# Patient Record
Sex: MALE | Race: ASIAN | Employment: OTHER | URBAN - METROPOLITAN AREA
[De-identification: names, ages, dates, MRNs, and addresses within clinical notes are randomized per-mention and may not be internally consistent; named-entity substitution may affect disease eponyms.]

---

## 2017-01-04 ENCOUNTER — HOSPITAL ENCOUNTER (INPATIENT)
Facility: HOSPITAL | Age: 82
LOS: 5 days | Discharge: NURSING, REHAB OR LONG-TERM CARE | DRG: 377 | End: 2017-01-09
Attending: EMERGENCY MEDICINE | Admitting: INTERNAL MEDICINE
Payer: MEDICARE

## 2017-01-04 ENCOUNTER — APPOINTMENT (OUTPATIENT)
Dept: GENERAL RADIOLOGY | Facility: HOSPITAL | Age: 82
DRG: 377 | End: 2017-01-04
Attending: STUDENT IN AN ORGANIZED HEALTH CARE EDUCATION/TRAINING PROGRAM
Payer: MEDICARE

## 2017-01-04 ENCOUNTER — ANESTHESIA EVENT (OUTPATIENT)
Dept: SURGERY | Facility: HOSPITAL | Age: 82
DRG: 377 | End: 2017-01-04
Payer: MEDICARE

## 2017-01-04 ENCOUNTER — APPOINTMENT (OUTPATIENT)
Dept: GENERAL RADIOLOGY | Facility: HOSPITAL | Age: 82
DRG: 377 | End: 2017-01-04
Attending: EMERGENCY MEDICINE
Payer: MEDICARE

## 2017-01-04 ENCOUNTER — SURGERY (OUTPATIENT)
Age: 82
End: 2017-01-04

## 2017-01-04 ENCOUNTER — ANESTHESIA (OUTPATIENT)
Dept: SURGERY | Facility: HOSPITAL | Age: 82
DRG: 377 | End: 2017-01-04
Payer: MEDICARE

## 2017-01-04 ENCOUNTER — APPOINTMENT (OUTPATIENT)
Dept: ULTRASOUND IMAGING | Facility: HOSPITAL | Age: 82
DRG: 377 | End: 2017-01-04
Attending: EMERGENCY MEDICINE
Payer: MEDICARE

## 2017-01-04 DIAGNOSIS — K92.2 GASTROINTESTINAL HEMORRHAGE, UNSPECIFIED GASTROINTESTINAL HEMORRHAGE TYPE: Primary | ICD-10-CM

## 2017-01-04 DIAGNOSIS — R06.00 DYSPNEA, UNSPECIFIED TYPE: ICD-10-CM

## 2017-01-04 DIAGNOSIS — D64.9 ANEMIA, UNSPECIFIED TYPE: ICD-10-CM

## 2017-01-04 PROBLEM — E87.3 RESPIRATORY ALKALOSIS: Status: ACTIVE | Noted: 2017-01-04

## 2017-01-04 PROBLEM — E87.1 HYPONATREMIA: Status: ACTIVE | Noted: 2017-01-04

## 2017-01-04 PROBLEM — E87.3 METABOLIC ALKALOSIS: Status: ACTIVE | Noted: 2017-01-04

## 2017-01-04 PROBLEM — N17.9 ACUTE RENAL FAILURE (ARF) (HCC): Status: ACTIVE | Noted: 2017-01-04

## 2017-01-04 PROBLEM — D72.829 LEUKOCYTOSIS: Status: ACTIVE | Noted: 2017-01-04

## 2017-01-04 PROBLEM — E87.2 METABOLIC ACIDOSIS: Status: ACTIVE | Noted: 2017-01-04

## 2017-01-04 PROBLEM — N17.9 ACUTE KIDNEY INJURY (HCC): Status: ACTIVE | Noted: 2017-01-04

## 2017-01-04 PROBLEM — R79.89 AZOTEMIA: Status: ACTIVE | Noted: 2017-01-04

## 2017-01-04 PROBLEM — N17.9 ACUTE KIDNEY INJURY: Status: ACTIVE | Noted: 2017-01-04

## 2017-01-04 PROBLEM — R73.9 HYPERGLYCEMIA: Status: ACTIVE | Noted: 2017-01-04

## 2017-01-04 PROBLEM — E87.20 METABOLIC ACIDOSIS: Status: ACTIVE | Noted: 2017-01-04

## 2017-01-04 PROBLEM — N17.9 ACUTE RENAL FAILURE (ARF): Status: ACTIVE | Noted: 2017-01-04

## 2017-01-04 LAB
ALBUMIN SERPL-MCNC: 2.3 G/DL (ref 3.5–4.8)
ALLENS TEST: POSITIVE
ALP LIVER SERPL-CCNC: 67 U/L (ref 45–117)
ALT SERPL-CCNC: 29 U/L (ref 17–63)
ANTIBODY SCREEN: NEGATIVE
APTT PPP: 30.4 SECONDS (ref 25–34)
ARTERIAL BLD GAS O2 SATURATION: 96 % (ref 92–100)
ARTERIAL BLOOD GAS BASE EXCESS: -6.7
ARTERIAL BLOOD GAS HCO3: 17.3 MEQ/L (ref 22–26)
ARTERIAL BLOOD GAS PCO2: 29 MM HG (ref 35–45)
ARTERIAL BLOOD GAS PH: 7.4 (ref 7.35–7.45)
ARTERIAL BLOOD GAS PO2: 121 MM HG (ref 80–105)
AST SERPL-CCNC: 23 U/L (ref 15–41)
ATRIAL RATE: 82 BPM
BAND %: 8 %
BASOPHIL % MANUAL: 0 %
BASOPHIL ABSOLUTE MANUAL: 0 X10(3) UL (ref 0–0.1)
BILIRUB SERPL-MCNC: 0.8 MG/DL (ref 0.1–2)
BUN BLD-MCNC: 95 MG/DL (ref 8–20)
CALCIUM BLD-MCNC: 6.9 MG/DL (ref 8.3–10.3)
CALCULATED O2 SATURATION: 99 % (ref 92–100)
CARBOXYHEMOGLOBIN: 2 % SAT (ref 0–3)
CHLORIDE: 100 MMOL/L (ref 101–111)
CO2: 20 MMOL/L (ref 22–32)
CREAT BLD-MCNC: 2.26 MG/DL (ref 0.7–1.3)
EOSINOPHIL % MANUAL: 1 %
EOSINOPHIL ABSOLUTE MANUAL: 0.16 X10(3) UL (ref 0–0.3)
ERYTHROCYTE [DISTWIDTH] IN BLOOD BY AUTOMATED COUNT: 13.5 % (ref 11.5–16)
GLUCOSE BLD-MCNC: 124 MG/DL (ref 70–99)
HCT VFR BLD AUTO: 24.6 % (ref 37–53)
HGB BLD-MCNC: 8 G/DL (ref 13–17)
INR BLD: 1.21 (ref 0.89–1.12)
L/M: 3 L/MIN
LYMPHOCYTE % MANUAL: 3 %
LYMPHOCYTE ABSOLUTE MANUAL: 0.48 X10(3) UL (ref 0.9–4)
M PROTEIN MFR SERPL ELPH: 5 G/DL (ref 6.1–8.3)
MCH RBC QN AUTO: 31 PG (ref 27–33.2)
MCHC RBC AUTO-ENTMCNC: 32.5 G/DL (ref 31–37)
MCV RBC AUTO: 95.3 FL (ref 80–99)
METHEMOGLOBIN: 0.6 % SAT (ref 0.4–1.5)
MONOCYTE % MANUAL: 2 %
MONOCYTE ABSOLUTE MANUAL: 0.32 X10(3) UL (ref 0.1–0.6)
MORPHOLOGY: NORMAL
MYELOCYTE %: 1 %
MYELOCYTE ABSOLUTE MANUAL: 0.16 X10(3) UL (ref ?–0.01)
NEUTROPHIL ABS PRELIM: 13.82 X10 (3) UL (ref 1.3–6.7)
NEUTROPHIL ABSOLUTE MANUAL: 14.88 X10(3) UL (ref 1.3–6.7)
NEUTROPHILS % MANUAL: 85 %
P AXIS: 64 DEGREES
P-R INTERVAL: 146 MS
PATIENT TEMPERATURE: 98.6 F
PLATELET # BLD AUTO: 354 10(3)UL (ref 150–450)
PLATELET MORPHOLOGY: NORMAL
POTASSIUM SERPL-SCNC: 4.5 MMOL/L (ref 3.6–5.1)
PRO-BETA NATRIURETIC PEPTIDE: 1108 PG/ML (ref ?–450)
PSA SERPL DL<=0.01 NG/ML-MCNC: 15.7 SECONDS (ref 12.3–14.8)
Q-T INTERVAL: 378 MS
QRS DURATION: 92 MS
QTC CALCULATION (BEZET): 441 MS
R AXIS: 31 DEGREES
RBC # BLD AUTO: 2.58 X10(6)UL (ref 3.8–5.8)
RED CELL DISTRIBUTION WIDTH-SD: 46.1 FL (ref 35.1–46.3)
RH BLOOD TYPE: POSITIVE
SODIUM SERPL-SCNC: 130 MMOL/L (ref 136–144)
T AXIS: 164 DEGREES
TOTAL CELLS COUNTED: 100
TOTAL HEMOGLOBIN: 8 G/DL (ref 12.6–17.4)
TROPONIN: <0.046 NG/ML (ref ?–0.05)
VENTRICULAR RATE: 82 BPM
WBC # BLD AUTO: 16 X10(3) UL (ref 4–13)

## 2017-01-04 PROCEDURE — 84484 ASSAY OF TROPONIN QUANT: CPT | Performed by: EMERGENCY MEDICINE

## 2017-01-04 PROCEDURE — 74000 XR ABDOMEN (KUB) (1 AP VIEW)  (CPT=74000): CPT

## 2017-01-04 PROCEDURE — 85025 COMPLETE CBC W/AUTO DIFF WBC: CPT | Performed by: EMERGENCY MEDICINE

## 2017-01-04 PROCEDURE — 86900 BLOOD TYPING SEROLOGIC ABO: CPT | Performed by: EMERGENCY MEDICINE

## 2017-01-04 PROCEDURE — 36600 WITHDRAWAL OF ARTERIAL BLOOD: CPT | Performed by: EMERGENCY MEDICINE

## 2017-01-04 PROCEDURE — 85027 COMPLETE CBC AUTOMATED: CPT | Performed by: EMERGENCY MEDICINE

## 2017-01-04 PROCEDURE — 93010 ELECTROCARDIOGRAM REPORT: CPT

## 2017-01-04 PROCEDURE — 85730 THROMBOPLASTIN TIME PARTIAL: CPT | Performed by: EMERGENCY MEDICINE

## 2017-01-04 PROCEDURE — 96361 HYDRATE IV INFUSION ADD-ON: CPT

## 2017-01-04 PROCEDURE — 0D9670Z DRAINAGE OF STOMACH WITH DRAINAGE DEVICE, VIA NATURAL OR ARTIFICIAL OPENING: ICD-10-PCS | Performed by: EMERGENCY MEDICINE

## 2017-01-04 PROCEDURE — 83050 HGB METHEMOGLOBIN QUAN: CPT | Performed by: EMERGENCY MEDICINE

## 2017-01-04 PROCEDURE — 85610 PROTHROMBIN TIME: CPT | Performed by: EMERGENCY MEDICINE

## 2017-01-04 PROCEDURE — 83880 ASSAY OF NATRIURETIC PEPTIDE: CPT | Performed by: EMERGENCY MEDICINE

## 2017-01-04 PROCEDURE — 30233N1 TRANSFUSION OF NONAUTOLOGOUS RED BLOOD CELLS INTO PERIPHERAL VEIN, PERCUTANEOUS APPROACH: ICD-10-PCS | Performed by: EMERGENCY MEDICINE

## 2017-01-04 PROCEDURE — 94640 AIRWAY INHALATION TREATMENT: CPT

## 2017-01-04 PROCEDURE — 99285 EMERGENCY DEPT VISIT HI MDM: CPT

## 2017-01-04 PROCEDURE — 86901 BLOOD TYPING SEROLOGIC RH(D): CPT | Performed by: EMERGENCY MEDICINE

## 2017-01-04 PROCEDURE — 87081 CULTURE SCREEN ONLY: CPT | Performed by: INTERNAL MEDICINE

## 2017-01-04 PROCEDURE — 87040 BLOOD CULTURE FOR BACTERIA: CPT | Performed by: EMERGENCY MEDICINE

## 2017-01-04 PROCEDURE — 93005 ELECTROCARDIOGRAM TRACING: CPT

## 2017-01-04 PROCEDURE — 96365 THER/PROPH/DIAG IV INF INIT: CPT

## 2017-01-04 PROCEDURE — 85018 HEMOGLOBIN: CPT | Performed by: EMERGENCY MEDICINE

## 2017-01-04 PROCEDURE — 74020 XR ABDOMEN, OBSTRUCTIVE SERIES (CPT=74020): CPT

## 2017-01-04 PROCEDURE — 85007 BL SMEAR W/DIFF WBC COUNT: CPT | Performed by: EMERGENCY MEDICINE

## 2017-01-04 PROCEDURE — 99291 CRITICAL CARE FIRST HOUR: CPT

## 2017-01-04 PROCEDURE — C9113 INJ PANTOPRAZOLE SODIUM, VIA: HCPCS | Performed by: EMERGENCY MEDICINE

## 2017-01-04 PROCEDURE — 86920 COMPATIBILITY TEST SPIN: CPT

## 2017-01-04 PROCEDURE — 86850 RBC ANTIBODY SCREEN: CPT | Performed by: EMERGENCY MEDICINE

## 2017-01-04 PROCEDURE — 0DJ08ZZ INSPECTION OF UPPER INTESTINAL TRACT, VIA NATURAL OR ARTIFICIAL OPENING ENDOSCOPIC: ICD-10-PCS | Performed by: STUDENT IN AN ORGANIZED HEALTH CARE EDUCATION/TRAINING PROGRAM

## 2017-01-04 PROCEDURE — 82803 BLOOD GASES ANY COMBINATION: CPT | Performed by: EMERGENCY MEDICINE

## 2017-01-04 PROCEDURE — 36415 COLL VENOUS BLD VENIPUNCTURE: CPT

## 2017-01-04 PROCEDURE — 80053 COMPREHEN METABOLIC PANEL: CPT | Performed by: EMERGENCY MEDICINE

## 2017-01-04 PROCEDURE — 93970 EXTREMITY STUDY: CPT

## 2017-01-04 PROCEDURE — 82375 ASSAY CARBOXYHB QUANT: CPT | Performed by: EMERGENCY MEDICINE

## 2017-01-04 PROCEDURE — 71010 XR CHEST AP PORTABLE  (CPT=71010): CPT

## 2017-01-04 RX ORDER — ACETAMINOPHEN AND CODEINE PHOSPHATE 300; 30 MG/1; MG/1
1 TABLET ORAL EVERY 4 HOURS PRN
Status: ON HOLD | COMMUNITY
End: 2017-01-09

## 2017-01-04 RX ORDER — CEPHALEXIN 250 MG/1
250 CAPSULE ORAL 4 TIMES DAILY
Status: ON HOLD | COMMUNITY
End: 2017-01-09

## 2017-01-04 RX ORDER — PREDNISONE 10 MG/1
30 TABLET ORAL DAILY
Status: ON HOLD | COMMUNITY
Start: 2017-01-05 | End: 2017-01-09

## 2017-01-04 RX ORDER — CARVEDILOL 6.25 MG/1
6.25 TABLET ORAL 2 TIMES DAILY WITH MEALS
Status: ON HOLD | COMMUNITY
End: 2017-05-15

## 2017-01-04 RX ORDER — METOCLOPRAMIDE HYDROCHLORIDE 5 MG/ML
10 INJECTION INTRAMUSCULAR; INTRAVENOUS ONCE
Status: COMPLETED | OUTPATIENT
Start: 2017-01-04 | End: 2017-01-04

## 2017-01-04 RX ORDER — SODIUM CHLORIDE 9 MG/ML
INJECTION, SOLUTION INTRAVENOUS CONTINUOUS
Status: DISCONTINUED | OUTPATIENT
Start: 2017-01-04 | End: 2017-01-06

## 2017-01-04 RX ORDER — IPRATROPIUM BROMIDE AND ALBUTEROL SULFATE 2.5; .5 MG/3ML; MG/3ML
3 SOLUTION RESPIRATORY (INHALATION) ONCE
Status: COMPLETED | OUTPATIENT
Start: 2017-01-04 | End: 2017-01-04

## 2017-01-04 RX ORDER — SODIUM CHLORIDE 9 MG/ML
INJECTION, SOLUTION INTRAVENOUS ONCE
Status: DISCONTINUED | OUTPATIENT
Start: 2017-01-04 | End: 2017-01-08

## 2017-01-04 RX ORDER — SODIUM CHLORIDE 9 MG/ML
INJECTION, SOLUTION INTRAVENOUS CONTINUOUS
Status: ACTIVE | OUTPATIENT
Start: 2017-01-04 | End: 2017-01-04

## 2017-01-04 RX ORDER — METHYLPREDNISOLONE SODIUM SUCCINATE 40 MG/ML
40 INJECTION, POWDER, LYOPHILIZED, FOR SOLUTION INTRAMUSCULAR; INTRAVENOUS EVERY 8 HOURS
Status: COMPLETED | OUTPATIENT
Start: 2017-01-04 | End: 2017-01-06

## 2017-01-04 RX ORDER — IPRATROPIUM BROMIDE AND ALBUTEROL SULFATE 2.5; .5 MG/3ML; MG/3ML
3 SOLUTION RESPIRATORY (INHALATION)
Status: ON HOLD | COMMUNITY
End: 2017-05-11 | Stop reason: ALTCHOICE

## 2017-01-04 RX ORDER — PRAVASTATIN SODIUM 40 MG
40 TABLET ORAL NIGHTLY
Status: ON HOLD | COMMUNITY
End: 2017-05-11 | Stop reason: ALTCHOICE

## 2017-01-04 RX ORDER — ONDANSETRON 4 MG/1
4 TABLET, ORALLY DISINTEGRATING ORAL EVERY 8 HOURS PRN
Status: ON HOLD | COMMUNITY
End: 2017-01-18

## 2017-01-04 RX ORDER — LOSARTAN POTASSIUM 100 MG/1
100 TABLET ORAL
COMMUNITY
End: 2019-02-27

## 2017-01-04 RX ORDER — BISACODYL 10 MG
10 SUPPOSITORY, RECTAL RECTAL DAILY PRN
Status: ON HOLD | COMMUNITY
End: 2017-05-11 | Stop reason: ALTCHOICE

## 2017-01-04 RX ORDER — SENNA PLUS 8.6 MG/1
1 TABLET ORAL 2 TIMES DAILY
Status: ON HOLD | COMMUNITY
End: 2017-05-11

## 2017-01-04 RX ORDER — ONDANSETRON 2 MG/ML
4 INJECTION INTRAMUSCULAR; INTRAVENOUS EVERY 4 HOURS PRN
Status: DISCONTINUED | OUTPATIENT
Start: 2017-01-04 | End: 2017-01-04

## 2017-01-04 RX ORDER — IPRATROPIUM BROMIDE AND ALBUTEROL SULFATE 2.5; .5 MG/3ML; MG/3ML
3 SOLUTION RESPIRATORY (INHALATION)
Status: DISCONTINUED | OUTPATIENT
Start: 2017-01-04 | End: 2017-01-06

## 2017-01-04 RX ORDER — ACETAMINOPHEN 325 MG/1
650 TABLET ORAL EVERY 6 HOURS PRN
Status: ON HOLD | COMMUNITY
End: 2017-05-11 | Stop reason: ALTCHOICE

## 2017-01-04 RX ORDER — PREDNISONE 10 MG/1
40 TABLET ORAL DAILY
Status: ON HOLD | COMMUNITY
Start: 2017-01-03 | End: 2017-01-09

## 2017-01-04 RX ORDER — ISOSORBIDE MONONITRATE 30 MG/1
30 TABLET, EXTENDED RELEASE ORAL DAILY
COMMUNITY
End: 2019-07-16

## 2017-01-04 RX ORDER — PREDNISONE 10 MG/1
10 TABLET ORAL DAILY
Status: ON HOLD | COMMUNITY
Start: 2017-01-07 | End: 2017-01-09

## 2017-01-04 RX ORDER — GUAIFENESIN AND DEXTROMETHORPHAN HYDROBROMIDE 100; 10 MG/5ML; MG/5ML
10 SOLUTION ORAL EVERY 6 HOURS PRN
Status: ON HOLD | COMMUNITY
End: 2017-05-11 | Stop reason: ALTCHOICE

## 2017-01-04 NOTE — ED PROVIDER NOTES
Patient Seen in: [facility] Emergency Department    History   Patient presents with:  Dyspnea CHRIS SOB (respiratory)    Stated Complaint: SOB, vomiting up blood    HPI    Patient is a pleasant 27-year-old male, who arrives via EMS for evaluation of d Inhalation Solution,  Take 3 mL by nebulization. Isosorbide Mononitrate ER 30 MG Oral Tablet 24 Hr,  Take 30 mg by mouth daily. ondansetron 4 MG Oral Tablet Dispersible,  Take 4 mg by mouth every 8 (eight) hours as needed for Nausea.    predniSONE 10 MG MG Oral Cap,  Take 160 mg by mouth every morning before breakfast.     Meloxicam (MOBIC) 7.5 MG Oral Tab,  Take 7.5 mg by mouth daily. Tiotropium Bromide Monohydrate (SPIRIVA HANDIHALER) 18 MCG Inhalation Cap,  Inhale 18 mcg into the lungs daily.        Carole Cage patient is awake, alert, and oriented x3. Cranial nerves are grossly intact. There is no gross motor or sensory deficits identified.     ED Course     Labs Reviewed   ARTERIAL BLOOD GAS - Abnormal; Notable for the following:     ABG pCO2 29 (*)     ABG pO2 following orders were created for panel order TYPE AND SCREEN.   Procedure                               Abnormality         Status                     ---------                               -----------         ------                     BLOOD TYPE, ABO AN spine. Please correlate clinically. If this is an NG tube, the tube should be further advanced into the stomach. There is elevation of the right hemidiaphragm. There is no pulmonary edema, consolidation or pleural effusion seen.  Calcified granuloma seen w involved direct patient intervention, complex decision making, and/or extensive discussions with the patient, family, and clinical staff.     Disposition and Plan     Clinical Impression:  Gastrointestinal hemorrhage, unspecified gastrointestinal hemorrhage

## 2017-01-04 NOTE — CONSULTS
[facility]  Report of Consultation    Nino Mccarty Patient Status:  Emergency    1934 MRN [id number]   Location [facility] Attending Carlos Gay MD   Hosp Day # 0 PCP Sindi Brothers MD     Reason he does not use illicit drugs. Allergies:  No Known Allergies    Medications:      Prior to Admission Medications:  acetaminophen 325 MG Oral Tab Take 325 mg by mouth every 6 (six) hours as needed for Pain.    Acetaminophen-Codeine #3 300-30 MG Oral Tab Soln Inhale 2 puffs into the lungs every 6 (six) hours as needed for Wheezing. Budesonide-Formoterol Fumarate (SYMBICORT) 160-4.5 MCG/ACT Inhalation Aerosol Inhale 2 puffs into the lungs 2 (two) times daily.    TraMADol HCl 50 MG Oral Tab Take 1 tablet (5 Vitals for the past 24 hrs:   BP Temp Temp src Pulse Resp SpO2 Height Weight   01/04/17 1617 96/55 mmHg 98.9 °F (37.2 °C) Temporal 77 18 100 % - -   01/04/17 1525 (!) 78/35 mmHg - - 75 17 97 % - -   01/04/17 1414 - - - 74 19 100 % - -   01/04/17 1354 - - - doing well  · US of LUIS MIGUEL to r/o DVT  · abx for aspiration pna   · Nebs , steroids for today - hopefully can stop tomorrow  · GI consulted   · Follow  BNP , will inform Dr Camila Frazier of patients admission  · PPI gtt   · Scope timing as per GI  Questions/concerns w

## 2017-01-05 ENCOUNTER — APPOINTMENT (OUTPATIENT)
Dept: GENERAL RADIOLOGY | Facility: HOSPITAL | Age: 82
DRG: 377 | End: 2017-01-05
Attending: INTERNAL MEDICINE
Payer: MEDICARE

## 2017-01-05 LAB
ALBUMIN SERPL-MCNC: 2.2 G/DL (ref 3.5–4.8)
ALP LIVER SERPL-CCNC: 66 U/L (ref 45–117)
ALT SERPL-CCNC: 28 U/L (ref 17–63)
AST SERPL-CCNC: 27 U/L (ref 15–41)
BAND %: 17 %
BASOPHIL % MANUAL: 0 %
BASOPHIL ABSOLUTE MANUAL: 0 X10(3) UL (ref 0–0.1)
BETA NATRIURETIC PEPTIDE: 85 PG/ML (ref 2–99)
BILIRUB SERPL-MCNC: 1.1 MG/DL (ref 0.1–2)
BUN BLD-MCNC: 105 MG/DL (ref 8–20)
CALCIUM BLD-MCNC: 6.5 MG/DL (ref 8.3–10.3)
CHLORIDE: 102 MMOL/L (ref 101–111)
CO2: 18 MMOL/L (ref 22–32)
CREAT BLD-MCNC: 2.39 MG/DL (ref 0.7–1.3)
EOSINOPHIL % MANUAL: 0 %
EOSINOPHIL ABSOLUTE MANUAL: 0 X10(3) UL (ref 0–0.3)
ERYTHROCYTE [DISTWIDTH] IN BLOOD BY AUTOMATED COUNT: 13.6 % (ref 11.5–16)
FOLATE (FOLIC ACID), SERUM: 59.4 NG/ML (ref 8.7–24)
GLUCOSE BLD-MCNC: 114 MG/DL (ref 70–99)
HAV AB SERPL IA-ACNC: >2000 PG/ML (ref 193–986)
HAV IGM SER QL: 4.3 MG/DL (ref 1.7–3)
HCT VFR BLD AUTO: 29.1 % (ref 37–53)
HEMOLYSIS: 2
HGB BLD-MCNC: 9.7 G/DL (ref 13–17)
ICTERUS: 1
INR BLD: 1.28 (ref 0.89–1.12)
IRON SATURATION: 17 % (ref 13–45)
IRON: 39 UG/DL (ref 45–182)
LIPEMIA: 1
LYMPHOCYTE % MANUAL: 2 %
LYMPHOCYTE ABSOLUTE MANUAL: 0.32 X10(3) UL (ref 0.9–4)
M PROTEIN MFR SERPL ELPH: 5.2 G/DL (ref 6.1–8.3)
MCH RBC QN AUTO: 31.6 PG (ref 27–33.2)
MCHC RBC AUTO-ENTMCNC: 33.3 G/DL (ref 31–37)
MCV RBC AUTO: 94.8 FL (ref 80–99)
MONOCYTE % MANUAL: 1 %
MONOCYTE ABSOLUTE MANUAL: 0.16 X10(3) UL (ref 0.1–0.6)
NEUTROPHIL ABS PRELIM: 14.41 X10 (3) UL (ref 1.3–6.7)
NEUTROPHIL ABSOLUTE MANUAL: 15.71 X10(3) UL (ref 1.3–6.7)
NEUTROPHILS % MANUAL: 80 %
PLATELET # BLD AUTO: 385 10(3)UL (ref 150–450)
POTASSIUM SERPL-SCNC: 4.5 MMOL/L (ref 3.6–5.1)
PSA SERPL DL<=0.01 NG/ML-MCNC: 16.4 SECONDS (ref 12.3–14.8)
RBC # BLD AUTO: 3.07 X10(6)UL (ref 3.8–5.8)
RED CELL DISTRIBUTION WIDTH-SD: 46.3 FL (ref 35.1–46.3)
SODIUM SERPL-SCNC: 133 MMOL/L (ref 136–144)
TOTAL CELLS COUNTED: 100
TOTAL IRON BINDING CAPACITY: 234 UG/DL (ref 298–536)
TRANSFERRIN: 157 MG/DL (ref 200–360)
TROPONIN: <0.046 NG/ML (ref ?–0.05)
WBC # BLD AUTO: 16.2 X10(3) UL (ref 4–13)

## 2017-01-05 PROCEDURE — 83735 ASSAY OF MAGNESIUM: CPT | Performed by: INTERNAL MEDICINE

## 2017-01-05 PROCEDURE — 97535 SELF CARE MNGMENT TRAINING: CPT

## 2017-01-05 PROCEDURE — 94640 AIRWAY INHALATION TREATMENT: CPT

## 2017-01-05 PROCEDURE — 83550 IRON BINDING TEST: CPT | Performed by: INTERNAL MEDICINE

## 2017-01-05 PROCEDURE — 84484 ASSAY OF TROPONIN QUANT: CPT | Performed by: INTERNAL MEDICINE

## 2017-01-05 PROCEDURE — 83880 ASSAY OF NATRIURETIC PEPTIDE: CPT | Performed by: INTERNAL MEDICINE

## 2017-01-05 PROCEDURE — 36430 TRANSFUSION BLD/BLD COMPNT: CPT

## 2017-01-05 PROCEDURE — 97116 GAIT TRAINING THERAPY: CPT

## 2017-01-05 PROCEDURE — 71010 XR CHEST AP PORTABLE  (CPT=71010): CPT

## 2017-01-05 PROCEDURE — 87493 C DIFF AMPLIFIED PROBE: CPT | Performed by: INTERNAL MEDICINE

## 2017-01-05 PROCEDURE — C9113 INJ PANTOPRAZOLE SODIUM, VIA: HCPCS | Performed by: STUDENT IN AN ORGANIZED HEALTH CARE EDUCATION/TRAINING PROGRAM

## 2017-01-05 PROCEDURE — 97163 PT EVAL HIGH COMPLEX 45 MIN: CPT

## 2017-01-05 PROCEDURE — 85610 PROTHROMBIN TIME: CPT | Performed by: INTERNAL MEDICINE

## 2017-01-05 PROCEDURE — 85025 COMPLETE CBC W/AUTO DIFF WBC: CPT | Performed by: INTERNAL MEDICINE

## 2017-01-05 PROCEDURE — 80053 COMPREHEN METABOLIC PANEL: CPT | Performed by: INTERNAL MEDICINE

## 2017-01-05 PROCEDURE — 82607 VITAMIN B-12: CPT | Performed by: INTERNAL MEDICINE

## 2017-01-05 PROCEDURE — 85007 BL SMEAR W/DIFF WBC COUNT: CPT | Performed by: INTERNAL MEDICINE

## 2017-01-05 PROCEDURE — 82746 ASSAY OF FOLIC ACID SERUM: CPT | Performed by: INTERNAL MEDICINE

## 2017-01-05 PROCEDURE — 83540 ASSAY OF IRON: CPT | Performed by: INTERNAL MEDICINE

## 2017-01-05 PROCEDURE — 85027 COMPLETE CBC AUTOMATED: CPT | Performed by: INTERNAL MEDICINE

## 2017-01-05 PROCEDURE — 97166 OT EVAL MOD COMPLEX 45 MIN: CPT

## 2017-01-05 RX ORDER — MORPHINE SULFATE 2 MG/ML
1 INJECTION, SOLUTION INTRAMUSCULAR; INTRAVENOUS EVERY 4 HOURS PRN
Status: DISCONTINUED | OUTPATIENT
Start: 2017-01-05 | End: 2017-01-07

## 2017-01-05 RX ORDER — PANTOPRAZOLE SODIUM 40 MG/1
40 TABLET, DELAYED RELEASE ORAL
Status: DISCONTINUED | OUTPATIENT
Start: 2017-01-06 | End: 2017-01-09

## 2017-01-05 RX ORDER — ONDANSETRON 2 MG/ML
4 INJECTION INTRAMUSCULAR; INTRAVENOUS EVERY 4 HOURS PRN
Status: DISCONTINUED | OUTPATIENT
Start: 2017-01-05 | End: 2017-01-09

## 2017-01-05 NOTE — ANESTHESIA PREPROCEDURE EVALUATION
PRE-OP EVALUATION    Patient Name: Rohit Abdullahi    Pre-op Diagnosis: Stomach disorder [K31.9]    Procedure(s):  Esophagogastroduodenoscopy with Mac    Surgeon(s) and Role:     * Syed Johnson MD - Primary    Pre-op vitals reviewed. Temp: 98. every 4 (four) hours as needed. Disp:  Rfl:    Isosorbide Mononitrate ER 30 MG Oral Tablet 24 Hr Take 30 mg by mouth daily. Disp:  Rfl:    ondansetron 4 MG Oral Tablet Dispersible Take 4 mg by mouth every 8 (eight) hours as needed for Nausea.  Disp:  Rfl: Aero Soln Inhale 2 puffs into the lungs every 6 (six) hours as needed for Wheezing. Disp:  Rfl:    Budesonide-Formoterol Fumarate (SYMBICORT) 160-4.5 MCG/ACT Inhalation Aerosol Inhale 2 puffs into the lungs 2 (two) times daily.  Disp:  Rfl:        Allergies ROM: limited Cardiovascular    Cardiovascular exam normal.         Dental  Comment: Edentulous           Pulmonary    Pulmonary exam normal.                 Other findings            ASA: 3 and emergent  Plan: MAC  NPO status verified and           Plan/ri

## 2017-01-05 NOTE — PHYSICAL THERAPY NOTE
PHYSICAL THERAPY EVALUATION - INPATIENT     Room Number: 463/463-A  Evaluation Date: 1/5/2017  Type of Evaluation: Initial  Physician Order: PT Eval and Treat    Presenting Problem: GI hemorrhage, anemia, dyspnea, R knee replacement  Reason for Therapy level  Stairs to Enter : 1  Railing: No  Stairs to Bedroom: 16  Railing: Yes    Lives With: Alone; Other (Comment) ( 4 days/wk)  Drives: Yes  Patient Owned Equipment: Rolling walker;Cane  Patient Regularly Uses: None    Prior Level of Independe Fair -  Static Standing: Fair -  Dynamic Standing: Fair -    ADDITIONAL TESTS  Additional Tests: Edema; Other (Comment)                    Other Test(s): Mercedes: 7/10 with ambulation    Edema: Deep pitting, indentation remains for a short time  Edema Location from EOB to RW with min A and stood for 1 minute, with verbal cues on hand placement. Pt then impulsively sat down at EOB. Pt sat EOB for 2 minutes and then performed transfer into standing at RW with min A.  Patient rated exertion level (Mercedes scale) with t mobility;Gait training;Patient education; Family education;Strengthening;Stair training;Transfer training;Balance training  Rehab Potential : Good  Frequency (Obs): 5x/week  Number of Visits to Meet Established Goals: 5      CURRENT GOALS    Goal #1 Patient

## 2017-01-05 NOTE — PLAN OF CARE
GASTROINTESTINAL - ADULT    • Minimal or absence of nausea and vomiting Progressing        RESPIRATORY - ADULT    • Achieves optimal ventilation and oxygenation Progressing        Assumed care of pt at 1930 consent for blood and egd obtained from son by of

## 2017-01-05 NOTE — ANESTHESIA POSTPROCEDURE EVALUATION
[facility] [place] Patient Status:  Inpatient   Age/Gender 80year old male MRN [id number]   [facility] SURGERY Attending Jacob Johnson MD   Hosp Day # 0 PCP Robert Freedman MD       Anesthesia Post-op Note    Pr

## 2017-01-05 NOTE — CONSULTS
Gastroenterology Initial Consultation  I have personally seen and examined the patient.     Patient Name: Lillian Hugh  Referring physician:  Faarz Miller  Reason for consultation: hematemesis  CC: hematemesis  HPI: Mr Sonido Wagner was brought today from rehab ondansetron HCl (ZOFRAN) injection 4 mg 4 mg Intravenous Q4H PRN   [COMPLETED] Pantoprazole Sodium (PROTONIX) 80 mg in sodium chloride 0.9 % 100 mL IVPB 80 mg Intravenous Once   [MAR Hold] ipratropium-albuterol (DUONEB) nebulizer solution 3 mL 3 mL Nebuliz ideation, or homicidal ideation           Oncologic: The patient reports on history of prior solid tumor or hematologic malignancy           ENT: The patient reports no hoarseness of voice, hearing loss, sinus congestion, tinnitus           Neurologic:  The 1406   ALT  29   AST  23     XR abdomen  NG in body of stomach, no dilation     Impression: Mr Fay Verdin is an 81 yo male with history of recent knee repair. He is on a full ASA for post-op prophylaxis.   He reports pre-surgical Hgb of 13, Hgb of 7 yesterday

## 2017-01-05 NOTE — CONSULTS
[facility]. History & Physical    Gerardo Prajapati Patient Status:  Inpatient    1934 MRN [id number]   [facility] 4SW-A Attending Mettu, Dima Mason MD   Hosp Day # 1 PCP Eliza Wright MD     History of Allergies    Home Medications:    Prescriptions prior to admission:  acetaminophen 325 MG Oral Tab Take 65 mg by mouth every 6 (six) hours as needed for Pain.    Disp:  Rfl:  Past Week at Unknown time   Acetaminophen-Codeine #3 300-30 MG Oral Tab Take 1 tab Rfl:  1/4/2017 at 0900   Tiotropium Bromide Monohydrate 18 MCG Inhalation Cap Inhale 18 mcg into the lungs daily. Disp:  Rfl:  1/4/2017 at 0900   predniSONE 10 MG Oral Tab Take 30 mg by mouth daily.  Disp:  Rfl:     [START ON 1/7/2017] predniSONE 10 MG Oral adenopathy;     thyroid:  no enlargement/tenderness/nodules; no carotid    bruit or JVD   Back:     Symmetric, no curvature, ROM normal, no CVA tenderness   Lungs:     Clear to auscultation bilaterally, respirations unlabored   Chest Wall:    No tenderness Hyponatremia    Hyperglycemia    Leukocytosis    Acute renal failure (ARF) (HCC)    Acute kidney injury (HCC)    Azotemia    Metabolic acidosis    Metabolic alkalosis    Respiratory alkalosis    Gastrointestinal hemorrhage    Anemia, unspecified type    Dy

## 2017-01-05 NOTE — OPERATIVE REPORT
EGD operative report  Patient Name: Biju Villanueva  Procedure: Esophagogastroduodenoscopy  Indication: hematemesis (per ER report)  Attending: Jan Manzanares M.D. Consent:  The risks, benefits, and alternatives were discussed with the patient / POA. portions of the stomach (the body, cardia, greater curvature, lesser curvature), there was no mucosal injury or lesion. There was no blood staining seen on the mucosa.    Duodenum: There was green fluid seen refluxing from pylorus into the stomach and ther

## 2017-01-05 NOTE — CM/SW NOTE
01/05/17 1400   CM/SW Referral Data   Referral Source Social Work (self-referral)   Reason for Referral Discharge planning   Informant Children   Pertinent Medical Hx   Primary Care Physician Name Mettu   Patient Info   Patient's Mental Status Alert;Andry

## 2017-01-05 NOTE — OCCUPATIONAL THERAPY NOTE
OCCUPATIONAL THERAPY EVALUATION - INPATIENT     Room Number: 463/463-A  Evaluation Date: 1/5/2017  Type of Evaluation: Initial  Presenting Problem: GI Hemorrhage; anemia; dyspnea; recent s/p R TKA x 2 weeks ago    Physician Order: IP Consult to [facility] cervical and lumbar spine   • Anemia 5/18/2016       Past Surgical History      Past Surgical History    HERNIA SURGERY      ANGIOGRAM      Comment 2009    COLECTOMY      OTHER SURGICAL HISTORY      Comment 6 yeara ago colonscopy with polpys    COLONOSCOPY WFL - within functional limits    SENSATION  Light touch:  intact    Communication: Able to makes needs known in [place].       Behavioral/Emotional/Social: WFL    RANGE OF MOTION AND STRENGTH ASSESSMENT  Upper extremity ROM is within functional limits care, recommendations and safety. Patient transferred supine to EOB with min A.  EOB sitting with good balance. EOB to stand with min A. Patient tolerated x 4 min in standing with min c/o SOB.   Patient educated on REBA scale of perceived exertion and wr equipment PRN  Patient will perform toileting: with supervision and with adaptive equipment PRN    Functional Transfer Goals  Patient will transfer from sit to stand:  with supervision  Patient will transfer to bedside commode:  with stand by assist    WILLIAM

## 2017-01-05 NOTE — PAYOR COMM NOTE
Attending Physician: Sabine Fuentes MD    Review Type: ADMISSION   Reviewer: Janine Sams       Date: January 5, 2017 - 10:43 AM  Payor: [address] Number: N/A  Admit date: 1/4/2017  1:42 PM   Admitted from Emergency Dept.:yes A, RN    1/4/2017 2230 Given 40 mg Intravenous Margaret MYERS RN      Metoclopramide HCl (REGLAN) injection 10 mg     Date Action Dose Route User    1/4/2017 1932 Given 10 mg Intravenous Margaret MYERS RN      morphINE sulfate (PF) 2 MG/ML injectio All other components within normal limits   PRO BETA NATRIURETIC PEPTIDE - Abnormal; Notable for the following:     Pro-Beta Natriuretic Peptide 1108 (*)     All other components within normal limits   PROTHROMBIN TIME (PT) - Abnormal; Notable for the anti-Xa units/mL. BNP (B TYPE NATRIUERTIC PEPTIDE) - Normal   TROPONIN I - Normal   CBC WITH DIFFERENTIAL WITH PLATELET    Narrative: The following orders were created for panel order CBC WITH DIFFERENTIAL WITH PLATELET.   Procedure hemorrhage, unspecified gastrointestinal hemorrhage type  (primary encounter diagnosis)  Anemia, unspecified type  Dyspnea, unspecified type    Disposition:  Admit    Present on Admission                ICD-10-CM  Noted  POA      Acute kidney injury (HCC)

## 2017-01-05 NOTE — PROGRESS NOTES
[facility]  Progress Note    Rohit Abdullahi Patient Status:  Inpatient    1934 MRN [id number]   [facility] 4SW-A Attending Syed Johnson MD   Hosp Day # 1 PCP Milo Mejia MD     Subjective:  Jarett Lovett ([facility]     Azotemia     Metabolic acidosis     Metabolic alkalosis     Respiratory alkalosis     Gastrointestinal hemorrhage     Gastrointestinal hemorrhage, unspecified gastrointestinal hemorrhage type     Anemia, unspecified type     Dyspnea, unspecified

## 2017-01-05 NOTE — PROGRESS NOTES
[facility] Pharm[facility] Note:  Renal Adjustment for Zosyn (piperacillin/tazobactam)    Syed QUEZADA Iris Unger is a 80year old male who has been prescribed Zosyn (piperacillin/tazobactam) 3.375 g IVPB every 8 hrs.   CrCl is estimated creatinine clearance is 17.8 mL/min

## 2017-01-05 NOTE — PROGRESS NOTES
Patient returned from EGD, spoke with Dr Giselle Pino about findings. Reported large quanity of fluid and food particle with no active bleed source noted.   Due to concern for possible aspiration will order PCXR for AM, encourage GARRY, RN to NT suction to clear au

## 2017-01-06 ENCOUNTER — APPOINTMENT (OUTPATIENT)
Dept: GENERAL RADIOLOGY | Facility: HOSPITAL | Age: 82
DRG: 377 | End: 2017-01-06
Attending: INTERNAL MEDICINE
Payer: MEDICARE

## 2017-01-06 LAB
ALBUMIN SERPL-MCNC: 2 G/DL (ref 3.5–4.8)
ALP LIVER SERPL-CCNC: 61 U/L (ref 45–117)
ALT SERPL-CCNC: 24 U/L (ref 17–63)
AST SERPL-CCNC: 19 U/L (ref 15–41)
BAND %: 1 %
BASOPHIL % MANUAL: 0 %
BASOPHIL ABSOLUTE MANUAL: 0 X10(3) UL (ref 0–0.1)
BILIRUB SERPL-MCNC: 1 MG/DL (ref 0.1–2)
BLOOD TYPE BARCODE: 5100
BUN BLD-MCNC: 86 MG/DL (ref 8–20)
CALCIUM BLD-MCNC: 6.4 MG/DL (ref 8.3–10.3)
CHLORIDE: 113 MMOL/L (ref 101–111)
CO2: 16 MMOL/L (ref 22–32)
CREAT BLD-MCNC: 1.85 MG/DL (ref 0.7–1.3)
EOSINOPHIL % MANUAL: 0 %
EOSINOPHIL ABSOLUTE MANUAL: 0 X10(3) UL (ref 0–0.3)
ERYTHROCYTE [DISTWIDTH] IN BLOOD BY AUTOMATED COUNT: 14.6 % (ref 11.5–16)
GLUCOSE BLD-MCNC: 169 MG/DL (ref 70–99)
HAV IGM SER QL: 4.1 MG/DL (ref 1.7–3)
HCT VFR BLD AUTO: 28.1 % (ref 37–53)
HGB BLD-MCNC: 9 G/DL (ref 13–17)
INR BLD: 1.26 (ref 0.89–1.12)
LYMPHOCYTE % MANUAL: 2 %
LYMPHOCYTE ABSOLUTE MANUAL: 0.26 X10(3) UL (ref 0.9–4)
M PROTEIN MFR SERPL ELPH: 4.7 G/DL (ref 6.1–8.3)
MCH RBC QN AUTO: 31.9 PG (ref 27–33.2)
MCHC RBC AUTO-ENTMCNC: 32 G/DL (ref 31–37)
MCV RBC AUTO: 99.6 FL (ref 80–99)
METAMYELOCYTE %: 1 %
METAMYELOCYTE ABSOLUTE MANUAL: 0.13 X10(3) UL (ref ?–0.01)
MONOCYTE % MANUAL: 3 %
MONOCYTE ABSOLUTE MANUAL: 0.4 X10(3) UL (ref 0.1–0.6)
MORPHOLOGY: NORMAL
NEUTROPHIL ABS PRELIM: 11.51 X10 (3) UL (ref 1.3–6.7)
NEUTROPHIL ABSOLUTE MANUAL: 12.41 X10(3) UL (ref 1.3–6.7)
NEUTROPHILS % MANUAL: 93 %
PHOSPHATE SERPL-MCNC: 4.1 MG/DL (ref 2.5–4.9)
PLATELET # BLD AUTO: 333 10(3)UL (ref 150–450)
PLATELET MORPHOLOGY: NORMAL
POTASSIUM SERPL-SCNC: 4 MMOL/L (ref 3.6–5.1)
PSA SERPL DL<=0.01 NG/ML-MCNC: 16.2 SECONDS (ref 12.3–14.8)
RBC # BLD AUTO: 2.82 X10(6)UL (ref 3.8–5.8)
RED CELL DISTRIBUTION WIDTH-SD: 52.3 FL (ref 35.1–46.3)
SODIUM SERPL-SCNC: 140 MMOL/L (ref 136–144)
TOTAL CELLS COUNTED: 100
WBC # BLD AUTO: 13.2 X10(3) UL (ref 4–13)

## 2017-01-06 PROCEDURE — 80053 COMPREHEN METABOLIC PANEL: CPT | Performed by: INTERNAL MEDICINE

## 2017-01-06 PROCEDURE — 83735 ASSAY OF MAGNESIUM: CPT | Performed by: INTERNAL MEDICINE

## 2017-01-06 PROCEDURE — 85025 COMPLETE CBC W/AUTO DIFF WBC: CPT | Performed by: INTERNAL MEDICINE

## 2017-01-06 PROCEDURE — 85007 BL SMEAR W/DIFF WBC COUNT: CPT | Performed by: INTERNAL MEDICINE

## 2017-01-06 PROCEDURE — 94640 AIRWAY INHALATION TREATMENT: CPT

## 2017-01-06 PROCEDURE — C9113 INJ PANTOPRAZOLE SODIUM, VIA: HCPCS | Performed by: INTERNAL MEDICINE

## 2017-01-06 PROCEDURE — 71010 XR CHEST AP PORTABLE  (CPT=71010): CPT

## 2017-01-06 PROCEDURE — 97110 THERAPEUTIC EXERCISES: CPT

## 2017-01-06 PROCEDURE — 97530 THERAPEUTIC ACTIVITIES: CPT

## 2017-01-06 PROCEDURE — 97535 SELF CARE MNGMENT TRAINING: CPT

## 2017-01-06 PROCEDURE — 84100 ASSAY OF PHOSPHORUS: CPT | Performed by: INTERNAL MEDICINE

## 2017-01-06 PROCEDURE — 85027 COMPLETE CBC AUTOMATED: CPT | Performed by: INTERNAL MEDICINE

## 2017-01-06 PROCEDURE — 85610 PROTHROMBIN TIME: CPT | Performed by: INTERNAL MEDICINE

## 2017-01-06 PROCEDURE — 94667 MNPJ CHEST WALL 1ST: CPT

## 2017-01-06 RX ORDER — IPRATROPIUM BROMIDE AND ALBUTEROL SULFATE 2.5; .5 MG/3ML; MG/3ML
3 SOLUTION RESPIRATORY (INHALATION)
Status: DISCONTINUED | OUTPATIENT
Start: 2017-01-07 | End: 2017-01-09

## 2017-01-06 RX ORDER — PREDNISONE 20 MG/1
40 TABLET ORAL 2 TIMES DAILY WITH MEALS
Status: COMPLETED | OUTPATIENT
Start: 2017-01-07 | End: 2017-01-08

## 2017-01-06 NOTE — PLAN OF CARE
GASTROINTESTINAL - ADULT    • Minimal or absence of nausea and vomiting Progressing          Impaired Activities of Daily Living    • Achieve highest/safest level of independence in self care Progressing          RESPIRATORY - ADULT    • Achieves optimal v

## 2017-01-06 NOTE — PROGRESS NOTES
PT. RECEIVED FROM ICU ACCOMPANIED BY ICU RN. PT. IS ALERT AND SPEAKS IN A HOARSE VOICE, VS STABLE. PT.S SON VISITING TO REASSURE PT. THAT THEY KNOW WHERE HE IS .PT.S RELATIVES GAVE RN LIST OF VEGETARIAN SELECTIONS FOR BREAKFAST.  PT. AND FAMILY UPDATED ON P

## 2017-01-06 NOTE — PHYSICAL THERAPY NOTE
PHYSICAL THERAPY TREATMENT NOTE - INPATIENT    Room Number: 353/353-A     Session: 1   Number of Visits to Meet Established Goals: 5    Presenting Problem: GI hemorrhage, anemia, dyspnea, R knee replacement - per chart - R TKA approx 2 weeks ago, at [facility] knee  Management Techniques: Breathing techniques;Relaxation;Repositioning    BALANCE                                                                                                                    Static Sitting: Fair  Dynamic Sitting: Fair - light in reach. RN informed of session. Mobility Guidelines updated in Pt room for nursing staff.        THERAPEUTIC EXERCISES  Lower Extremity Ankle pumps  Glut sets  Hip AB/AD  Heel slides  Quad sets     Upper Extremity  - open/close     Position S

## 2017-01-06 NOTE — OCCUPATIONAL THERAPY NOTE
OCCUPATIONAL THERAPY TREATMENT NOTE - INPATIENT     Room Number: 353/353-A  Session: 1  Number of Visits to Meet Established Goals: 7    Presenting Problem: GI Hemorrhage; anemia; dyspnea; recent s/p R TKA x 2 weeks ago    History related to current admiss SURGERY      ANGIOGRAM      Comment 2009    COLECTOMY      OTHER SURGICAL HISTORY      Comment 6 yeara ago colonscopy with polpys    COLONOSCOPY  6/30/2014    Comment Procedure: COLONOSCOPY;  Surgeon: Hugo Flores DO;  Location: [facility] ENDOSCOPY    KNEE R tolerated standing x 4 min during grooming and OFH at sink, no LOB noted. Patient returned to bed at end of session. Son present and was asked to bring in clothes for patient. Patient End of Session: In bed; With [facility] staff;Needs met;Call light within beka

## 2017-01-06 NOTE — PROGRESS NOTES
[facility]  Progress Note    Bishop Chavez Patient Status:  Inpatient    1934 MRN [id number]   [facility] 4SW-A Attending Mettu, Stephanie Hammans, MD   Hosp Day # 2 PCP MD Vinod Umana Abdominal pain     Back pain with radiation     Community acquired pneumonia     At risk for falling     Lumbar spinal stenosis     Lumbosacral radiculopathy     Nontraumatic subdural hygroma (HCC)     Acute encephalopathy     Anemia     Left lower lobe pn

## 2017-01-06 NOTE — PROGRESS NOTES
[facility]  Progress Note    Myla Euceda Patient Status:  Inpatient    1934 MRN [id number]   [facility] 4SW-A Attending Dina Johnson MD   Hosp Day # 2 PCP Gregory Kat MD     Subjective:  Myla Euceda with radiation     Community acquired pneumonia     At risk for falling     Lumbar spinal stenosis     Lumbosacral radiculopathy     Nontraumatic subdural hygroma (HCC)     Acute encephalopathy     Anemia     Left lower lobe pneumonia ([address]     Hyponatremi

## 2017-01-06 NOTE — PROGRESS NOTES
Pharmacy Note: Renal dose adjustment   Syed QUEZADA Melissa Sun was originally prescribed Zosyn 3.375 g IV every 8hr which was renally dose adjusted at the time of the original order per P&T approved renal dosing protocol. Renal function has now improved.

## 2017-01-06 NOTE — PLAN OF CARE
GASTROINTESTINAL - ADULT    • Minimal or absence of nausea and vomiting Progressing        Impaired Activities of Daily Living    • Achieve highest/safest level of independence in self care Progressing        Impaired Functional Mobility    • Achieve highe

## 2017-01-06 NOTE — CM/SW NOTE
Update sent via [facility] to Kristian Price [phone]. GREGORIO noted that pt will need nebulizer treatments when he returns. SW following.

## 2017-01-07 LAB
BASOPHILS # BLD AUTO: 0.03 X10(3) UL (ref 0–0.1)
BASOPHILS NFR BLD AUTO: 0.2 %
BUN BLD-MCNC: 64 MG/DL (ref 8–20)
CALCIUM BLD-MCNC: 6.9 MG/DL (ref 8.3–10.3)
CHLORIDE: 113 MMOL/L (ref 101–111)
CO2: 20 MMOL/L (ref 22–32)
CREAT BLD-MCNC: 1.56 MG/DL (ref 0.7–1.3)
EOSINOPHIL # BLD AUTO: 0 X10(3) UL (ref 0–0.3)
EOSINOPHIL NFR BLD AUTO: 0 %
ERYTHROCYTE [DISTWIDTH] IN BLOOD BY AUTOMATED COUNT: 14.3 % (ref 11.5–16)
GLUCOSE BLD-MCNC: 296 MG/DL (ref 70–99)
HCT VFR BLD AUTO: 24.6 % (ref 37–53)
HGB BLD-MCNC: 8.6 G/DL (ref 13–17)
IMMATURE GRANULOCYTE COUNT: 0.47 X10(3) UL (ref 0–1)
IMMATURE GRANULOCYTE RATIO %: 3.9 %
LYMPHOCYTES # BLD AUTO: 0.31 X10(3) UL (ref 0.9–4)
LYMPHOCYTES NFR BLD AUTO: 2.6 %
MCH RBC QN AUTO: 32.2 PG (ref 27–33.2)
MCHC RBC AUTO-ENTMCNC: 35 G/DL (ref 31–37)
MCV RBC AUTO: 92.1 FL (ref 80–99)
MONOCYTES # BLD AUTO: 0.56 X10(3) UL (ref 0.1–0.6)
MONOCYTES NFR BLD AUTO: 4.6 %
NEUTROPHIL ABS PRELIM: 10.75 X10 (3) UL (ref 1.3–6.7)
NEUTROPHILS # BLD AUTO: 10.75 X10(3) UL (ref 1.3–6.7)
NEUTROPHILS NFR BLD AUTO: 88.7 %
PLATELET # BLD AUTO: 341 10(3)UL (ref 150–450)
POTASSIUM SERPL-SCNC: 4.1 MMOL/L (ref 3.6–5.1)
RBC # BLD AUTO: 2.67 X10(6)UL (ref 3.8–5.8)
RED CELL DISTRIBUTION WIDTH-SD: 48 FL (ref 35.1–46.3)
SODIUM SERPL-SCNC: 141 MMOL/L (ref 136–144)
WBC # BLD AUTO: 12.1 X10(3) UL (ref 4–13)

## 2017-01-07 PROCEDURE — 97535 SELF CARE MNGMENT TRAINING: CPT

## 2017-01-07 PROCEDURE — 94640 AIRWAY INHALATION TREATMENT: CPT

## 2017-01-07 PROCEDURE — 80048 BASIC METABOLIC PNL TOTAL CA: CPT | Performed by: FAMILY MEDICINE

## 2017-01-07 PROCEDURE — 85025 COMPLETE CBC W/AUTO DIFF WBC: CPT | Performed by: INTERNAL MEDICINE

## 2017-01-07 PROCEDURE — 97110 THERAPEUTIC EXERCISES: CPT

## 2017-01-07 PROCEDURE — 97530 THERAPEUTIC ACTIVITIES: CPT

## 2017-01-07 PROCEDURE — 97116 GAIT TRAINING THERAPY: CPT

## 2017-01-07 PROCEDURE — 94664 DEMO&/EVAL PT USE INHALER: CPT

## 2017-01-07 PROCEDURE — 92610 EVALUATE SWALLOWING FUNCTION: CPT

## 2017-01-07 RX ORDER — ACETAMINOPHEN AND CODEINE PHOSPHATE 300; 30 MG/1; MG/1
1 TABLET ORAL EVERY 4 HOURS PRN
Status: DISCONTINUED | OUTPATIENT
Start: 2017-01-07 | End: 2017-01-09

## 2017-01-07 NOTE — PLAN OF CARE
GASTROINTESTINAL - ADULT    • Minimal or absence of nausea and vomiting Completed          Patient/Family Goals    • Patient/Family Long Term Goal Progressing    • Patient/Family Short Term Goal Progressing        States no nausea, vomiting.   States tolera

## 2017-01-07 NOTE — PHYSICAL THERAPY NOTE
PHYSICAL THERAPY KNEE TREATMENT NOTE - INPATIENT     Room Number: 353/353-A     Session: 2  Number of Visits to Meet Established Goals: 5    Presenting Problem: GI hemorrhage, anemia, dyspnea, R knee replacement    Problem List  Principal Problem:    Dali Repositioning    BALANCE  Static Sitting: Fair  Dynamic Sitting: Fair -  Static Standing: Fair -  Dynamic Standing: Fair -    ACTIVITY TOLERANCE  Room air  Shortness of breath    AM-PAC '6-Clicks' INPATIENT SHORT FORM - BASIC MOBILITY  How much difficulty tolerance on RLE, forward head and kyphotic posture. Pt used toilet during session, cues for proper use of walker during turning and sitting down. Pt tends to place walker on the side during turning.       Exercises AM Session    Ankle Pumps 20 reps    Quad

## 2017-01-07 NOTE — PROGRESS NOTES
[facility]  Progress Note    Pili Johnson Patient Status:  Inpatient    1934 MRN [id number]   [facility] 3SW-A Attending Mettu, Wes Carrion MD   Hosp Day # 3 PCP Amanuel Gong MD     Subjective:  Pili Johnson Lumbar spinal stenosis     Lumbosacral radiculopathy     Nontraumatic subdural hygroma (HCC)     Acute encephalopathy     Anemia     Left lower lobe pneumonia (HCC)     Hyponatremia     Hyperglycemia     Leukocytosis     Acute renal failure (ARF) ([facility]

## 2017-01-07 NOTE — SLP NOTE
ADULT SWALLOWING EVALUATION    ASSESSMENT & PLAN   ASSESSMENT    Orders received for bedside swallow evaluation secondary to family reporting observing Patient coughing following PO intake within the last 1-2 days.   Patient reported that after he eats he e to Patient report of symptoms. Results/recommendations communicated to RN. Jean Assessment of Swallow Function Score: Minimal abnormality detected    PLAN   Diet Recommendations - Solids: Regular  Diet Recommendations - Liquid:  Thin    Treatment Plan with pneumonia. Patient treated multiple times by this service in days surrounding that date, including a VFSS (on 5/14/2016) which recommended mechanical soft/chopped diet, thin liquids with small/single sips and no straws.   No penetration nor aspiration #2 The patient/family/caregiver will demonstrate understanding and implementation of aspiration precautions and swallow strategies independently over 1-2 session(s).    Goal #3 Patient will participate in VFSS to further investigate pharyngeal swallow funct

## 2017-01-07 NOTE — PLAN OF CARE
GASTROINTESTINAL - ADULT    • Minimal or absence of nausea and vomiting Progressing        Impaired Functional Mobility    • Achieve highest/safest level of mobility/gait Progressing        Patient/Family Goals    • Patient/Family Short Term Goal Progressi

## 2017-01-07 NOTE — PROGRESS NOTES
[facility]  Progress Note    Darlin Carlos Patient Status:  Inpatient    1934 MRN [id number]   [facility] 4SW-A Attending Mettu, Emi Scott MD   Hosp Day # 3 PCP Shahab Pack MD     Subjective:  Darlin Carlos radiation     Community acquired pneumonia     At risk for falling     Lumbar spinal stenosis     Lumbosacral radiculopathy     Nontraumatic subdural hygroma (HCC)     Acute encephalopathy     Anemia     Left lower lobe pneumonia (HCC)     Hyponatremia

## 2017-01-07 NOTE — OCCUPATIONAL THERAPY NOTE
OCCUPATIONAL THERAPY TREATMENT NOTE - INPATIENT     Room Number: 353/353-A  Session: 2  Number of Visits to Meet Established Goals: 7    Presenting Problem: GI Hemorrhage; anemia; dyspnea; recent s/p R TKA x 2 weeks ago    History related to current admiss SURGERY      ANGIOGRAM      Comment 2009    COLECTOMY      OTHER SURGICAL HISTORY      Comment 6 yeara ago colonscopy with polpys    COLONOSCOPY  6/30/2014    Comment Procedure: COLONOSCOPY;  Surgeon: Mary Dawson DO;  Location: [facility] ENDOSCOPY    KNEE R difficulty w/ dressing at baseline).   Standing there ex, pt requiring unilateral support on rw at all times in stand, 10 reps of 2 exercises w/ one standing rest break, pt SOB and requiring seated rest.  Instruction in seated there ex (pt still sup assist

## 2017-01-08 ENCOUNTER — APPOINTMENT (OUTPATIENT)
Dept: ULTRASOUND IMAGING | Facility: HOSPITAL | Age: 82
DRG: 377 | End: 2017-01-08
Attending: INTERNAL MEDICINE
Payer: MEDICARE

## 2017-01-08 ENCOUNTER — APPOINTMENT (OUTPATIENT)
Dept: GENERAL RADIOLOGY | Facility: HOSPITAL | Age: 82
DRG: 377 | End: 2017-01-08
Attending: INTERNAL MEDICINE
Payer: MEDICARE

## 2017-01-08 LAB
BAND %: 6 %
BASOPHIL % MANUAL: 0 %
BASOPHIL ABSOLUTE MANUAL: 0 X10(3) UL (ref 0–0.1)
BUN BLD-MCNC: 43 MG/DL (ref 8–20)
CALCIUM BLD-MCNC: 7.1 MG/DL (ref 8.3–10.3)
CHLORIDE: 115 MMOL/L (ref 101–111)
CO2: 22 MMOL/L (ref 22–32)
CREAT BLD-MCNC: 1.26 MG/DL (ref 0.7–1.3)
EOSINOPHIL % MANUAL: 0 %
EOSINOPHIL ABSOLUTE MANUAL: 0 X10(3) UL (ref 0–0.3)
ERYTHROCYTE [DISTWIDTH] IN BLOOD BY AUTOMATED COUNT: 14.6 % (ref 11.5–16)
GLUCOSE BLD-MCNC: 222 MG/DL (ref 70–99)
HAV IGM SER QL: 3.4 MG/DL (ref 1.7–3)
HCT VFR BLD AUTO: 24.8 % (ref 37–53)
HGB BLD-MCNC: 8 G/DL (ref 13–17)
LYMPHOCYTE % MANUAL: 4 %
LYMPHOCYTE ABSOLUTE MANUAL: 0.53 X10(3) UL (ref 0.9–4)
MCH RBC QN AUTO: 30.9 PG (ref 27–33.2)
MCHC RBC AUTO-ENTMCNC: 32.3 G/DL (ref 31–37)
MCV RBC AUTO: 95.8 FL (ref 80–99)
MONOCYTE % MANUAL: 2 %
MONOCYTE ABSOLUTE MANUAL: 0.27 X10(3) UL (ref 0.1–0.6)
MORPHOLOGY: NORMAL
NEUTROPHIL ABS PRELIM: 11.57 X10 (3) UL (ref 1.3–6.7)
NEUTROPHIL ABSOLUTE MANUAL: 12.5 X10(3) UL (ref 1.3–6.7)
NEUTROPHILS % MANUAL: 88 %
PLATELET # BLD AUTO: 299 10(3)UL (ref 150–450)
PLATELET MORPHOLOGY: NORMAL
POTASSIUM SERPL-SCNC: 4.8 MMOL/L (ref 3.6–5.1)
RBC # BLD AUTO: 2.59 X10(6)UL (ref 3.8–5.8)
RED CELL DISTRIBUTION WIDTH-SD: 50.3 FL (ref 35.1–46.3)
SODIUM SERPL-SCNC: 145 MMOL/L (ref 136–144)
TOTAL CELLS COUNTED: 100
WBC # BLD AUTO: 13.3 X10(3) UL (ref 4–13)

## 2017-01-08 PROCEDURE — 71010 XR CHEST AP PORTABLE  (CPT=71010): CPT

## 2017-01-08 PROCEDURE — 80048 BASIC METABOLIC PNL TOTAL CA: CPT | Performed by: INTERNAL MEDICINE

## 2017-01-08 PROCEDURE — 87205 SMEAR GRAM STAIN: CPT | Performed by: INTERNAL MEDICINE

## 2017-01-08 PROCEDURE — 83735 ASSAY OF MAGNESIUM: CPT | Performed by: INTERNAL MEDICINE

## 2017-01-08 PROCEDURE — 87070 CULTURE OTHR SPECIMN AEROBIC: CPT | Performed by: INTERNAL MEDICINE

## 2017-01-08 PROCEDURE — 85027 COMPLETE CBC AUTOMATED: CPT | Performed by: INTERNAL MEDICINE

## 2017-01-08 PROCEDURE — 94640 AIRWAY INHALATION TREATMENT: CPT

## 2017-01-08 PROCEDURE — 85007 BL SMEAR W/DIFF WBC COUNT: CPT | Performed by: INTERNAL MEDICINE

## 2017-01-08 PROCEDURE — 85025 COMPLETE CBC W/AUTO DIFF WBC: CPT | Performed by: INTERNAL MEDICINE

## 2017-01-08 PROCEDURE — 87075 CULTR BACTERIA EXCEPT BLOOD: CPT | Performed by: INTERNAL MEDICINE

## 2017-01-08 PROCEDURE — 93971 EXTREMITY STUDY: CPT

## 2017-01-08 RX ORDER — CARVEDILOL 6.25 MG/1
6.25 TABLET ORAL 2 TIMES DAILY WITH MEALS
Status: DISCONTINUED | OUTPATIENT
Start: 2017-01-08 | End: 2017-01-09

## 2017-01-08 RX ORDER — CYANOCOBALAMIN 1000 UG/ML
1000 INJECTION INTRAMUSCULAR; SUBCUTANEOUS
Status: DISCONTINUED | OUTPATIENT
Start: 2017-02-01 | End: 2017-01-09

## 2017-01-08 RX ORDER — SENNA AND DOCUSATE SODIUM 50; 8.6 MG/1; MG/1
2 TABLET, FILM COATED ORAL 2 TIMES DAILY
Status: DISCONTINUED | OUTPATIENT
Start: 2017-01-08 | End: 2017-01-09

## 2017-01-08 RX ORDER — LOSARTAN POTASSIUM 100 MG/1
100 TABLET ORAL DAILY
Status: DISCONTINUED | OUTPATIENT
Start: 2017-01-08 | End: 2017-01-09

## 2017-01-08 RX ORDER — BISACODYL 10 MG
10 SUPPOSITORY, RECTAL RECTAL DAILY
Status: DISCONTINUED | OUTPATIENT
Start: 2017-01-08 | End: 2017-01-09

## 2017-01-08 RX ORDER — ISOSORBIDE MONONITRATE 30 MG/1
30 TABLET, EXTENDED RELEASE ORAL DAILY
Status: DISCONTINUED | OUTPATIENT
Start: 2017-01-08 | End: 2017-01-09

## 2017-01-08 RX ORDER — ENOXAPARIN SODIUM 100 MG/ML
40 INJECTION SUBCUTANEOUS DAILY
Status: DISCONTINUED | OUTPATIENT
Start: 2017-01-08 | End: 2017-01-09

## 2017-01-08 RX ORDER — MELATONIN
325 DAILY
Status: DISCONTINUED | OUTPATIENT
Start: 2017-01-08 | End: 2017-01-09

## 2017-01-08 RX ORDER — SENNOSIDES 8.6 MG
8.6 TABLET ORAL 2 TIMES DAILY
Status: DISCONTINUED | OUTPATIENT
Start: 2017-01-08 | End: 2017-01-09

## 2017-01-08 RX ORDER — LEVOTHYROXINE SODIUM 0.05 MG/1
50 TABLET ORAL
Status: DISCONTINUED | OUTPATIENT
Start: 2017-01-08 | End: 2017-01-09

## 2017-01-08 RX ORDER — ASPIRIN 325 MG
325 TABLET, DELAYED RELEASE (ENTERIC COATED) ORAL 2 TIMES DAILY
Status: DISCONTINUED | OUTPATIENT
Start: 2017-01-08 | End: 2017-01-09

## 2017-01-08 RX ORDER — ACETAMINOPHEN 325 MG/1
650 TABLET ORAL EVERY 6 HOURS PRN
Status: DISCONTINUED | OUTPATIENT
Start: 2017-01-08 | End: 2017-01-09

## 2017-01-08 RX ORDER — ATORVASTATIN CALCIUM 20 MG/1
20 TABLET, FILM COATED ORAL NIGHTLY
Status: DISCONTINUED | OUTPATIENT
Start: 2017-01-08 | End: 2017-01-09

## 2017-01-08 RX ORDER — ALFUZOSIN HYDROCHLORIDE 10 MG/1
10 TABLET, EXTENDED RELEASE ORAL
Status: DISCONTINUED | OUTPATIENT
Start: 2017-01-09 | End: 2017-01-09

## 2017-01-08 NOTE — PROGRESS NOTES
[facility]. Progress Note    Kasia Bruno Patient Status:  Inpatient    1934 MRN [id number]   [facility] 3SW-A Attending Toni Johnson MD   Hosp Day # 4 PCP Velia Nair MD     Subjective:  Pr that was retained in the stomach, unclear as to why.  There was no clear obstructive pattern on single plain film of abdomen shot after NG was inserted    Current  complaints: SOB    Objective:  /73 mmHg  Pulse 85  Temp(Src) 98.6 °F (37 °C) (Oral)  R 1.26*   --    --        Recent Labs   Lab  01/06/17   0633  01/07/17   0528  01/08/17   0531   NA  140  141  145*   K  4.0  4.1  4.8   CL  113*  113*  115*   CO2  16.0*  20.0*  22.0   BUN  86*  64*  43*   CREATSERUM  1.85*  1.56*  1.26   CA  6.4*  6.9*  7. and posterior tibial veins. FINDINGS:   SAPHENOFEMORAL JUNCTION:  No reflux. THROMBI:  None visible. COMPRESSION:  Normal compressibility, phasicity, and augmentation. OTHER:  Negative. 1/4/2017  CONCLUSION:  No DVT in the lower extremities.     Dict (CPT=71010), 1/05/2017, 5:15. INDICATIONS:  dyspnea  PATIENT STATED HISTORY:  Patient is post-op knee surgery with dyspnea and wheezing. 1/6/2017  CONCLUSION:  Slight elevation right hemidiaphragm. Normal heart size and pulmonary vascularity.   No by: Josh Moreno MD on 1/04/2017 at 15:25     Approved by: Josh Moreno MD              Assessment:  Patient Active Problem List:     Abdominal pain     Back pain with radiation     Community acquired pneumonia     At risk for falling     Lumbar

## 2017-01-08 NOTE — PLAN OF CARE
Patient remained alert and oriented to person, place, time and situation this shift. On room air with continuous pulse oximetry monitoring and oxygen saturations remaining greater than 92%. Denies sob/difficulty breathing.   Wheezing auscultated bilateral

## 2017-01-08 NOTE — PLAN OF CARE
Impaired Activities of Daily Living    • Achieve highest/safest level of independence in self care Progressing        Impaired Functional Mobility    • Achieve highest/safest level of mobility/gait Progressing        Impaired Swallowing    • Minimize aspir

## 2017-01-09 ENCOUNTER — APPOINTMENT (OUTPATIENT)
Dept: GENERAL RADIOLOGY | Facility: HOSPITAL | Age: 82
DRG: 377 | End: 2017-01-09
Attending: INTERNAL MEDICINE
Payer: MEDICARE

## 2017-01-09 VITALS
BODY MASS INDEX: 32.51 KG/M2 | SYSTOLIC BLOOD PRESSURE: 145 MMHG | HEIGHT: 60 IN | RESPIRATION RATE: 18 BRPM | DIASTOLIC BLOOD PRESSURE: 62 MMHG | OXYGEN SATURATION: 98 % | TEMPERATURE: 98 F | HEART RATE: 82 BPM | WEIGHT: 165.56 LBS

## 2017-01-09 LAB
BASOPHILS # BLD AUTO: 0.03 X10(3) UL (ref 0–0.1)
BASOPHILS NFR BLD AUTO: 0.2 %
BUN BLD-MCNC: 37 MG/DL (ref 8–20)
CALCIUM BLD-MCNC: 7.3 MG/DL (ref 8.3–10.3)
CHLORIDE: 112 MMOL/L (ref 101–111)
CO2: 24 MMOL/L (ref 22–32)
CREAT BLD-MCNC: 1.13 MG/DL (ref 0.7–1.3)
EOSINOPHIL # BLD AUTO: 0 X10(3) UL (ref 0–0.3)
EOSINOPHIL NFR BLD AUTO: 0 %
ERYTHROCYTE [DISTWIDTH] IN BLOOD BY AUTOMATED COUNT: 14.6 % (ref 11.5–16)
GLUCOSE BLD-MCNC: 175 MG/DL (ref 70–99)
HAV IGM SER QL: 3.1 MG/DL (ref 1.7–3)
HCT VFR BLD AUTO: 24.9 % (ref 37–53)
HGB BLD-MCNC: 8.2 G/DL (ref 13–17)
IMMATURE GRANULOCYTE COUNT: 0.44 X10(3) UL (ref 0–1)
IMMATURE GRANULOCYTE RATIO %: 2.7 %
LYMPHOCYTES # BLD AUTO: 0.7 X10(3) UL (ref 0.9–4)
LYMPHOCYTES NFR BLD AUTO: 4.4 %
MCH RBC QN AUTO: 32 PG (ref 27–33.2)
MCHC RBC AUTO-ENTMCNC: 32.9 G/DL (ref 31–37)
MCV RBC AUTO: 97.3 FL (ref 80–99)
MONOCYTES # BLD AUTO: 0.52 X10(3) UL (ref 0.1–0.6)
MONOCYTES NFR BLD AUTO: 3.2 %
NEUTROPHIL ABS PRELIM: 14.4 X10 (3) UL (ref 1.3–6.7)
NEUTROPHILS # BLD AUTO: 14.4 X10(3) UL (ref 1.3–6.7)
NEUTROPHILS NFR BLD AUTO: 89.5 %
PLATELET # BLD AUTO: 285 10(3)UL (ref 150–450)
POTASSIUM SERPL-SCNC: 5.2 MMOL/L (ref 3.6–5.1)
POTASSIUM SERPL-SCNC: 5.3 MMOL/L (ref 3.6–5.1)
RBC # BLD AUTO: 2.56 X10(6)UL (ref 3.8–5.8)
RED CELL DISTRIBUTION WIDTH-SD: 51.5 FL (ref 35.1–46.3)
SODIUM SERPL-SCNC: 141 MMOL/L (ref 136–144)
WBC # BLD AUTO: 16.1 X10(3) UL (ref 4–13)

## 2017-01-09 PROCEDURE — 97530 THERAPEUTIC ACTIVITIES: CPT

## 2017-01-09 PROCEDURE — 92611 MOTION FLUOROSCOPY/SWALLOW: CPT

## 2017-01-09 PROCEDURE — 94640 AIRWAY INHALATION TREATMENT: CPT

## 2017-01-09 PROCEDURE — 74230 X-RAY XM SWLNG FUNCJ C+: CPT

## 2017-01-09 PROCEDURE — 80048 BASIC METABOLIC PNL TOTAL CA: CPT | Performed by: INTERNAL MEDICINE

## 2017-01-09 PROCEDURE — 83735 ASSAY OF MAGNESIUM: CPT | Performed by: FAMILY MEDICINE

## 2017-01-09 PROCEDURE — 85025 COMPLETE CBC W/AUTO DIFF WBC: CPT | Performed by: INTERNAL MEDICINE

## 2017-01-09 PROCEDURE — 97116 GAIT TRAINING THERAPY: CPT

## 2017-01-09 PROCEDURE — 84132 ASSAY OF SERUM POTASSIUM: CPT | Performed by: FAMILY MEDICINE

## 2017-01-09 RX ORDER — PREDNISONE 10 MG/1
TABLET ORAL
Qty: 20 TABLET | Refills: 0 | Status: ON HOLD | OUTPATIENT
Start: 2017-01-09 | End: 2017-01-18

## 2017-01-09 RX ORDER — ACETAMINOPHEN AND CODEINE PHOSPHATE 300; 30 MG/1; MG/1
1 TABLET ORAL EVERY 4 HOURS PRN
Qty: 60 TABLET | Refills: 0 | Status: ON HOLD | OUTPATIENT
Start: 2017-01-09 | End: 2017-05-11 | Stop reason: ALTCHOICE

## 2017-01-09 NOTE — PLAN OF CARE
Patient remained alert and oriented to person, place, time and situation this shift. On room air with continuous pulse oximetry monitoring and oxygen saturations remaining greater than 92%. Telemetry monitoring with NSR. Administered aspirin as ordered.

## 2017-01-09 NOTE — VIDEO SWALLOW STUDY NOTE
ADULT VIDEOFLUOROSCOPIC SWALLOWING STUDY    Admission Date: 1/4/2017  Evaluation Date: 01/09/2017  Radiologist: Dr. James Stephens    Dear Dr. Mellissa Butt,  This letter is to inform you of Syed Cooney Videofluoroscopic Swallowing Study results and/or plan o Approved by: Roderick Dodson MD                   Reason for Referral: R/O aspiration  Patient with complaints of burning in his chest after eating per clinical swallow evaluation completed 1/7/17  Family/Patient Goals:   To get better     ASSESSMENT signs or symptoms of aspiration with 95 % accuracy over 1-2 session(s). Goal #2 The patient/family/caregiver will demonstrate understanding and implementation of aspiration precautions and swallow strategies independently over 1-2 session(s).      Lisa Sosa

## 2017-01-09 NOTE — PROGRESS NOTES
[facility] 15. Progress Note    Ruddy Rodriguez Patient Status:  Inpatient    1934 MRN [id number]   [facility] 3SW-A Attending Mettu, Kennedi Devlin MD   Hosp Day # 5 PCP Merry Pop MD     Subjective:  Pr that was retained in the stomach, unclear as to why.  There was no clear obstructive pattern on single plain film of abdomen shot after NG was inserted    Current  complaints: feeling much better co reflux like symptoms    Objective:  /57 mmHg  Pulse 95.8  97.3   PLT  341.0  299.0  285.0   BAND   --   6   --        Recent Labs   Lab  01/07/17   0528  01/08/17   0531  01/09/17   0515   NA  141  145*  141   K  4.1  4.8  5.2*   CL  113*  115*  112*   CO2  20.0*  22.0  24.0   BUN  64*  43*  37*   TISHA Common, deep, and superficial femoral, popliteal, sapheno-femoral junction, and posterior tibial veins. FINDINGS:   SAPHENOFEMORAL JUNCTION:  No reflux. THROMBI:  None visible. COMPRESSION:  Normal compressibility, phasicity, and augmentation.  OTHER:  Ne AP chest radiograph was obtained. COMPARISON:  [facility] , XR CHEST AP PORTABLE  (CPT=71010), 1/05/2017, 5:15. INDICATIONS:  dyspnea  PATIENT STATED HISTORY:  Patient is post-op knee surgery with dyspnea and wheezing.         1/6/2017  CONCLUSION:  Slight el correlate clinically. 2. Stable elevation of the right hemidiaphragm.     Dictated by: Claribel Kauffman MD on 1/04/2017 at 15:25     Approved by: Claribel Kauffman MD              Assessment:  Patient Active Problem List:     Abdominal pain     Back pain w

## 2017-01-09 NOTE — CM/SW NOTE
Informed by RN that pt can d/c to [address] [facility] [phone]. She notes that family had discussed pt having SCDs with SON[facility] and order is on the AVS.  RN states that family is requesting [employer]- pt is able to travel via [address].     GREGORIO contacted Courtney- liaison

## 2017-01-09 NOTE — PROGRESS NOTES
[facility]  Progress Note    Tiffanie Bradford Patient Status:  Inpatient    1934 MRN [id number]   [facility] 3SW-A Attending Jacob Johnson MD   Hosp Day # 4 PCP Robert Freedman MD     Subjective:  Tiffanie Bradford Back pain with radiation     Community acquired pneumonia     At risk for falling     Lumbar spinal stenosis     Lumbosacral radiculopathy     Nontraumatic subdural hygroma (HCC)     Acute encephalopathy     Anemia     Left lower lobe pneumonia (HCC)     H

## 2017-01-09 NOTE — SLP NOTE
Preliminary VFSS report:  Patient with transient laryngeal penetration with thin liquids via straw. No laryngeal penetration or tracheal aspiration with solids or thin liquids.       Recommend:  Regular consistency and thin liquids  No Straw  To retroflow

## 2017-01-09 NOTE — PLAN OF CARE
Dr. Raf Gan notified of pt's potassium and magnesium results.  Orders received for discharge and placed in AVS.

## 2017-01-09 NOTE — PLAN OF CARE
Pt had 12 beats of V-tach at 13:17 per telemetry tech. Pt asleep and asymptomatic. VSS. Dr. Foster Rubio notified. Orders for labs received. Phlebotomist paged.

## 2017-01-09 NOTE — PROGRESS NOTES
[facility]  Progress Note    Kamryn Titus Patient Status:  Inpatient    1934 MRN [id number]   [facility] 3SW-A Attending Mettu, Mariam Soares MD   Hosp Day # 5 PCP Obed Porter MD     Assessment:      1.  Anemia ac Right hand  Blood pressure 126/56, pulse 77, temperature 98 °F (36.7 °C), temperature source Oral, resp. rate 18, height 5' (1.524 m), weight 165 lb 9.1 oz (75.1 kg), SpO2 100 %.     Intake/Output:    Intake/Output Summary (Last 24 hours) at 01/09/17 5400 Oral BID with meals   cholecalciferol (VITAMIN D3) cap/tab 1,000 Units 1,000 Units Oral Daily   [START ON 2/1/2017] cyanocobalamin (VITAMIN B12) 1000 MCG/ML injection 1,000 mcg 1,000 mcg Intramuscular Q30 Days   dextromethorphan-guaiFENesin (ROBITUSSIN-DM)

## 2017-01-09 NOTE — PHYSICAL THERAPY NOTE
PHYSICAL THERAPY KNEE TREATMENT NOTE - INPATIENT     Room Number: 353/353-A     Session: 3  Number of Visits to Meet Established Goals: 5    Presenting Problem: GI hemorrhage, anemia, dyspnea, R knee replacement    Problem List  Principal Problem:    Dali thigh  Management Techniques: Repositioning    BALANCE  Static Sitting: Fair  Dynamic Sitting: Fair -  Static Standing: Fair -  Dynamic Standing: Fair -    ACTIVITY TOLERANCE  Room air  Shortness of breath    AM-PAC '6-Clicks' INPATIENT SHORT FORM - BASIC End of Session: Needs met;Call light within reach;RN aware of session/findings; With [address] [place] [address] staff; In bed    ASSESSMENT        Pt seen for gait training, active flexibility and BLE strengthening, due to [facility] admission for GI hemorrhage, recent R TKA.  At this time,

## 2017-01-09 NOTE — PLAN OF CARE
Patient/Family Goals    • Patient/Family Long Term Goal Progressing    • Patient/Family Short Term Goal Progressing        States pain well controlled on oral pain meds. Has ambulated three times today in hallway with staff, gait steady.    distal portion

## 2017-01-10 NOTE — PLAN OF CARE
Impaired Activities of Daily Living    • Achieve highest/safest level of independence in self care Adequate for Discharge        Impaired Functional Mobility    • Achieve highest/safest level of mobility/gait Adequate for Discharge        Impaired Swallowi

## 2017-01-10 NOTE — CM/SW NOTE
01/10/17 1700   Discharge disposition   Discharged to: Skilled Nurs   Name of Facillity/Home [address]   Patient assessed for rehabilitation services?  Yes   Patient is Discharged to a [address] [place], [place] [address]vard Yes   Discharge transportatio

## 2017-01-18 ENCOUNTER — ANESTHESIA EVENT (OUTPATIENT)
Dept: ENDOSCOPY | Facility: HOSPITAL | Age: 82
DRG: 378 | End: 2017-01-18
Payer: MEDICARE

## 2017-01-18 ENCOUNTER — APPOINTMENT (OUTPATIENT)
Dept: CT IMAGING | Facility: HOSPITAL | Age: 82
DRG: 378 | End: 2017-01-18
Attending: EMERGENCY MEDICINE
Payer: MEDICARE

## 2017-01-18 ENCOUNTER — HOSPITAL ENCOUNTER (INPATIENT)
Facility: HOSPITAL | Age: 82
LOS: 6 days | Discharge: ANOTHER HOSPITAL | DRG: 378 | End: 2017-01-24
Attending: EMERGENCY MEDICINE | Admitting: INTERNAL MEDICINE
Payer: MEDICARE

## 2017-01-18 ENCOUNTER — ANESTHESIA (OUTPATIENT)
Dept: ENDOSCOPY | Facility: HOSPITAL | Age: 82
DRG: 378 | End: 2017-01-18
Payer: MEDICARE

## 2017-01-18 ENCOUNTER — APPOINTMENT (OUTPATIENT)
Dept: GENERAL RADIOLOGY | Facility: HOSPITAL | Age: 82
DRG: 378 | End: 2017-01-18
Attending: EMERGENCY MEDICINE
Payer: MEDICARE

## 2017-01-18 ENCOUNTER — SURGERY (OUTPATIENT)
Age: 82
End: 2017-01-18

## 2017-01-18 DIAGNOSIS — K92.2 UPPER GI BLEEDING: Primary | ICD-10-CM

## 2017-01-18 PROBLEM — J44.89 ASTHMA-COPD OVERLAP SYNDROME (HCC): Chronic | Status: ACTIVE | Noted: 2017-01-18

## 2017-01-18 PROBLEM — J44.89 ASTHMA-COPD OVERLAP SYNDROME: Chronic | Status: ACTIVE | Noted: 2017-01-18

## 2017-01-18 PROBLEM — I10 ESSENTIAL HYPERTENSION, BENIGN: Chronic | Status: ACTIVE | Noted: 2017-01-18

## 2017-01-18 PROBLEM — J44.9 ASTHMA-COPD OVERLAP SYNDROME (HCC): Chronic | Status: ACTIVE | Noted: 2017-01-18

## 2017-01-18 LAB
ALBUMIN SERPL-MCNC: 2.3 G/DL (ref 3.5–4.8)
ALP LIVER SERPL-CCNC: 87 U/L (ref 45–117)
ALT SERPL-CCNC: 20 U/L (ref 17–63)
ANTIBODY SCREEN: NEGATIVE
APTT PPP: 30.8 SECONDS (ref 25–34)
AST SERPL-CCNC: 20 U/L (ref 15–41)
ATRIAL RATE: 85 BPM
BASOPHILS # BLD AUTO: 0.01 X10(3) UL (ref 0–0.1)
BASOPHILS NFR BLD AUTO: 0.2 %
BILIRUB SERPL-MCNC: 0.7 MG/DL (ref 0.1–2)
BUN BLD-MCNC: 26 MG/DL (ref 8–20)
CALCIUM BLD-MCNC: 8.2 MG/DL (ref 8.3–10.3)
CHLORIDE: 104 MMOL/L (ref 101–111)
CO2: 25 MMOL/L (ref 22–32)
CREAT BLD-MCNC: 1.65 MG/DL (ref 0.7–1.3)
EOSINOPHIL # BLD AUTO: 0.04 X10(3) UL (ref 0–0.3)
EOSINOPHIL NFR BLD AUTO: 0.7 %
ERYTHROCYTE [DISTWIDTH] IN BLOOD BY AUTOMATED COUNT: 13.7 % (ref 11.5–16)
GLUCOSE BLD-MCNC: 101 MG/DL (ref 70–99)
HCT VFR BLD AUTO: 24.4 % (ref 37–53)
HCT VFR BLD AUTO: 29.2 % (ref 37–53)
HGB BLD-MCNC: 8 G/DL (ref 13–17)
HGB BLD-MCNC: 9.8 G/DL (ref 13–17)
IMMATURE GRANULOCYTE COUNT: 0.03 X10(3) UL (ref 0–1)
IMMATURE GRANULOCYTE RATIO %: 0.5 %
INR BLD: 1.23 (ref 0.89–1.12)
LIPASE: 93 U/L (ref 73–393)
LYMPHOCYTES # BLD AUTO: 0.47 X10(3) UL (ref 0.9–4)
LYMPHOCYTES NFR BLD AUTO: 8.4 %
M PROTEIN MFR SERPL ELPH: 5.1 G/DL (ref 6.1–8.3)
MCH RBC QN AUTO: 31.6 PG (ref 27–33.2)
MCHC RBC AUTO-ENTMCNC: 33.6 G/DL (ref 31–37)
MCV RBC AUTO: 94.2 FL (ref 80–99)
MONOCYTES # BLD AUTO: 0.16 X10(3) UL (ref 0.1–0.6)
MONOCYTES NFR BLD AUTO: 2.9 %
NEUTROPHIL ABS PRELIM: 4.88 X10 (3) UL (ref 1.3–6.7)
NEUTROPHILS # BLD AUTO: 4.88 X10(3) UL (ref 1.3–6.7)
NEUTROPHILS NFR BLD AUTO: 87.3 %
P AXIS: 80 DEGREES
P-R INTERVAL: 128 MS
PLATELET # BLD AUTO: 252 10(3)UL (ref 150–450)
POTASSIUM SERPL-SCNC: 4.5 MMOL/L (ref 3.6–5.1)
PRO-BETA NATRIURETIC PEPTIDE: 1842 PG/ML (ref ?–450)
PSA SERPL DL<=0.01 NG/ML-MCNC: 15.9 SECONDS (ref 12.3–14.8)
Q-T INTERVAL: 342 MS
QRS DURATION: 74 MS
QTC CALCULATION (BEZET): 406 MS
R AXIS: 25 DEGREES
RBC # BLD AUTO: 3.1 X10(6)UL (ref 3.8–5.8)
RED CELL DISTRIBUTION WIDTH-SD: 47 FL (ref 35.1–46.3)
RH BLOOD TYPE: POSITIVE
SODIUM SERPL-SCNC: 137 MMOL/L (ref 136–144)
T AXIS: 100 DEGREES
TROPONIN: <0.046 NG/ML (ref ?–0.05)
VENTRICULAR RATE: 85 BPM
WBC # BLD AUTO: 5.6 X10(3) UL (ref 4–13)

## 2017-01-18 PROCEDURE — 0DB98ZX EXCISION OF DUODENUM, VIA NATURAL OR ARTIFICIAL OPENING ENDOSCOPIC, DIAGNOSTIC: ICD-10-PCS | Performed by: INTERNAL MEDICINE

## 2017-01-18 PROCEDURE — 85018 HEMOGLOBIN: CPT | Performed by: INTERNAL MEDICINE

## 2017-01-18 PROCEDURE — 93005 ELECTROCARDIOGRAM TRACING: CPT

## 2017-01-18 PROCEDURE — 86850 RBC ANTIBODY SCREEN: CPT | Performed by: EMERGENCY MEDICINE

## 2017-01-18 PROCEDURE — 96375 TX/PRO/DX INJ NEW DRUG ADDON: CPT

## 2017-01-18 PROCEDURE — 0DB68ZX EXCISION OF STOMACH, VIA NATURAL OR ARTIFICIAL OPENING ENDOSCOPIC, DIAGNOSTIC: ICD-10-PCS | Performed by: INTERNAL MEDICINE

## 2017-01-18 PROCEDURE — 88305 TISSUE EXAM BY PATHOLOGIST: CPT | Performed by: INTERNAL MEDICINE

## 2017-01-18 PROCEDURE — 82272 OCCULT BLD FECES 1-3 TESTS: CPT

## 2017-01-18 PROCEDURE — 96374 THER/PROPH/DIAG INJ IV PUSH: CPT

## 2017-01-18 PROCEDURE — 87081 CULTURE SCREEN ONLY: CPT | Performed by: INTERNAL MEDICINE

## 2017-01-18 PROCEDURE — 94640 AIRWAY INHALATION TREATMENT: CPT

## 2017-01-18 PROCEDURE — 71010 XR CHEST AP PORTABLE  (CPT=71010): CPT

## 2017-01-18 PROCEDURE — 86900 BLOOD TYPING SEROLOGIC ABO: CPT | Performed by: EMERGENCY MEDICINE

## 2017-01-18 PROCEDURE — C9113 INJ PANTOPRAZOLE SODIUM, VIA: HCPCS | Performed by: EMERGENCY MEDICINE

## 2017-01-18 PROCEDURE — 87046 STOOL CULTR AEROBIC BACT EA: CPT | Performed by: NURSE PRACTITIONER

## 2017-01-18 PROCEDURE — 85610 PROTHROMBIN TIME: CPT | Performed by: EMERGENCY MEDICINE

## 2017-01-18 PROCEDURE — 85014 HEMATOCRIT: CPT | Performed by: INTERNAL MEDICINE

## 2017-01-18 PROCEDURE — 87493 C DIFF AMPLIFIED PROBE: CPT | Performed by: NURSE PRACTITIONER

## 2017-01-18 PROCEDURE — 87045 FECES CULTURE AEROBIC BACT: CPT | Performed by: NURSE PRACTITIONER

## 2017-01-18 PROCEDURE — 99285 EMERGENCY DEPT VISIT HI MDM: CPT

## 2017-01-18 PROCEDURE — 83690 ASSAY OF LIPASE: CPT | Performed by: EMERGENCY MEDICINE

## 2017-01-18 PROCEDURE — 87427 SHIGA-LIKE TOXIN AG IA: CPT | Performed by: NURSE PRACTITIONER

## 2017-01-18 PROCEDURE — 93010 ELECTROCARDIOGRAM REPORT: CPT

## 2017-01-18 PROCEDURE — 74176 CT ABD & PELVIS W/O CONTRAST: CPT

## 2017-01-18 PROCEDURE — 85730 THROMBOPLASTIN TIME PARTIAL: CPT | Performed by: EMERGENCY MEDICINE

## 2017-01-18 PROCEDURE — 0DB58ZX EXCISION OF ESOPHAGUS, VIA NATURAL OR ARTIFICIAL OPENING ENDOSCOPIC, DIAGNOSTIC: ICD-10-PCS | Performed by: INTERNAL MEDICINE

## 2017-01-18 PROCEDURE — 80053 COMPREHEN METABOLIC PANEL: CPT | Performed by: EMERGENCY MEDICINE

## 2017-01-18 PROCEDURE — 87015 SPECIMEN INFECT AGNT CONCNTJ: CPT | Performed by: NURSE PRACTITIONER

## 2017-01-18 PROCEDURE — 87040 BLOOD CULTURE FOR BACTERIA: CPT | Performed by: INTERNAL MEDICINE

## 2017-01-18 PROCEDURE — 84484 ASSAY OF TROPONIN QUANT: CPT | Performed by: EMERGENCY MEDICINE

## 2017-01-18 PROCEDURE — 83880 ASSAY OF NATRIURETIC PEPTIDE: CPT | Performed by: EMERGENCY MEDICINE

## 2017-01-18 PROCEDURE — 87798 DETECT AGENT NOS DNA AMP: CPT | Performed by: INTERNAL MEDICINE

## 2017-01-18 PROCEDURE — C9113 INJ PANTOPRAZOLE SODIUM, VIA: HCPCS | Performed by: NURSE PRACTITIONER

## 2017-01-18 PROCEDURE — 85025 COMPLETE CBC W/AUTO DIFF WBC: CPT | Performed by: EMERGENCY MEDICINE

## 2017-01-18 PROCEDURE — 86901 BLOOD TYPING SEROLOGIC RH(D): CPT | Performed by: EMERGENCY MEDICINE

## 2017-01-18 RX ORDER — METOCLOPRAMIDE HYDROCHLORIDE 5 MG/ML
5 INJECTION INTRAMUSCULAR; INTRAVENOUS 3 TIMES DAILY
Status: DISCONTINUED | OUTPATIENT
Start: 2017-01-18 | End: 2017-01-22

## 2017-01-18 RX ORDER — SODIUM CHLORIDE, SODIUM LACTATE, POTASSIUM CHLORIDE, CALCIUM CHLORIDE 600; 310; 30; 20 MG/100ML; MG/100ML; MG/100ML; MG/100ML
INJECTION, SOLUTION INTRAVENOUS CONTINUOUS
Status: DISCONTINUED | OUTPATIENT
Start: 2017-01-18 | End: 2017-01-22

## 2017-01-18 RX ORDER — POLYETHYLENE GLYCOL 3350 17 G/17G
17 POWDER, FOR SOLUTION ORAL NIGHTLY
Status: DISCONTINUED | OUTPATIENT
Start: 2017-01-18 | End: 2017-01-18

## 2017-01-18 RX ORDER — ONDANSETRON 4 MG/1
4 TABLET, FILM COATED ORAL EVERY 6 HOURS PRN
Status: ON HOLD | COMMUNITY
End: 2017-05-11 | Stop reason: ALTCHOICE

## 2017-01-18 RX ORDER — NALOXONE HYDROCHLORIDE 0.4 MG/ML
80 INJECTION, SOLUTION INTRAMUSCULAR; INTRAVENOUS; SUBCUTANEOUS AS NEEDED
Status: DISCONTINUED | OUTPATIENT
Start: 2017-01-18 | End: 2017-01-18 | Stop reason: HOSPADM

## 2017-01-18 RX ORDER — PANTOPRAZOLE SODIUM 40 MG/1
40 TABLET, DELAYED RELEASE ORAL
Status: DISCONTINUED | OUTPATIENT
Start: 2017-01-18 | End: 2017-01-18

## 2017-01-18 RX ORDER — MORPHINE SULFATE 2 MG/ML
2 INJECTION, SOLUTION INTRAMUSCULAR; INTRAVENOUS ONCE
Status: COMPLETED | OUTPATIENT
Start: 2017-01-18 | End: 2017-01-18

## 2017-01-18 RX ORDER — ALBUTEROL SULFATE 90 UG/1
2 AEROSOL, METERED RESPIRATORY (INHALATION) EVERY 6 HOURS PRN
Status: DISCONTINUED | OUTPATIENT
Start: 2017-01-18 | End: 2017-01-25

## 2017-01-18 RX ORDER — METOCLOPRAMIDE 10 MG/1
10 TABLET ORAL ONCE
Status: DISCONTINUED | OUTPATIENT
Start: 2017-01-18 | End: 2017-01-18

## 2017-01-18 RX ORDER — ALFUZOSIN HYDROCHLORIDE 10 MG/1
10 TABLET, EXTENDED RELEASE ORAL DAILY
Status: DISCONTINUED | OUTPATIENT
Start: 2017-01-18 | End: 2017-01-25

## 2017-01-18 RX ORDER — MORPHINE SULFATE 2 MG/ML
INJECTION, SOLUTION INTRAMUSCULAR; INTRAVENOUS
Status: COMPLETED
Start: 2017-01-18 | End: 2017-01-18

## 2017-01-18 RX ORDER — CEPHALEXIN 500 MG/1
500 CAPSULE ORAL 4 TIMES DAILY
Status: ON HOLD | COMMUNITY
End: 2017-01-24

## 2017-01-18 RX ORDER — CEPHALEXIN 500 MG/1
500 CAPSULE ORAL 4 TIMES DAILY
Status: DISCONTINUED | OUTPATIENT
Start: 2017-01-18 | End: 2017-01-18

## 2017-01-18 RX ORDER — CARVEDILOL 6.25 MG/1
6.25 TABLET ORAL 2 TIMES DAILY WITH MEALS
Status: DISCONTINUED | OUTPATIENT
Start: 2017-01-18 | End: 2017-01-25

## 2017-01-18 RX ORDER — CYANOCOBALAMIN 1000 UG/ML
1000 INJECTION INTRAMUSCULAR; SUBCUTANEOUS
Status: DISCONTINUED | OUTPATIENT
Start: 2017-01-18 | End: 2017-01-25

## 2017-01-18 RX ORDER — BISACODYL 10 MG
10 SUPPOSITORY, RECTAL RECTAL
Status: DISCONTINUED | OUTPATIENT
Start: 2017-01-18 | End: 2017-01-25

## 2017-01-18 RX ORDER — MORPHINE SULFATE 2 MG/ML
2 INJECTION, SOLUTION INTRAMUSCULAR; INTRAVENOUS ONCE
Status: DISCONTINUED | OUTPATIENT
Start: 2017-01-18 | End: 2017-01-18

## 2017-01-18 RX ORDER — LOSARTAN POTASSIUM 100 MG/1
100 TABLET ORAL DAILY
Status: DISCONTINUED | OUTPATIENT
Start: 2017-01-18 | End: 2017-01-25

## 2017-01-18 RX ORDER — PROCHLORPERAZINE MALEATE 10 MG
10 TABLET ORAL EVERY 8 HOURS PRN
Status: ON HOLD | COMMUNITY
End: 2017-01-24

## 2017-01-18 RX ORDER — ONDANSETRON 2 MG/ML
4 INJECTION INTRAMUSCULAR; INTRAVENOUS AS NEEDED
Status: DISCONTINUED | OUTPATIENT
Start: 2017-01-18 | End: 2017-01-18 | Stop reason: HOSPADM

## 2017-01-18 RX ORDER — MELATONIN
325 DAILY
Status: DISCONTINUED | OUTPATIENT
Start: 2017-01-18 | End: 2017-01-25

## 2017-01-18 RX ORDER — LEVOTHYROXINE SODIUM 0.05 MG/1
50 TABLET ORAL
Status: DISCONTINUED | OUTPATIENT
Start: 2017-01-18 | End: 2017-01-25

## 2017-01-18 RX ORDER — ONDANSETRON 2 MG/ML
INJECTION INTRAMUSCULAR; INTRAVENOUS
Status: COMPLETED
Start: 2017-01-18 | End: 2017-01-18

## 2017-01-18 RX ORDER — HYDROMORPHONE HYDROCHLORIDE 1 MG/ML
0.4 INJECTION, SOLUTION INTRAMUSCULAR; INTRAVENOUS; SUBCUTANEOUS EVERY 5 MIN PRN
Status: DISCONTINUED | OUTPATIENT
Start: 2017-01-18 | End: 2017-01-18 | Stop reason: HOSPADM

## 2017-01-18 RX ORDER — METOCLOPRAMIDE HYDROCHLORIDE 5 MG/ML
10 INJECTION INTRAMUSCULAR; INTRAVENOUS ONCE
Status: DISCONTINUED | OUTPATIENT
Start: 2017-01-18 | End: 2017-01-18 | Stop reason: HOSPADM

## 2017-01-18 RX ORDER — ACETAMINOPHEN 325 MG/1
650 TABLET ORAL EVERY 6 HOURS PRN
Status: DISCONTINUED | OUTPATIENT
Start: 2017-01-18 | End: 2017-01-25

## 2017-01-18 RX ORDER — SODIUM CHLORIDE 450 MG/100ML
INJECTION, SOLUTION INTRAVENOUS CONTINUOUS
Status: DISCONTINUED | OUTPATIENT
Start: 2017-01-18 | End: 2017-01-19

## 2017-01-18 RX ORDER — ACETAMINOPHEN AND CODEINE PHOSPHATE 300; 30 MG/1; MG/1
1 TABLET ORAL EVERY 4 HOURS PRN
Status: DISCONTINUED | OUTPATIENT
Start: 2017-01-18 | End: 2017-01-25

## 2017-01-18 RX ORDER — ISOSORBIDE MONONITRATE 30 MG/1
30 TABLET, EXTENDED RELEASE ORAL DAILY
Status: DISCONTINUED | OUTPATIENT
Start: 2017-01-18 | End: 2017-01-20

## 2017-01-18 RX ORDER — PRAVASTATIN SODIUM 20 MG
40 TABLET ORAL NIGHTLY
Status: DISCONTINUED | OUTPATIENT
Start: 2017-01-18 | End: 2017-01-25

## 2017-01-18 RX ORDER — PREDNISONE 10 MG/1
10 TABLET ORAL DAILY
Status: ON HOLD | COMMUNITY
End: 2017-05-11 | Stop reason: ALTCHOICE

## 2017-01-18 RX ORDER — IPRATROPIUM BROMIDE AND ALBUTEROL SULFATE 2.5; .5 MG/3ML; MG/3ML
3 SOLUTION RESPIRATORY (INHALATION) EVERY 4 HOURS PRN
Status: DISCONTINUED | OUTPATIENT
Start: 2017-01-18 | End: 2017-01-25

## 2017-01-18 RX ORDER — PROCHLORPERAZINE MALEATE 10 MG
10 TABLET ORAL EVERY 8 HOURS PRN
Status: DISCONTINUED | OUTPATIENT
Start: 2017-01-18 | End: 2017-01-25

## 2017-01-18 RX ORDER — SENNA AND DOCUSATE SODIUM 50; 8.6 MG/1; MG/1
2 TABLET, FILM COATED ORAL 2 TIMES DAILY
Status: DISCONTINUED | OUTPATIENT
Start: 2017-01-18 | End: 2017-01-18

## 2017-01-18 NOTE — CM/SW NOTE
01/18/17 1000   CM/SW Referral Data   Referral Source Physician   Reason for Referral Discharge planning   Informant Children   Readmission Assessment   Factors that patient feels contributed to this readmission Other (only choose if nothing else applie AM      Chelsie Panda from [facility] states pt is ok to return if family is ok with that plan.   Claudine Naidu, 01/18/2017, 10:52 AM

## 2017-01-18 NOTE — CONSULTS
[facility]                       Gastroenterology [address] [facility] Gastroenterology    [facility] Patient Status:  Inpatient    1934 MRN [id number]   [facility] 0SW-A Attending More Dobbs MD   Hosp Day # 0 PCP hypertension    • Chronic lung disease    • Diverticula of small intestine    • High blood pressure    • High cholesterol    • Shortness of breath    • COPD (chronic obstructive pulmonary disease) (HCC)    • Back problem    • Osteoarthrosis, unspecified wh Isosorbide Mononitrate ER (IMDUR) 24 hr tab 30 mg 30 mg Oral Daily   ipratropium-albuterol (DUONEB) nebulizer solution 3 mL 3 mL Nebulization Q4H PRN   ferrous sulfate EC tab 325 mg 325 mg Oral Daily   dextromethorphan-guaiFENesin (ROBITUSSIN-DM)  reports a history of chronic arthritis, arthralgias            Genitourinary:  The patient reports no history of recurrent urinary tract infections, hematuria, dysuria, or nephrolithiasis           Psychiatric: The patient reports no history of depression, 4.5   CL  104   CO2  25.0     Recent Labs   Lab  01/18/17   0058  01/18/17   0750   RBC  3.10*   --    HGB  9.8*  8.0*   HCT  29.2*  24.4*   MCV  94.2   --    MCH  31.6   --    MCHC  33.6   --    RDW  13.7   --    NEPRELIM  4.88   --    WBC  5.6   --    PL attenuation. SPLEEN:  Not enlarged. KIDNEYS:  Normal anatomic positions. No new hydronephrosis. 5.3 cm left cortical cyst appears unchanged. ADRENALS:  Not enlarged. AORTA/VASCULAR:  Moderate calcified atherosclerosis. Aortoiliac tortuosity.  No abdomin hematemesis  Findings:                  Esophagus: Mild esophagitis in the distal esophagus without erosion or ulcer or active bleeding.  There was no sign of esophageal varices.  There was no sign of blood, old blood, clots or heme seen in the esophagus.   Plan for an EGD under MAC today with Dr Josef Coleman   2. Increase PPI to 40 mg IV BID  3. Strict NPO  4. Stool studies when able   5. D/C Miralax, senna at this time     Thank you for the consultation, we will follow the patient with you.   SIMONA Kinsey

## 2017-01-18 NOTE — H&P
[facility] 15. History & Physical    Jaiden Tabares Patient Status:  Inpatient    1934 MRN [id number]   [facility] 0SW-A Attending Orly Page MD   Hosp Day # 0 PCP Asif Menjivar MD     History of Present 500 mg by mouth 4 (four) times daily. Disp:  Rfl:     Prochlorperazine Maleate (COMPAZINE) 10 mg tablet Take 10 mg by mouth every 8 (eight) hours as needed for Nausea.  Disp:  Rfl:     carvedilol 6.25 MG Oral Tab Take 6.25 mg by mouth 2 (two) times daily wi 0.5-2.5 (3) MG/3ML Inhalation Solution Take 3 mL by nebulization every 4 (four) hours as needed. Disp:  Rfl:  1/4/2017 at Unknown time   ondansetron 4 MG Oral Tablet Dispersible Take 4 mg by mouth every 8 (eight) hours as needed for Nausea.  Disp:  Rfl: Nose:   Nares normal, septum midline, mucosa normal, no drainage    or sinus tenderness   Throat:   Lips, mucosa, and tongue normal; teeth and gums normal   Neck:   Supple, symmetrical, trachea midline, no adenopathy;        thyroid:  No enlargement/tend Acute encephalopathy     Anemia     Left lower lobe pneumonia (HCC)     Hyponatremia     Hyperglycemia     Leukocytosis     Acute renal failure (ARF) (HCC)     Acute kidney injury (HCC)     Azotemia     Metabolic acidosis     Metabolic alkalosis     Respir

## 2017-01-18 NOTE — ED PROVIDER NOTES
Patient Seen in: [facility] Emergency Department    History   Patient presents with:  GI Bleeding (gastrointestinal)  Nausea/Vomiting/Diarrhea (gastrointestinal)    Stated Complaint: GI bleed    HPI    Patient is an 80-year-old male with multiple pa days, then 2 tabs daily (20 mg) X 2 day,  then one tab daily (10 mg) X 1 day then  1/2  tab for 1 day ,  Then stop  20 pills of 10 mg   acetaminophen 325 MG Oral Tab,  Take 65 mg by mouth every 6 (six) hours as needed for Pain.      bisacodyl 10 MG Rectal S 108 (90 BASE) MCG/ACT Inhalation Aero Soln,  Inhale 2 puffs into the lungs every 6 (six) hours as needed for Wheezing. Budesonide-Formoterol Fumarate (SYMBICORT) 160-4.5 MCG/ACT Inhalation Aerosol,  Inhale 2 puffs into the lungs 2 (two) times daily. normal.  Stool is brown and Hemoccult negative with appropriate controls. Extremities: No deformity, nontender throughout, and normal active range of motion of all 4 extremities. Distal pulses normal and symmetric.   No calf swelling, asymmetry, tendernes panel order TYPE AND SCREEN.   Procedure                               Abnormality         Status                     ---------                               -----------         ------                     BLOOD TYPE, ABO AND RH Z[[id number]] Medication List        Present on Admission           ICD-10-CM Noted POA    Upper GI bleeding K92.2 1/18/2017 Unknown

## 2017-01-18 NOTE — PLAN OF CARE
NURSING ADMISSION NOTE      Patient admitted via cart. Oriented to room. Safety precautions initiated. Bed in low position. Call light in reach. Admit completed. Dr. Yareli De La Cruz notified of admit orders obtained.  Inform MD of temp 101.2 and R knee wit

## 2017-01-18 NOTE — ED NOTES
Attempted to insert NGT x 2, unable to tolerate insertion process. Pt denies any nausea or vomiting, no vomiting noted the whole shift.  Dr Will made aware, will hold NGT insertion @ this time

## 2017-01-18 NOTE — ED INITIAL ASSESSMENT (HPI)
Pt presents from [address] with cc of nausea and vomiting x2 today, poor appetite, last ate at noon. Hx of GI bleed in past, abdomen distended. 4 MG zofran given IV by ambulance.

## 2017-01-18 NOTE — PAYOR COMM NOTE
Attending Physician: Bonnie Vernon MD      1/18       Patient presents with:  GI Bleeding   Nausea/Vomiting/Diarrhea (gastrointestinal)    Stated Complaint: GI bleed        Patient is an 40-year-old male with multiple past medical problems including abdo other components within normal limits    PTT, ACTIVATED - Normal      Narrative:       The aPTT Heparin Therapeutic Range is approximately 65- 104 seconds.  The therapeutic range has been validated against 0.3-0.7 heparin anti-Xa units/mL.      LIPASE - Nor

## 2017-01-18 NOTE — PLAN OF CARE
Minimal or absence of nausea and vomiting Progressing      Maintains hematologic stability Progressing      Return mobility to safest level of function Progressing      Skin integrity remains intact Progressing      C/o slight abdominal, denies sob  No n/v

## 2017-01-18 NOTE — CONSULTS
INFECTIOUS DISEASE CONSULT NOTE    Yanelis Katarzyna Patient Status:  Inpatient    1934 MRN [id number]   [facility] 0SW-A Attending Yolanda Mccain MD   Hosp Day # 0 PCP Florian Yuan SURGERY      EGD N/A 1/4/2017    Comment Procedure: ESOPHAGOGASTRODUODENOSCOPY (EGD); Surgeon: Abdirizak Carter MD;  Location: [facility] MAIN OR     History reviewed. No pertinent family history. reports that he has quit smoking.  He does not have any smokel suppository 10 mg, 10 mg, Rectal, Daily PRN  •  Albuterol Sulfate  (90 BASE) MCG/ACT inhaler 2 puff, 2 puff, Inhalation, Q6H PRN  •  Acetaminophen-Codeine #3 (TYLENOL #3) 300-30 MG tab 1 tablet, 1 tablet, Oral, Q4H PRN  •  acetaminophen (TYLENOL) ta 13. 7   --    NEPRELIM  4.88   --    WBC  5.6   --    PLT  252.0   --      Recent Labs   Lab  01/18/17   0058   GLU  101*   BUN  26*   CREATSERUM  1.65*   CA  8.2*   ALB  2.3*   NA  137   K  4.5   CL  104   CO2  25.0   ALKPHO  87   AST  20   ALT  20   BILT hernia. URINARY BLADDER:  Nondistended. PELVIC NODES:  None enlarged. PELVIC ORGANS:  Mild prostatomegaly. BONES:  Moderate to severe degenerative changes. LUNG BASES:  Mild scarring. Coronary calcifications.  OTHER:  There is a small amount of fluid in the call if you have any questions. I will continue to follow with you and will make further recommendations based on his progress.     Brit Gan  1/18/2017  9:17 AM

## 2017-01-19 ENCOUNTER — APPOINTMENT (OUTPATIENT)
Dept: GENERAL RADIOLOGY | Facility: HOSPITAL | Age: 82
DRG: 378 | End: 2017-01-19
Attending: INTERNAL MEDICINE
Payer: MEDICARE

## 2017-01-19 ENCOUNTER — APPOINTMENT (OUTPATIENT)
Dept: GENERAL RADIOLOGY | Facility: HOSPITAL | Age: 82
DRG: 378 | End: 2017-01-19
Attending: RADIOLOGY
Payer: MEDICARE

## 2017-01-19 LAB
BASOPHILS # BLD AUTO: 0 X10(3) UL (ref 0–0.1)
BASOPHILS NFR BLD AUTO: 0 %
BUN BLD-MCNC: 33 MG/DL (ref 8–20)
CALCIUM BLD-MCNC: 7.1 MG/DL (ref 8.3–10.3)
CHLORIDE: 110 MMOL/L (ref 101–111)
CO2: 21 MMOL/L (ref 22–32)
CREAT BLD-MCNC: 1.36 MG/DL (ref 0.7–1.3)
EOSINOPHIL # BLD AUTO: 0.09 X10(3) UL (ref 0–0.3)
EOSINOPHIL NFR BLD AUTO: 2.1 %
ERYTHROCYTE [DISTWIDTH] IN BLOOD BY AUTOMATED COUNT: 13.8 % (ref 11.5–16)
GLUCOSE BLD-MCNC: 120 MG/DL (ref 65–99)
GLUCOSE BLD-MCNC: 64 MG/DL (ref 70–99)
GLUCOSE BLD-MCNC: 71 MG/DL (ref 65–99)
GLUCOSE BLD-MCNC: 96 MG/DL (ref 65–99)
HAV IGM SER QL: 2.3 MG/DL (ref 1.7–3)
HCT VFR BLD AUTO: 26.4 % (ref 37–53)
HGB BLD-MCNC: 8.5 G/DL (ref 13–17)
IMMATURE GRANULOCYTE COUNT: 0.02 X10(3) UL (ref 0–1)
IMMATURE GRANULOCYTE RATIO %: 0.5 %
LYMPHOCYTES # BLD AUTO: 0.61 X10(3) UL (ref 0.9–4)
LYMPHOCYTES NFR BLD AUTO: 14.5 %
MCH RBC QN AUTO: 30.9 PG (ref 27–33.2)
MCHC RBC AUTO-ENTMCNC: 32.2 G/DL (ref 31–37)
MCV RBC AUTO: 96 FL (ref 80–99)
MONOCYTES # BLD AUTO: 0.41 X10(3) UL (ref 0.1–0.6)
MONOCYTES NFR BLD AUTO: 9.7 %
NEUTROPHIL ABS PRELIM: 3.09 X10 (3) UL (ref 1.3–6.7)
NEUTROPHILS # BLD AUTO: 3.09 X10(3) UL (ref 1.3–6.7)
NEUTROPHILS NFR BLD AUTO: 73.2 %
PLATELET # BLD AUTO: 170 10(3)UL (ref 150–450)
POTASSIUM SERPL-SCNC: 3.6 MMOL/L (ref 3.6–5.1)
RBC # BLD AUTO: 2.75 X10(6)UL (ref 3.8–5.8)
RED CELL DISTRIBUTION WIDTH-SD: 47.8 FL (ref 35.1–46.3)
SODIUM SERPL-SCNC: 142 MMOL/L (ref 136–144)
WBC # BLD AUTO: 4.2 X10(3) UL (ref 4–13)

## 2017-01-19 PROCEDURE — 87269 GIARDIA AG IF: CPT | Performed by: INTERNAL MEDICINE

## 2017-01-19 PROCEDURE — 85025 COMPLETE CBC W/AUTO DIFF WBC: CPT | Performed by: NURSE PRACTITIONER

## 2017-01-19 PROCEDURE — 87209 SMEAR COMPLEX STAIN: CPT | Performed by: INTERNAL MEDICINE

## 2017-01-19 PROCEDURE — 87177 OVA AND PARASITES SMEARS: CPT | Performed by: INTERNAL MEDICINE

## 2017-01-19 PROCEDURE — 83735 ASSAY OF MAGNESIUM: CPT | Performed by: NURSE PRACTITIONER

## 2017-01-19 PROCEDURE — 80048 BASIC METABOLIC PNL TOTAL CA: CPT | Performed by: NURSE PRACTITIONER

## 2017-01-19 PROCEDURE — C9113 INJ PANTOPRAZOLE SODIUM, VIA: HCPCS | Performed by: INTERNAL MEDICINE

## 2017-01-19 PROCEDURE — 74250 X-RAY XM SM INT 1CNTRST STD: CPT

## 2017-01-19 PROCEDURE — 82962 GLUCOSE BLOOD TEST: CPT

## 2017-01-19 RX ORDER — DEXTROSE AND SODIUM CHLORIDE 5; .45 G/100ML; G/100ML
INJECTION, SOLUTION INTRAVENOUS CONTINUOUS
Status: DISCONTINUED | OUTPATIENT
Start: 2017-01-19 | End: 2017-01-22

## 2017-01-19 RX ORDER — DEXTROSE MONOHYDRATE 25 G/50ML
INJECTION, SOLUTION INTRAVENOUS
Status: COMPLETED
Start: 2017-01-19 | End: 2017-01-19

## 2017-01-19 NOTE — PLAN OF CARE
CARDIOVASCULAR - ADULT    • Maintains optimal cardiac output and hemodynamic stability Progressing        GASTROINTESTINAL - ADULT    • Minimal or absence of nausea and vomiting Progressing    • Maintains or returns to baseline bowel function Progressing

## 2017-01-19 NOTE — OPERATIVE REPORT
Operative Report-Esophagogastroduodenoscopy with cold biopsies and ablation of AVM  Myla [place] 5/31/1934   [facility] [id number] MRN [id number]   Admission Date 1/18/2017 Operation Date 1/18/2017   Attending Physician Meryle Dubonnet, MD Operating Physicia duodenal settings. There was active reflux from the distal small bowel, extending proximally. THERAPEUTICS: Biopsies were performed of the duodenum, antrum/body, and one biopsy obtained from the esophagus.  An NGT was placed during the procedure, given th

## 2017-01-19 NOTE — PROGRESS NOTES
[facility]    Progress Note    Kasia Bruno Patient Status:  Inpatient    1934 MRN [id number]   [facility] 0SW-A Attending Kody Velasquez MD   Hosp Day # 1 PCP Uzma Greenwood MD       SUBJECTIVE:  Getting CT done today mg Oral Nightly   nystatin (MYCOSTATIN) suspension 500,000 Units 5 mL Oral QID   Losartan Potassium (COZAAR) tab 100 mg 100 mg Oral Daily   Levothyroxine Sodium (SYNTHROID) tab 50 mcg 50 mcg Oral Before breakfast   Isosorbide Mononitrate ER (IMDUR) 24 hr t unspecified gastrointestinal hemorrhage type     Anemia, unspecified type     Dyspnea, unspecified type     Upper GI bleeding     Asthma-COPD overlap syndrome (HCC)     Essential hypertension, benign      Plan:   Await CT results  EGD results noted  Advanc

## 2017-01-19 NOTE — ANESTHESIA POSTPROCEDURE EVALUATION
[address] [facility] Patient Status:  Inpatient   Age/Gender 80year old male MRN [id number]   Location [facility]. Attending Liset Terry MD   Hosp Day # 0 PCP Liam Melendez MD       Anesthesia Post-op Note    Procedure

## 2017-01-19 NOTE — ANESTHESIA PREPROCEDURE EVALUATION
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  Disp:  Rfl:    lansoprazole 30 MG Oral Capsule Delayed Release Take 30 mg by mouth every morning before breakfast. Disp:  Rfl:    Levothyroxine Sodium 50 MCG Oral Tab Take 50 mcg by mouth before breakfast. 3.10* 01/18/2017   HGB 8.0* 01/18/2017   HCT 24.4* 01/18/2017   MCV 94.2 01/18/2017   MCH 31.6 01/18/2017   MCHC 33.6 01/18/2017   RDW 13.7 01/18/2017   .0 01/18/2017       Lab Results  Component Value Date    01/18/2017   K 4.5 01/18/2017   C

## 2017-01-19 NOTE — PROGRESS NOTES
Gastroenterology Progress Note  Patient Name: Alissa Roberts  Chief Complaint: Esophagitis, N/V  S: Pt reports that his abdominal pain has improved.    O: /54 mmHg  Pulse 69  Temp(Src) 98.5 °F (36.9 °C) (Oral)  Resp 16  SpO2 100%  Gen: AAOx3  CV ([facility] [address]     Hyponatremia     Hyperglycemia     Leukocytosis     Acute renal failure (ARF) (HCC)     Acute kidney injury (HCC)     Azotemia     Metabolic acidosis     Metabolic alkalosis     Respiratory alkalosis     Gastrointestinal hemorrhage     Robert Selvin

## 2017-01-20 ENCOUNTER — APPOINTMENT (OUTPATIENT)
Dept: GENERAL RADIOLOGY | Facility: HOSPITAL | Age: 82
DRG: 378 | End: 2017-01-20
Attending: INTERNAL MEDICINE
Payer: MEDICARE

## 2017-01-20 LAB — POTASSIUM SERPL-SCNC: 3.6 MMOL/L (ref 3.6–5.1)

## 2017-01-20 PROCEDURE — 97116 GAIT TRAINING THERAPY: CPT

## 2017-01-20 PROCEDURE — 97110 THERAPEUTIC EXERCISES: CPT

## 2017-01-20 PROCEDURE — C9113 INJ PANTOPRAZOLE SODIUM, VIA: HCPCS | Performed by: INTERNAL MEDICINE

## 2017-01-20 PROCEDURE — 74000 XR ABDOMEN (KUB) (1 AP VIEW)  (CPT=74000): CPT

## 2017-01-20 PROCEDURE — 97163 PT EVAL HIGH COMPLEX 45 MIN: CPT

## 2017-01-20 PROCEDURE — 84132 ASSAY OF SERUM POTASSIUM: CPT | Performed by: INTERNAL MEDICINE

## 2017-01-20 RX ORDER — NITROGLYCERIN 40 MG/1
1 PATCH TRANSDERMAL DAILY
Status: DISCONTINUED | OUTPATIENT
Start: 2017-01-20 | End: 2017-01-25

## 2017-01-20 RX ORDER — SUCRALFATE ORAL 1 G/10ML
1 SUSPENSION ORAL
Status: DISCONTINUED | OUTPATIENT
Start: 2017-01-20 | End: 2017-01-25

## 2017-01-20 NOTE — PROGRESS NOTES
Gastroenterology Progress Note  Patient Name: Suzanne Jones  Chief Complaint: Esophagitis, N/V  S: Pt reports that his abdominal pain has improved. He has a few BMs overnight.    O: /43 mmHg  Pulse 75  Temp(Src) 98.1 °F (36.7 °C) (Oral)  Resp 1 ([facility] [address]     Hyponatremia     Hyperglycemia     Leukocytosis     Acute renal failure (ARF) (HCC)     Acute kidney injury (HCC)     Azotemia     Metabolic acidosis     Metabolic alkalosis     Respiratory alkalosis     Gastrointestinal hemorrhage     Oren Ferreira

## 2017-01-20 NOTE — PHYSICAL THERAPY NOTE
PHYSICAL THERAPY EVALUATION - INPATIENT     Room Number: 0012/0012-A  Evaluation Date: 1/20/2017  Type of Evaluation: Initial  Physician Order: PT Eval and Treat    Presenting Problem: UGI bleed, recent R TKR  Reason for Therapy: Mobility Dysfunction a HOME SITUATION  Type of Home: [facility]   Home Layout: Two level  Stairs to Enter : 2     Stairs to Bedroom: 16  Railing: Yes    Lives With: Alone  Drives: Yes  Patient Owned Equipment: Rolling walker;Cane       Prior Level of [place]: prior to CK    FUNCTIONAL ABILITY STATUS  Gait Assessment   Gait Assistance: Supervision  Distance (ft): 200  Assistive Device: Rolling walker  Pattern: Within Functional Limits          Skilled Therapy Provided: RN ok'd tx, pt and family agreeable.  Instructed pt o able to ambulate 500 feet with assist device: walker - rolling at assistance level: modified independent     Goal #4    Goal #5    Goal #6    Goal Comments: Goals established on 1/20/2017

## 2017-01-20 NOTE — CM/SW NOTE
Received call from son Jatinder Sun [phone]. He does not want pt to return to [place] and is requesting Cris avila.  Order placed. Explained to son that pt may not qualify for MJ and he should have a back up plan.   PT/OT needs to be ordered to determin

## 2017-01-20 NOTE — PLAN OF CARE
Minimal or absence of nausea and vomiting Progressing      Maintains or returns to baseline bowel function Progressing      Return mobility to safest level of function Progressing      Skin integrity remains intact Progressing      Incision(s), wounds(s) o

## 2017-01-20 NOTE — PAYOR COMM NOTE
Attending Physician: Vicente Olson MD      1/20    CONTINUED STAY    LABS  HG 8.5    Principal Problem:    Upper GI bleeding  Active Problems:    Lumbar spinal stenosis    Lumbosacral radiculopathy    Anemia    Gastrointestinal hemorrhage, unspecified ga

## 2017-01-20 NOTE — PROGRESS NOTES
[facility] 15. Progress Note    Beulah Moreira Patient Status:  Inpatient    1934 MRN [id number]   [facility] 0SW-A Attending Bella Juares MD   Hosp Day # 2 PCP Lillian Gómez MD     Subjective:  Rocio Henry Normocephalic, without obvious abnormality, atraumatic NG Tube  bilious   Eyes:    PERRL, conjunctiva/corneas clear and EOM's intact   Ears:    Normal TM's and external ear canals, both ears   Nose:   Nares normal, septum midline, mucosa normal, no drai 80         Radiology    Assessment:  Principal Problem:    Upper GI bleeding  Active Problems:    Lumbar spinal stenosis    Lumbosacral radiculopathy    Anemia    Gastrointestinal hemorrhage, unspecified gastrointestinal hemorrhage type    Asthma-COPD over

## 2017-01-20 NOTE — CONSULTS
INFECTIOUS DISEASE CONSULT NOTE    Baca [place] Patient Status:  Inpatient    1934 MRN [id number]   [facility] 0SW-A Attending More Dobbs MD   Hosp Day # 2 PCP Ashley Lemus Inhalation, QAM  •  bisacodyl (DULCOLAX) rectal suppository 10 mg, 10 mg, Rectal, Daily PRN  •  Albuterol Sulfate  (90 BASE) MCG/ACT inhaler 2 puff, 2 puff, Inhalation, Q6H PRN  •  Acetaminophen-Codeine #3 (TYLENOL #3) 300-30 MG tab 1 tablet, 1 tabl --    --    TP  5.1*   --    --        Microbiology    Reviewed in EMR,  Bcx pend    Radiology:    1/18/2017  PROCEDURE:  CT ABDOMEN+PELVIS(CPT=74176)  COMPARISON:  [place] , CT ABDOMEN+PELVIS(CONTRAST ONLY)(CPT=74177), 6/28/2014, 0:46.   INDICATIONS:  GI bl of fluid in the distal esophagus. 1/18/2017  CONCLUSION:  1. There is fluid-filled non-pathologically distended small bowel. Enteritis or ileus is favored. Distal small bowel obstruction is felt less likely. 2. Colonic diverticulosis.  No acute inflamm

## 2017-01-20 NOTE — CM/SW NOTE
Will need PT/OT evals to determine if Marcelo Pham can get insurance auth for pt to return there at [place].     Claudine Naidu, 01/20/2017, 8:51 AM

## 2017-01-21 ENCOUNTER — APPOINTMENT (OUTPATIENT)
Dept: GENERAL RADIOLOGY | Facility: HOSPITAL | Age: 82
DRG: 378 | End: 2017-01-21
Attending: INTERNAL MEDICINE
Payer: MEDICARE

## 2017-01-21 LAB
BUN BLD-MCNC: 14 MG/DL (ref 8–20)
CALCIUM BLD-MCNC: 7.4 MG/DL (ref 8.3–10.3)
CHLORIDE: 110 MMOL/L (ref 101–111)
CO2: 23 MMOL/L (ref 22–32)
CREAT BLD-MCNC: 1.1 MG/DL (ref 0.7–1.3)
ERYTHROCYTE [DISTWIDTH] IN BLOOD BY AUTOMATED COUNT: 13.8 % (ref 11.5–16)
GLUCOSE BLD-MCNC: 121 MG/DL (ref 70–99)
HCT VFR BLD AUTO: 24.4 % (ref 37–53)
HGB BLD-MCNC: 7.9 G/DL (ref 13–17)
MCH RBC QN AUTO: 30.6 PG (ref 27–33.2)
MCHC RBC AUTO-ENTMCNC: 32.4 G/DL (ref 31–37)
MCV RBC AUTO: 94.6 FL (ref 80–99)
PLATELET # BLD AUTO: 131 10(3)UL (ref 150–450)
POTASSIUM SERPL-SCNC: 3.7 MMOL/L (ref 3.6–5.1)
POTASSIUM SERPL-SCNC: 3.7 MMOL/L (ref 3.6–5.1)
RBC # BLD AUTO: 2.58 X10(6)UL (ref 3.8–5.8)
RED CELL DISTRIBUTION WIDTH-SD: 47.1 FL (ref 35.1–46.3)
SODIUM SERPL-SCNC: 141 MMOL/L (ref 136–144)
WBC # BLD AUTO: 2.6 X10(3) UL (ref 4–13)

## 2017-01-21 PROCEDURE — 97116 GAIT TRAINING THERAPY: CPT

## 2017-01-21 PROCEDURE — 87040 BLOOD CULTURE FOR BACTERIA: CPT | Performed by: INTERNAL MEDICINE

## 2017-01-21 PROCEDURE — 97167 OT EVAL HIGH COMPLEX 60 MIN: CPT

## 2017-01-21 PROCEDURE — 97530 THERAPEUTIC ACTIVITIES: CPT

## 2017-01-21 PROCEDURE — 85027 COMPLETE CBC AUTOMATED: CPT | Performed by: FAMILY MEDICINE

## 2017-01-21 PROCEDURE — C9113 INJ PANTOPRAZOLE SODIUM, VIA: HCPCS | Performed by: INTERNAL MEDICINE

## 2017-01-21 PROCEDURE — 80048 BASIC METABOLIC PNL TOTAL CA: CPT | Performed by: FAMILY MEDICINE

## 2017-01-21 PROCEDURE — 84132 ASSAY OF SERUM POTASSIUM: CPT | Performed by: INTERNAL MEDICINE

## 2017-01-21 PROCEDURE — 87493 C DIFF AMPLIFIED PROBE: CPT | Performed by: INTERNAL MEDICINE

## 2017-01-21 PROCEDURE — 71010 XR CHEST AP PORTABLE  (CPT=71010): CPT

## 2017-01-21 RX ORDER — ARIPIPRAZOLE 15 MG/1
40 TABLET ORAL ONCE
Status: COMPLETED | OUTPATIENT
Start: 2017-01-21 | End: 2017-01-21

## 2017-01-21 RX ORDER — LEVOFLOXACIN 750 MG/1
750 TABLET ORAL ONCE
Status: DISCONTINUED | OUTPATIENT
Start: 2017-01-21 | End: 2017-01-21

## 2017-01-21 RX ORDER — LEVOFLOXACIN 750 MG/1
750 TABLET ORAL DAILY
Status: DISCONTINUED | OUTPATIENT
Start: 2017-01-21 | End: 2017-01-21 | Stop reason: DRUGHIGH

## 2017-01-21 RX ORDER — LEVOFLOXACIN 750 MG/1
750 TABLET ORAL EVERY OTHER DAY
Status: DISCONTINUED | OUTPATIENT
Start: 2017-01-21 | End: 2017-01-25

## 2017-01-21 NOTE — OCCUPATIONAL THERAPY NOTE
OCCUPATIONAL THERAPY EVALUATION - INPATIENT     Room Number: 354/354-A  Evaluation Date: 1/21/2017  Type of Evaluation: Initial  Presenting Problem: UGI,recent R TKR    Physician Order: IP Consult to Occupational Therapy  Reason for Therapy: ADL/IADL Dysfu ENDOSCOPY       HOME SITUATION  Type of Home: [facility]  Home Layout: Two level  Lives With: Alone    Toilet and Equipment: Standard height toilet                Drives: Yes  Patient Regularly Uses: Glasses    Prior Level of [place]: prior to knee rep Lot  -   Putting on and taking off regular upper body clothing?: A Little  -   Taking care of personal grooming such as brushing teeth?: A Little  -   Eating meals?: A Little    AM-PAC Score:  Score: 15  Approx Degree of Impairment: 56.46%  Standardized Sc Goals  Patient will transfer to toilet:  with supervision

## 2017-01-21 NOTE — CONSULTS
[facility] Pharmacy Note:  Renal Adjustment for Levaquin (levofloxacin)    Travisinatul Sullivan is a 80year old male who has been prescribed Levaquin (levofloxacin) 750 mg every 24 hrs. CrCl is estimated creatinine clearance is 36.6 mL/min (based on Cr of 1.1).

## 2017-01-21 NOTE — PROGRESS NOTES
Gastroenterology Progress Note  Patient Name: Mane Bowers  Chief Complaint: Esophagitis, N/V  S: Pt reports that his abdominal pain and throat pain have improved.  He denies worsening abdominal pain with clamping of NGT for the past day and with tr Leukocytosis     Acute renal failure (ARF) (HCC)     Acute kidney injury (HCC)     Azotemia     Metabolic acidosis     Metabolic alkalosis     Respiratory alkalosis     Gastrointestinal hemorrhage     Gastrointestinal hemorrhage, unspecified gastrointestin

## 2017-01-21 NOTE — PLAN OF CARE
GASTROINTESTINAL - ADULT    • Minimal or absence of nausea and vomiting Progressing    • Maintains adequate nutritional intake (undernourished) Progressing    Tolerated clear iquid diet this morning, denies having nausea.   Abdomen soft with good bowel soun

## 2017-01-21 NOTE — PHYSICAL THERAPY NOTE
PHYSICAL THERAPY KNEE TREATMENT NOTE - INPATIENT     Room Number: 354/354-A     Session: 1/5   Number of Visits to Meet Established Goals: 5    Presenting Problem: UGI bleed, recent R TKR    Problem List  Principal Problem:    Upper GI bleeding  Active Pr promotion;Relaxation;Repositioning    BALANCE  Static Sitting: Good  Dynamic Sitting: Fair -  Static Standing: Fair -  Dynamic Standing: Poor +    ACTIVITY TOLERANCE  Room air  No shortness of breath    AM-PAC '6-Clicks' INPATIENT SHORT FORM - BASIC MOBILI Extension stretch  1x 1x     Comments: Pt seen bedside.  was present yes   is a son    Knee ROM                 Patient End of Session: In bed;Needs met;Call light within reach;RN aware of session/findings; Family present    ASSESSMENT   Pt dem

## 2017-01-21 NOTE — PROGRESS NOTES
Temperature 101.9, complaint of feeling cold and patient is shevering. Dr. Jacquelyn mcdonnell MD called back with order for Chest X-ray

## 2017-01-21 NOTE — PROGRESS NOTES
Patient transferred to [facility] in stable condition via bed. All belongings sent w/ pt. Family aware. Report given to receiving RN.

## 2017-01-21 NOTE — PLAN OF CARE
GASTROINTESTINAL - ADULT    • Minimal or absence of nausea and vomiting Progressing    • Maintains or returns to baseline bowel function Progressing    • Maintains adequate nutritional intake (undernourished) Progressing        Impaired Functional Mobility

## 2017-01-22 ENCOUNTER — APPOINTMENT (OUTPATIENT)
Dept: GENERAL RADIOLOGY | Facility: HOSPITAL | Age: 82
DRG: 378 | End: 2017-01-22
Attending: INTERNAL MEDICINE
Payer: MEDICARE

## 2017-01-22 LAB
ALBUMIN SERPL-MCNC: 1.7 G/DL (ref 3.5–4.8)
ALP LIVER SERPL-CCNC: 72 U/L (ref 45–117)
ALT SERPL-CCNC: 14 U/L (ref 17–63)
AST SERPL-CCNC: 16 U/L (ref 15–41)
BASOPHILS # BLD AUTO: 0 X10(3) UL (ref 0–0.1)
BASOPHILS NFR BLD AUTO: 0 %
BILIRUB SERPL-MCNC: 0.6 MG/DL (ref 0.1–2)
BUN BLD-MCNC: 14 MG/DL (ref 8–20)
CALCIUM BLD-MCNC: 7.3 MG/DL (ref 8.3–10.3)
CHLORIDE: 110 MMOL/L (ref 101–111)
CO2: 23 MMOL/L (ref 22–32)
CREAT BLD-MCNC: 1.25 MG/DL (ref 0.7–1.3)
EOSINOPHIL # BLD AUTO: 0.01 X10(3) UL (ref 0–0.3)
EOSINOPHIL NFR BLD AUTO: 0.4 %
ERYTHROCYTE [DISTWIDTH] IN BLOOD BY AUTOMATED COUNT: 13.9 % (ref 11.5–16)
GLUCOSE BLD-MCNC: 78 MG/DL (ref 70–99)
HCT VFR BLD AUTO: 22.1 % (ref 37–53)
HGB BLD-MCNC: 7.4 G/DL (ref 13–17)
IMMATURE GRANULOCYTE COUNT: 0.01 X10(3) UL (ref 0–1)
IMMATURE GRANULOCYTE RATIO %: 0.4 %
LYMPHOCYTES # BLD AUTO: 0.72 X10(3) UL (ref 0.9–4)
LYMPHOCYTES NFR BLD AUTO: 27.4 %
M PROTEIN MFR SERPL ELPH: 4.2 G/DL (ref 6.1–8.3)
MCH RBC QN AUTO: 30.8 PG (ref 27–33.2)
MCHC RBC AUTO-ENTMCNC: 33.5 G/DL (ref 31–37)
MCV RBC AUTO: 92.1 FL (ref 80–99)
MONOCYTES # BLD AUTO: 0.29 X10(3) UL (ref 0.1–0.6)
MONOCYTES NFR BLD AUTO: 11 %
NEUTROPHIL ABS PRELIM: 1.6 X10 (3) UL (ref 1.3–6.7)
NEUTROPHILS # BLD AUTO: 1.6 X10(3) UL (ref 1.3–6.7)
NEUTROPHILS NFR BLD AUTO: 60.8 %
PLATELET # BLD AUTO: 118 10(3)UL (ref 150–450)
POTASSIUM SERPL-SCNC: 3.9 MMOL/L (ref 3.6–5.1)
RBC # BLD AUTO: 2.4 X10(6)UL (ref 3.8–5.8)
RED CELL DISTRIBUTION WIDTH-SD: 46.8 FL (ref 35.1–46.3)
SODIUM SERPL-SCNC: 141 MMOL/L (ref 136–144)
WBC # BLD AUTO: 2.6 X10(3) UL (ref 4–13)

## 2017-01-22 PROCEDURE — 97530 THERAPEUTIC ACTIVITIES: CPT

## 2017-01-22 PROCEDURE — 80053 COMPREHEN METABOLIC PANEL: CPT | Performed by: INTERNAL MEDICINE

## 2017-01-22 PROCEDURE — 85025 COMPLETE CBC W/AUTO DIFF WBC: CPT | Performed by: INTERNAL MEDICINE

## 2017-01-22 PROCEDURE — 97110 THERAPEUTIC EXERCISES: CPT

## 2017-01-22 PROCEDURE — 87205 SMEAR GRAM STAIN: CPT | Performed by: INTERNAL MEDICINE

## 2017-01-22 PROCEDURE — 87070 CULTURE OTHR SPECIMN AEROBIC: CPT | Performed by: INTERNAL MEDICINE

## 2017-01-22 PROCEDURE — 87075 CULTR BACTERIA EXCEPT BLOOD: CPT | Performed by: INTERNAL MEDICINE

## 2017-01-22 PROCEDURE — 97116 GAIT TRAINING THERAPY: CPT

## 2017-01-22 PROCEDURE — 73562 X-RAY EXAM OF KNEE 3: CPT

## 2017-01-22 RX ORDER — METOCLOPRAMIDE 5 MG/1
5 TABLET ORAL 2 TIMES DAILY WITH MEALS
Status: DISCONTINUED | OUTPATIENT
Start: 2017-01-22 | End: 2017-01-25

## 2017-01-22 RX ORDER — PANTOPRAZOLE SODIUM 40 MG/1
40 TABLET, DELAYED RELEASE ORAL
Status: DISCONTINUED | OUTPATIENT
Start: 2017-01-22 | End: 2017-01-25

## 2017-01-22 RX ORDER — METRONIDAZOLE 500 MG/1
500 TABLET ORAL EVERY 8 HOURS SCHEDULED
Status: DISCONTINUED | OUTPATIENT
Start: 2017-01-22 | End: 2017-01-22

## 2017-01-22 NOTE — PROGRESS NOTES
Notified Dr. Abdias Chavez of Chest X ray results: Lungs are well-expanded and are free of infiltrate, effusion, pneumothorax. There is elevation of the right hemidiaphragm. The cardiac silhouette is normal limits. No congestion.   MD told RN to give dose of Levaq

## 2017-01-22 NOTE — PROGRESS NOTES
[facility]    Progress Note    Filomena Mazariegos Patient Status:  Inpatient    1934 MRN [id number]   [facility] 3SW-A Attending Pradeep Ngo MD   Hosp Day # 4 PCP Shaye Smith MD       SUBJECTIVE:  Fever yesterday  Abx s AC and HS   dextrose 5 %-0.45 % NaCl infusion  Intravenous Continuous   influenza vaccine (PF)(FLUZONE) high dose for 65 yrs & older nj 0.5ml 0.5 mL Intramuscular Prior to discharge   Umeclidinium Bromide (INCRUSE ELLIPTA) 62.5 MCG/INH inhaler 1 puff 1 puf radiculopathy     Nontraumatic subdural hygroma     Acute encephalopathy     Anemia     Left lower lobe pneumonia (HCC)     Hyponatremia     Hyperglycemia     Leukocytosis     Acute renal failure (ARF) ([address]     Acute kidney injury ([facility]     Azotemia     M

## 2017-01-22 NOTE — CONSULTS
[facility]    Beverly Slaughter Patient Status:  Inpatient    1934 MRN [id number]   [facility] 3SW-A Attending Vicente Olson MD   Hosp Day # 4 PCP Vin Stone MD     Patient Identification  Beverly Slaughter is a 80 ye Unspecified essential hypertension    • Chronic lung disease    • Diverticula of small intestine    • High blood pressure    • High cholesterol    • Shortness of breath    • COPD (chronic obstructive pulmonary disease) (HCC)    • Back problem    • Osteoart to discharge   [COMPLETED] potassium chloride 40 mEq in sodium chloride 0.9 % 250 mL IVPB 40 mEq Intravenous Once   [COMPLETED] ondansetron HCl (ZOFRAN) 4 MG/2ML injection      [COMPLETED] morphINE sulfate (PF) 2 MG/ML injection 2 mg 2 mg Intravenous Once bisacodyl (DULCOLAX) rectal suppository 10 mg 10 mg Rectal Daily PRN   Albuterol Sulfate  (90 BASE) MCG/ACT inhaler 2 puff 2 puff Inhalation Q6H PRN   Acetaminophen-Codeine #3 (TYLENOL #3) 300-30 MG tab 1 tablet 1 tablet Oral Q4H PRN   acetaminoph systems within normal limits. OBJECTIVE:    Blood pressure 114/39, pulse 70, temperature 97.9 °F (36.6 °C), temperature source Oral, resp. rate 20, weight 149 lb (67.586 kg), SpO2 100 %.     Intake/Output Summary (Last 24 hours) at 01/22/17 1430  Last da ASSESSMENT:  Impaired mobility and self-care secondary to deconditioning, UGIB, fevers, leukopenia, thrombocy

## 2017-01-22 NOTE — PHYSICAL THERAPY NOTE
PHYSICAL THERAPY KNEE TREATMENT NOTE - INPATIENT     Room Number: 354/354-A     Session: 2   Number of Visits to Meet Established Goals: 5    Presenting Problem: UGI bleed, recent R TKR    Problem List  Principal Problem:    Upper GI bleeding  Active Prob Techniques:  Activity promotion;Relaxation;Repositioning    BALANCE  Static Sitting: Good  Dynamic Sitting: Fair -  Static Standing: Fair -  Dynamic Standing: Poor +    ACTIVITY TOLERANCE  Room air    AM-PAC '6-Clicks' INPATIENT SHORT FORM - BASIC MOBILITY Patient End of Session: Up in chair;Needs met;Call light within reach;RN aware of session/findings; All patient questions and concerns addressed;SCDs in place; Family present    ASSESSMENT   Pt continues to progress well with mobility.  Pt c/o incr pain w

## 2017-01-22 NOTE — PLAN OF CARE
GASTROINTESTINAL - ADULT    • Minimal or absence of nausea and vomiting Progressing    • Maintains or returns to baseline bowel function Progressing        HEMATOLOGIC - ADULT    • Maintains hematologic stability Progressing        Impaired Functional Mobi

## 2017-01-22 NOTE — PROGRESS NOTES
[facility]                INFECTIOUS DISEASE PROGRESS NOTE    Early Lions Patient Status:  Inpatient    1934 MRN [id number]   [facility] 3SW-A Attending Sadaf Velarde MD   Hosp Day # 4 PCP Francisco Marinelli MD     Ant risk for falling     Lumbar spinal stenosis     Lumbosacral radiculopathy     Nontraumatic subdural hygroma     Acute encephalopathy     Anemia     Left lower lobe pneumonia (HCC)     Hyponatremia     Hyperglycemia     Leukocytosis     Acute renal failure

## 2017-01-22 NOTE — PROGRESS NOTES
Gastroenterology Progress Note  Patient Name: Lillian Reese  Chief Complaint: Esophagitis, N/V  S: Pt denies abdominal pain or regurgitation. His BMs have been pasty and without melena or BRBPR. He has tolerated Full liquid diet.  He had a fever of 1 failure (ARF) ([facility] [address]     Acute kidney injury ([address]     Azotemia     Metabolic acidosis     Metabolic alkalosis     Respiratory alkalosis     Gastrointestinal hemorrhage     Gastrointestinal hemorrhage, unspecified gastrointestinal hemorrhage type     Anemia coordination of care. I have updated Lashawn Arreguin.        Meeta Myles DO  11:56 AM  1/22/2017  [facility] Gastroenterology  [phone]

## 2017-01-22 NOTE — PLAN OF CARE
GASTROINTESTINAL - ADULT    • Minimal or absence of nausea and vomiting Progressing    • Maintains or returns to baseline bowel function Progressing    Denies abdominal discomfort. Verbalized has occasional reflux.   GI service examined patient today, diet

## 2017-01-22 NOTE — OCCUPATIONAL THERAPY NOTE
OCCUPATIONAL THERAPY TREATMENT NOTE - INPATIENT     Room Number: 354/354-A  Session: 1/5  Number of Visits to Meet Established Goals: 5    Presenting Problem: UGI,recent R TKR    History related to current admission: Adm 1/18/17 from [facility] where he was getti stronger\".     OBJECTIVE  Precautions: None    WEIGHT BEARING RESTRICTION  Weight Bearing Restriction: None                PAIN ASSESSMENT  Ratin           ACTIVITY TOLERANCE  Room air  No shortness of breath    ACTIVITIES OF DAILY LIVING ASSESSMENT  A from skilled inpatient OT to address the above deficits, maximizing patient's ability to return to prior level of function      OT Discharge Recommendations: Acute rehabilitation  OT Device Recommendations: Grab bars; Shower chair    PLAN  OT Treatment Plan

## 2017-01-23 LAB
NOROVIRUS 1 BY PCR: NOT DETECTED
NOROVIRUS 2 BY PCR: NOT DETECTED

## 2017-01-23 PROCEDURE — 97116 GAIT TRAINING THERAPY: CPT

## 2017-01-23 PROCEDURE — 97535 SELF CARE MNGMENT TRAINING: CPT

## 2017-01-23 PROCEDURE — 97110 THERAPEUTIC EXERCISES: CPT

## 2017-01-23 PROCEDURE — 97530 THERAPEUTIC ACTIVITIES: CPT

## 2017-01-23 RX ORDER — SUCRALFATE ORAL 1 G/10ML
1 SUSPENSION ORAL
Qty: 1680 ML | Refills: 0 | Status: ON HOLD | OUTPATIENT
Start: 2017-01-23 | End: 2017-05-11

## 2017-01-23 RX ORDER — METOCLOPRAMIDE 5 MG/1
5 TABLET ORAL 2 TIMES DAILY WITH MEALS
Qty: 84 TABLET | Refills: 0 | Status: ON HOLD | OUTPATIENT
Start: 2017-01-23 | End: 2017-05-11 | Stop reason: ALTCHOICE

## 2017-01-23 NOTE — CONSULTS
[facility]  Report of Consultation    Beulah Yamilethalis Patient Status:  Inpatient    1934 MRN [id number]   [facility] 3SW-A Attending Bella Juares MD   Hosp Day # 5 PCP Lillian Gómez MD       History of Present Illness:  P Allergies    Medications:    Current facility-administered medications:   •  Metoclopramide HCl (REGLAN) tab 5 mg, 5 mg, Oral, BID with meals  •  Pantoprazole Sodium (PROTONIX) EC tab 40 mg, 40 mg, Oral, BID AC  •  levofloxacin (LEVAQUIN) tab 750 mg, 750 m mg, 650 mg, Oral, Q6H PRN    Review of Systems:  Pertinent items are noted in HPI. Physical Exam:    General: Alert, orientated x3. Cooperative. No apparent distress.   Vital Signs:  Blood pressure 117/39, pulse 67, temperature 98 °F (36.7 °C), tempera

## 2017-01-23 NOTE — OCCUPATIONAL THERAPY NOTE
OCCUPATIONAL THERAPY TREATMENT NOTE - INPATIENT     Room Number: 354/354-A  Session: 2/5  Number of Visits to Meet Established Goals: 5    Presenting Problem: UGI,recent R TKR    History related to current admission: Adm 1/18/17 from [facility] where he was getti better and stronger\".     OBJECTIVE  Precautions: None    WEIGHT BEARING RESTRICTION  Weight Bearing Restriction: None                PAIN ASSESSMENT  Ratin  Location: n/a        ACTIVITY TOLERANCE  Room air  No shortness of breath    ACTIVITIES OF STEPHANIE reach;RN aware of session/findings; All patient questions and concerns addressed    ASSESSMENT   Patient is a 80year old male admitted 1/18/2017 for UGI bleed and recent R TKR in late December 2016.   In this OT treatment session, patient continues to prese

## 2017-01-23 NOTE — PHYSICAL THERAPY NOTE
PHYSICAL THERAPY KNEE TREATMENT NOTE - INPATIENT     Room Number: 354/354-A     Session: 3  Number of Visits to Meet Established Goals: 5    Presenting Problem: UGI bleed, recent R TKR    Problem List  Principal Problem:    Upper GI bleeding  Active Probl Good  Dynamic Sitting: Fair  Static Standing: Fair -  Dynamic Standing: Poor +    ACTIVITY TOLERANCE  Room air    AM-PAC '6-Clicks' INPATIENT SHORT FORM - BASIC MOBILITY  How much difficulty does the patient currently have. ..  -   Turning over in bed (incl End of Session: Up in chair;Needs met;Call light within reach;RN aware of session/findings; All patient questions and concerns addressed; Family present    ASSESSMENT   Pt continues to progress well with mobility - Pt eager to participate in therapy services

## 2017-01-23 NOTE — PROGRESS NOTES
[facility]    Progress Note    Rosalinda Longoria Patient Status:  Inpatient    1934 MRN [id number]   [facility] 3SW-A Attending Liset Terry MD   Hosp Day # 5 PCP Liam Melendez MD       SUBJECTIVE:    Feeling little terry (PRAVACHOL) tab 40 mg 40 mg Oral Nightly   nystatin (MYCOSTATIN) suspension 500,000 Units 5 mL Oral QID   Losartan Potassium (COZAAR) tab 100 mg 100 mg Oral Daily   Levothyroxine Sodium (SYNTHROID) tab 50 mcg 50 mcg Oral Before breakfast   ipratropium-albu want his dad to go to [facility]gilio or [facility]    Questions/concerns were discussed with patient and/or family by bedside.

## 2017-01-23 NOTE — PLAN OF CARE
Paged wound care re: new consult for wound vac, awaiting response. Son would like to know when wound care will see pt and he would like to be present. Will continue to monitor.

## 2017-01-23 NOTE — PROGRESS NOTES
[facility]                INFECTIOUS DISEASE PROGRESS NOTE    Raydell File Patient Status:  Inpatient    1934 MRN [id number]   [facility] 3SW-A Attending Yasmin Holloway MD   Hosp Day # 5 PCP Eren José MD     [facility]     Azotemia     Metabolic acidosis     Metabolic alkalosis     Respiratory alkalosis     Gastrointestinal hemorrhage     Gastrointestinal hemorrhage, unspecified gastrointestinal hemorrhage type     Anemia, unspecified type     Dyspnea, unspecified

## 2017-01-23 NOTE — CM/SW NOTE
Informed by RN that  is recommending that pt be transferred to [facility], where his surgeon can f/u on RTKA incision wound. RN notes that MD has concerns about f/u if pt has a wound vac placed here and then is discharge to [facility].   RN contacted pt's

## 2017-01-23 NOTE — PLAN OF CARE
Dr. Sherri Mendez is recommending pt transfer to Dr. [facility] gisselle at [facility]. Notified Dr. Yovani Farley of recommendation, he will discuss with Dr. Toma Bolanos. Will continue to monitor. 1758: Paged Dr. Rebecca Lundborg for an update. Awaiting response.  Will continue to [facility]

## 2017-01-23 NOTE — PROGRESS NOTES
[facility]    Gastroenterology Follow-Up Note      Beverly Slaughter Patient Status:  Inpatient    1934 MRN [id number]   [facility] 3SW-A Attending Vicente Olson MD   Hosp Day # 5 PCP Vin Stone MD     Chief Complaint/R

## 2017-01-23 NOTE — CM/SW NOTE
Discussed in d/c rounds- RN indicates that pt is in contact isolation -pt had RTKA 12/27 at [facility] in [facility] and wound is oozing; culture results pending. PMR consult indicating [facility].   Spoke with Dolly Andrews, nurse liaison with [facility], no [facility] beds availab

## 2017-01-24 VITALS
HEART RATE: 81 BPM | TEMPERATURE: 98 F | BODY MASS INDEX: 29 KG/M2 | WEIGHT: 149 LBS | DIASTOLIC BLOOD PRESSURE: 53 MMHG | OXYGEN SATURATION: 99 % | SYSTOLIC BLOOD PRESSURE: 128 MMHG | RESPIRATION RATE: 16 BRPM

## 2017-01-24 LAB
BASOPHILS # BLD AUTO: 0 X10(3) UL (ref 0–0.1)
BASOPHILS NFR BLD AUTO: 0 %
BUN BLD-MCNC: 10 MG/DL (ref 8–20)
CALCIUM BLD-MCNC: 7.3 MG/DL (ref 8.3–10.3)
CHLORIDE: 110 MMOL/L (ref 101–111)
CO2: 24 MMOL/L (ref 22–32)
CREAT BLD-MCNC: 1.15 MG/DL (ref 0.7–1.3)
EOSINOPHIL # BLD AUTO: 0.03 X10(3) UL (ref 0–0.3)
EOSINOPHIL NFR BLD AUTO: 0.9 %
ERYTHROCYTE [DISTWIDTH] IN BLOOD BY AUTOMATED COUNT: 14.5 % (ref 11.5–16)
GLUCOSE BLD-MCNC: 85 MG/DL (ref 70–99)
HCT VFR BLD AUTO: 23.9 % (ref 37–53)
HGB BLD-MCNC: 7.7 G/DL (ref 13–17)
IMMATURE GRANULOCYTE COUNT: 0.01 X10(3) UL (ref 0–1)
IMMATURE GRANULOCYTE RATIO %: 0.3 %
LYMPHOCYTES # BLD AUTO: 0.97 X10(3) UL (ref 0.9–4)
LYMPHOCYTES NFR BLD AUTO: 29 %
MCH RBC QN AUTO: 29.8 PG (ref 27–33.2)
MCHC RBC AUTO-ENTMCNC: 32.2 G/DL (ref 31–37)
MCV RBC AUTO: 92.6 FL (ref 80–99)
MONOCYTES # BLD AUTO: 0.3 X10(3) UL (ref 0.1–0.6)
MONOCYTES NFR BLD AUTO: 9 %
NEUTROPHIL ABS PRELIM: 2.03 X10 (3) UL (ref 1.3–6.7)
NEUTROPHILS # BLD AUTO: 2.03 X10(3) UL (ref 1.3–6.7)
NEUTROPHILS NFR BLD AUTO: 60.8 %
OVA AND PARASITE, TRICHROME STAIN: NEGATIVE
PLATELET # BLD AUTO: 144 10(3)UL (ref 150–450)
POTASSIUM SERPL-SCNC: 3.5 MMOL/L (ref 3.6–5.1)
RBC # BLD AUTO: 2.58 X10(6)UL (ref 3.8–5.8)
RED CELL DISTRIBUTION WIDTH-SD: 49.1 FL (ref 35.1–46.3)
SODIUM SERPL-SCNC: 141 MMOL/L (ref 136–144)
WBC # BLD AUTO: 3.3 X10(3) UL (ref 4–13)

## 2017-01-24 PROCEDURE — 97116 GAIT TRAINING THERAPY: CPT

## 2017-01-24 PROCEDURE — 85025 COMPLETE CBC W/AUTO DIFF WBC: CPT | Performed by: FAMILY MEDICINE

## 2017-01-24 PROCEDURE — 94640 AIRWAY INHALATION TREATMENT: CPT

## 2017-01-24 PROCEDURE — 99214 OFFICE O/P EST MOD 30 MIN: CPT

## 2017-01-24 PROCEDURE — 80048 BASIC METABOLIC PNL TOTAL CA: CPT | Performed by: FAMILY MEDICINE

## 2017-01-24 PROCEDURE — 97535 SELF CARE MNGMENT TRAINING: CPT

## 2017-01-24 RX ORDER — POTASSIUM CHLORIDE 20 MEQ/1
40 TABLET, EXTENDED RELEASE ORAL EVERY 4 HOURS
Status: COMPLETED | OUTPATIENT
Start: 2017-01-24 | End: 2017-01-24

## 2017-01-24 RX ORDER — LEVOFLOXACIN 750 MG/1
750 TABLET ORAL EVERY OTHER DAY
Qty: 7 TABLET | Refills: 0 | Status: ON HOLD | OUTPATIENT
Start: 2017-01-24 | End: 2017-05-11 | Stop reason: ALTCHOICE

## 2017-01-24 NOTE — CM/SW NOTE
I called and requested imaging to disk from Jan 1 to date. Radiology with call PORT[facility] Marques [id number] when disk is ready.   Ronan Peralta, 01/24/2017, 11:53 AM

## 2017-01-24 NOTE — OCCUPATIONAL THERAPY NOTE
OCCUPATIONAL THERAPY TREATMENT NOTE - INPATIENT     Room Number: 354/354-A  Session: 3/5  Number of Visits to Meet Established Goals: 5    Presenting Problem: UGI,recent R TKR    History related to current admission: Adm 1/18/17 from [facility] where he was getti self-stated goal is to \"get better and stronger\".     OBJECTIVE  Precautions: None    WEIGHT BEARING RESTRICTION  Weight Bearing Restriction: None                PAIN ASSESSMENT  Ratin (0 at rest, minimal increase in knee with movement)  Location: n/a role, care plan with good verbal understanding.  Offered patient opportunity to attempt tub transfer, however patient politely declined (had declined tub/simulated car transfer with PT earlier in day as well), reporting familiar with technique and will not

## 2017-01-24 NOTE — CONSULTS
[facility]  Inpatient Wound Care Note    Efrain Gunter Patient Status:  Inpatient    1934 MRN [id number]   [facility] 3SW-A Attending Bong Suarez MD   Hosp Day # 6 PCP Daphne Fitzgerald MD     Patient seen bedside, verbal

## 2017-01-24 NOTE — CM/SW NOTE
Received call from [address], in direct admitting from [facility].  Per [address], they are working very hard to get open bed yet tonight for patient as Dr. Sukumar Mcmahan there wants to do procedure for pt there tomorrow.     Provided Ericka with PORTILLO marquez

## 2017-01-24 NOTE — CM/SW NOTE
Received call from  to assist with transfer to [address] [place] [address]. RN advising  that MD's may have talked but unknown outcome or plan? Per Dr. Bridger Up note here this am, pt is accepted by Dr. Joseph Hwang (ortho) there.     I called to [address] [place] [address] on plan.   Janet Hi, 01/24/2017, 11:48 AM

## 2017-01-24 NOTE — PHYSICAL THERAPY NOTE
PHYSICAL THERAPY KNEE TREATMENT NOTE - INPATIENT     Room Number: 354/354-A     Session: 4  Number of Visits to Meet Established Goals: 5    Presenting Problem: UGI bleed, recent R TKR    Problem List  Principal Problem:    Upper GI bleeding  Active Probl promotion;Relaxation;Repositioning    BALANCE  Static Sitting: Good  Dynamic Sitting: Good  Static Standing: Fair -  Dynamic Standing: Poor +    ACTIVITY TOLERANCE  Room air    AM-PAC '6-Clicks' INPATIENT SHORT FORM - BASIC MOBILITY  How much difficulty do session/findings; All patient questions and concerns addressed    ASSESSMENT   Pt continues to progress well with mobility - Pt able to tolerate increased ambulation distance with minimal pain complaints.   Pt reluctant to attempt stairs this session stating

## 2017-01-24 NOTE — PROGRESS NOTES
Keflex was stopped for patient wasn't taking it. Also nystatin. RN called dr. Nirmal Ruiz and said to stop those 2 meds. RN spoke with Dr. Madhavi Webster and said to continue Levaquin upon discharge.  Patient's pharmacy was called and told them to cancel the prescription

## 2017-01-24 NOTE — PROGRESS NOTES
Dr. Tanja Bosworth spoke with RN regarding patient okay to be discharged to [facility] today. According to Dr. Tanja Bosworth he kept in touched with MDs at [facility]. However as per  [address] still hasn't been notified about him being transferred.  SW and Case

## 2017-01-24 NOTE — CM/SW NOTE
I placed [employer] on [employer], pending confirmation and acceptance at [facility]. Did online MCA form and did [employer] [facility] form for ambulance transport.     Plan:  [facility] on will-call, pending pt being accepted and open bed at [address]

## 2017-01-24 NOTE — PROGRESS NOTES
Mohini Green, Son spoke with RN. He was informing RN that he spoke with Dr. Jesus Jones requesting Dr Gali Hamilton to see patient assess and give his opinion so that once patient is being transferred to [facility] Dr. Jesus Jones would be ready and know what to do.  Asked for an

## 2017-01-24 NOTE — PROGRESS NOTES
[facility]                INFECTIOUS DISEASE PROGRESS NOTE    Kasia Bruno Patient Status:  Inpatient    1934 MRN [id number]   [facility] 3SW-A Attending Kody Velasquez MD   Hosp Day # 6 PCP Uzma Greenwood MD     Ant radiation     Community acquired pneumonia     At risk for falling     Lumbar spinal stenosis     Lumbosacral radiculopathy     Nontraumatic subdural hygroma     Acute encephalopathy     Anemia     Left lower lobe pneumonia (HCC)     Hyponatremia     Hyper

## 2017-01-24 NOTE — CM/SW NOTE
I called to [facility] to check transfer status. I spoke with direct admitting and they advise pt has been accepted there by Dr. Searcy Felty and hospitalist will admit. However, they have no open bed as yet. Possibility of open bed later tonight or tomorrow?

## 2017-01-24 NOTE — CM/SW NOTE
Informed by PT that rec changed to [facility] but they are aware of pending transfer to [facility]. SW spoke with pt's son re: above. He still feels that pt needs rehab when he discharges from [facility].     Message left for admissions at [facility]-

## 2017-01-24 NOTE — PROGRESS NOTES
[facility]    Progress Note    Beverly Slaughter Patient Status:  Inpatient    1934 MRN [id number]   [facility] 3SW-A Attending Vicente Olson MD   Hosp Day # 6 PCP Vin Stone MD       SUBJECTIVE:    Feeling little terry g Oral TID AC and HS   influenza vaccine (PF)(FLUZONE) high dose for 65 yrs & older nj 0.5ml 0.5 mL Intramuscular Prior to discharge   Umeclidinium Bromide (INCRUSE ELLIPTA) 62.5 MCG/INH inhaler 1 puff 1 puff Inhalation Daily   Alfuzosin HCl ER (UROXATRAL) Metabolic alkalosis     Respiratory alkalosis     Gastrointestinal hemorrhage     Gastrointestinal hemorrhage, unspecified gastrointestinal hemorrhage type     Anemia, unspecified type     Dyspnea, unspecified type     Upper GI bleeding     Asthma-COPD ove

## 2017-01-24 NOTE — DISCHARGE SUMMARY
[facility]. Progress Note    Kasia Bruno Patient Status:  Inpatient    1934 MRN [id number]   [facility] 3SW-A Attending Toni Johnson MD   Hosp Day # 5 PCP Uzma Greenwood MD     Subjective:  Madeleine Paul retained in the stomach, unclear as to why.  There was no clear obstructive pattern on single plain film of abdomen shot after NG was inserted    Current  complaints: feeling much better co reflux like symptoms    Objective:  /62 mmHg  Pulse 82  Temp Labs   Lab  01/22/17   0726  01/24/17   0525   NA  141  141   K  3.9  3.5*   CL  110  110   CO2  23.0  24.0   BUN  14  10   CREATSERUM  1.25  1.15   CA  7.3*  7.3*   GLU  78  85       Recent Labs   Lab  01/22/17   0726   ALT  14*   AST  16   ALB  1.7* and augmentation. OTHER:  Negative. 1/4/2017  CONCLUSION:  No DVT in the lower extremities.     Dictated by: Kim Garcia MD on 1/04/2017 at 19:17     Approved by: Kim Garcia MD            Xr Abdomen (kub) (1 Ap View)  (cpt=74000)    1/4/2017 1/6/2017  CONCLUSION:  Slight elevation right hemidiaphragm. Normal heart size and pulmonary vascularity. No focal infiltrate or consolidation.     Dictated by: Crow Gilliam MD on 1/06/2017 at 9:01     Approved by: Crow Gilliam MD            [facility] Abdominal pain     Back pain with radiation     Community acquired pneumonia     At risk for falling     Lumbar spinal stenosis     Lumbosacral radiculopathy     Nontraumatic subdural hygroma     Acute encephalopathy     Anemia     Left lower lobe pneumo

## 2017-01-25 NOTE — PLAN OF CARE
Pt taken to [facility] via [employer]. Son and daughter present with pt. All belongings taken by family. Pt transported in stable condition.

## 2017-02-27 NOTE — CONSULTS
[address] Discharge Summary    Kamryn Titus Patient Status:  Inpatient    1934 MRN [id number]   [facility] 3[facility] Attending No att. providers found   Hosp Day # 6 PCP Smiley Riojas MD     Date of Admissi CNII-XII intact, normal strength, sensation and reflexes     throughout       History of Present Illness:  This is an 80year-old male with a history of COPD, HTN, dyslipidemia, and anemia who presented to the ER yesterday from a rehab facility (s/p right k joint    Consultations: gi id ortho  [place] [address]    Discharge Condition: *stable    Discharge Medications: Discharge Medication List as of 1/24/2017  7:36 PM    START taking these medications    levofloxacin 750 MG Oral T puffs into the lungs every 6 (six) hours as needed for Wheezing., Historical    Budesonide-Formoterol Fumarate (SYMBICORT) 160-4.5 MCG/ACT Inhalation Aerosol  Inhale 2 puffs into the lungs 2 (two) times daily. , Historical    Ondansetron HCl (ZOFRAN) 4 mg t

## 2017-03-03 NOTE — DISCHARGE SUMMARY
[facility] Discharge Summary      Consloislo Allen Patient Status:  Inpatient    Barry Teague [birth year]  MRN  [id number]    [facility] 3SW-A  Attending  No att. providers found    Hosp Day #  6  PCP  Kimberly Molina MD  supraclavicular, and axillary nodes normal    Neurologic:    CNII-XII intact, normal strength, sensation and reflexes      throughout        History of Present Illness:  This is an 80year-old male with a history of COPD, HTN, dyslipidemia, and anemia who p [facility]  For further ortho work up possinfected joint    Consultations: gi id ortho  Gi Disposition: [facility]    Discharge Condition: *stable    Discharge Medications: Discharge Medication List as of 1/24/2017  7:36 PM    START 108 (90 BASE) MCG/ACT Inhalation Aero Soln  Inhale 2 puffs into the lungs every 6 (six) hours as needed for Wheezing., Historical    Budesonide-Formoterol Fumarate (SYMBICORT) 160-4.5 MCG/ACT Inhalation Aerosol  Inhale 2 puffs into the lungs 2 (two) times

## 2017-05-11 ENCOUNTER — APPOINTMENT (OUTPATIENT)
Dept: GENERAL RADIOLOGY | Facility: HOSPITAL | Age: 82
DRG: 192 | End: 2017-05-11
Attending: STUDENT IN AN ORGANIZED HEALTH CARE EDUCATION/TRAINING PROGRAM
Payer: MEDICARE

## 2017-05-11 ENCOUNTER — HOSPITAL ENCOUNTER (INPATIENT)
Facility: HOSPITAL | Age: 82
LOS: 5 days | Discharge: HOME | DRG: 192 | End: 2017-05-16
Attending: STUDENT IN AN ORGANIZED HEALTH CARE EDUCATION/TRAINING PROGRAM | Admitting: INTERNAL MEDICINE
Payer: MEDICARE

## 2017-05-11 DIAGNOSIS — R06.00 DYSPNEA, UNSPECIFIED TYPE: ICD-10-CM

## 2017-05-11 DIAGNOSIS — J44.9 ASTHMA-COPD OVERLAP SYNDROME (HCC): ICD-10-CM

## 2017-05-11 DIAGNOSIS — N28.9 RENAL INSUFFICIENCY: ICD-10-CM

## 2017-05-11 DIAGNOSIS — J44.1 ACUTE EXACERBATION OF CHRONIC OBSTRUCTIVE PULMONARY DISEASE (COPD) (HCC): Primary | ICD-10-CM

## 2017-05-11 PROCEDURE — 94640 AIRWAY INHALATION TREATMENT: CPT

## 2017-05-11 PROCEDURE — 96375 TX/PRO/DX INJ NEW DRUG ADDON: CPT

## 2017-05-11 PROCEDURE — 85025 COMPLETE CBC W/AUTO DIFF WBC: CPT | Performed by: STUDENT IN AN ORGANIZED HEALTH CARE EDUCATION/TRAINING PROGRAM

## 2017-05-11 PROCEDURE — 99285 EMERGENCY DEPT VISIT HI MDM: CPT

## 2017-05-11 PROCEDURE — 85610 PROTHROMBIN TIME: CPT | Performed by: STUDENT IN AN ORGANIZED HEALTH CARE EDUCATION/TRAINING PROGRAM

## 2017-05-11 PROCEDURE — 96366 THER/PROPH/DIAG IV INF ADDON: CPT

## 2017-05-11 PROCEDURE — 93010 ELECTROCARDIOGRAM REPORT: CPT

## 2017-05-11 PROCEDURE — 80053 COMPREHEN METABOLIC PANEL: CPT | Performed by: STUDENT IN AN ORGANIZED HEALTH CARE EDUCATION/TRAINING PROGRAM

## 2017-05-11 PROCEDURE — 71010 XR CHEST AP PORTABLE  (CPT=71010): CPT | Performed by: STUDENT IN AN ORGANIZED HEALTH CARE EDUCATION/TRAINING PROGRAM

## 2017-05-11 PROCEDURE — 93005 ELECTROCARDIOGRAM TRACING: CPT

## 2017-05-11 PROCEDURE — 87040 BLOOD CULTURE FOR BACTERIA: CPT | Performed by: STUDENT IN AN ORGANIZED HEALTH CARE EDUCATION/TRAINING PROGRAM

## 2017-05-11 PROCEDURE — 96365 THER/PROPH/DIAG IV INF INIT: CPT

## 2017-05-11 PROCEDURE — 85730 THROMBOPLASTIN TIME PARTIAL: CPT | Performed by: STUDENT IN AN ORGANIZED HEALTH CARE EDUCATION/TRAINING PROGRAM

## 2017-05-11 PROCEDURE — 84484 ASSAY OF TROPONIN QUANT: CPT | Performed by: STUDENT IN AN ORGANIZED HEALTH CARE EDUCATION/TRAINING PROGRAM

## 2017-05-11 PROCEDURE — 83605 ASSAY OF LACTIC ACID: CPT | Performed by: STUDENT IN AN ORGANIZED HEALTH CARE EDUCATION/TRAINING PROGRAM

## 2017-05-11 PROCEDURE — 83880 ASSAY OF NATRIURETIC PEPTIDE: CPT | Performed by: STUDENT IN AN ORGANIZED HEALTH CARE EDUCATION/TRAINING PROGRAM

## 2017-05-11 PROCEDURE — 94644 CONT INHLJ TX 1ST HOUR: CPT

## 2017-05-11 RX ORDER — ALBUTEROL SULFATE 2.5 MG/3ML
5 SOLUTION RESPIRATORY (INHALATION) ONCE
Status: COMPLETED | OUTPATIENT
Start: 2017-05-11 | End: 2017-05-11

## 2017-05-11 RX ORDER — SACCHAROMYCES BOULARDII 250 MG
250 CAPSULE ORAL 2 TIMES DAILY
Status: DISCONTINUED | OUTPATIENT
Start: 2017-05-11 | End: 2017-05-16

## 2017-05-11 RX ORDER — ISOSORBIDE MONONITRATE 30 MG/1
30 TABLET, EXTENDED RELEASE ORAL DAILY
Status: DISCONTINUED | OUTPATIENT
Start: 2017-05-11 | End: 2017-05-16

## 2017-05-11 RX ORDER — LEVOTHYROXINE SODIUM 0.05 MG/1
50 TABLET ORAL
Status: DISCONTINUED | OUTPATIENT
Start: 2017-05-11 | End: 2017-05-16

## 2017-05-11 RX ORDER — ALFUZOSIN HYDROCHLORIDE 10 MG/1
10 TABLET, EXTENDED RELEASE ORAL
Status: DISCONTINUED | OUTPATIENT
Start: 2017-05-11 | End: 2017-05-16

## 2017-05-11 RX ORDER — DOXYCYCLINE HYCLATE 100 MG/1
100 CAPSULE ORAL 2 TIMES DAILY
Status: ON HOLD | COMMUNITY
End: 2017-05-15

## 2017-05-11 RX ORDER — ALBUTEROL SULFATE 2.5 MG/3ML
2.5 SOLUTION RESPIRATORY (INHALATION)
Status: DISCONTINUED | OUTPATIENT
Start: 2017-05-11 | End: 2017-05-11

## 2017-05-11 RX ORDER — PANTOPRAZOLE SODIUM 40 MG/1
40 TABLET, DELAYED RELEASE ORAL
Status: DISCONTINUED | OUTPATIENT
Start: 2017-05-11 | End: 2017-05-16

## 2017-05-11 RX ORDER — DOXYCYCLINE HYCLATE 100 MG/1
100 CAPSULE ORAL 2 TIMES DAILY
Status: DISCONTINUED | OUTPATIENT
Start: 2017-05-11 | End: 2017-05-11

## 2017-05-11 RX ORDER — ALBUTEROL SULFATE 90 UG/1
2 AEROSOL, METERED RESPIRATORY (INHALATION) EVERY 6 HOURS PRN
Status: DISCONTINUED | OUTPATIENT
Start: 2017-05-11 | End: 2017-05-16

## 2017-05-11 RX ORDER — ALBUTEROL SULFATE 2.5 MG/3ML
SOLUTION RESPIRATORY (INHALATION)
Status: COMPLETED
Start: 2017-05-11 | End: 2017-05-11

## 2017-05-11 RX ORDER — SODIUM CHLORIDE 9 MG/ML
INJECTION, SOLUTION INTRAVENOUS ONCE
Status: COMPLETED | OUTPATIENT
Start: 2017-05-11 | End: 2017-05-11

## 2017-05-11 RX ORDER — CYANOCOBALAMIN 1000 UG/ML
1000 INJECTION INTRAMUSCULAR; SUBCUTANEOUS
Status: DISCONTINUED | OUTPATIENT
Start: 2017-06-03 | End: 2017-05-16

## 2017-05-11 RX ORDER — SACCHAROMYCES BOULARDII 250 MG
250 CAPSULE ORAL 2 TIMES DAILY
COMMUNITY
End: 2019-07-16

## 2017-05-11 RX ORDER — ESOMEPRAZOLE MAGNESIUM 40 MG/1
40 CAPSULE, DELAYED RELEASE ORAL
COMMUNITY
End: 2019-07-16

## 2017-05-11 RX ORDER — ALBUTEROL SULFATE 2.5 MG/3ML
2.5 SOLUTION RESPIRATORY (INHALATION) EVERY 4 HOURS
Status: DISCONTINUED | OUTPATIENT
Start: 2017-05-11 | End: 2017-05-15

## 2017-05-11 RX ORDER — DOXYCYCLINE HYCLATE 100 MG/1
100 CAPSULE ORAL 2 TIMES DAILY
Status: DISCONTINUED | OUTPATIENT
Start: 2017-05-11 | End: 2017-05-16

## 2017-05-11 RX ORDER — METHYLPREDNISOLONE SODIUM SUCCINATE 40 MG/ML
40 INJECTION, POWDER, LYOPHILIZED, FOR SOLUTION INTRAMUSCULAR; INTRAVENOUS EVERY 8 HOURS
Status: DISCONTINUED | OUTPATIENT
Start: 2017-05-11 | End: 2017-05-15

## 2017-05-11 RX ORDER — SENNA AND DOCUSATE SODIUM 50; 8.6 MG/1; MG/1
2 TABLET, FILM COATED ORAL 2 TIMES DAILY
Status: DISCONTINUED | OUTPATIENT
Start: 2017-05-11 | End: 2017-05-16

## 2017-05-11 RX ORDER — MELATONIN
325 2 TIMES DAILY WITH MEALS
Status: DISCONTINUED | OUTPATIENT
Start: 2017-05-11 | End: 2017-05-16

## 2017-05-11 RX ORDER — LOSARTAN POTASSIUM 100 MG/1
100 TABLET ORAL
Status: DISCONTINUED | OUTPATIENT
Start: 2017-05-11 | End: 2017-05-16

## 2017-05-11 RX ORDER — CARVEDILOL 6.25 MG/1
6.25 TABLET ORAL 2 TIMES DAILY WITH MEALS
Status: DISCONTINUED | OUTPATIENT
Start: 2017-05-11 | End: 2017-05-15

## 2017-05-11 RX ORDER — METHYLPREDNISOLONE SODIUM SUCCINATE 125 MG/2ML
125 INJECTION, POWDER, LYOPHILIZED, FOR SOLUTION INTRAMUSCULAR; INTRAVENOUS ONCE
Status: COMPLETED | OUTPATIENT
Start: 2017-05-11 | End: 2017-05-11

## 2017-05-11 NOTE — ED NOTES
CF 2 CTU WITH REPORT GIVEN TO AND ACCEPTED BY RHIANNA NATH.   PREPARED FOR TX TO 2609 PER CART VIA TRANSPORT

## 2017-05-11 NOTE — PROGRESS NOTES
Received pt this morning at 07 am. Pt denied SOB at that time. Noted expiratory wheeze bilateral upper and lower lobes. O2 sats 97% on RA. Notified Dr. Vikas Jones received some orders and  To see pt tonight per Dr. Yareli De La Cruz  Pt oriented to room.   Call l

## 2017-05-11 NOTE — H&P
[facility]    PATIENT'S NAME: Breanna Terry   ATTENDING PHYSICIAN: Leon Lazar M.D.    PATIENT ACCOUNT#:   [de-identified]    LOCATION:  [address] [place] [address]  MEDICAL RECORD #:   [id number]       YOB: 1934  ADMISSION DATE:       05/11 no thyromegaly. There is no carotid bruit. LUNGS:  Clear to auscultation and percussion bilaterally. Decreased air entry bilaterally. There is no wheezing or crepitation heard at this time. HEART:  S1, S2 regular. There is no S3, S4, or gallop heard.

## 2017-05-11 NOTE — PROGRESS NOTES
Pt up in hallway with son ambulated 300 ft on RA denied any SOB, 02 sats 97%, gait steady with walker.

## 2017-05-11 NOTE — ED PROVIDER NOTES
Patient Seen in: [facility] Emergency Department    History   Patient presents with:  Dyspnea CHRIS SOB (respiratory)    Stated Complaint:     HPI    Patient is an 80-year-old woman presenting to emergency department via EMS secondary to difficulty br mouth 2 (two) times daily with meals. predniSONE 10 MG Oral Tab,  Take 10 mg by mouth daily. Ondansetron HCl (ZOFRAN) 4 mg tablet,  Take 4 mg by mouth every 6 (six) hours as needed for Nausea.    Acetaminophen-Codeine #3 300-30 MG Oral Tab,  Take 1 tabl MG Oral Cap,  Take 0.4 mg by mouth daily. Albuterol Sulfate HFA (PROAIR HFA) 108 (90 BASE) MCG/ACT Inhalation Aero Soln,  Inhale 2 puffs into the lungs every 6 (six) hours as needed for Wheezing.    Budesonide-Formoterol Fumarate (SYMBICORT) 160-4.5 MCG/A motion, no edema, no calf tenderness, negative Homans sign bilaterally. Neurological: alert and oriented to person, place, and time. Skin: Skin is warm and dry. No rash noted. Psychiatric: normal mood and affect.        ED Course     Labs Reviewed   C MDM     Extensive differential diagnosis was considered for the patient including pulmonary, infectious, cardiac, thromboembolic, vascular and other pathology. Patient had significant wheezing which improved only somewhat with bronchodilators.   He w

## 2017-05-12 ENCOUNTER — APPOINTMENT (OUTPATIENT)
Dept: CV DIAGNOSTICS | Facility: HOSPITAL | Age: 82
DRG: 192 | End: 2017-05-12
Attending: INTERNAL MEDICINE
Payer: MEDICARE

## 2017-05-12 PROCEDURE — 94640 AIRWAY INHALATION TREATMENT: CPT

## 2017-05-12 PROCEDURE — 80053 COMPREHEN METABOLIC PANEL: CPT | Performed by: INTERNAL MEDICINE

## 2017-05-12 PROCEDURE — 97161 PT EVAL LOW COMPLEX 20 MIN: CPT

## 2017-05-12 PROCEDURE — 93306 TTE W/DOPPLER COMPLETE: CPT | Performed by: INTERNAL MEDICINE

## 2017-05-12 PROCEDURE — 85025 COMPLETE CBC W/AUTO DIFF WBC: CPT | Performed by: INTERNAL MEDICINE

## 2017-05-12 PROCEDURE — 83880 ASSAY OF NATRIURETIC PEPTIDE: CPT | Performed by: INTERNAL MEDICINE

## 2017-05-12 NOTE — PAYOR COMM NOTE
Attending Physician: Bruce Doe MD  5/11    ED      22-year-old woman presenting to emergency department via EMS secondary to difficulty breathing.  he reports dyspnea and wheezing for 1-2 days now also with productive cough with yellow sputum.  Noted 155/64 mmHg    Pulse  05/11/17 0423  63    Resp  05/11/17 0423  24    Temp  05/11/17 0423  98.1 °F (36.7 °C)    Temp src  05/11/17 0423  Temporal    SpO2  05/11/17 0423  97   O2 Device  05/11/17 0423  None (Room air)                RESULTS LAST 24HRS:  Lab Abnormality         Status                     ---------                               -----------         ------                     CBC W/ DIFFERENTIAL[[id number]]          Abnormal            Final result                 Please

## 2017-05-12 NOTE — PHYSICAL THERAPY NOTE
PHYSICAL THERAPY EVALUATION - INPATIENT     Room Number: 2609/2609-A  Evaluation Date: 5/12/2017  Type of Evaluation: Initial  Physician Order: PT Eval and Treat (Fall Risk Protocol)    Presenting Problem: Acute Exacerbation of COPD, Renal Insufficiency 1  Railing: No  Stairs to Bedroom: 15  Railing: Yes    Lives With: Alone  Drives: Yes  Patient Owned Equipment: Rolling walker;Cane       Prior Level of Whitman: The pt reports he lives alone but receives assistance with IADL's from his son.   The pt t None      BALANCE  Static Sitting: Good  Dynamic Sitting: Good  Static Standing: Fair  Dynamic Standing: Fair -      ACTIVITY TOLERANCE  O2 Saturation: 100%  Room air  Shortness of breath  Heart Rate: 63 bpm at rest, 88 bpm with ambulation  Modified Mercedes D Session: In bed; With [facility] staff;Needs met;Call light within reach;RN aware of session/findings; All patient questions and concerns addressed; Family present    ASSESSMENT     Patient is a 80year old male admitted 5/11/2017 for acute COPD exacerbation.    In th

## 2017-05-12 NOTE — CM/SW NOTE
05/12/17 1500   CM/SW Referral Data   Referral Source Social Work (self-referral)   Reason for Referral Discharge planning   Informant Patient; Children   Pertinent Medical Hx   Primary Care Physician Name KATHY Disla MD   Patient Info   Patient's Mental [address] treatments but if this is needed a home, usually MD will order a nebulizer for home and medication for nebulizer. MD will determine what is needed. Family requesting VN- no order received as yet. Family indicating that d/c likely for tomorrow.   GREGORIO akins

## 2017-05-12 NOTE — PROGRESS NOTES
[facility]    Progress Note    Bal Alfaro Patient Status:  Inpatient    1934 MRN [id number]   [facility] 2NE-A Attending Nathan Sher MD   Hosp Day # 1 PCP Sandhya Rubio MD     Subjective:     Constitutional: Elsa Tom <0.046 05/11/2017   B12 >2000* 01/05/2017       Xr Chest Ap Portable  (cpt=71010)    5/11/2017  CONCLUSION:  No acute disease.      Dictated by: Dieudonne Keith MD on 5/11/2017 at 7:30     Approved by: Dieudonne Keith MD            Ekg 12-lead    5/11/2

## 2017-05-13 PROCEDURE — 80053 COMPREHEN METABOLIC PANEL: CPT | Performed by: INTERNAL MEDICINE

## 2017-05-13 PROCEDURE — 94640 AIRWAY INHALATION TREATMENT: CPT

## 2017-05-13 PROCEDURE — 85025 COMPLETE CBC W/AUTO DIFF WBC: CPT | Performed by: INTERNAL MEDICINE

## 2017-05-13 PROCEDURE — 94664 DEMO&/EVAL PT USE INHALER: CPT

## 2017-05-13 NOTE — PROGRESS NOTES
[facility]    Progress Note    [place] Patient Status:  Inpatient    1934 MRN [id number]   [facility] 2NE-A Attending Brandon Rubin MD   Hosp Day # 2 PCP Puma Gerard MD       SUBJECTIVE:  Continues to have whee Sodium (SYNTHROID) tab 50 mcg 50 mcg Oral Before breakfast   Isosorbide Mononitrate ER (IMDUR) 24 hr tab 30 mg 30 mg Oral Daily   ferrous sulfate EC tab 325 mg 325 mg Oral BID with meals   losartan (COZAAR) tab 100 mg 100 mg Oral Daily before lunch   sacch require inpatient services that will reasonably be expected to span two midnight's based on the clinical documentation in H+P. Based on patients current state of illness, I anticipate that, after discharge, patient will require TBD.         Brian Stevens

## 2017-05-13 NOTE — PLAN OF CARE
DISCHARGE PLANNING - CASE MANAGEMENT    • Discharge to post-acute care or home with appropriate resources Progressing        MUSCULOSKELETAL - ADULT    • Return mobility to safest level of function Progressing        Patient/Family Goals    • Patient/Famil

## 2017-05-13 NOTE — CONSULTS
[facility]  Report of Consultation    Marcus Morrison Patient Status:  Inpatient    1934 MRN [id number]   [facility] 2NE-A Attending Lauri Altman MD   Hosp Day # 2 PCP John Kang MD     Reason for Consultation:  Aly [place] on room air at rest    History:  Past Medical History   Diagnosis Date   • Other and unspecified hyperlipidemia    • Unspecified essential hypertension    • Chronic lung disease    • Diverticula of small intestine    • High blood pressure    • High cholest Disp:  Rfl:  5/10/2017 at Unknown time   cyanocobalamin 1000 MCG/ML Injection Solution Inject 1,000 mcg into the muscle every 30 (thirty) days.    Disp:  Rfl:  Past Month at Unknown time   Levothyroxine Sodium 50 MCG Oral Tab Take 50 mcg by mouth before allyson 05/13/17 0038 - - - - - 96 %   05/13/17 0037 - - - - - 96 %   05/13/17 0036 - - - - - 96 %   05/13/17 0035 - - - - - 96 %   05/13/17 0034 - - - - - 96 %   05/13/17 0033 - - - - - 96 %   05/13/17 0032 - - - - - 96 %   05/13/17 0031 - - - - - 97 %   05/13/ Hyponatremia     Hyperglycemia     Leukocytosis     Acute renal failure (ARF) (HCC)     Acute kidney injury (HCC)     Azotemia     Metabolic acidosis     Metabolic alkalosis     Respiratory alkalosis     Gastrointestinal hemorrhage     Gastrointestinal h

## 2017-05-14 ENCOUNTER — APPOINTMENT (OUTPATIENT)
Dept: GENERAL RADIOLOGY | Facility: HOSPITAL | Age: 82
DRG: 192 | End: 2017-05-14
Attending: INTERNAL MEDICINE
Payer: MEDICARE

## 2017-05-14 PROCEDURE — 92610 EVALUATE SWALLOWING FUNCTION: CPT

## 2017-05-14 PROCEDURE — 80053 COMPREHEN METABOLIC PANEL: CPT | Performed by: INTERNAL MEDICINE

## 2017-05-14 PROCEDURE — 71010 XR CHEST AP PORTABLE  (CPT=71010): CPT | Performed by: INTERNAL MEDICINE

## 2017-05-14 PROCEDURE — 94640 AIRWAY INHALATION TREATMENT: CPT

## 2017-05-14 PROCEDURE — 85025 COMPLETE CBC W/AUTO DIFF WBC: CPT | Performed by: INTERNAL MEDICINE

## 2017-05-14 PROCEDURE — 94667 MNPJ CHEST WALL 1ST: CPT

## 2017-05-14 NOTE — SLP NOTE
ADULT SWALLOWING EVALUATION    ASSESSMENT & PLAN   ASSESSMENT  B/S swallow eval completed upon receipt of MD order. Per MD note  History of Present Illness:  Vitor Murphy is a a(n) 80year old male.   Ex-smoker ×30 years who is a known diagnosis of Intact volitional swallow. Oral containment, prep and transit WFL. Functional mastication (upper and lower dentures in place). Pharyngeal response was timely with adequate hyolaryngeal excursion palpated.   No clinical s/s aspiration and clear vocal qual 5/18/2016   • Mitral valve disorder      leaky mitral valve       Prior Living Situation: Home alone  Diet Prior to Admission: Regular; Thin liquids       Patient/Family Goals: find out what's causing wheezing    SWALLOWING HISTORY  Current Diet Consistency to Meet Established Goals: 1  Follow Up Needed: Yes  SLP Follow-up Date: 05/15/17    Thank you for your referral.   If you have any questions, please contact John Ross.  Jerrod Alva M.S.,[facility]-SLP  Speech Language Pathologist

## 2017-05-14 NOTE — PROGRESS NOTES
[facility]    Progress Note    [facility] Patient Status:  Inpatient    1934 MRN [id number]   [facility] 2NE-A Attending Yolanda Mccain MD   Hosp Day # 3 PCP Stuart Cerna MD       SUBJECTIVE:  Some improvement   Whe MCG/ACT inhaler 2 puff 2 puff Inhalation Q6H PRN   Fluticasone Furoate-Vilanterol (BREO ELLIPTA) 200-25 MCG/INH inhaler 1 puff 1 puff Inhalation Daily   carvedilol (COREG) tab 6.25 mg 6.25 mg Oral BID with meals   cholecalciferol (VITAMIN D3) cap/tab 2,000 type     Dyspnea, unspecified type     Upper GI bleeding     Asthma-COPD overlap syndrome (HCC)     Essential hypertension, benign     Acute exacerbation of chronic obstructive pulmonary disease (COPD) ([facility] [address]     Renal insufficiency      Plan:   pulmonary c

## 2017-05-14 NOTE — PROGRESS NOTES
[facility]  Progress Note    Biju Villanueva Patient Status:  Inpatient    1934 MRN [id number]   [facility] 2NE-A Attending Aristides Lobo MD   Hosp Day # 3 PCP Leon Lazar MD     Subjective:  Biju Villanueva is a(n) radiation     Community acquired pneumonia     At risk for falling     Lumbar spinal stenosis     Lumbosacral radiculopathy     Nontraumatic subdural hygroma     Acute encephalopathy     Anemia     Left lower lobe pneumonia (HCC)     Hyponatremia     Hyper

## 2017-05-14 NOTE — PLAN OF CARE
Comments:   Pt is A&OX4 (extremely pleasant), VSS on RA, and maintaining NSR on telemetry. Denies any chest pain, chest pressure, palpitations, SOB at rest, N/V, dizziness, blurry vision, or any numbness/tingling in any extremity.   Only c/o wheezing, whic safest level of function  INTERVENTIONS:  - Assess patient stability and activity tolerance for standing, transferring and ambulating w/ or w/o assistive devices  - Assist with transfers and ambulation using safe patient handling equipment as needed  - Ens

## 2017-05-15 ENCOUNTER — APPOINTMENT (OUTPATIENT)
Dept: GENERAL RADIOLOGY | Facility: HOSPITAL | Age: 82
DRG: 192 | End: 2017-05-15
Attending: INTERNAL MEDICINE
Payer: MEDICARE

## 2017-05-15 PROCEDURE — 92526 ORAL FUNCTION THERAPY: CPT

## 2017-05-15 PROCEDURE — 94640 AIRWAY INHALATION TREATMENT: CPT

## 2017-05-15 PROCEDURE — 80048 BASIC METABOLIC PNL TOTAL CA: CPT | Performed by: INTERNAL MEDICINE

## 2017-05-15 PROCEDURE — 71020 XR CHEST PA + LAT CHEST (CPT=71020): CPT | Performed by: INTERNAL MEDICINE

## 2017-05-15 PROCEDURE — 82785 ASSAY OF IGE: CPT | Performed by: INTERNAL MEDICINE

## 2017-05-15 PROCEDURE — 97116 GAIT TRAINING THERAPY: CPT

## 2017-05-15 RX ORDER — ALBUTEROL SULFATE 2.5 MG/3ML
2.5 SOLUTION RESPIRATORY (INHALATION) EVERY 4 HOURS
Qty: 540 ML | Refills: 0 | Status: SHIPPED | OUTPATIENT
Start: 2017-05-15 | End: 2017-06-14

## 2017-05-15 RX ORDER — PREDNISONE 20 MG/1
20 TABLET ORAL DAILY
Qty: 7 TABLET | Refills: 0 | Status: SHIPPED | OUTPATIENT
Start: 2017-05-15 | End: 2017-05-16

## 2017-05-15 RX ORDER — ALBUTEROL SULFATE 2.5 MG/3ML
2.5 SOLUTION RESPIRATORY (INHALATION)
Status: DISCONTINUED | OUTPATIENT
Start: 2017-05-15 | End: 2017-05-16

## 2017-05-15 RX ORDER — CLARITHROMYCIN 500 MG/1
250 TABLET, COATED ORAL 2 TIMES DAILY
Qty: 20 TABLET | Refills: 0 | Status: SHIPPED | OUTPATIENT
Start: 2017-05-15 | End: 2017-05-25

## 2017-05-15 RX ORDER — CARVEDILOL 12.5 MG/1
12.5 TABLET ORAL 2 TIMES DAILY WITH MEALS
Status: DISCONTINUED | OUTPATIENT
Start: 2017-05-15 | End: 2017-05-16

## 2017-05-15 RX ORDER — CARVEDILOL 6.25 MG/1
12.5 TABLET ORAL 2 TIMES DAILY WITH MEALS
Qty: 120 TABLET | Refills: 0 | Status: SHIPPED | OUTPATIENT
Start: 2017-05-15 | End: 2017-06-14

## 2017-05-15 NOTE — PROGRESS NOTES
[facility]  Progress Note    Darlin Martinez Patient Status:  Inpatient    1934 MRN [id number]   [facility] 2NE-A Attending Hien Young MD   Hosp Day # 4 PCP Anil Gates MD     Impression    1.  Flare of known COPD year old male.    better , but wheezign after lunch  No pain     Objective:  Oxygen Therapy  SpO2: 100 %  O2 Device: None (Room air)  Pulse Oximetry Type: Continuous  Blood pressure 155/60, pulse 67, temperature 97.4 °F (36.3 °C), temperature source Oral, r Reviewed:    Current Facility-Administered Medications:  carvedilol (COREG) tab 12.5 mg 12.5 mg Oral BID with meals   albuterol sulfate (VENTOLIN) (2.5 MG/3ML) 0.083% nebulizer solution 2.5 mg 2.5 mg Nebulization QID   CefTRIAXone Sodium (ROCEPHIN) 1 g in

## 2017-05-15 NOTE — PLAN OF CARE
DISCHARGE PLANNING - CASE MANAGEMENT    • Discharge to post-acute care or home with appropriate resources Progressing        Impaired Swallowing    • Minimize aspiration risk Progressing    Speech to eval today for swallow     MUSCULOSKELETAL - ADULT    •

## 2017-05-15 NOTE — PLAN OF CARE
RESPIRATORY - ADULT    • Achieves optimal ventilation and oxygenation Not Progressing          Patient still with audible wheeze after nebs and ambulating. Still continues on Nebs q 4 hours, abx, and IV steroids.   Patient ambulating halls with SBA and wal

## 2017-05-15 NOTE — SLP NOTE
SPEECH DAILY NOTE - INPATIENT    Evaluation Date: 05/15/2017    ASSESSMENT & PLAN   ASSESSMENT  Pt seen at bedside this PM for meal follow up and education session. Pt cooperative, pleasant, and alert. Pt's daughter present at bedside.  Per pt's daughter re patient will utilize compensatory strategies as outlined by  BSSE (clinical evaluation) including Slow rate, Small bites, Small sips, No straws, Upright 90 degrees with min assistance 100 % of the time across 2 sessions. DISCONTINUE   Goal #4 Video swallow

## 2017-05-15 NOTE — CM/SW NOTE
Received call from [facility] with Francisco Wu, stating they are able to provide patient's home nebulizer upon discharge, however, they need the J44 code added into the order. GREGORIO paged Dr. Rekha Fernandez to obtain new orders.   GREGORIO to send via [address] once availa

## 2017-05-15 NOTE — PROGRESS NOTES
[facility]    Progress Note    Rohit Bradly Patient Status:  Inpatient    1934 MRN [id number]   [facility] 2NE-A Attending Heber Walden MD   Hosp Day # 4 PCP Rosalie Garrison MD       SUBJECTIVE:  Feeling much better  N (VITAMIN B12) 1000 MCG/ML injection 1,000 mcg 1,000 mcg Intramuscular Q30 Days   Pantoprazole Sodium (PROTONIX) EC tab 40 mg 40 mg Oral QAM AC   Levothyroxine Sodium (SYNTHROID) tab 50 mcg 50 mcg Oral Before breakfast   Isosorbide Mononitrate ER (IMDUR) 24 steroids    Plan:   adjust blood presssure m,eds   check hemoglobin aic  Questions/concerns were discussed with patient     Discharge if ok with pulmonary                    hany goel  5/15/2017  9:52 AM

## 2017-05-15 NOTE — CM/SW NOTE
Received consult stating patient needs home nebulizer. GREGORIO sent referral to [address] via [facility], await reply.  STAS.

## 2017-05-15 NOTE — PHYSICAL THERAPY NOTE
PHYSICAL THERAPY TREATMENT NOTE - INPATIENT    Room Number: 2609/2609-A     Session: 1   Number of Visits to Meet Established Goals: 3    Presenting Problem: Acute Exacerbation of COPD, Renal Insufficiency    Problem List  Principal Problem:    Acute exac with stairs and ambulation   SCORE DESCRIPTION   0 Nothing at all   1 Very slight   2 Slight   3 Moderate   4 Somewhat severe   5 Strong or hard breathing   6    7 Very hard breathing   8    9 Very, Very Severe   10 MAX - You need to stop     Patient Gorge Cisneros ascend and step-to-step stair pattern to descend. Patient End of Session: Up in chair;Needs met;Call light within reach;RN aware of session/findings; Family present    ASSESSMENT   The pt is now able to complete bed mobility, transfers, ambulation and

## 2017-05-16 ENCOUNTER — APPOINTMENT (OUTPATIENT)
Dept: GENERAL RADIOLOGY | Facility: HOSPITAL | Age: 82
DRG: 192 | End: 2017-05-16
Attending: INTERNAL MEDICINE
Payer: MEDICARE

## 2017-05-16 VITALS
WEIGHT: 152.13 LBS | BODY MASS INDEX: 24.45 KG/M2 | HEART RATE: 69 BPM | RESPIRATION RATE: 17 BRPM | DIASTOLIC BLOOD PRESSURE: 66 MMHG | TEMPERATURE: 97 F | SYSTOLIC BLOOD PRESSURE: 124 MMHG | OXYGEN SATURATION: 100 % | HEIGHT: 66 IN

## 2017-05-16 PROCEDURE — 94640 AIRWAY INHALATION TREATMENT: CPT

## 2017-05-16 PROCEDURE — 74230 X-RAY XM SWLNG FUNCJ C+: CPT | Performed by: INTERNAL MEDICINE

## 2017-05-16 PROCEDURE — 36415 COLL VENOUS BLD VENIPUNCTURE: CPT

## 2017-05-16 PROCEDURE — 83036 HEMOGLOBIN GLYCOSYLATED A1C: CPT | Performed by: FAMILY MEDICINE

## 2017-05-16 PROCEDURE — 92611 MOTION FLUOROSCOPY/SWALLOW: CPT

## 2017-05-16 RX ORDER — PREDNISONE 20 MG/1
TABLET ORAL
Qty: 52 TABLET | Refills: 0 | Status: SHIPPED | OUTPATIENT
Start: 2017-05-16 | End: 2018-01-20

## 2017-05-16 RX ORDER — ALBUTEROL SULFATE 2.5 MG/3ML
2.5 SOLUTION RESPIRATORY (INHALATION) EVERY 4 HOURS PRN
Qty: 120 VIAL | Refills: 2 | Status: SHIPPED | OUTPATIENT
Start: 2017-05-16 | End: 2019-09-11

## 2017-05-16 NOTE — VIDEO SWALLOW STUDY NOTE
ADULT VIDEOFLUOROSCOPIC SWALLOWING STUDY    Admission Date: 5/11/2017  Evaluation Date: 05/16/2017  Radiologist: Dr. Wes Wyman    Dear Dr. Nirmal Ruiz,  This letter is to inform you of Syed Cooney Videofluoroscopic Swallowing Study results and/or plan of stevenson Referral: R/O aspiration      Family/Patient Goals:  Safe eating    ASSESSMENT   DYSPHAGIA ASSESSMENT  Test completed in conjunction with Radiologist.  Patient Positioned: Upright;[facility] chair. Patient Viewed: Lateral.  Patient Alertness: Fully alert.   Consi viewed in the lateral plane. Pt presented with nectar via tsp, thin via cup (small sips and successive larger swallows), puree and cracker.    Results revealed mild oropharyngeal dysphagia c/b premature spillage over BOT, mildly reduced BOT retraction, del

## 2017-05-16 NOTE — PLAN OF CARE
Assumed care of patient around 0730. Pt a/o x 4, RA, NSR on tele monitor. Expiratory wheezing noted to bilateral upper lobes. Pt denies chest pain/discomfort or SOB at this time. Video swallow this a.m. Encouraged IS/flutter valve use.    Pt and da

## 2017-05-16 NOTE — PROGRESS NOTES
[facility]    Progress Note    Wellington Maxim Patient Status:  Inpatient    1934 MRN [id number]   [facility] 2NE-A Attending Christie Agustin MD   Hosp Day # 5 PCP Kimberly Molina MD     Subjective:     Constitutional: Rupali Castillo Video Swallow (cpt=74230)    5/16/2017  PROCEDURE:  SWALLOWING STUDY  TECHNIQUE:  Video fluoroscopic swallowing study was performed in cooperation with the speech pathologist.  Barium of varying consistencies was administered orally with patient in the lat

## 2017-05-16 NOTE — CM/SW NOTE
Spoke with Shaina cruz who stated they will deliver patient's home nebulizer tonight between 7 and 9pm.  RN and family notified.

## 2017-05-16 NOTE — CM/SW NOTE
CM sent updated MD order for nebulizer to [address] in [address] [facility], will need to f/u to confirm able to deliver nebulizer machine and supplies to patient at discharge.    [address]  P:[phone]  F:[phone]      Kael Brock RN, BSN   [id number]

## 2017-05-16 NOTE — CONSULTS
[facility]  Diabetes Clinical Nurse Specialist Consult Note    Yanelis Colón Patient Status:  Inpatient    1934 MRN [id number]   [facility] 2NE-A Attending Yolanda Mccain MD   Hosp Day # 5 PCP Stuart Cerna MD     Reason monitoring patient as outpatient for elevated blood glucose. Recommendations:    Monitor patient as outpatient for steroid-induced hyperglycemia.     PROVIDER F/U RECOMMENDATIONS:    · Patient's current PCP    Ace Gavin, MSN, APN, ACNS-BC  [facility]

## 2017-05-16 NOTE — PLAN OF CARE
Problem: Impaired Swallowing  Goal: Minimize aspiration risk  Aspiration Precautions:    Position: Position as upright as possible. Up to chair preferred. Liquids: Liquids by cup only. Single sips. No straws. Solids: teaspoon size bites.  Encourage a

## 2017-05-16 NOTE — SLP NOTE
Preliminary VFSS results. Pt presented with mild oropharyngeal dysphagia c/b premature spillage over BOT, delayed pharyngeal response with trace transient laryngeal penetration thin liquids via successive large swallows from cup. No aspiration.   Recommen

## 2017-05-16 NOTE — PROCEDURES
This test includes spirometry and flow volume loop done at the bedside during an inpatient hospitalization. Spirometry and  flow volume loop show evidence of moderate obstruction. .    There was no change in spirometry after bronchodilator challenge.

## 2017-05-16 NOTE — PROGRESS NOTES
[facility]  Progress Note    Beverly Slaughter Patient Status:  Inpatient    1934 MRN [id number]   [facility] 2NE-A Attending Vicente Olson MD   Hosp Day # 5 PCP Vin Stone MD     Subjective: normal. The estimated ejection fraction was 55-60%.    Doppler parameters are consistent with abnormal left ventricular     relaxation - grade 1 diastolic dysfunction. 2. Aortic valve: Mild regurgitation.   3. Left atrium: The left atrial volume was upper veins:  Inferior vena cava: The vessel was normal in size. The respirophasic  diameter changes were in the normal range (greater than or equal to 50%).       Medications Reviewed:    Current Facility-Administered Medications:  carvedilol (COREG) tab 12.5 mg lobe pneumonia (HCC)     Hyponatremia     Hyperglycemia     Leukocytosis     Acute renal failure (ARF) (HCC)     Acute kidney injury (HCC)     Azotemia     Metabolic acidosis     Metabolic alkalosis     Respiratory alkalosis     Gastrointestinal hemorrhage

## 2017-05-16 NOTE — PAYOR COMM NOTE
Attending Physician: Yasmin Holloway MD    Review Type: CONTINUED STAY  Reviewer: Mauri Meckel     Date: May 16, 2017 - 10:56 AM  Payor: [address] Number: N/A  Admit date: 5/11/2017  4:18 AM        REVIEWER COMMENTS  Vitals: 5/1 Marce Holt RN      cholecalciferol (VITAMIN D3) cap/tab 2,000 Units     Date Action Dose Route User    5/16/2017 1006 Given 2000 Units Oral Marce Holt RN      Doxycycline Hyclate (VIBRAMYCIN) cap 100 mg     Date Action Dose Route User    5/16/201 reversibility.   we will also recheck chest x-ray  Current regiment is adequate.  Would plan for discharge with home nebulizer  5/14  Plan at this time to recheck chest x-ray in a.m. and begin I-S and flutter valve.   No evidence of eosinophilia-we will myriam

## 2017-05-17 NOTE — PLAN OF CARE
NURSING DISCHARGE NOTE    Discharged Home via Wheelchair. Accompanied by Family member and Support staff  Belongings Taken by patient/family. IV removed, IV catheter intact. D/c instructions discussed with pt and family member.    Prescriptions ca

## 2017-05-17 NOTE — CM/SW NOTE
05/17/17 0800   Discharge disposition   Discharged to: Home or Self   E provider [address]   Discharge transportation Private car

## 2017-05-19 ENCOUNTER — TELEPHONE (OUTPATIENT)
Dept: RESPIRATORY THERAPY | Facility: HOSPITAL | Age: 82
End: 2017-05-19

## 2017-06-05 ENCOUNTER — PRIOR ORIGINAL RECORDS (OUTPATIENT)
Dept: OTHER | Age: 82
End: 2017-06-05

## 2017-06-05 NOTE — DISCHARGE SUMMARY
[facility]    PATIENT'S NAME: Keegan Ponpreeti   ATTENDING PHYSICIAN: Liam Melendez M.D.    PATIENT ACCOUNT#:   [de-identified]    LOCATION:  [address] [place] [address]  MEDICAL RECORD #:   [id number]       YOB: 1934  ADMISSION DATE:       05/11

## 2017-06-06 ENCOUNTER — PRIOR ORIGINAL RECORDS (OUTPATIENT)
Dept: OTHER | Age: 82
End: 2017-06-06

## 2017-06-20 ENCOUNTER — HOSPITAL (OUTPATIENT)
Dept: OTHER | Age: 82
End: 2017-06-20
Attending: INTERNAL MEDICINE

## 2017-09-27 ENCOUNTER — TELEPHONE (OUTPATIENT)
Dept: FAMILY MEDICINE CLINIC | Facility: CLINIC | Age: 82
End: 2017-09-27

## 2017-10-02 ENCOUNTER — LAB ENCOUNTER (OUTPATIENT)
Dept: LAB | Facility: HOSPITAL | Age: 82
End: 2017-10-02
Attending: INTERNAL MEDICINE
Payer: MEDICARE

## 2017-10-02 DIAGNOSIS — T84.53XA INFECTION OF TOTAL RIGHT KNEE REPLACEMENT (HCC): Primary | ICD-10-CM

## 2017-10-02 PROCEDURE — 36415 COLL VENOUS BLD VENIPUNCTURE: CPT

## 2017-10-02 PROCEDURE — 85652 RBC SED RATE AUTOMATED: CPT

## 2017-10-02 PROCEDURE — 80053 COMPREHEN METABOLIC PANEL: CPT

## 2017-10-02 PROCEDURE — 86140 C-REACTIVE PROTEIN: CPT

## 2017-10-02 PROCEDURE — 85025 COMPLETE CBC W/AUTO DIFF WBC: CPT

## 2017-10-03 ENCOUNTER — HOSPITAL (OUTPATIENT)
Dept: OTHER | Age: 82
End: 2017-10-03
Attending: SPECIALIST

## 2017-12-31 ENCOUNTER — PRIOR ORIGINAL RECORDS (OUTPATIENT)
Dept: OTHER | Age: 82
End: 2017-12-31

## 2018-01-09 ENCOUNTER — HOSPITAL ENCOUNTER (OUTPATIENT)
Dept: GENERAL RADIOLOGY | Facility: HOSPITAL | Age: 83
Discharge: HOME | End: 2018-01-09
Attending: ORTHOPAEDIC SURGERY
Payer: MEDICARE

## 2018-01-09 DIAGNOSIS — M17.12 LEFT KNEE DJD: ICD-10-CM

## 2018-01-09 DIAGNOSIS — M17.11 RIGHT KNEE DJD: ICD-10-CM

## 2018-01-09 PROCEDURE — 73560 X-RAY EXAM OF KNEE 1 OR 2: CPT | Performed by: ORTHOPAEDIC SURGERY

## 2018-01-20 ENCOUNTER — HOSPITAL ENCOUNTER (OUTPATIENT)
Age: 83
Discharge: HOME | End: 2018-01-20
Payer: MEDICARE

## 2018-01-20 ENCOUNTER — APPOINTMENT (OUTPATIENT)
Dept: GENERAL RADIOLOGY | Age: 83
End: 2018-01-20
Attending: PHYSICIAN ASSISTANT
Payer: MEDICARE

## 2018-01-20 VITALS
DIASTOLIC BLOOD PRESSURE: 64 MMHG | SYSTOLIC BLOOD PRESSURE: 114 MMHG | HEART RATE: 74 BPM | TEMPERATURE: 98 F | OXYGEN SATURATION: 97 % | RESPIRATION RATE: 20 BRPM

## 2018-01-20 DIAGNOSIS — H10.32 ACUTE CONJUNCTIVITIS OF LEFT EYE, UNSPECIFIED ACUTE CONJUNCTIVITIS TYPE: ICD-10-CM

## 2018-01-20 DIAGNOSIS — J40 WHEEZY BRONCHITIS: Primary | ICD-10-CM

## 2018-01-20 PROCEDURE — 99214 OFFICE O/P EST MOD 30 MIN: CPT

## 2018-01-20 PROCEDURE — 71046 X-RAY EXAM CHEST 2 VIEWS: CPT | Performed by: PHYSICIAN ASSISTANT

## 2018-01-20 PROCEDURE — 94640 AIRWAY INHALATION TREATMENT: CPT

## 2018-01-20 RX ORDER — DOXYCYCLINE HYCLATE 100 MG/1
100 CAPSULE ORAL 2 TIMES DAILY
Qty: 14 CAPSULE | Refills: 0 | Status: SHIPPED | OUTPATIENT
Start: 2018-01-20 | End: 2018-01-27

## 2018-01-20 RX ORDER — POLYMYXIN B SULFATE AND TRIMETHOPRIM 1; 10000 MG/ML; [USP'U]/ML
1 SOLUTION OPHTHALMIC
Qty: 10 ML | Refills: 0 | Status: SHIPPED | OUTPATIENT
Start: 2018-01-20 | End: 2018-01-25

## 2018-01-20 RX ORDER — TETRACAINE HYDROCHLORIDE 5 MG/ML
1 SOLUTION OPHTHALMIC ONCE
Status: COMPLETED | OUTPATIENT
Start: 2018-01-20 | End: 2018-01-20

## 2018-01-20 RX ORDER — ALBUTEROL SULFATE 90 UG/1
2 AEROSOL, METERED RESPIRATORY (INHALATION) EVERY 4 HOURS PRN
Qty: 1 INHALER | Refills: 0 | Status: SHIPPED | OUTPATIENT
Start: 2018-01-20 | End: 2018-02-19

## 2018-01-20 RX ORDER — MOMETASONE FUROATE 1 MG/G
1 OINTMENT TOPICAL DAILY
Qty: 15 G | Refills: 0 | Status: SHIPPED | OUTPATIENT
Start: 2018-01-20 | End: 2019-02-27

## 2018-01-20 RX ORDER — IPRATROPIUM BROMIDE AND ALBUTEROL SULFATE 2.5; .5 MG/3ML; MG/3ML
3 SOLUTION RESPIRATORY (INHALATION) ONCE
Status: COMPLETED | OUTPATIENT
Start: 2018-01-20 | End: 2018-01-20

## 2018-01-20 RX ORDER — PREDNISONE 20 MG/1
60 TABLET ORAL ONCE
Status: COMPLETED | OUTPATIENT
Start: 2018-01-20 | End: 2018-01-20

## 2018-01-20 RX ORDER — PREDNISONE 20 MG/1
40 TABLET ORAL DAILY
Qty: 8 TABLET | Refills: 0 | Status: SHIPPED | OUTPATIENT
Start: 2018-01-20 | End: 2018-01-24

## 2018-01-20 NOTE — ED PROVIDER NOTES
Patient Seen in: [facility]    History   Patient presents with:  Eye Problem    Stated Complaint: EYE IRRITATION AND [place]    HPI    1year-old male here with complaint of left eye discharge and a productive cough for history and social history elements  as listed in today's nurse's notes are reviewed and agree. The patient's family history is reviewed and is noncontributory to the presenting problem, except as indicated as above.   Smoking status: Former Smoker reflexes. Skin: Skin is warm and dry.            ED Course   Xr Chest Pa + Lat Chest (cpt=71046)    Result Date: 1/20/2018  PROCEDURE:  XR CHEST PA + LAT CHEST (CPT=71046)  INDICATIONS:  EYE IRRITATION AND DISCHARGE, COUGH  COMPARISON:  EDWARD , XR CHEST conjunctivitis of left eye, unspecified acute conjunctivitis type    Disposition:  Discharge  1/20/2018 12:09 pm    Follow-up:  Nathan Sher MD  [address] [place], [place] [address]mariana Suarez [address]  [phone]    Schedule an appointment as soon as possible

## 2018-06-12 ENCOUNTER — HOSPITAL ENCOUNTER (INPATIENT)
Facility: HOSPITAL | Age: 83
LOS: 9 days | Discharge: HOME HEALTH | DRG: 391 | End: 2018-06-22
Admitting: INTERNAL MEDICINE
Payer: MEDICARE

## 2018-06-12 ENCOUNTER — APPOINTMENT (OUTPATIENT)
Dept: CT IMAGING | Facility: HOSPITAL | Age: 83
DRG: 391 | End: 2018-06-12
Payer: MEDICARE

## 2018-06-12 DIAGNOSIS — R19.7 DIARRHEA, UNSPECIFIED TYPE: ICD-10-CM

## 2018-06-12 DIAGNOSIS — E87.2 METABOLIC ACIDOSIS: Primary | ICD-10-CM

## 2018-06-12 PROCEDURE — 74176 CT ABD & PELVIS W/O CONTRAST: CPT

## 2018-06-12 RX ORDER — FAMOTIDINE 10 MG/ML
20 INJECTION, SOLUTION INTRAVENOUS ONCE
Status: COMPLETED | OUTPATIENT
Start: 2018-06-12 | End: 2018-06-12

## 2018-06-13 PROBLEM — R19.7 DIARRHEA, UNSPECIFIED TYPE: Status: ACTIVE | Noted: 2018-06-13

## 2018-06-13 PROBLEM — N17.9 AKI (ACUTE KIDNEY INJURY): Status: ACTIVE | Noted: 2017-01-04

## 2018-06-13 PROBLEM — N17.9 AKI (ACUTE KIDNEY INJURY) (HCC): Status: ACTIVE | Noted: 2017-01-04

## 2018-06-13 PROCEDURE — 99222 1ST HOSP IP/OBS MODERATE 55: CPT | Performed by: INTERNAL MEDICINE

## 2018-06-13 RX ORDER — CYANOCOBALAMIN 1000 UG/ML
1000 INJECTION INTRAMUSCULAR; SUBCUTANEOUS
Status: DISCONTINUED | OUTPATIENT
Start: 2018-06-13 | End: 2018-06-22

## 2018-06-13 RX ORDER — SODIUM CHLORIDE 9 MG/ML
INJECTION, SOLUTION INTRAVENOUS CONTINUOUS
Status: ACTIVE | OUTPATIENT
Start: 2018-06-13 | End: 2018-06-13

## 2018-06-13 RX ORDER — LOSARTAN POTASSIUM 100 MG/1
100 TABLET ORAL
Status: DISCONTINUED | OUTPATIENT
Start: 2018-06-13 | End: 2018-06-13

## 2018-06-13 RX ORDER — ALBUTEROL SULFATE 90 UG/1
2 AEROSOL, METERED RESPIRATORY (INHALATION) EVERY 6 HOURS PRN
Status: DISCONTINUED | OUTPATIENT
Start: 2018-06-13 | End: 2018-06-22

## 2018-06-13 RX ORDER — ALFUZOSIN HYDROCHLORIDE 10 MG/1
10 TABLET, EXTENDED RELEASE ORAL
Status: DISCONTINUED | OUTPATIENT
Start: 2018-06-13 | End: 2018-06-22

## 2018-06-13 RX ORDER — MELATONIN
325 2 TIMES DAILY WITH MEALS
Status: DISCONTINUED | OUTPATIENT
Start: 2018-06-13 | End: 2018-06-22

## 2018-06-13 RX ORDER — PANTOPRAZOLE SODIUM 40 MG/1
40 TABLET, DELAYED RELEASE ORAL
Status: DISCONTINUED | OUTPATIENT
Start: 2018-06-13 | End: 2018-06-22

## 2018-06-13 RX ORDER — SACCHAROMYCES BOULARDII 250 MG
250 CAPSULE ORAL 2 TIMES DAILY
Status: DISCONTINUED | OUTPATIENT
Start: 2018-06-13 | End: 2018-06-16

## 2018-06-13 RX ORDER — LOPERAMIDE HYDROCHLORIDE 2 MG/1
2 CAPSULE ORAL 3 TIMES DAILY PRN
Status: DISCONTINUED | OUTPATIENT
Start: 2018-06-13 | End: 2018-06-13

## 2018-06-13 RX ORDER — ALBUTEROL SULFATE 2.5 MG/3ML
2.5 SOLUTION RESPIRATORY (INHALATION) EVERY 4 HOURS PRN
Status: DISCONTINUED | OUTPATIENT
Start: 2018-06-13 | End: 2018-06-22

## 2018-06-13 RX ORDER — LOPERAMIDE HYDROCHLORIDE 2 MG/1
2 CAPSULE ORAL
Status: DISCONTINUED | OUTPATIENT
Start: 2018-06-14 | End: 2018-06-15

## 2018-06-13 RX ORDER — CARVEDILOL 6.25 MG/1
6.25 TABLET ORAL 2 TIMES DAILY WITH MEALS
Status: DISCONTINUED | OUTPATIENT
Start: 2018-06-13 | End: 2018-06-22

## 2018-06-13 RX ORDER — ACETAMINOPHEN 160 MG
2000 TABLET,DISINTEGRATING ORAL DAILY
Status: DISCONTINUED | OUTPATIENT
Start: 2018-06-13 | End: 2018-06-22

## 2018-06-13 RX ORDER — PRAVASTATIN SODIUM 20 MG
20 TABLET ORAL NIGHTLY
Status: DISCONTINUED | OUTPATIENT
Start: 2018-06-13 | End: 2018-06-22

## 2018-06-13 RX ORDER — PRAVASTATIN SODIUM 20 MG
20 TABLET ORAL NIGHTLY
COMMUNITY

## 2018-06-13 RX ORDER — CARVEDILOL 6.25 MG/1
6.25 TABLET ORAL 2 TIMES DAILY WITH MEALS
Status: ON HOLD | COMMUNITY
End: 2019-07-19

## 2018-06-13 RX ORDER — ISOSORBIDE MONONITRATE 30 MG/1
30 TABLET, EXTENDED RELEASE ORAL DAILY
Status: DISCONTINUED | OUTPATIENT
Start: 2018-06-13 | End: 2018-06-22

## 2018-06-13 RX ORDER — SODIUM CHLORIDE 9 MG/ML
INJECTION, SOLUTION INTRAVENOUS CONTINUOUS
Status: DISCONTINUED | OUTPATIENT
Start: 2018-06-13 | End: 2018-06-13

## 2018-06-13 RX ORDER — LEVOTHYROXINE SODIUM 0.05 MG/1
50 TABLET ORAL
Status: DISCONTINUED | OUTPATIENT
Start: 2018-06-13 | End: 2018-06-22

## 2018-06-13 NOTE — CONSULTS
[facility]                       Gastroenterology [address] [facility] Gastroenterology    Dulce Maria Loya Patient Status:  Inpatient    1934 MRN [id number]   [facility] 2NE-A Attending Latrice Beltre MD   [facility] Day # 0 leaky mitral valve   • Osteoarthrosis, unspecified whether generalized or localized, unspecified site     cervical and lumbar spine   • Other and unspecified hyperlipidemia    • Shortness of breath    • Unspecified essential hypertension      PSHx: Past Loera Nightly   saccharomyces boulardii (FLORASTOR) cap 250 mg 250 mg Oral BID   Alfuzosin HCl ER (UROXATRAL) 24 hr tab 10 mg 10 mg Oral Daily with breakfast   Umeclidinium Bromide (INCRUSE ELLIPTA) 62.5 MCG/INH inhaler 1 puff 1 puff Inhalation Daily   [COMPLETE 119/46 (BP Location: Left arm)   Pulse 56   Temp 98 °F (36.7 °C) (Oral)   Resp 17   Ht 5' (1.524 m)   Wt 134 lb 14.7 oz (61.2 kg)   SpO2 99%   BMI 26.35 kg/m²   Gen: AAO x 3, able to speak in complete sentences  HENT: EOMI, PERRL, oropharynx is clear with pubic symphysis.  Dose reduction techniques were used.  Dose information is transmitted to the [facility] ([facility]) [address]) which includes the Dose Index Registry.     PATIENT STATED HISTORY: (As transcribed evidence of acute cholecystitis.     Incidental findings include the following:       Renal cortical scarring left kidney consistent with prior pyelonephritis.  Benign left renal cyst.       Splenic capsular calcification having a benign appearance possibl shital. CT unremarkable. Labs notable for ANTHONY, acidosis. C diff negative. Conservative management, and if no improvement, flex sig with biopsies. Family is agreeable.     Jennifer Dixon MD

## 2018-06-13 NOTE — ED INITIAL ASSESSMENT (HPI)
Family reports diarrhea for 8 days, no n/v, denies abd pain. Per family, pt given \"Imodium and BRAT diet with no relief. \" Denies blood in stools

## 2018-06-13 NOTE — H&P
[facility]    PATIENT'S NAME: Teo Tyson   ATTENDING PHYSICIAN: Rosalie Garrison M.D.    PATIENT ACCOUNT#:   [de-identified]    LOCATION:  [address] [place] [address]  MEDICAL RECORD #:   [id number]       YOB: 1934  ADMISSION DATE:       0

## 2018-06-13 NOTE — OCCUPATIONAL THERAPY NOTE
OCCUPATIONAL THERAPY QUICK EVALUATION - INPATIENT    Room Number: 2606/2606-A  Evaluation Date: 6/13/2018     Type of Evaluation: Quick Eval  Presenting Problem: Diarrhea    Physician Order: IP Consult to Occupational Therapy  Reason for Therapy:  ADL/IADL • Osteoarthrosis, unspecified whether generalized or localized, unspecified site     cervical and lumbar spine   • Other and unspecified hyperlipidemia    • Shortness of breath    • Unspecified essential hypertension        Past Surgical History  Past Surg WEIGHT BEARING RESTRICTION  Weight Bearing Restriction: None                PAIN ASSESSMENT  Ratin          COGNITION  No issues noted, able to make needs known    RANGE OF MOTION AND STRENGTH ASSESSMENT  Upper extremity ROM is within functional limits Skilled Therapy Provided: Pt presents supine in bed, son present. Pt education on Role of OT, POC, D/C plan, Energy Conservation Techniques, Home Safety/Fall Prevention with patient asking questions related to home/work/community/lesiure situations.  Pt/th Patient has been evaluated and presents with no skilled Occupational Therapy needs at this time. Patient discharged from Occupational Therapy services. Please re-order if a new functional limitation presents during this admission.     Patient was able to

## 2018-06-13 NOTE — PLAN OF CARE
GASTROINTESTINAL - ADULT    • Minimal or absence of nausea and vomiting Progressing    • Maintains or returns to baseline bowel function Progressing          Assumed care of pt at 0700. Still w/ diarrhea. Pt has had at least 2 episodes since this morning.

## 2018-06-13 NOTE — CONSULTS
[facility]  Report of Consultation    Cloudcirilo Steinberg Patient Status:  Inpatient    1934 MRN [id number]   [facility] 2NE-A Attending Jaelyn Florez MD   Hosp Day # 0 PCP Agapito Baer MD     Reason for Consultation:  Isamar Yu colonscopy with wendy  No date: TOTAL KNEE REPLACEMENT  Family History   Problem Relation Age of Onset   • Cancer Brother       reports that he has quit smoking. He quit after 30.00 years of use.  He has never used smokeless tobacco. He reports that he mares hematuria  Denies LE edema  Denies skin rashes/myalgias/arthralgias      Physical Exam:   /46 (BP Location: Left arm)   Pulse 56   Temp 98 °F (36.7 °C) (Oral)   Resp 17   Ht 60\"   Wt 134 lb 14.7 oz   SpO2 99%   BMI 26.35 kg/m²   Temp (24hrs), Av Date Value   06/13/2018 50 (H)   06/12/2018 53 (H)   10/02/2017 30 (H)   06/29/2014 16   06/27/2014 23 (H)   ----------  CREATININE (mg/dL)   Date Value   06/29/2014 1.10   06/27/2014 1.42 (H)   ----------  Creatinine (mg/dL)   Date Value   06/13/2018 2.

## 2018-06-13 NOTE — PROGRESS NOTES
NURSING ADMISSION NOTE      Patient admitted via Cart  Oriented to room. Safety precautions initiated. Bed in low position. Call light in reach. Pt is A&Ox4. On room air. NSR/SB on tele. bp stable. Fluids infusing. Up with 1 and cane.  Pt had one

## 2018-06-13 NOTE — PAYOR COMM NOTE
--------------  ADMISSION REVIEW     Payor: [address] [place] [address] #:  [id number]  Authorization Number: [id number]    Admit date: 6/13/18  Admit time: Leslie       Admitting Physician: Dustin Edward MD  Attending Physician:  Dustin Edward MD cervical and lumbar spine   • Other and unspecified hyperlipidemia    • Shortness of breath    • Unspecified essential hypertension        Past Surgical History:  No date: ANGIOGRAM      Comment: 2009 6/30/2014: COLONOSCOPY      Comment: Procedure: CO angle tenderness. Extremities: Warm, well perfused, without edema    No significant deformity or joint abnormality    Calves are symmetric and nontender  Good peripheral color, cap refill . Skin: Unremarkable without lesions or rash.      Neurolog INDICATIONS:  diarrhea x 8 days, worse today, L pain. elevated creat  TECHNIQUE:  Unenhanced multislice CT scanning was performed from the dome of the diaphragm to the pubic symphysis. Dose reduction techniques were used.  Dose information is transmitted Negative. CONCLUSION:  Large rectus diastases as noted above. 2.3 cm densely calcified gallstone. No CT evidence of acute cholecystitis.   Incidental findings include the following:    Renal cortical scarring left kidney consistent with prior pyel    MEDICATIONS:  Medications reviewed and reconciled in chart.       ALLERGIES:  No known drug allergies.     SOCIAL HISTORY:  No history of smoking, alcohol or illicit drug use.       PHYSICAL EXAMINATION:    VITAL SIGNS:  Temperature 37, heart rate 56, was 1.57 mg/dl although had been normal prior.  Creat 3.37 mg/dl in ED yest.     History:       Past Medical History:   Diagnosis Date   • Anemia 5/18/2016   • Back problem     • Chronic lung disease     • COPD (chronic obstructive pulmonary disease) ([facility] [address] Inhalation, Q6H PRN  •  Fluticasone Furoate-Vilanterol (BREO ELLIPTA) 200-25 MCG/INH inhaler 1 puff, 1 puff, Inhalation, Daily  •  carvedilol (COREG) tab 6.25 mg, 6.25 mg, Oral, BID with meals  •  Vitamin D3 cap 2,000 Units, 2,000 Units, Oral, Daily  •  cy oz  05/11/17 0804 : 145 lb 11.6 oz  05/11/17 0423 : 149 lb  01/21/17 1700 : 149 lb  01/05/17 0400 : 165 lb 9.1 oz  01/04/17 1815 : 164 lb 14.5 oz  01/04/17 1354 : 140 lb  05/21/16 0443 : 143 lb 8.3 oz  05/20/16 0423 : 144 lb 12.8 oz  05/19/16 0505 : 157 lb CREATSERUM  3.37*  2.55*   CA  8.7  7.9*   GLU  122*  109*             Recent Labs   Lab  06/12/18   2230   ALT  25   AST  23   ALB  3.6         No results for input(s): PGLU in the last 168 hours.           Impression/Plan:     #1.   ANTHONY- hx most c/w pre

## 2018-06-13 NOTE — ED PROVIDER NOTES
Patient Seen in: [facility] Emergency Department    History   Patient presents with:  Nausea/Vomiting/Diarrhea (gastrointestinal)    Stated Complaint: diarrhea x 8 days, worse today    HPI    Pleasant elderly male presenting with sons with complaint Location: EH                ENDOSCOPY  No date: HERNIA SURGERY  No date: KNEE REPLACEMENT SURGERY  No date: OTHER SURGICAL HISTORY      Comment: 6 yeara ago colonscopy with wendy        Smoking status: Former Smoker Notable for the following:        Result Value    Glucose 122 (*)     BUN 53 (*)     Creatinine 3.37 (*)     GFR, Non- 16 (*)     GFR, -American 18 (*)     Alkaline Phosphatase 158 (*)     Sodium 135 (*)     CO2 15.0 (*)     All othe PATIENT STATED HISTORY: (As transcribed by Technologist)  Epigastric pain with known hernia    FINDINGS:  LIVER:  No enlargement, atrophy, abnormal density, or significant focal lesion. BILIARY:  Large densely calcified gallstone measuring 2.3 x 1.5 cm. Mild diverticulosis mild prostatic enlargement. Lumbar spondylosis.                       Dictated by: Marky Durant MD on 6/12/2018 at 23:51     Approved by: Marky Durant MD            ED Course as of Jun 13 0015  --------------------------------------

## 2018-06-13 NOTE — PHYSICAL THERAPY NOTE
PHYSICAL THERAPY EVALUATION - INPATIENT     Room Number: 2606/2606-A  Evaluation Date: 6/13/2018  Type of Evaluation: Initial  Physician Order: PT Eval and Treat    Presenting Problem: diarrhea, nausea and vomitin  Reason for Therapy: Mobility Dysfun level;Bed/bath upstairs  Stairs to Enter : 2  Railing: No  Stairs to Bedroom: 16  Railing: Yes    Lives With: Alone;Caregiver part-time (Chrs/day x5 days /wk)  Drives: No  Patient Owned Equipment: Rolling walker (quad)  Patient Regularly Uses: Reading Moving from lying on back to sitting on the side of the bed?: None   How much help from another person does the patient currently need. ..   -   Moving to and from a bed to a chair (including a wheelchair)?: A Little   -   Need to walk in hospital room?: A functional limitations in independent bed mobility, transfers, and gait and stairs. The patient is below baseline and would benefit from skilled inpatient PT to address the above deficits to assist patient in returning to prior to level of function.  Plan

## 2018-06-13 NOTE — PLAN OF CARE
Assumed care of pt at 1000  A/o x3 speaks [address] but understands english. Family at bedside. Son fully updated on POC and test results. Freq. Diarrhea noted. GI aware. C-DIF negative. IVF changed to bi carb.    Dr. Bev Richards notified regarding

## 2018-06-14 PROCEDURE — 99232 SBSQ HOSP IP/OBS MODERATE 35: CPT | Performed by: INTERNAL MEDICINE

## 2018-06-14 RX ORDER — SODIUM PHOSPHATE,MONO-DIBASIC 19G-7G/118
1 ENEMA (ML) RECTAL ONCE
Status: COMPLETED | OUTPATIENT
Start: 2018-06-15 | End: 2018-06-15

## 2018-06-14 RX ORDER — POTASSIUM CHLORIDE 20 MEQ/1
20 TABLET, EXTENDED RELEASE ORAL ONCE
Status: COMPLETED | OUTPATIENT
Start: 2018-06-14 | End: 2018-06-14

## 2018-06-14 NOTE — PLAN OF CARE
GASTROINTESTINAL - ADULT    • Minimal or absence of nausea and vomiting Progressing    • Maintains or returns to baseline bowel function Progressing        Impaired Functional Mobility    • Achieve highest/safest level of mobility/gait Progressing        S

## 2018-06-14 NOTE — PAYOR COMM NOTE
--------------  CONTINUED STAY REVIEW    Payor: William  Subscriber #:  [id number]  Authorization Number: [id number]    Admit date: 6/13/18  Admit time: Leslie    Admitting Physician: Yasmin Holloway MD  Attending Physician:  Yasmin Holloway MD symptoms fail to improve and #1 is unrevealing   5.  CBC, BMP, Mg in AM   SIMONA Urrutia  10:08 AM  6/13/2018  [facility] Gastroenterology  [phone]      MEDICATIONS ADMINISTERED IN LAST 1 DAY:  Alfuzosin HCl ER (UROXATRAL) 24 hr tab 10 mg     Date A [facility] ELLIPTA) 62.5-25 MCG/INH inhaler 1 puff     Date Action Dose Route User    6/14/2018 0819 Given 1 puff Inhalation [facility]    6/13/2018 1218 Given 1 puff Inhalation Rashid Schmidt RCP      Vitamin D3 cap 2,000 Units     Date Action Dose

## 2018-06-14 NOTE — PROGRESS NOTES
[facility]  Nephrology Progress Note    Manan Lora Patient Status:  Inpatient    1934 MRN [id number]   [facility] 2NE-A Attending Yolanda Mccain MD   Hosp Day # 1 PCP Stuart Cerna MD       SUBJECTIVE:  Diarrhea pe CREATSERUM  3.37*  2.55*  2.06*   CA  8.7  7.9*  7.6*   MG   --    --   2.3   GLU  122*  109*  99       Recent Labs   Lab  06/12/18 2230 06/14/18   0558   ALT  25  19   AST  23  19   ALB  3.6  2.4*       No results for input(s): PGLU in the last 168 h

## 2018-06-14 NOTE — PROGRESS NOTES
[facility]. Progress Note    Tiny Hoist Patient Status:  Inpatient    1934 MRN [id number]   [facility] 2NE-A Attending Meryle Dubonnet, MD   Hosp Day # 1 PCP Hamilton Queen MD     Subjective:  Jesus Walker ears   Nose:   Nares normal, septum midline, mucosa normal, no drainage    or sinus tenderness   Throat:   Lips, mucosa, and tongue normal; teeth and gums normal   Neck:   Supple, symmetrical, trachea midline, no adenopathy;     thyroid:  no enlargement/te HYPERTENSIVE HEART DISEASE           Plan:  IMPROVING  TESTS NEGATIVE  D/W PATENT HIS SON AND HIS DAUGHTER    Deysi Babin MD  6/14/2018  1:57 PM

## 2018-06-14 NOTE — PROGRESS NOTES
Gastroenterology Progress Note  Woodville Shallow Patient Status:  Inpatient    1934 MRN [id number]   [facility] 2NE-A Attending Phyllis Curiel MD   Hosp Day # 1 PCP Terri Murphy flexible-sigmoidoscopy under MAC in AM with Dr Gregory Rebolledo  2. NPO after midnight   3. 1 fleet enema in AM   4. Await stool culture results   5.  CBC, BMP, Mg in AM    Case discussed and reviewed with SIMONA Chand  2:11 PM  6/14/2018  Lm [place]

## 2018-06-14 NOTE — DIETARY NOTE
NUTRITION INITIAL ASSESSMENT    Pt is at moderate nutrition risk. Pt does not meet malnutrition criteria.     NUTRITION DIAGNOSIS/PROBLEM:    Inadequate energy intake related to altered GI function as evidenced by diarrhea > 8 days with poor PO intake, and PO intake to meet at least 75% patient nutrition prescription  2. At least 75% intake of oral supplements  3. No signs of skin breakdown  4.  Maintain lean body mass    MEDICATIONS:  Noted    LABS:  Noted    FOLLOW-UP DATE: 6/19/18  Екатерина Harman RD,LDN  Pager

## 2018-06-14 NOTE — CM/SW NOTE
06/14/18 1345   CM/SW Referral Data   Referral Source    Reason for Referral Discharge planning   Informant Patient   Pertinent Medical Hx   Primary Care Physician Name dr cora goel   Significant Past Medical/Mental Health Hx htn, copd, oa, an

## 2018-06-15 ENCOUNTER — ANESTHESIA EVENT (OUTPATIENT)
Dept: ENDOSCOPY | Facility: HOSPITAL | Age: 83
DRG: 391 | End: 2018-06-15
Payer: MEDICARE

## 2018-06-15 ENCOUNTER — SURGERY (OUTPATIENT)
Age: 83
End: 2018-06-15

## 2018-06-15 ENCOUNTER — ANESTHESIA (OUTPATIENT)
Dept: ENDOSCOPY | Facility: HOSPITAL | Age: 83
DRG: 391 | End: 2018-06-15
Payer: MEDICARE

## 2018-06-15 PROCEDURE — 0DBN8ZX EXCISION OF SIGMOID COLON, VIA NATURAL OR ARTIFICIAL OPENING ENDOSCOPIC, DIAGNOSTIC: ICD-10-PCS | Performed by: STUDENT IN AN ORGANIZED HEALTH CARE EDUCATION/TRAINING PROGRAM

## 2018-06-15 PROCEDURE — 99232 SBSQ HOSP IP/OBS MODERATE 35: CPT | Performed by: INTERNAL MEDICINE

## 2018-06-15 RX ORDER — SODIUM CHLORIDE, SODIUM LACTATE, POTASSIUM CHLORIDE, CALCIUM CHLORIDE 600; 310; 30; 20 MG/100ML; MG/100ML; MG/100ML; MG/100ML
INJECTION, SOLUTION INTRAVENOUS CONTINUOUS
Status: DISCONTINUED | OUTPATIENT
Start: 2018-06-15 | End: 2018-06-15

## 2018-06-15 RX ORDER — NALOXONE HYDROCHLORIDE 0.4 MG/ML
80 INJECTION, SOLUTION INTRAMUSCULAR; INTRAVENOUS; SUBCUTANEOUS AS NEEDED
Status: ACTIVE | OUTPATIENT
Start: 2018-06-15 | End: 2018-06-15

## 2018-06-15 RX ORDER — POTASSIUM CHLORIDE 20 MEQ/1
40 TABLET, EXTENDED RELEASE ORAL ONCE
Status: COMPLETED | OUTPATIENT
Start: 2018-06-15 | End: 2018-06-15

## 2018-06-15 RX ORDER — IPRATROPIUM BROMIDE AND ALBUTEROL SULFATE 2.5; .5 MG/3ML; MG/3ML
SOLUTION RESPIRATORY (INHALATION)
Status: COMPLETED
Start: 2018-06-15 | End: 2018-06-15

## 2018-06-15 RX ORDER — IPRATROPIUM BROMIDE AND ALBUTEROL SULFATE 2.5; .5 MG/3ML; MG/3ML
3 SOLUTION RESPIRATORY (INHALATION) AS NEEDED
Status: DISCONTINUED | OUTPATIENT
Start: 2018-06-15 | End: 2018-06-22

## 2018-06-15 RX ORDER — DIPHENOXYLATE HYDROCHLORIDE AND ATROPINE SULFATE 2.5; .025 MG/1; MG/1
1 TABLET ORAL
Status: DISCONTINUED | OUTPATIENT
Start: 2018-06-15 | End: 2018-06-22

## 2018-06-15 NOTE — CM/SW NOTE
spoke w/ son and dil, patient lives alone with family support, goes to  3 days/week and receives homemaker services through [employer].      Confirmed with [facility] [phone] they have accepted patient for nursing visits an

## 2018-06-15 NOTE — ANESTHESIA POSTPROCEDURE EVALUATION
[address] [place] [address] Patient Status:  Inpatient   Age/Gender 80year old male MRN [id number]   Location [address] [place] [address]. Attending Liset Terry MD   Hosp Day # 2 PCP Liam Melendez MD       Anesthesia Post-op Note    Proce

## 2018-06-15 NOTE — PAYOR COMM NOTE
--------------  CONTINUED STAY REVIEW    Payor: William  Subscriber #:  [id number]  Authorization Number: [id number]    Admit date: 6/13/18  Admit time: Leslie    Admitting Physician: Stefani Piper MD  Attending Physician:  Stefani Piper MD pediatric colonoscopy through the sigmoid colon due to edema and luminal narrowing. There was no wolf ulceration. There was a white film over the entire mucosa.     - The distal rectum was normal.   - There was grade 3 internal hemorrhoids and a thrombos 118 mL     Date Action Dose Route User    6/15/2018 0650 Given 118 mL Rectal Nelson MYERS RN      ferrous sulfate EC tab 325 mg     Date Action Dose Route User    6/14/2018 1836 Given 325 mg Oral Yanet Day RN      ipratropium-albuterol (Pheobe Amass

## 2018-06-15 NOTE — BH PROGRESS NOTE
Went to see the pt for f/u with the phq4. The family stated he has no history of mental health and that he is not in need of any services. The pt agrees.

## 2018-06-15 NOTE — ANESTHESIA PREPROCEDURE EVALUATION
PRE-OP EVALUATION    Patient Name: Brittani Setting    Pre-op Diagnosis: Diarrhea, unspecified type [R19.7]    Procedure(s): FLEXIBLE SIGMOIDOSCOPY    Surgeon(s) and Role:     * Gina Johnson MD - Primary    Pre-op vitals reviewed.   Temp: 98 1,000 mL Intravenous Once       Outpatient Medications:    carvedilol 6.25 MG Oral Tab Take 6.25 mg by mouth 2 (two) times daily with meals. Disp:  Rfl:    Pravastatin Sodium 20 MG Oral Tab Take 20 mg by mouth nightly. Disp:  Rfl:    mometasone (ELOCON) 0. Complications           GI/Hepatic/Renal             (+) chronic renal disease and CRI                   Cardiovascular              Unable to assess MET.                                          Endo/Other               (+) anemia                   Pulmona Other findings  kenny wheezing          ASA: 3   Plan: general and MAC  NPO status verified and patient meets guidelines. Patient has taken beta blockers in last 24 hours. Post-procedure pain management plan discussed with surgeon and patient.     Comment

## 2018-06-15 NOTE — OPERATIVE REPORT
Flex Sig operative report  Patient Name: Brigid Garcia  Procedure: Flexible sigmoidoscopy with biopsies  Indication: diarrhea  Attending: Juan Manuel De La Cruz M.D. Consent: The risks, benefits, and alternatives were discussed with the patient / POA. Findings as above.    Recommendations:    - Await biopsy results   - Change Imodium to Lomotil   - Continue to follow    Viviana Syed MD

## 2018-06-16 PROCEDURE — 99232 SBSQ HOSP IP/OBS MODERATE 35: CPT | Performed by: INTERNAL MEDICINE

## 2018-06-16 RX ORDER — SODIUM CHLORIDE 450 MG/100ML
INJECTION, SOLUTION INTRAVENOUS CONTINUOUS
Status: DISCONTINUED | OUTPATIENT
Start: 2018-06-16 | End: 2018-06-18

## 2018-06-16 RX ORDER — CHOLESTYRAMINE LIGHT 4 G/5.7G
4 POWDER, FOR SUSPENSION ORAL DAILY
Status: DISCONTINUED | OUTPATIENT
Start: 2018-06-16 | End: 2018-06-17

## 2018-06-16 NOTE — RESPIRATORY THERAPY NOTE
Patient does not have an trouble breathing, smoking hx, peak flow at 150 best patient efforts, Nebs Q4PRN.

## 2018-06-16 NOTE — PROGRESS NOTES
[facility]  Report of GI Consultation    Marcelsharmin Thompsons Patient Status:  Inpatient    1934 MRN [id number]   [facility] 2NE-A Attending Shad Markham MD   Hosp Day # 3 PCP Josue Chen MD     Date of Admission:   SpO2 96 %.     GENERAL: NAD, oriented x 3, pleasant, sitting up in chair eating  PULM: Lungs clear to auscultation anteriorly  CV: Regular, no murmurs  ABD: S/NT/ND, + hyperactive BS    Results:     Laboratory Data:    Lab Results  Component Value Date   WB

## 2018-06-16 NOTE — PROGRESS NOTES
[facility] 15. Progress Note    Luciopablo Vences Patient Status:  Inpatient    1934 MRN [id number]   [facility] 2NE-A Attending Aristides Lobo MD   Hosp Day # 3 PCP Leon Lazar MD     Subjective:  Emre Medico external ear canals, both ears   Nose:   Nares normal, septum midline, mucosa normal, no drainage    or sinus tenderness   Throat:   Lips, mucosa, and tongue normal; teeth and gums normal   Neck:   Supple, symmetrical, trachea midline, no adenopathy;     t hours.       Radiology:      Assessment:  Principal Problem:    Metabolic acidosis  Active Problems:    Diarrhea, unspecified type    COPD   CKD    ANEMIA OF CHRONIC DISEASE   DIVERTICULOSIS   MITRAL VALVE DISORDER   OA    KNEE REPLACEMENT   CHRONIC BACK PA

## 2018-06-16 NOTE — PROGRESS NOTES
[facility]  Nephrology Progress Note    Master Arreola Patient Status:  Inpatient    1934 MRN [id number]   [facility] 2NE-A Attending Trisha Dunn MD   Hosp Day # 3 PCP Helena Perez MD       SUBJECTIVE:  Cont to hav 06/14/18   0558  06/15/18   0544  06/16/18   0554   NA  135*  138  143  139  145*   K  4.8  4.5  3.7  3.7  3.7  4.3   CL  110  114*  111  105  108   CO2  15.0*  15.0*  22.0  27.0  30.0   BUN  53*  50*  53*  47*  46*   CREATSERUM  3.37*  2.55*  2.06*  1.88* Acidosis- due to severe diarrhea. Improved with sodium bicarb infusion and will d/c     #3. Diarrhea- has been severe. workup ongoing. c diff/cultures neg thus far. Per GI    #4.   Hypernatremia- mild, change to hypotonic IVF, encourage po intake    Amanda Law

## 2018-06-17 PROCEDURE — 99232 SBSQ HOSP IP/OBS MODERATE 35: CPT | Performed by: INTERNAL MEDICINE

## 2018-06-17 RX ORDER — CHOLESTYRAMINE LIGHT 4 G/5.7G
4 POWDER, FOR SUSPENSION ORAL 2 TIMES DAILY
Status: DISCONTINUED | OUTPATIENT
Start: 2018-06-17 | End: 2018-06-22

## 2018-06-17 RX ORDER — HEPARIN SODIUM 5000 [USP'U]/ML
5000 INJECTION, SOLUTION INTRAVENOUS; SUBCUTANEOUS EVERY 8 HOURS SCHEDULED
Status: DISCONTINUED | OUTPATIENT
Start: 2018-06-17 | End: 2018-06-22

## 2018-06-17 NOTE — PROGRESS NOTES
[address]  Report of GI Consultation    [address] Meganchristina Patient Status:  Inpatient    1934 MRN [id number]   [facility] 2NE-A Attending Christie Agustin MD   Hosp Day # 4 PCP Kimberly Molina MD     Date of Admission:   puff 1 puff Inhalation Daily       Allergies  No Known Allergies      Physical Exam:   Blood pressure 101/43, pulse 70, temperature 97.9 °F (36.6 °C), temperature source Oral, resp. rate 18, height 60\", weight 140 lb 14 oz, SpO2 96 %.     GENERAL: NAD, justin

## 2018-06-17 NOTE — PROGRESS NOTES
[facility]    Progress Note    Nilesh Shallow Patient Status:  Inpatient    1934 MRN [id number]   [facility] 2NE-A Attending Phyllis Curiel MD   Hosp Day # 4 PCP Terri Murphy MD       SUBJECTIVE:  Diarrhea getting b nebulizer solution 2.5 mg 2.5 mg Nebulization Q4H PRN   Albuterol Sulfate  (90 Base) MCG/ACT inhaler 2 puff 2 puff Inhalation Q6H PRN   carvedilol (COREG) tab 6.25 mg 6.25 mg Oral BID with meals   Vitamin D3 cap 2,000 Units 2,000 Units Oral Daily discussed with the son over the phone         Dispo: inpatient    Questions/concerns were discussed with patient and/or family by bedside.                 Fernando Scales  6/17/2018  12:18 PM

## 2018-06-17 NOTE — PROGRESS NOTES
[facility]  Nephrology Progress Note    Conda Catching Patient Status:  Inpatient    1934 MRN [id number]   [facility] 2NE-A Attending Gilberto Hammans, MD   Hosp Day # 4 PCP Smiley Riojas MD       SUBJECTIVE:  Cont to hav 06/13/18   0818  06/14/18   0558  06/15/18   0544  06/16/18   0554  06/17/18   0644   NA  138  143  139  145*  139   K  4.5  3.7  3.7  3.7  4.3  4.4   CL  114*  111  105  108  102   CO2  15.0*  22.0  27.0  30.0  32.0   BUN  50*  53*  47*  46*  45*   CREATS (ANORO ELLIPTA) 62.5-25 MCG/INH inhaler 1 puff 1 puff Inhalation Daily         Impression/Plan:    #1. ANTHONY- hx most c/w prerenal azotemia due to severe diarrhea. Creat slightly worse today, will incr IVF.       #2. Acidosis- due to severe diarrhea.   Impr

## 2018-06-18 PROCEDURE — 99232 SBSQ HOSP IP/OBS MODERATE 35: CPT | Performed by: INTERNAL MEDICINE

## 2018-06-18 RX ORDER — SODIUM CHLORIDE 9 MG/ML
INJECTION, SOLUTION INTRAVENOUS CONTINUOUS
Status: DISCONTINUED | OUTPATIENT
Start: 2018-06-18 | End: 2018-06-22

## 2018-06-18 RX ORDER — HEPARIN SODIUM 5000 [USP'U]/ML
INJECTION, SOLUTION INTRAVENOUS; SUBCUTANEOUS
Status: COMPLETED
Start: 2018-06-18 | End: 2018-06-18

## 2018-06-18 NOTE — PHYSICAL THERAPY NOTE
PHYSICAL THERAPY TREATMENT NOTE - INPATIENT    Room Number: 2606/2606-A     Session: 1   Number of Visits to Meet Established Goals: 2    Presenting Problem: diarrhea, nausea and vomitin    Problem List  Principal Problem:    Metabolic acidosis  Active Pr Static Sitting: Fair  Dynamic Sitting: Poor +           Static Standing: Fair  Dynamic Standing: Fair -    ACTIVITY TOLERANCE  O2 Saturation: >92%  Room air  No shortness of breath    AM-PAC '6-Clicks' INPATI therex to maintain current level of mobility. The rehab aide will perform treatment activities prescribed by this physical therapist. The rehab aide will communicate with overseeing PT regarding any change in functional mobility. RN aware.          Via Carol Pedroza

## 2018-06-18 NOTE — PROGRESS NOTES
[facility]    Progress Note    Master Arreola Patient Status:  Inpatient    1934 MRN [id number]   [facility] 2NE-A Attending Trisha Dunn MD   Hosp Day # 5 PCP Helena Perez MD       SUBJECTIVE:  Diarrhea getting b solution 2.5 mg 2.5 mg Nebulization Q4H PRN   Albuterol Sulfate  (90 Base) MCG/ACT inhaler 2 puff 2 puff Inhalation Q6H PRN   carvedilol (COREG) tab 6.25 mg 6.25 mg Oral BID with meals   Vitamin D3 cap 2,000 Units 2,000 Units Oral Daily   cyanocobal SCAN OF abd with contrast EDW EDWARD/w patent that contrast may worsen renal function   Await path report biopsy noroviur    Dispo: inpatient    Questions/concerns were discussed with patient and/or family by bedside.                 hany gentile  6/18/2018

## 2018-06-18 NOTE — PROGRESS NOTES
[facility]  Nephrology Progress Note    Luz Marina Braun Patient Status:  Inpatient    1934 MRN [id number]   [facility] 2NE-A Attending Bong Suarez MD   Hosp Day # 5 PCP Daphne Fitzgerald MD       SUBJECTIVE:  Diarrhea sl 06/15/18   0544  06/16/18   0554  06/17/18   0644  06/18/18   0552   NA  143  139  145*  139  134*   K  3.7  3.7  3.7  4.3  4.4  4.3   CL  111  105  108  102  101   CO2  22.0  27.0  30.0  32.0  25.0   BUN  53*  47*  46*  45*  44*   CREATSERUM  2.06*  1.88* (ANORO ELLIPTA) 62.5-25 MCG/INH inhaler 1 puff 1 puff Inhalation Daily         Impression/Plan:    #1. ANTHONY- hx most c/w prerenal azotemia due to severe diarrhea. Creat slightly better today with incr IVF.       #2. Diarrhea- has been severe.   workup ongo

## 2018-06-18 NOTE — PROGRESS NOTES
[facility] Consult Note:  Medication List Evaluation for Cholestyramine Drug Interactions    Pharmacy has been consulted to evaluate his current medications for those that could be interact with cholestyramine and to adjust the times of administration as below cannot be given within 1 hour prior to cholestyramine:       (2)  vitamin D administration time was adjusted to 0800       (3)  levothyroxine administration time was adjusted to 0800       (4)  pravastatin administration time was adjusted to 1800

## 2018-06-18 NOTE — PROGRESS NOTES
Gastroenterology Progress Note  Jaxson Dickinson Patient Status:  Inpatient    1934 MRN [id number]   [facility] 2NE-A Attending Adam Roberts MD   Hosp Day # 5 PCP Via Bhanu Ta Lomotil and cholestyramine. Abd is soft/non-tender/non-distended and renal function continues to improve. Will continue supportive care measures and await biopsy/stool study results   Plan:   1. Continue Lomotil TID AC  2.  Continue cholestyramine BID, will

## 2018-06-18 NOTE — PAYOR COMM NOTE
--------------  CONTINUED STAY REVIEW    Payor: William  Subscriber #:  [id number]  Authorization Number: [id number]    Admit date: 6/13/18  Admit time: Leslie    Admitting Physician: Adam Roberts MD  Attending Physician:  Adam Roberts MD patient and/or family by bedside.   Mel Sicard, MD  6/18/2018  11:19 AM    Assessment: 80 yr-old male admitted with acute ongoing diarrhea. Family with similar symptoms which resolved without intervention.  C Diff x 2, awaiting expanded stool studie Action Dose Route User    6/18/2018 1700 Given 6.25 mg Oral [place]enia [place]    6/18/2018 8372 Given 6.25 mg Oral Monica Rivera, RN      cholestyramine light (PREVALITE) powder packet 4 g     Date Action Dose Route User    6/18/2018 0953 Given 4 g Oral (none) Intravenous Deuce Constantino RN    6/18/2018 0555 New Bag (none) Intravenous Deuce Constantino RN    6/18/2018 0024 New Bag (none) Intravenous Deuce Constantino RN      0.9%  NaCl infusion     Date Action Dose Route User    6/18/2018 5034 New Bag (

## 2018-06-18 NOTE — CM/SW NOTE
Received call from reji Luevano [phone] requesting new quad can, will obtain orders and sent to in network provider [employer] via [employer]. 1530: [employer] requires physician signed form, placed on patient's chart for signature.      Rosa Elena Loredo RN, [address] [employer]

## 2018-06-19 PROCEDURE — 99232 SBSQ HOSP IP/OBS MODERATE 35: CPT | Performed by: INTERNAL MEDICINE

## 2018-06-19 NOTE — PROGRESS NOTES
[facility]    Gastroenterology Follow-Up Note      Chris Kenan Patient Status:  Inpatient    1934 MRN [id number]   [facility] 2NE-A Attending Brandon Rubin MD   Hosp Day # 6 PCP Puma Gerard MD     Chief Complai

## 2018-06-19 NOTE — PLAN OF CARE
GASTROINTESTINAL - ADULT    • Minimal or absence of nausea and vomiting Progressing    • Maintains or returns to baseline bowel function Progressing          Denies n/v ,still with diarrhea  Dr Vegas Para here with orders noted  Ct abd/pelvis with oral contr

## 2018-06-19 NOTE — PROGRESS NOTES
[facility]  Nephrology Progress Note    Brigid Garcia Patient Status:  Inpatient    1934 MRN [id number]   [facility] 2NE-A Attending Bonnie Vernon MD   Hosp Day # 6 PCP Daren Masters MD       SUBJECTIVE:  Cont to hav Recent Labs   Lab  06/14/18   0558  06/15/18   0544  06/16/18   0554  06/17/18   0644  06/18/18   0552  06/19/18   0528   NA  143  139  145*  139  134*  143   K  3.7  3.7  3.7  4.3  4.4  4.3  3.9   CL  111  105  108  102  101  110   CO2  22.0  27.0 (PRAVACHOL) tab 20 mg 20 mg Oral Nightly   Alfuzosin HCl ER (UROXATRAL) 24 hr tab 10 mg 10 mg Oral Daily with breakfast   umeclidinium-vilanterol (ANORO ELLIPTA) 62.5-25 MCG/INH inhaler 1 puff 1 puff Inhalation Daily         Impression/Plan:    #1.   ANTHONY- h

## 2018-06-19 NOTE — DIETARY NOTE
NUTRITION FOLLOW UP ASSESSMENT    Pt is at moderate nutrition risk. Pt does not meet malnutrition criteria.     NUTRITION DIAGNOSIS/PROBLEM:    Inadequate energy intake related to altered GI function as evidenced by diarrhea > 8 days with poor PO intake, an (149 lb)  01/05/17 : 75.1 kg (165 lb 9.1 oz)  05/21/16 : 65.1 kg (143 lb 8.3 oz)  05/02/16 : 70.3 kg (155 lb)      NUTRITION:  Diet: Vegetarian  Oral Supplements: Ensure BID    FOOD/NUTRITION RELATED HISTORY:  Appetite: Fair  Intake: <50%  Intake Meeting N

## 2018-06-19 NOTE — CM/SW NOTE
Received signed order for quad cane, sent via [employer] to [address] [place] [address]  [phone], call to son Sergio Stokes [phone] to make aware.  Contact info added to avs.  Wendie Hemphill RN,   Phone [phone]  Pager [phone]

## 2018-06-20 ENCOUNTER — APPOINTMENT (OUTPATIENT)
Dept: ULTRASOUND IMAGING | Facility: HOSPITAL | Age: 83
DRG: 391 | End: 2018-06-20
Attending: INTERNAL MEDICINE
Payer: MEDICARE

## 2018-06-20 ENCOUNTER — APPOINTMENT (OUTPATIENT)
Dept: CT IMAGING | Facility: HOSPITAL | Age: 83
DRG: 391 | End: 2018-06-20
Attending: INTERNAL MEDICINE
Payer: MEDICARE

## 2018-06-20 PROCEDURE — 99232 SBSQ HOSP IP/OBS MODERATE 35: CPT | Performed by: INTERNAL MEDICINE

## 2018-06-20 PROCEDURE — 74177 CT ABD & PELVIS W/CONTRAST: CPT | Performed by: INTERNAL MEDICINE

## 2018-06-20 PROCEDURE — 76700 US EXAM ABDOM COMPLETE: CPT | Performed by: INTERNAL MEDICINE

## 2018-06-20 PROCEDURE — 93975 VASCULAR STUDY: CPT | Performed by: INTERNAL MEDICINE

## 2018-06-20 RX ORDER — METRONIDAZOLE 500 MG/100ML
500 INJECTION, SOLUTION INTRAVENOUS EVERY 8 HOURS
Status: DISCONTINUED | OUTPATIENT
Start: 2018-06-20 | End: 2018-06-22

## 2018-06-20 NOTE — PROGRESS NOTES
[facility]  Nephrology Progress Note    Briana Lyman Patient Status:  Inpatient    1934 MRN [id number]   [facility] 2NE-A Attending Hien Young MD   Hosp Day # 7 PCP Anil Gates MD       SUBJECTIVE:  Diarrhea pe Recent Labs   Lab  06/14/18   0558  06/15/18   0544  06/16/18   0554  06/17/18   0644  06/18/18   0552  06/19/18   0528  06/20/18   0612   NA  143  139  145*  139  134*  143  146*   K  3.7  3.7  3.7  4.3  4.4  4.3  3.9  3.7   CL  111  105  108  102 breakfast   Pravastatin Sodium (PRAVACHOL) tab 20 mg 20 mg Oral Nightly   Alfuzosin HCl ER (UROXATRAL) 24 hr tab 10 mg 10 mg Oral Daily with breakfast   umeclidinium-vilanterol (ANORO ELLIPTA) 62.5-25 MCG/INH inhaler 1 puff 1 puff Inhalation Daily

## 2018-06-20 NOTE — PROGRESS NOTES
[facility]    Progress Note    Nicole Rogers Patient Status:  Inpatient    1934 MRN [id number]   [facility] 2NE-A Attending Yasmin Holloway MD   Hosp Day # 7 PCP Eren José MD     Subjective:     Respiratory: Jeffy Beauchamp

## 2018-06-20 NOTE — PROGRESS NOTES
[facility]    Gastroenterology Follow-Up Note      Viri Felipe Patient Status:  Inpatient    1934 MRN [id number]   [facility] 2NE-A Attending Robert Lewis MD   Hosp Day # 7 PCP Javier Higgins MD     Chief Complai

## 2018-06-20 NOTE — PROGRESS NOTES
Nilay Montilla [facility] 15. Progress Note    Cloud Mode Patient Status:  Inpatient    1934 MRN [id number]   [facility] 2NE-A Attending Jaelyn Florez MD   Hosp Day # 7 PCP Agapito Baer MD     Subjective:  Ella Lesch Old surgical vertical wound   Genitalia:     Rectal:     Extremities:   Extremities normal, atraumatic, no cyanosis or edema   Pulses:   2+ and symmetric all extremities   Skin:   Skin color, texture, turgor normal, no rashes or lesions   Lymph nodes:   Ce gallbladder, common bile duct, pancreas, spleen, kidneys, IVC, and aorta. Technically limited examination due to overlying bowel gas.   PATIENT STATED HISTORY: (As transcribed by Technologist)     FINDINGS:  PEAK VELOCITIES (cm/sec): AORTA ABOVE CELIAC  11 There is renal cortical scarring on the left consistent with old pyelonephritis. ADRENALS:  No mass or enlargement. AORTA/VASCULAR:   moderate calcified plaque. No aneurysm. RETROPERITONEUM:  No mass or adenopathy.   BOWEL/MESENTERY:  No visible mass, ob pubic symphysis with non-ionic intravenous contrast material. Post contrast coronal MPR imaging was performed. Dose reduction techniques were used.  Dose information is transmitted to the ACR ([facility] [employer]) Luiz Oglesby [address] [place] [address] Radiology Data Re OTHER:  None. CONCLUSION:   1.  There are scattered prominent to mildly dilated small bowel loops with air-fluid levels noted in the abdomen that could be related to ileus, nonspecific enteritis, with developing small bowel obstruction not entirely exc

## 2018-06-21 PROCEDURE — 99232 SBSQ HOSP IP/OBS MODERATE 35: CPT | Performed by: INTERNAL MEDICINE

## 2018-06-21 RX ORDER — BUDESONIDE 3 MG/1
9 CAPSULE, COATED PELLETS ORAL DAILY
Status: DISCONTINUED | OUTPATIENT
Start: 2018-06-21 | End: 2018-06-22

## 2018-06-21 NOTE — PROGRESS NOTES
[facility]    Progress Note    Ibrahima Williamson Patient Status:  Inpatient    1934 MRN [id number]   [facility] 2NE-A Attending Bruce Doe MD   Hosp Day # 8 PCP Nora Connor MD       SUBJECTIVE:  Continues to have mL Nebulization PRN   diphenoxylate-atropine (LOMOTIL) 2.5-0.025 MG per tab 1 tablet 1 tablet Oral TID CC   albuterol sulfate (VENTOLIN) (2.5 MG/3ML) 0.083% nebulizer solution 2.5 mg 2.5 mg Nebulization Q4H PRN   Albuterol Sulfate  (90 Base) MCG/ACT Hypernatremia      Plan:   Continue IV abx  Monitor kidney function  Discharge planning soon - once diarrhea lets up         Questions/concerns were discussed with patient and/or family by bedside.               Blanca Failing  6/21/2018  10:07 AM

## 2018-06-21 NOTE — PAYOR COMM NOTE
--------------  CONTINUED STAY REVIEW    Payor: William  Subscriber #:  [id number]  Authorization Number: [id number]    Admit date: 6/13/18:    Admit time: Pauldelilah    Admitting Physician: Rafaela Goodman MD  Attending Physician:  Rafaela Goodman, [place]  7.6*  7.8*  7.9*  7.7*  7.9*  7.9*  7.9*   MG  2.3  2.2   --    --    --    --    --    GLU  99  116*  111*  95  78  69*  74     Lab  06/14/18   0558  06/15/18   0544  06/17/18   0644  06/19/18   0528   ALT  19 19  18  17   AST  19  19  20  25   ALB User    6/21/2018 1004 Given 4 g Oral Jose PORTILLO Joseph    6/20/2018 2013 Given 4 g Oral Nikolay Bender RN    6/20/2018 1229 Given 4 g Oral Mary Lema, PORTILLO      diphenoxylate-atropine (LOMOTIL) 2.5-0.025 MG per tab 1 tablet     Date Action Dose Rou 6/20/2018 2100 Rate/Dose Verify (none) Intravenous Michael Chavarria RN    6/20/2018 1606 New Bag (none) Intravenous Tadeo Miner, PORTILLO      umeclidinium-vilanterol (ANORO ELLIPTA) 62.5-25 MCG/INH inhaler 1 puff     Date Action Dose Route User    6/21/2018

## 2018-06-21 NOTE — PROGRESS NOTES
[facility]    Gastroenterology Follow-Up Note      Conda Catching Patient Status:  Inpatient    1934 MRN [id number]   [facility] 2NE-A Attending Gilberto Hammans, MD   Hosp Day # 8 PCP Smiley Riojas MD     Chief Complai

## 2018-06-21 NOTE — PROGRESS NOTES
[facility]  Nephrology Progress Note    Genaro Ching Patient Status:  Inpatient    1934 MRN [id number]   [facility] 2NE-A Attending Shad Markham MD   Hosp Day # 8 PCP Josue Chen MD       SUBJECTIVE:  Diarrhea pe Recent Labs   Lab  06/15/18   0544   06/17/18   0644  06/18/18   0552  06/19/18   0528  06/20/18   0612  06/21/18   0556   NA  139   < >  139  134*  143  146*  145*   K  3.7  3.7   < >  4.4  4.3  3.9  3.7  4.2   CL  105   < >  102  101  110  116*  11 tab 30 mg 30 mg Oral Daily   Levothyroxine Sodium (SYNTHROID) tab 50 mcg 50 mcg Oral Before breakfast   Pravastatin Sodium (PRAVACHOL) tab 20 mg 20 mg Oral Nightly   Alfuzosin HCl ER (UROXATRAL) 24 hr tab 10 mg 10 mg Oral Daily with breakfast   dionna

## 2018-06-22 VITALS
TEMPERATURE: 98 F | BODY MASS INDEX: 28.99 KG/M2 | HEIGHT: 60 IN | DIASTOLIC BLOOD PRESSURE: 48 MMHG | OXYGEN SATURATION: 98 % | HEART RATE: 69 BPM | RESPIRATION RATE: 18 BRPM | SYSTOLIC BLOOD PRESSURE: 136 MMHG | WEIGHT: 147.69 LBS

## 2018-06-22 PROCEDURE — 99232 SBSQ HOSP IP/OBS MODERATE 35: CPT | Performed by: INTERNAL MEDICINE

## 2018-06-22 RX ORDER — DIPHENOXYLATE HYDROCHLORIDE AND ATROPINE SULFATE 2.5; .025 MG/1; MG/1
1 TABLET ORAL
Qty: 21 TABLET | Refills: 0 | Status: SHIPPED | OUTPATIENT
Start: 2018-06-22 | End: 2019-06-06

## 2018-06-22 RX ORDER — LOSARTAN POTASSIUM 100 MG/1
100 TABLET ORAL
Status: DISCONTINUED | OUTPATIENT
Start: 2018-06-22 | End: 2018-06-22

## 2018-06-22 RX ORDER — BUDESONIDE 3 MG/1
9 CAPSULE, COATED PELLETS ORAL DAILY
Qty: 180 CAPSULE | Refills: 0 | Status: SHIPPED | OUTPATIENT
Start: 2018-06-23 | End: 2019-02-27 | Stop reason: ALTCHOICE

## 2018-06-22 RX ORDER — METRONIDAZOLE 500 MG/1
500 TABLET ORAL 3 TIMES DAILY
Qty: 21 TABLET | Refills: 0 | Status: SHIPPED | OUTPATIENT
Start: 2018-06-22 | End: 2018-06-29

## 2018-06-22 NOTE — PAYOR COMM NOTE
--------------  CONTINUED STAY REVIEW    Payor: William  Subscriber #:  [id number]  Authorization Number: [id number]    Admit date: 6/13/18  Admit time: Leslie    Admitting Physician: Gilberto Hammans, MD  Attending Physician:  Gilberto Hammans, MD Subcutaneous (Left Lower Abdomen) Bella Urias RN    6/21/2018 2044 Given 5000 Units Subcutaneous (Left Lower Abdomen) Doug Caldwell RN    6/21/2018 1309 Given 5000 Units Subcutaneous (Left Lower Abdomen) Deshawn Mendez RN      Isosorbide Mononitrate ER

## 2018-06-22 NOTE — PROGRESS NOTES
[facility]  Nephrology Progress Note    Genaro Ching Patient Status:  Inpatient    1934 MRN [id number]   [facility] 2NE-A Attending Shad Markham MD   Hosp Day # 9 PCP Josue Chen MD       SUBJECTIVE:  Diarrhea mu last 168 hours.     Invalid input(s): LYM#, MONO#, BASOS#, EOSIN#    Recent Labs   Lab  06/18/18   0552  06/19/18   0528  06/20/18   0612  06/21/18   0556  06/22/18   0541   NA  134*  143  146*  145*  146*   K  4.3  3.9  3.7  4.2  4.0   CL  101  110  116* Alfuzosin HCl ER (UROXATRAL) 24 hr tab 10 mg 10 mg Oral Daily with breakfast   umeclidinium-vilanterol (ANORO ELLIPTA) 62.5-25 MCG/INH inhaler 1 puff 1 puff Inhalation Daily         Impression/Plan:    #1.   ANTHOYN- hx most c/w prerenal azotemia due to sever

## 2018-06-22 NOTE — PROGRESS NOTES
[facility]    Progress Note    Master Arreola Patient Status:  Inpatient    1934 MRN [id number]   [facility] 2NE-A Attending Trisha Dunn MD   Hosp Day # 9 PCP Helena Perez MD     Subjective:     Respiratory: Stephanie Scot

## 2018-06-22 NOTE — PROGRESS NOTES
[facility]    Gastroenterology Follow-Up Note      Rachael Pinedalisset Patient Status:  Inpatient    1934 MRN [id number]   [facility] 2NE-A Attending Wing Madison MD   Hosp Day # 9 PCP Frances Watts MD     Chief Complai

## 2018-06-22 NOTE — PROGRESS NOTES
NURSING DISCHARGE NOTE    Discharged Home via Wheelchair. Accompanied by Family member  Belongings Taken by patient/family.     All instructions reviewed with patient and family members

## 2018-06-22 NOTE — DIETARY NOTE
NUTRITION FOLLOW UP ASSESSMENT    Pt is at moderate nutrition risk. Pt does not meet malnutrition criteria.     NUTRITION DIAGNOSIS/PROBLEM:    Altered GI function related to alteration in GI tract function as evidenced by diarrhea - improving    EVALUATION kg  Usual Body Wt: 69 kg    WEIGHT HISTORY:   Wt Readings from Last 6 Encounters:  06/22/18 : 67 kg (147 lb 11.3 oz)  05/15/17 : 69 kg (152 lb 1.9 oz)  01/21/17 : 67.6 kg (149 lb)  01/05/17 : 75.1 kg (165 lb 9.1 oz)  05/21/16 : 65.1 kg (143 lb 8.3 oz)  05/

## 2018-07-11 PROBLEM — K50.10 CROHN'S DISEASE OF LARGE INTESTINE WITHOUT COMPLICATION (HCC): Status: ACTIVE | Noted: 2018-07-11

## 2018-08-01 ENCOUNTER — LAB ENCOUNTER (OUTPATIENT)
Dept: LAB | Facility: HOSPITAL | Age: 83
End: 2018-08-01
Attending: INTERNAL MEDICINE
Payer: MEDICARE

## 2018-08-01 DIAGNOSIS — I51.9 MYXEDEMA HEART DISEASE: ICD-10-CM

## 2018-08-01 DIAGNOSIS — K52.9 INFLAMMATORY BOWEL DISEASE: Primary | ICD-10-CM

## 2018-08-01 DIAGNOSIS — E03.9 MYXEDEMA HEART DISEASE: ICD-10-CM

## 2018-08-01 DIAGNOSIS — Z12.5 SPECIAL SCREENING FOR MALIGNANT NEOPLASM OF PROSTATE: ICD-10-CM

## 2018-08-01 DIAGNOSIS — I25.10 CORONARY ATHEROSCLEROSIS OF NATIVE CORONARY ARTERY: ICD-10-CM

## 2018-08-01 LAB
ALBUMIN SERPL-MCNC: 2.7 G/DL (ref 3.5–4.8)
ALBUMIN/GLOB SERPL: 1 {RATIO} (ref 1–2)
ALP LIVER SERPL-CCNC: 128 U/L (ref 45–117)
ALT SERPL-CCNC: 37 U/L (ref 17–63)
ANION GAP SERPL CALC-SCNC: 8 MMOL/L (ref 0–18)
AST SERPL-CCNC: 32 U/L (ref 15–41)
BASOPHILS # BLD AUTO: 0.02 X10(3) UL (ref 0–0.1)
BASOPHILS NFR BLD AUTO: 0.3 %
BILIRUB SERPL-MCNC: 0.7 MG/DL (ref 0.1–2)
BILIRUB UR QL STRIP.AUTO: NEGATIVE
BUN BLD-MCNC: 19 MG/DL (ref 8–20)
BUN/CREAT SERPL: 16 (ref 10–20)
CALCIUM BLD-MCNC: 8.3 MG/DL (ref 8.3–10.3)
CHLORIDE SERPL-SCNC: 113 MMOL/L (ref 101–111)
CHOLEST SMN-MCNC: 61 MG/DL (ref ?–200)
CLARITY UR REFRACT.AUTO: CLEAR
CO2 SERPL-SCNC: 26 MMOL/L (ref 22–32)
COLOR UR AUTO: YELLOW
CREAT BLD-MCNC: 1.19 MG/DL (ref 0.7–1.3)
EOSINOPHIL # BLD AUTO: 0.1 X10(3) UL (ref 0–0.3)
EOSINOPHIL NFR BLD AUTO: 1.3 %
ERYTHROCYTE [DISTWIDTH] IN BLOOD BY AUTOMATED COUNT: 14.6 % (ref 11.5–16)
GLOBULIN PLAS-MCNC: 2.7 G/DL (ref 2.5–3.7)
GLUCOSE BLD-MCNC: 83 MG/DL (ref 70–99)
GLUCOSE UR STRIP.AUTO-MCNC: NEGATIVE MG/DL
HCT VFR BLD AUTO: 37.6 % (ref 37–53)
HDLC SERPL-MCNC: 36 MG/DL (ref 40–59)
HGB BLD-MCNC: 12.6 G/DL (ref 13–17)
IMMATURE GRANULOCYTE COUNT: 0.13 X10(3) UL (ref 0–1)
IMMATURE GRANULOCYTE RATIO %: 1.7 %
KETONES UR STRIP.AUTO-MCNC: NEGATIVE MG/DL
LDLC SERPL CALC-MCNC: 12 MG/DL (ref ?–100)
LEUKOCYTE ESTERASE UR QL STRIP.AUTO: NEGATIVE
LYMPHOCYTES # BLD AUTO: 1.48 X10(3) UL (ref 0.9–4)
LYMPHOCYTES NFR BLD AUTO: 19.1 %
M PROTEIN MFR SERPL ELPH: 5.4 G/DL (ref 6.1–8.3)
MCH RBC QN AUTO: 32.4 PG (ref 27–33.2)
MCHC RBC AUTO-ENTMCNC: 33.5 G/DL (ref 31–37)
MCV RBC AUTO: 96.7 FL (ref 80–99)
MONOCYTES # BLD AUTO: 0.62 X10(3) UL (ref 0.1–1)
MONOCYTES NFR BLD AUTO: 8 %
NEUTROPHIL ABS PRELIM: 5.41 X10 (3) UL (ref 1.3–6.7)
NEUTROPHILS # BLD AUTO: 5.41 X10(3) UL (ref 1.3–6.7)
NEUTROPHILS NFR BLD AUTO: 69.6 %
NITRITE UR QL STRIP.AUTO: NEGATIVE
NONHDLC SERPL-MCNC: 25 MG/DL (ref ?–130)
OSMOLALITY SERPL CALC.SUM OF ELEC: 305 MOSM/KG (ref 275–295)
PH UR STRIP.AUTO: 5 [PH] (ref 4.5–8)
PLATELET # BLD AUTO: 156 10(3)UL (ref 150–450)
POTASSIUM SERPL-SCNC: 3.7 MMOL/L (ref 3.6–5.1)
PROT UR STRIP.AUTO-MCNC: NEGATIVE MG/DL
PSA SERPL-MCNC: 1.45 NG/ML (ref 0.01–4)
RBC # BLD AUTO: 3.89 X10(6)UL (ref 3.8–5.8)
RED CELL DISTRIBUTION WIDTH-SD: 52 FL (ref 35.1–46.3)
SODIUM SERPL-SCNC: 147 MMOL/L (ref 136–144)
SP GR UR STRIP.AUTO: 1.01 (ref 1–1.03)
TRIGL SERPL-MCNC: 64 MG/DL (ref 30–149)
TSI SER-ACNC: 3.03 MIU/ML (ref 0.35–5.5)
UROBILINOGEN UR STRIP.AUTO-MCNC: <2 MG/DL
VLDLC SERPL CALC-MCNC: 13 MG/DL (ref 0–30)
WBC # BLD AUTO: 7.8 X10(3) UL (ref 4–13)

## 2018-08-01 PROCEDURE — 84443 ASSAY THYROID STIM HORMONE: CPT

## 2018-08-01 PROCEDURE — 84153 ASSAY OF PSA TOTAL: CPT

## 2018-08-01 PROCEDURE — 80053 COMPREHEN METABOLIC PANEL: CPT

## 2018-08-01 PROCEDURE — 36415 COLL VENOUS BLD VENIPUNCTURE: CPT

## 2018-08-01 PROCEDURE — 85025 COMPLETE CBC W/AUTO DIFF WBC: CPT

## 2018-08-01 PROCEDURE — 80061 LIPID PANEL: CPT

## 2018-08-01 PROCEDURE — 81001 URINALYSIS AUTO W/SCOPE: CPT

## 2018-08-02 NOTE — DISCHARGE SUMMARY
[facility]    PATIENT'S NAME: Padmaja Felton   ATTENDING PHYSICIAN: Juanita Gómez M.D.    PATIENT ACCOUNT#:   [de-identified]    LOCATION:  [address] [place] [address]  MEDICAL RECORD #:   [id number]       YOB: 1934  ADMISSION DATE:       0 budesonide for possible inflammatory bowel disease. Patient eventually improved. The patient was eventually discharged and will follow up with GI and Dr. Joan Stack.     Dictated By Chaitanya Valderrama M.D.  d: 08/02/2018 09:09:48  t: 08/02/2018 10:44:15  [facility] [id number]

## 2018-08-07 ENCOUNTER — ANESTHESIA EVENT (OUTPATIENT)
Dept: ENDOSCOPY | Facility: HOSPITAL | Age: 83
End: 2018-08-07
Payer: MEDICARE

## 2018-08-08 ENCOUNTER — ANESTHESIA (OUTPATIENT)
Dept: ENDOSCOPY | Facility: HOSPITAL | Age: 83
End: 2018-08-08
Payer: MEDICARE

## 2018-08-08 ENCOUNTER — HOSPITAL ENCOUNTER (OUTPATIENT)
Facility: HOSPITAL | Age: 83
Setting detail: HOSPITAL OUTPATIENT SURGERY
Discharge: HOME | End: 2018-08-08
Attending: INTERNAL MEDICINE | Admitting: INTERNAL MEDICINE
Payer: MEDICARE

## 2018-08-08 VITALS
BODY MASS INDEX: 28.86 KG/M2 | RESPIRATION RATE: 16 BRPM | HEART RATE: 64 BPM | DIASTOLIC BLOOD PRESSURE: 68 MMHG | SYSTOLIC BLOOD PRESSURE: 166 MMHG | WEIGHT: 147 LBS | OXYGEN SATURATION: 100 % | HEIGHT: 60 IN | TEMPERATURE: 98 F

## 2018-08-08 DIAGNOSIS — K50.10 CROHN'S DISEASE OF LARGE INTESTINE WITHOUT COMPLICATION (HCC): ICD-10-CM

## 2018-08-08 DIAGNOSIS — R19.7 ACUTE DIARRHEA: ICD-10-CM

## 2018-08-08 PROCEDURE — 0DBN8ZX EXCISION OF SIGMOID COLON, VIA NATURAL OR ARTIFICIAL OPENING ENDOSCOPIC, DIAGNOSTIC: ICD-10-PCS | Performed by: INTERNAL MEDICINE

## 2018-08-08 PROCEDURE — 88305 TISSUE EXAM BY PATHOLOGIST: CPT | Performed by: INTERNAL MEDICINE

## 2018-08-08 RX ORDER — HYDROCODONE BITARTRATE AND ACETAMINOPHEN 5; 325 MG/1; MG/1
2 TABLET ORAL AS NEEDED
Status: DISCONTINUED | OUTPATIENT
Start: 2018-08-08 | End: 2018-08-08

## 2018-08-08 RX ORDER — MIDAZOLAM HYDROCHLORIDE 1 MG/ML
1 INJECTION INTRAMUSCULAR; INTRAVENOUS EVERY 5 MIN PRN
Status: DISCONTINUED | OUTPATIENT
Start: 2018-08-08 | End: 2018-08-08

## 2018-08-08 RX ORDER — MEPERIDINE HYDROCHLORIDE 25 MG/ML
12.5 INJECTION INTRAMUSCULAR; INTRAVENOUS; SUBCUTANEOUS AS NEEDED
Status: DISCONTINUED | OUTPATIENT
Start: 2018-08-08 | End: 2018-08-08

## 2018-08-08 RX ORDER — SODIUM PHOSPHATE, DIBASIC AND SODIUM PHOSPHATE, MONOBASIC 7; 19 G/133ML; G/133ML
1 ENEMA RECTAL ONCE AS NEEDED
Status: COMPLETED | OUTPATIENT
Start: 2018-08-08 | End: 2018-08-08

## 2018-08-08 RX ORDER — SODIUM CHLORIDE, SODIUM LACTATE, POTASSIUM CHLORIDE, CALCIUM CHLORIDE 600; 310; 30; 20 MG/100ML; MG/100ML; MG/100ML; MG/100ML
INJECTION, SOLUTION INTRAVENOUS CONTINUOUS
Status: DISCONTINUED | OUTPATIENT
Start: 2018-08-08 | End: 2018-08-08

## 2018-08-08 RX ORDER — NALOXONE HYDROCHLORIDE 0.4 MG/ML
80 INJECTION, SOLUTION INTRAMUSCULAR; INTRAVENOUS; SUBCUTANEOUS AS NEEDED
Status: DISCONTINUED | OUTPATIENT
Start: 2018-08-08 | End: 2018-08-08

## 2018-08-08 RX ORDER — HYDROCODONE BITARTRATE AND ACETAMINOPHEN 5; 325 MG/1; MG/1
1 TABLET ORAL AS NEEDED
Status: DISCONTINUED | OUTPATIENT
Start: 2018-08-08 | End: 2018-08-08

## 2018-08-08 RX ORDER — MORPHINE SULFATE 4 MG/ML
2 INJECTION, SOLUTION INTRAMUSCULAR; INTRAVENOUS EVERY 5 MIN PRN
Status: DISCONTINUED | OUTPATIENT
Start: 2018-08-08 | End: 2018-08-08

## 2018-08-08 RX ORDER — ONDANSETRON 2 MG/ML
4 INJECTION INTRAMUSCULAR; INTRAVENOUS AS NEEDED
Status: DISCONTINUED | OUTPATIENT
Start: 2018-08-08 | End: 2018-08-08

## 2018-08-08 NOTE — ANESTHESIA POSTPROCEDURE EVALUATION
[address] [place] [address] Patient Status:  Hospital Outpatient Surgery   Age/Gender 80year old male MRN [id number]   Location [facility]. Attending Andrews Etienne DO   Hosp Day # 0 PCP Chelsi Hudson MD       Anesthesia P

## 2018-08-08 NOTE — ANESTHESIA PREPROCEDURE EVALUATION
PRE-OP EVALUATION    Patient Name: Briana Lyman    Pre-op Diagnosis: Acute diarrhea [R19.7]  Crohn's disease of large intestine without complication ([address] [I43.40]    Procedure(s):   FLEXIBLE SIGMOIDOSCOPY    Surgeon(s) and Role:     * Sandor mg by mouth daily. Disp:  Rfl:    Albuterol Sulfate HFA (PROAIR HFA) 108 (90 BASE) MCG/ACT Inhalation Aero Soln Inhale 2 puffs into the lungs every 6 (six) hours as needed for Wheezing. Disp:  Rfl:        Allergies: Patient has no known allergies.       Ane 08/01/2018       Lab Results  Component Value Date    (H) 08/01/2018   K 3.7 08/01/2018    (H) 08/01/2018   CO2 26.0 08/01/2018   BUN 19 08/01/2018   CREATSERUM 1.19 08/01/2018   GLU 83 08/01/2018   CA 8.3 08/01/2018            Airway      Mall

## 2018-08-08 NOTE — OPERATIVE REPORT
Luz Marina Braun Patient Status:  Hospital Outpatient Surgery    1934 MRN [id number]   [facility] ENDOSCOPY Attending Connie Cho,    Hosp Day # 0 PCP Daphne Fitzgerald MD       PREOPERATIVE DIAGNOSIS/INDICATION: external hemorrhoid was present. IMPRESSION:   1. Diverticulosis. 2. Internal hemorrhoids. 3. External hemorrhoid. 4. Previous sigmoid narrowing/hyperemia resolved. Sigmoid colon biopsies were taken.      RECOMMENDATIONS:   1. Start to wean bud

## 2018-08-08 NOTE — H&P
History & Physical Examination    Patient Name: Santos Ly  MRN: [id number]  CSN: [id number]  YOB: 1934    Diagnosis: History of Crohn's colitis    Present Illness: 81 y/o M history as above presents for flexible sigmoidoscopy. before lunch. Disp:  Rfl:  8/7/2018 at Unknown time   cyanocobalamin 1000 MCG/ML Injection Solution Inject 1,000 mcg into the muscle every 30 (thirty) days.    Disp:  Rfl:  Past Month at Unknown time   Levothyroxine Sodium 50 MCG Oral Tab Take 50 mcg by m ESOPHAGOGASTRODUODENOSCOPY (EGD); Surgeon: Say Riddle MD;  Location: [facility]                MAIN OR  1/18/2017: EGD N/A      Comment: Procedure: ESOPHAGOGASTRODUODENOSCOPY (EGD);                  Surgeon: Olimpia Angel DO;  Location:

## 2018-10-22 NOTE — PROGRESS NOTES
[facility]    Progress Note    Stephanie Rubio Patient Status:  Inpatient    1934 MRN [id number]   [facility] 3SW-A Attending Wing Madison MD   Hosp Day # 3 PCP Frances Watts MD       SUBJECTIVE:  No CP, SOB, or N/V, di H&P reviewed. The patient was examined and there are no changes to the H&P.  Patient with a personal history of colon polyps, last 1/20/13 Risk and benefits were reviewed with the patient.  The patient wishes to proceed.   (UROXATRAL) 24 hr tab 10 mg 10 mg Oral Daily   Prochlorperazine Maleate (COMPAZINE) tab 10 mg 10 mg Oral Q8H PRN   Pravastatin Sodium (PRAVACHOL) tab 40 mg 40 mg Oral Nightly   nystatin (MYCOSTATIN) suspension 500,000 Units 5 mL Oral QID   Losartan Potassi Respiratory alkalosis     Gastrointestinal hemorrhage     Gastrointestinal hemorrhage, unspecified gastrointestinal hemorrhage type     Anemia, unspecified type     Dyspnea, unspecified type     Upper GI bleeding     Asthma-COPD overlap syndrome (HCC)

## 2019-02-27 ENCOUNTER — LAB ENCOUNTER (OUTPATIENT)
Dept: LAB | Facility: HOSPITAL | Age: 84
End: 2019-02-27
Attending: INTERNAL MEDICINE
Payer: MEDICARE

## 2019-02-27 DIAGNOSIS — K50.10 CROHN'S DISEASE OF COLON WITHOUT COMPLICATION (HCC): ICD-10-CM

## 2019-02-27 LAB
ALBUMIN SERPL-MCNC: 2.9 G/DL (ref 3.4–5)
ALBUMIN/GLOB SERPL: 1 {RATIO} (ref 1–2)
ALP LIVER SERPL-CCNC: 222 U/L (ref 45–117)
ALT SERPL-CCNC: 23 U/L (ref 16–61)
ANION GAP SERPL CALC-SCNC: 5 MMOL/L (ref 0–18)
AST SERPL-CCNC: 32 U/L (ref 15–37)
BASOPHILS # BLD AUTO: 0.02 X10(3) UL (ref 0–0.2)
BASOPHILS NFR BLD AUTO: 0.2 %
BILIRUB SERPL-MCNC: 0.4 MG/DL (ref 0.1–2)
BUN BLD-MCNC: 22 MG/DL (ref 7–18)
BUN/CREAT SERPL: 16.7 (ref 10–20)
CALCIUM BLD-MCNC: 8.3 MG/DL (ref 8.5–10.1)
CHLORIDE SERPL-SCNC: 113 MMOL/L (ref 98–107)
CO2 SERPL-SCNC: 26 MMOL/L (ref 21–32)
CREAT BLD-MCNC: 1.32 MG/DL (ref 0.7–1.3)
CRP SERPL-MCNC: <0.29 MG/DL (ref ?–0.3)
DEPRECATED RDW RBC AUTO: 46.4 FL (ref 35.1–46.3)
EOSINOPHIL # BLD AUTO: 0.28 X10(3) UL (ref 0–0.7)
EOSINOPHIL NFR BLD AUTO: 3.1 %
ERYTHROCYTE [DISTWIDTH] IN BLOOD BY AUTOMATED COUNT: 12.9 % (ref 11–15)
GLOBULIN PLAS-MCNC: 2.9 G/DL (ref 2.8–4.4)
GLUCOSE BLD-MCNC: 93 MG/DL (ref 70–99)
HCT VFR BLD AUTO: 34.7 % (ref 39–53)
HGB BLD-MCNC: 11.7 G/DL (ref 13–17.5)
IMM GRANULOCYTES # BLD AUTO: 0.05 X10(3) UL (ref 0–1)
IMM GRANULOCYTES NFR BLD: 0.6 %
LYMPHOCYTES # BLD AUTO: 1.96 X10(3) UL (ref 1–4)
LYMPHOCYTES NFR BLD AUTO: 21.9 %
M PROTEIN MFR SERPL ELPH: 5.8 G/DL (ref 6.4–8.2)
MCH RBC QN AUTO: 32.7 PG (ref 26–34)
MCHC RBC AUTO-ENTMCNC: 33.7 G/DL (ref 31–37)
MCV RBC AUTO: 96.9 FL (ref 80–100)
MONOCYTES # BLD AUTO: 0.8 X10(3) UL (ref 0.1–1)
MONOCYTES NFR BLD AUTO: 9 %
NEUTROPHILS # BLD AUTO: 5.82 X10 (3) UL (ref 1.5–7.7)
NEUTROPHILS # BLD AUTO: 5.82 X10(3) UL (ref 1.5–7.7)
NEUTROPHILS NFR BLD AUTO: 65.2 %
OSMOLALITY SERPL CALC.SUM OF ELEC: 301 MOSM/KG (ref 275–295)
PLATELET # BLD AUTO: 168 10(3)UL (ref 150–450)
POTASSIUM SERPL-SCNC: 4.4 MMOL/L (ref 3.5–5.1)
RBC # BLD AUTO: 3.58 X10(6)UL (ref 3.8–5.8)
SODIUM SERPL-SCNC: 144 MMOL/L (ref 136–145)
WBC # BLD AUTO: 8.9 X10(3) UL (ref 4–11)

## 2019-02-27 PROCEDURE — 80053 COMPREHEN METABOLIC PANEL: CPT

## 2019-02-27 PROCEDURE — 36415 COLL VENOUS BLD VENIPUNCTURE: CPT

## 2019-02-27 PROCEDURE — 85025 COMPLETE CBC W/AUTO DIFF WBC: CPT

## 2019-02-27 PROCEDURE — 86140 C-REACTIVE PROTEIN: CPT

## 2019-06-06 ENCOUNTER — APPOINTMENT (OUTPATIENT)
Dept: GENERAL RADIOLOGY | Facility: HOSPITAL | Age: 84
End: 2019-06-06
Attending: EMERGENCY MEDICINE
Payer: MEDICARE

## 2019-06-06 ENCOUNTER — HOSPITAL ENCOUNTER (EMERGENCY)
Facility: HOSPITAL | Age: 84
Discharge: HOME | End: 2019-06-06
Attending: EMERGENCY MEDICINE
Payer: MEDICARE

## 2019-06-06 VITALS
HEART RATE: 73 BPM | SYSTOLIC BLOOD PRESSURE: 146 MMHG | RESPIRATION RATE: 20 BRPM | HEIGHT: 60 IN | BODY MASS INDEX: 28.66 KG/M2 | WEIGHT: 146 LBS | TEMPERATURE: 99 F | OXYGEN SATURATION: 94 % | DIASTOLIC BLOOD PRESSURE: 65 MMHG

## 2019-06-06 DIAGNOSIS — J44.1 COPD EXACERBATION (HCC): Primary | ICD-10-CM

## 2019-06-06 PROCEDURE — 71046 X-RAY EXAM CHEST 2 VIEWS: CPT | Performed by: EMERGENCY MEDICINE

## 2019-06-06 PROCEDURE — 94644 CONT INHLJ TX 1ST HOUR: CPT

## 2019-06-06 PROCEDURE — 99285 EMERGENCY DEPT VISIT HI MDM: CPT

## 2019-06-06 PROCEDURE — 85025 COMPLETE CBC W/AUTO DIFF WBC: CPT | Performed by: EMERGENCY MEDICINE

## 2019-06-06 PROCEDURE — 93010 ELECTROCARDIOGRAM REPORT: CPT

## 2019-06-06 PROCEDURE — 93005 ELECTROCARDIOGRAM TRACING: CPT

## 2019-06-06 PROCEDURE — 80053 COMPREHEN METABOLIC PANEL: CPT | Performed by: EMERGENCY MEDICINE

## 2019-06-06 PROCEDURE — 81001 URINALYSIS AUTO W/SCOPE: CPT | Performed by: EMERGENCY MEDICINE

## 2019-06-06 PROCEDURE — 96374 THER/PROPH/DIAG INJ IV PUSH: CPT

## 2019-06-06 PROCEDURE — 83880 ASSAY OF NATRIURETIC PEPTIDE: CPT | Performed by: EMERGENCY MEDICINE

## 2019-06-06 PROCEDURE — 84484 ASSAY OF TROPONIN QUANT: CPT | Performed by: EMERGENCY MEDICINE

## 2019-06-06 RX ORDER — PREDNISONE 20 MG/1
40 TABLET ORAL DAILY
Qty: 10 TABLET | Refills: 0 | Status: SHIPPED | OUTPATIENT
Start: 2019-06-06 | End: 2019-06-11

## 2019-06-06 RX ORDER — METHYLPREDNISOLONE SODIUM SUCCINATE 125 MG/2ML
125 INJECTION, POWDER, LYOPHILIZED, FOR SOLUTION INTRAMUSCULAR; INTRAVENOUS ONCE
Status: COMPLETED | OUTPATIENT
Start: 2019-06-06 | End: 2019-06-06

## 2019-06-06 NOTE — ED PROVIDER NOTES
Patient Seen in: [facility] Emergency Department    History   Patient presents with:  Dyspnea CHRIS SOB (respiratory)    Stated Complaint: sob    HPI    66-year-old male with history of COPD presents for evaluation of persistent wheezing over the past HISTORY      6 yeara ago colonscopy with polpys   • SIGMOIDOSCOPY,DIAGNOSTIC     • TOTAL KNEE REPLACEMENT             Social History    Tobacco Use      Smoking status: Former Smoker        Years: 30.00      Smokeless tobacco: Never Used    Alcohol use: Marcy Kruger NATRIURETIC PEPTIDE - Abnormal; Notable for the following components:    Pro-Beta Natriuretic Peptide 864 (*)     All other components within normal limits   CBC W/ DIFFERENTIAL - Abnormal; Notable for the following components:    RBC 3.47 (*)     HGB 11.2 overload. If clinical symptoms persist then consider CT. Dictated by: Prabhjot Agarwal MD on 6/06/2019 at 18:34     Approved by: Prabhjot Agarwal MD              Labs are unremarkable other than a slightly elevated BNP, unclear significance.     Patient felt much b

## 2019-06-06 NOTE — ED INITIAL ASSESSMENT (HPI)
Patient with SOB and wheezing since this morning. Patient states \"my chest feels like I am drowning. \" Patient states he's used both his inhalers, and nebulizer without relief.

## 2019-07-16 ENCOUNTER — APPOINTMENT (OUTPATIENT)
Dept: GENERAL RADIOLOGY | Facility: HOSPITAL | Age: 84
DRG: 313 | End: 2019-07-16
Attending: EMERGENCY MEDICINE
Payer: MEDICARE

## 2019-07-16 ENCOUNTER — APPOINTMENT (OUTPATIENT)
Dept: CV DIAGNOSTICS | Facility: HOSPITAL | Age: 84
DRG: 313 | End: 2019-07-16
Attending: SPECIALIST
Payer: MEDICARE

## 2019-07-16 ENCOUNTER — APPOINTMENT (OUTPATIENT)
Dept: CT IMAGING | Facility: HOSPITAL | Age: 84
DRG: 313 | End: 2019-07-16
Attending: EMERGENCY MEDICINE
Payer: MEDICARE

## 2019-07-16 ENCOUNTER — HOSPITAL ENCOUNTER (INPATIENT)
Facility: HOSPITAL | Age: 84
LOS: 3 days | Discharge: NURSING, REHAB OR LONG-TERM CARE | DRG: 313 | End: 2019-07-22
Attending: EMERGENCY MEDICINE | Admitting: INTERNAL MEDICINE
Payer: MEDICARE

## 2019-07-16 DIAGNOSIS — R07.89 CHEST PAIN, ATYPICAL: Primary | ICD-10-CM

## 2019-07-16 LAB
ALBUMIN SERPL-MCNC: 2.7 G/DL (ref 3.4–5)
ALBUMIN/GLOB SERPL: 0.9 {RATIO} (ref 1–2)
ALP LIVER SERPL-CCNC: 173 U/L (ref 45–117)
ALT SERPL-CCNC: 21 U/L (ref 16–61)
ANION GAP SERPL CALC-SCNC: 6 MMOL/L (ref 0–18)
APTT PPP: 30.6 SECONDS (ref 25.4–36.1)
AST SERPL-CCNC: 21 U/L (ref 15–37)
ATRIAL RATE: 56 BPM
BASOPHILS # BLD AUTO: 0.03 X10(3) UL (ref 0–0.2)
BASOPHILS NFR BLD AUTO: 0.4 %
BILIRUB SERPL-MCNC: 0.4 MG/DL (ref 0.1–2)
BUN BLD-MCNC: 22 MG/DL (ref 7–18)
BUN/CREAT SERPL: 16.2 (ref 10–20)
CALCIUM BLD-MCNC: 8.2 MG/DL (ref 8.5–10.1)
CHLORIDE SERPL-SCNC: 112 MMOL/L (ref 98–112)
CO2 SERPL-SCNC: 25 MMOL/L (ref 21–32)
CREAT BLD-MCNC: 1.36 MG/DL (ref 0.7–1.3)
DEPRECATED RDW RBC AUTO: 42.8 FL (ref 35.1–46.3)
EOSINOPHIL # BLD AUTO: 0.25 X10(3) UL (ref 0–0.7)
EOSINOPHIL NFR BLD AUTO: 3 %
ERYTHROCYTE [DISTWIDTH] IN BLOOD BY AUTOMATED COUNT: 12.4 % (ref 11–15)
GLOBULIN PLAS-MCNC: 2.9 G/DL (ref 2.8–4.4)
GLUCOSE BLD-MCNC: 107 MG/DL (ref 70–99)
HCT VFR BLD AUTO: 33.6 % (ref 39–53)
HGB BLD-MCNC: 11.3 G/DL (ref 13–17.5)
IMM GRANULOCYTES # BLD AUTO: 0.06 X10(3) UL (ref 0–1)
IMM GRANULOCYTES NFR BLD: 0.7 %
INR BLD: 1.1 (ref 0.9–1.1)
LIPASE SERPL-CCNC: 294 U/L (ref 73–393)
LYMPHOCYTES # BLD AUTO: 2.15 X10(3) UL (ref 1–4)
LYMPHOCYTES NFR BLD AUTO: 25.6 %
M PROTEIN MFR SERPL ELPH: 5.6 G/DL (ref 6.4–8.2)
MCH RBC QN AUTO: 31.5 PG (ref 26–34)
MCHC RBC AUTO-ENTMCNC: 33.6 G/DL (ref 31–37)
MCV RBC AUTO: 93.6 FL (ref 80–100)
MONOCYTES # BLD AUTO: 0.72 X10(3) UL (ref 0.1–1)
MONOCYTES NFR BLD AUTO: 8.6 %
NEUTROPHILS # BLD AUTO: 5.18 X10 (3) UL (ref 1.5–7.7)
NEUTROPHILS # BLD AUTO: 5.18 X10(3) UL (ref 1.5–7.7)
NEUTROPHILS NFR BLD AUTO: 61.7 %
NT-PROBNP SERPL-MCNC: 1481 PG/ML (ref ?–450)
OSMOLALITY SERPL CALC.SUM OF ELEC: 300 MOSM/KG (ref 275–295)
P AXIS: 56 DEGREES
P-R INTERVAL: 148 MS
PLATELET # BLD AUTO: 190 10(3)UL (ref 150–450)
POTASSIUM SERPL-SCNC: 4.1 MMOL/L (ref 3.5–5.1)
PSA SERPL DL<=0.01 NG/ML-MCNC: 14.7 SECONDS (ref 12.5–14.7)
Q-T INTERVAL: 424 MS
QRS DURATION: 82 MS
QTC CALCULATION (BEZET): 409 MS
R AXIS: 18 DEGREES
RBC # BLD AUTO: 3.59 X10(6)UL (ref 3.8–5.8)
SODIUM SERPL-SCNC: 143 MMOL/L (ref 136–145)
T AXIS: 46 DEGREES
TROPONIN I SERPL-MCNC: <0.045 NG/ML (ref ?–0.04)
VENTRICULAR RATE: 56 BPM
WBC # BLD AUTO: 8.4 X10(3) UL (ref 4–11)

## 2019-07-16 PROCEDURE — 94640 AIRWAY INHALATION TREATMENT: CPT

## 2019-07-16 PROCEDURE — 93005 ELECTROCARDIOGRAM TRACING: CPT

## 2019-07-16 PROCEDURE — 85730 THROMBOPLASTIN TIME PARTIAL: CPT | Performed by: EMERGENCY MEDICINE

## 2019-07-16 PROCEDURE — 84484 ASSAY OF TROPONIN QUANT: CPT | Performed by: SPECIALIST

## 2019-07-16 PROCEDURE — 85610 PROTHROMBIN TIME: CPT | Performed by: EMERGENCY MEDICINE

## 2019-07-16 PROCEDURE — 99285 EMERGENCY DEPT VISIT HI MDM: CPT | Performed by: EMERGENCY MEDICINE

## 2019-07-16 PROCEDURE — 36415 COLL VENOUS BLD VENIPUNCTURE: CPT | Performed by: EMERGENCY MEDICINE

## 2019-07-16 PROCEDURE — 83690 ASSAY OF LIPASE: CPT | Performed by: EMERGENCY MEDICINE

## 2019-07-16 PROCEDURE — 93010 ELECTROCARDIOGRAM REPORT: CPT | Performed by: INTERNAL MEDICINE

## 2019-07-16 PROCEDURE — 93010 ELECTROCARDIOGRAM REPORT: CPT | Performed by: EMERGENCY MEDICINE

## 2019-07-16 PROCEDURE — 80053 COMPREHEN METABOLIC PANEL: CPT | Performed by: EMERGENCY MEDICINE

## 2019-07-16 PROCEDURE — 93306 TTE W/DOPPLER COMPLETE: CPT | Performed by: SPECIALIST

## 2019-07-16 PROCEDURE — 84484 ASSAY OF TROPONIN QUANT: CPT | Performed by: EMERGENCY MEDICINE

## 2019-07-16 PROCEDURE — 71045 X-RAY EXAM CHEST 1 VIEW: CPT | Performed by: EMERGENCY MEDICINE

## 2019-07-16 PROCEDURE — 83880 ASSAY OF NATRIURETIC PEPTIDE: CPT | Performed by: SPECIALIST

## 2019-07-16 PROCEDURE — 71275 CT ANGIOGRAPHY CHEST: CPT | Performed by: EMERGENCY MEDICINE

## 2019-07-16 PROCEDURE — 85025 COMPLETE CBC W/AUTO DIFF WBC: CPT | Performed by: EMERGENCY MEDICINE

## 2019-07-16 RX ORDER — ALBUTEROL SULFATE 90 UG/1
2 AEROSOL, METERED RESPIRATORY (INHALATION) EVERY 6 HOURS PRN
Status: DISCONTINUED | OUTPATIENT
Start: 2019-07-16 | End: 2019-07-22

## 2019-07-16 RX ORDER — LOSARTAN POTASSIUM 25 MG/1
25 TABLET ORAL DAILY
Status: DISCONTINUED | OUTPATIENT
Start: 2019-07-16 | End: 2019-07-18

## 2019-07-16 RX ORDER — PANTOPRAZOLE SODIUM 40 MG/1
40 TABLET, DELAYED RELEASE ORAL
Status: DISCONTINUED | OUTPATIENT
Start: 2019-07-17 | End: 2019-07-22

## 2019-07-16 RX ORDER — AMLODIPINE BESYLATE 5 MG/1
5 TABLET ORAL DAILY
Status: DISCONTINUED | OUTPATIENT
Start: 2019-07-16 | End: 2019-07-18

## 2019-07-16 RX ORDER — IPRATROPIUM BROMIDE AND ALBUTEROL SULFATE 2.5; .5 MG/3ML; MG/3ML
3 SOLUTION RESPIRATORY (INHALATION) ONCE
Status: COMPLETED | OUTPATIENT
Start: 2019-07-16 | End: 2019-07-16

## 2019-07-16 RX ORDER — MELATONIN
325 2 TIMES DAILY WITH MEALS
Status: DISCONTINUED | OUTPATIENT
Start: 2019-07-16 | End: 2019-07-22

## 2019-07-16 RX ORDER — MESALAMINE 400 MG/1
800 CAPSULE, DELAYED RELEASE ORAL
Status: DISCONTINUED | OUTPATIENT
Start: 2019-07-16 | End: 2019-07-22

## 2019-07-16 RX ORDER — CARVEDILOL 6.25 MG/1
6.25 TABLET ORAL 2 TIMES DAILY WITH MEALS
Status: DISCONTINUED | OUTPATIENT
Start: 2019-07-16 | End: 2019-07-16

## 2019-07-16 RX ORDER — ALBUTEROL SULFATE 2.5 MG/3ML
2.5 SOLUTION RESPIRATORY (INHALATION) EVERY 4 HOURS PRN
Status: DISCONTINUED | OUTPATIENT
Start: 2019-07-16 | End: 2019-07-22

## 2019-07-16 RX ORDER — ACETAMINOPHEN 160 MG/5ML
325 SOLUTION ORAL EVERY 6 HOURS PRN
Status: DISCONTINUED | OUTPATIENT
Start: 2019-07-16 | End: 2019-07-16

## 2019-07-16 RX ORDER — ONDANSETRON 2 MG/ML
4 INJECTION INTRAMUSCULAR; INTRAVENOUS EVERY 4 HOURS PRN
Status: DISCONTINUED | OUTPATIENT
Start: 2019-07-16 | End: 2019-07-16 | Stop reason: ALTCHOICE

## 2019-07-16 RX ORDER — PRAVASTATIN SODIUM 20 MG
20 TABLET ORAL NIGHTLY
Status: DISCONTINUED | OUTPATIENT
Start: 2019-07-16 | End: 2019-07-22

## 2019-07-16 RX ORDER — ACETAMINOPHEN 160 MG
2000 TABLET,DISINTEGRATING ORAL DAILY
Status: DISCONTINUED | OUTPATIENT
Start: 2019-07-16 | End: 2019-07-22

## 2019-07-16 RX ORDER — CARVEDILOL 3.12 MG/1
3.12 TABLET ORAL 2 TIMES DAILY WITH MEALS
Status: DISCONTINUED | OUTPATIENT
Start: 2019-07-17 | End: 2019-07-18

## 2019-07-16 RX ORDER — OMEPRAZOLE 40 MG/1
40 CAPSULE, DELAYED RELEASE ORAL DAILY
COMMUNITY

## 2019-07-16 RX ORDER — ALFUZOSIN HYDROCHLORIDE 10 MG/1
10 TABLET, EXTENDED RELEASE ORAL
Status: DISCONTINUED | OUTPATIENT
Start: 2019-07-17 | End: 2019-07-22

## 2019-07-16 RX ORDER — SODIUM CHLORIDE 9 MG/ML
INJECTION, SOLUTION INTRAVENOUS CONTINUOUS
Status: ACTIVE | OUTPATIENT
Start: 2019-07-16 | End: 2019-07-16

## 2019-07-16 RX ORDER — ACETAMINOPHEN 325 MG/1
650 TABLET ORAL EVERY 6 HOURS PRN
Status: DISCONTINUED | OUTPATIENT
Start: 2019-07-16 | End: 2019-07-22

## 2019-07-16 RX ORDER — LEVOTHYROXINE SODIUM 0.05 MG/1
50 TABLET ORAL
Status: DISCONTINUED | OUTPATIENT
Start: 2019-07-16 | End: 2019-07-22

## 2019-07-16 RX ORDER — ASPIRIN 81 MG/1
324 TABLET, CHEWABLE ORAL ONCE
Status: DISCONTINUED | OUTPATIENT
Start: 2019-07-16 | End: 2019-07-16

## 2019-07-16 NOTE — PROGRESS NOTES
NURSING ADMISSION NOTE      Patient admitted via Cart  Oriented to room. Safety precautions initiated. Bed in low position. Call light in reach. Patient able to demonstrate correct use of call light.   Admission questions and medication list complete

## 2019-07-16 NOTE — ED PROVIDER NOTES
Patient Seen in: [facility] Emergency Department    History   Patient presents with:  Chest Pain Angina (cardiovascular)    Stated Complaint: chest pain    HPI    Patient is an 60-year-old male who presents emergency room with a history of chest dis Past Surgical History:   Procedure Laterality Date   • ANGIOGRAM      2009   • COLONOSCOPY N/A 6/30/2014    Performed by Hugo Flores DO at [facility] ENDOSCOPY   • ESOPHAGOGASTRODUODENOSCOPY (EGD) N/A 1/18/2017    Performed by Hugo Flores DO at  sounds with expiratory wheeze in all lung fields. HEART: Regular rhythm with a somewhat bradycardic rate. Normal S1, S2 no S3, or S4. No murmur. ABDOMEN: There is no focal tenderness to palpation appreciated anywhere throughout the abdomen.  There is no for these tests on the individual orders. RAINBOW DRAW BLUE   RAINBOW DRAW LAVENDER   RAINBOW DRAW LIGHT GREEN   RAINBOW DRAW GOLD     EKG    Rate, intervals and axes as noted on EKG Report.   Rate: 56  Rhythm: Sinus Rhythm  Reading: Nonspecific ST change essentially unremarkable. Patient troponin and repeat troponin are both negative. Patient's coags are unremarkable. Patient was placed on a continuous pulse ox and cardiac monitor.   The patient received aspirin and nitro in route to the hospital.  In li

## 2019-07-16 NOTE — H&P
[facility]  History & Physical    [facility] Patient Status:  Observation    1934 MRN [id number]   [facility] 2NE-A Attending Orly Page MD   Hosp Day # 0 PCP Asif Menjivar MD     History of Present Illness:  P Relation Age of Onset   • Cancer Brother       reports that he has quit smoking. He quit after 30.00 years of use. He has never used smokeless tobacco. He reports that he drinks alcohol. He reports that he does not use drugs.     Allergies:  No Known Allerg Week at Unknown time       Review of Systems:  Pertinent items are noted in HPI. A comprehensive review of systems was negative.     Physical Exam:  BP (!) 161/71 (BP Location: Right arm)   Pulse (!) 49   Temp 97.2 °F (36.2 °C) (Oral)   Resp 18   Ht 5' 1\" 07/16/2019    CA 8.2 07/16/2019    ALB 2.7 07/16/2019    ALKPHO 173 07/16/2019    BILT 0.4 07/16/2019    TP 5.6 07/16/2019    AST 21 07/16/2019    ALT 21 07/16/2019    PTT 30.6 07/16/2019    INR 1.10 07/16/2019    PTP 14.7 07/16/2019     07/16/2019

## 2019-07-16 NOTE — ED INITIAL ASSESSMENT (HPI)
Pt presents to ed via ems with medial chest pain that started at 0555 this morning. Ems administered 324mg aspirin and one dose sublingual nitro enroute to ed. Pt states pain on arrival is a 5/10. Pt is a&ox4, moves all extremities well, resps easy.

## 2019-07-16 NOTE — PLAN OF CARE
Pt /59, HR mid 40's-50's, sinus [place]. Dr. Heath Rubio notified, amlodipine and losartan ordered, carvedilol held today and decreased to 3.125 mg BID starting tomorrow, hold for HR <60. 2D echo ordered and currently being done at bedside.  Amlodipine given

## 2019-07-16 NOTE — PROGRESS NOTES
Cardiology Consultation Note- ROXANN Estrada MD    The patient was interviewed, examined, the chart was reviewed and the consult was dictated. This is a 80year old male with a chief complaint of chest pain. Impression:  1.  Chest pain, possibly chest wall

## 2019-07-16 NOTE — CONSULTS
[facility]    PATIENT'S NAME: Gabrieltanvir Felton   ATTENDING PHYSICIAN: FAHEEM Scherer: Felicity Church M.D.    PATIENT ACCOUNT#:   [de-identified]    LOCATION:  [address] [place] [address] [facility]  MEDICAL RECORD #:   [id number]       DATE OF B addiction to drugs. GASTROINTESTINAL: History of occasional loose stools, both diverticulosis and Crohn's disease. ENDOCRINE: History of hypothyroidism. MUSCULOSKELETAL: Chest wall tenderness.     PHYSICAL EXAMINATION:    VITAL SIGNS:  Blood pressur

## 2019-07-17 ENCOUNTER — APPOINTMENT (OUTPATIENT)
Dept: CV DIAGNOSTICS | Facility: HOSPITAL | Age: 84
DRG: 313 | End: 2019-07-17
Attending: SPECIALIST
Payer: MEDICARE

## 2019-07-17 PROCEDURE — 97162 PT EVAL MOD COMPLEX 30 MIN: CPT

## 2019-07-17 PROCEDURE — 93017 CV STRESS TEST TRACING ONLY: CPT | Performed by: SPECIALIST

## 2019-07-17 PROCEDURE — 78452 HT MUSCLE IMAGE SPECT MULT: CPT | Performed by: SPECIALIST

## 2019-07-17 PROCEDURE — 93018 CV STRESS TEST I&R ONLY: CPT | Performed by: SPECIALIST

## 2019-07-17 PROCEDURE — 51701 INSERT BLADDER CATHETER: CPT

## 2019-07-17 NOTE — DISCHARGE SUMMARY
Nilay Montilla [address] Discharge Summary    [facility] Patient Status:  Observation    1934 MRN [id number]   [facility] 2NE-A Attending Latrice Beltre MD   Hosp Day # 0 PCP Darya Barillas MD     Date of Admiss septum midline, mucosa normal, no drainage    or sinus tenderness   Throat:   Lips, mucosa, and tongue normal; teeth and gums normal   Neck:   Supple, symmetrical, trachea midline, no adenopathy;        thyroid:  No enlargement/tenderness/nodules; no carot MCG/INH Inhalation Aerosol Powder, Breath Activated  Inhale 1 puff into the lungs daily. mesalamine 1.2 g Oral Tab EC  Take 2 tablets (2.4 g total) by mouth daily.   Qty: 180 tablet Refills: 3    carvedilol 6.25 MG Oral Tab  Take 6.25 mg by mouth 2 (two)

## 2019-07-17 NOTE — PLAN OF CARE
7/17/2019      Assumed care of pt at 2300. A&Ox4, RA, SB on tele. Continent of B and B. SBA. Denies any pain or sob.  Pt asleep at this time    POC: Stress test, treadmill diet

## 2019-07-17 NOTE — PROGRESS NOTES
Patient stated some discomfort and need to urinate. NO burning just sense of urgency and fullness. Patient belly firm. Post void residual obtained and 644 was resulted. Orders obtained for finch catheter to be placed by PCP. 16 FR finch catheter placed.  Pa

## 2019-07-17 NOTE — PHYSICAL THERAPY NOTE
PHYSICAL THERAPY EVALUATION - INPATIENT     Room Number: 2625/2625-A  Evaluation Date: 7/17/2019  Type of Evaluation: Initial  Physician Order: PT Eval and Treat    Presenting Problem: chest pain, weakness  Reason for Therapy: Mobility Dysfunction an SURGERY     • KNEE REPLACEMENT SURGERY     • OTHER SURGICAL HISTORY      6 yeara ago colonscopy with polpys   • SIGMOIDOSCOPY,DIAGNOSTIC     • TOTAL KNEE REPLACEMENT         HOME SITUATION  Type of Home: [facility]   Home Layout: Two level  Stairs to [place] NEUROLOGICAL FINDINGS  Neurological Findings: None                   ACTIVITY TOLERANCE                         O2 WALK                  AM-PAC '6-Clicks' INPATIENT SHORT FORM - BASIC MOBILITY  How much difficulty does the patient currently have. .. within reach;RN aware of session/findings; All patient questions and concerns addressed; Family present(son present)    ASSESSMENT   Patient is a 80year old male admitted on 7/16/2019 for chest pain, weakness, EKG, trop, lexiscan (-).   Pertinent comorbiditi independent     Goal #4 Stair ambulation with railing and cane with supervision   Goal #5 Ind HEP   Goal #6    Goal Comments: Goals established on 7/17/2019

## 2019-07-17 NOTE — PROGRESS NOTES
CARDIODIAGNOSTIC PRELIMINARY REPORT:      LEXISCAN injection completed with no new EKG changes noted; no arrhythmias      Denied cardiac symptoms     Base:   140/54, HR: 55;  Peak:  94/42, HR: 71    Second set of images pending

## 2019-07-17 NOTE — PROGRESS NOTES
Orthostatics done this afternoon with physical therapy. Lying 118/62  Sitting 108/68  Standing 105/46    Patient was symptomatic with standing. Coreg am and pm dose held d/t hr <60. PT did not ambulate him.  New meds for him include amlodipine and losarta

## 2019-07-18 LAB
ATRIAL RATE: 49 BPM
P AXIS: 59 DEGREES
P-R INTERVAL: 148 MS
Q-T INTERVAL: 450 MS
QRS DURATION: 82 MS
QTC CALCULATION (BEZET): 406 MS
R AXIS: 14 DEGREES
T AXIS: 52 DEGREES
VENTRICULAR RATE: 49 BPM

## 2019-07-18 PROCEDURE — 97110 THERAPEUTIC EXERCISES: CPT

## 2019-07-18 PROCEDURE — 97116 GAIT TRAINING THERAPY: CPT

## 2019-07-18 NOTE — PLAN OF CARE
Patient laying in bed, denies SOB. Patient c/o CP left and right side of chest, CP is reproducible, PRN medication given, will continue to monitor.  Orthostatics this AM are positive, see note, pt is symptomatic, patient states \"I feel lightheaded and dizz

## 2019-07-18 NOTE — PROGRESS NOTES
[facility]    Progress Note    Nilesh Shallow Patient Status:  Observation    1934 MRN [id number]   [facility] 2NE-A Attending Phyllis Curiel MD   Hosp Day # 0 PCP Terri Murphy MD     Subjective:  Still has spotty myriam dilated. Impressions:  This study is compared with previous dated 5/12/17: The  elevate LVEDP is new        Stress Test: Reportedly no perfusion defect    Labs:     Results for Sanjay Napier (MRN [id number]) as of 7/17/2019 21:07   Ref.  Range hemorrhage     Gastrointestinal hemorrhage, unspecified gastrointestinal hemorrhage type     Anemia, unspecified type     Dyspnea, unspecified type     Upper GI bleeding     Asthma-COPD overlap syndrome (HCC)     Essential hypertension, benign     Acute ex

## 2019-07-18 NOTE — PROGRESS NOTES
07/18/19 0815 07/18/19 0821 07/18/19 0822   Vital Signs   Pulse 57 71 68   Heart Rate Source Monitor Monitor Monitor   Resp 16 16 16   Respiratory Quality Normal Normal Normal   /60 138/57 106/59   BP Location Right arm Right arm Right arm   BP Me

## 2019-07-18 NOTE — PROGRESS NOTES
[facility]    Progress Note    Radha Bustillos Patient Status:  Observation    1934 MRN [id number]   [facility] 2NE-A Attending Sadaf Velarde MD   Hosp Day # 0 PCP Francisco Marinelli MD       SUBJECTIVE:  Continues to hav (ANORO ELLIPTA) 62.5-25 MCG/INH inhaler 1 puff 1 puff Inhalation Daily   mesalamine (DELZICOL) delayed release capsule 800 mg Oral TID AC   Pantoprazole Sodium (PROTONIX) EC tab 40 mg 40 mg Oral QAM AC   Alfuzosin HCl ER (UROXATRAL) 24 hr tab 10 mg 10 mg O discharge, patient will require TBD.         Nazanin Allen  7/18/2019  11:53 AM

## 2019-07-18 NOTE — CONSULTS
[facility]      Report of Consultation    Nilesh Clark Patient Status:  Observation    1934 MRN [id number]   [facility] 2NE-A Attending Phyllis Curiel MD   Hosp Day # 0 PCP Terri Murphy MD     Reason for [facility] disease    • Unspecified essential hypertension    • Wheezing      Past Surgical History:   Procedure Laterality Date   • ANGIOGRAM      2009   • COLONOSCOPY N/A 6/30/2014    Performed by Azam Jauregui DO at [facility] ENDOSCOPY   • ESOPHAGOGASTRODUODENOSCOPY HCl ER (UROXATRAL) 24 hr tab 10 mg, 10 mg, Oral, Daily with breakfast  •  acetaminophen (TYLENOL) tab 650 mg, 650 mg, Oral, Q6H PRN    General ROS: negative for - chills or fever  Respiratory ROS: no cough, shortness of breath, or wheezing  Cardiovascular hypertension, benign     Acute exacerbation of chronic obstructive pulmonary disease (COPD) (HCC)     Renal insufficiency     Diarrhea, unspecified type     Hypernatremia     Crohn's disease of large intestine without complication (HCC)     Chest pain, aty

## 2019-07-18 NOTE — PHYSICAL THERAPY NOTE
PHYSICAL THERAPY TREATMENT NOTE - INPATIENT    Room Number: 2625/2625-A     Session:2  Number of Visits to Meet Established Goals: 5    Presenting Problem: chest pain, weakness  History related to current admission: Pt admitted from home due to chest pain • SIGMOIDOSCOPY,DIAGNOSTIC     • TOTAL KNEE REPLACEMENT         SUBJECTIVE  \" My legs are weak.'    Patient’s self-stated goal is to get well.     OBJECTIVE  Precautions: Cardiac    WEIGHT BEARING RESTRICTION  Weight Bearing Restriction: None positions, denied dizziness. Pt ambulated with RW total of 125' with sup.    THERAPEUTIC EXERCISES  Lower Extremity Ankle pumps  Hip AB/AD  Heel slides  LAQ  SAQ     Upper Extremity Elbow flex/ext     Position Sitting and Supine     Repetitions   12   Sets

## 2019-07-18 NOTE — PROGRESS NOTES
07/18/19 1632 07/18/19 1635 07/18/19 1636   Vital Signs   Pulse 96 78 86   Heart Rate Source Monitor Monitor Monitor   Resp 18 18 18   Respiratory Quality Normal Normal Normal   /56 (!) 149/117 133/56   BP Location Left arm Left arm Left arm   BP

## 2019-07-18 NOTE — PLAN OF CARE
Pt is AOx4, still having some reproducible chest pain. Room air. SR/SB on tele. Up with SBA and a walker. Hasn't voided since finch was removed just before 1300. Bladder scan done this evening and only shows 100ml.   Pt encourage to drink more po fluid

## 2019-07-19 LAB
ALBUMIN SERPL-MCNC: 2.5 G/DL (ref 3.4–5)
ALBUMIN/GLOB SERPL: 0.9 {RATIO} (ref 1–2)
ALP LIVER SERPL-CCNC: 124 U/L (ref 45–117)
ALT SERPL-CCNC: 18 U/L (ref 16–61)
ANION GAP SERPL CALC-SCNC: 5 MMOL/L (ref 0–18)
AST SERPL-CCNC: 25 U/L (ref 15–37)
BASOPHILS # BLD AUTO: 0.01 X10(3) UL (ref 0–0.2)
BASOPHILS NFR BLD AUTO: 0.1 %
BILIRUB SERPL-MCNC: 0.4 MG/DL (ref 0.1–2)
BUN BLD-MCNC: 27 MG/DL (ref 7–18)
BUN/CREAT SERPL: 22.7 (ref 10–20)
CALCIUM BLD-MCNC: 8.7 MG/DL (ref 8.5–10.1)
CHLORIDE SERPL-SCNC: 115 MMOL/L (ref 98–112)
CHOLEST SMN-MCNC: 68 MG/DL (ref ?–200)
CO2 SERPL-SCNC: 24 MMOL/L (ref 21–32)
CREAT BLD-MCNC: 1.19 MG/DL (ref 0.7–1.3)
DEPRECATED RDW RBC AUTO: 41.3 FL (ref 35.1–46.3)
EOSINOPHIL # BLD AUTO: 0.21 X10(3) UL (ref 0–0.7)
EOSINOPHIL NFR BLD AUTO: 2.5 %
ERYTHROCYTE [DISTWIDTH] IN BLOOD BY AUTOMATED COUNT: 12.4 % (ref 11–15)
GLOBULIN PLAS-MCNC: 2.7 G/DL (ref 2.8–4.4)
GLUCOSE BLD-MCNC: 81 MG/DL (ref 70–99)
HCT VFR BLD AUTO: 32.8 % (ref 39–53)
HDLC SERPL-MCNC: 25 MG/DL (ref 40–59)
HGB BLD-MCNC: 11.1 G/DL (ref 13–17.5)
IMM GRANULOCYTES # BLD AUTO: 0.06 X10(3) UL (ref 0–1)
IMM GRANULOCYTES NFR BLD: 0.7 %
LDLC SERPL CALC-MCNC: 19 MG/DL (ref ?–100)
LYMPHOCYTES # BLD AUTO: 1.6 X10(3) UL (ref 1–4)
LYMPHOCYTES NFR BLD AUTO: 19.2 %
M PROTEIN MFR SERPL ELPH: 5.2 G/DL (ref 6.4–8.2)
MCH RBC QN AUTO: 30.9 PG (ref 26–34)
MCHC RBC AUTO-ENTMCNC: 33.8 G/DL (ref 31–37)
MCV RBC AUTO: 91.4 FL (ref 80–100)
MONOCYTES # BLD AUTO: 0.74 X10(3) UL (ref 0.1–1)
MONOCYTES NFR BLD AUTO: 8.9 %
NEUTROPHILS # BLD AUTO: 5.7 X10 (3) UL (ref 1.5–7.7)
NEUTROPHILS # BLD AUTO: 5.7 X10(3) UL (ref 1.5–7.7)
NEUTROPHILS NFR BLD AUTO: 68.6 %
NONHDLC SERPL-MCNC: 43 MG/DL (ref ?–130)
OSMOLALITY SERPL CALC.SUM OF ELEC: 302 MOSM/KG (ref 275–295)
PLATELET # BLD AUTO: 170 10(3)UL (ref 150–450)
POTASSIUM SERPL-SCNC: 4.1 MMOL/L (ref 3.5–5.1)
RBC # BLD AUTO: 3.59 X10(6)UL (ref 3.8–5.8)
SODIUM SERPL-SCNC: 144 MMOL/L (ref 136–145)
T4 FREE SERPL-MCNC: 1.1 NG/DL (ref 0.8–1.7)
TRIGL SERPL-MCNC: 119 MG/DL (ref 30–149)
TSI SER-ACNC: 5 MIU/ML (ref 0.36–3.74)
VLDLC SERPL CALC-MCNC: 24 MG/DL (ref 0–30)
WBC # BLD AUTO: 8.3 X10(3) UL (ref 4–11)

## 2019-07-19 PROCEDURE — 97110 THERAPEUTIC EXERCISES: CPT

## 2019-07-19 PROCEDURE — 80061 LIPID PANEL: CPT | Performed by: FAMILY MEDICINE

## 2019-07-19 PROCEDURE — 84439 ASSAY OF FREE THYROXINE: CPT | Performed by: FAMILY MEDICINE

## 2019-07-19 PROCEDURE — 80053 COMPREHEN METABOLIC PANEL: CPT | Performed by: INTERNAL MEDICINE

## 2019-07-19 PROCEDURE — 97530 THERAPEUTIC ACTIVITIES: CPT

## 2019-07-19 PROCEDURE — 85025 COMPLETE CBC W/AUTO DIFF WBC: CPT | Performed by: INTERNAL MEDICINE

## 2019-07-19 PROCEDURE — 97116 GAIT TRAINING THERAPY: CPT

## 2019-07-19 PROCEDURE — 84443 ASSAY THYROID STIM HORMONE: CPT | Performed by: FAMILY MEDICINE

## 2019-07-19 NOTE — CM/SW NOTE
Per unit RN, PT recommends SONIYA. SW met with pt and his son at bedside. They decline SONIYA and request [facility]. Family will provide support at home. [facility] has accepted.      [address] [place] [address]  P: [phone]  F: [phone]

## 2019-07-19 NOTE — PLAN OF CARE
ALERT AND ORIENTED X4 ON TELE MONITOR HR 80'S SINUS RHYTHM. CHAUDHARY CATH DRAINING YELLOW URINE. DENIES ANY PAIN. UPDATED W/ POC AND VERBALIZED UNDERSTANDING. ALL NEEDS ATTENDED AND WILL CONTINUE TO MONITOR. CALL LIGHT WITHIN REACH.   Problem: CARDIOVASCULAR -

## 2019-07-19 NOTE — PROGRESS NOTES
[facility] Patient Status:  Observation    1934 MRN [id number]   [facility] 2NE-A Attending Meryle Dubonnet, MD   Hosp Day # 0 PCP Hamilton Queen MD     Subjective:  Mercedes Hayes

## 2019-07-19 NOTE — PHYSICAL THERAPY NOTE
PHYSICAL THERAPY TREATMENT NOTE - INPATIENT    Room Number: 2625/2625-A     Session:3  Number of Visits to Meet Established Goals: 5    Presenting Problem: chest pain, weakness  History related to current admission: Pt admitted from home due to chest pain • SIGMOIDOSCOPY,DIAGNOSTIC     • TOTAL KNEE REPLACEMENT         SUBJECTIVE  \" My legs are weak.'    Patient’s self-stated goal is to get well.     OBJECTIVE  Precautions: Cardiac    WEIGHT BEARING RESTRICTION  Weight Bearing Restriction: None carry over. BP monitored in all three positions, denied dizziness. Pt ambulated with RW total of 125', first 79' with close sup, later cga for another 36' and constant cues for proper upright posture.  Suddenly pt began experiencing kenny knee buckling becaus restrictive at assistance level: modified independent      Goal #4 Stair ambulation with railing and cane with supervision   Goal #5 Ind HEP   Goal #6     Goal Comments: Goals established on 7/17/2019.  Ongoing

## 2019-07-19 NOTE — PHYSICAL THERAPY NOTE
PHYSICAL THERAPY TREATMENT NOTE - INPATIENT    Room Number: 2625/2625-A     Session:3  Number of Visits to Meet Established Goals: 5    Presenting Problem: chest pain, weakness  History related to current admission: Pt admitted from home due to chest pain • SIGMOIDOSCOPY,DIAGNOSTIC     • TOTAL KNEE REPLACEMENT         SUBJECTIVE  \" My legs are weak, I am going to fall. \"    Patient’s self-stated goal is to get well.     OBJECTIVE  Precautions: Cardiac    WEIGHT BEARING RESTRICTION  Weight Bearing Restrict in all three positions, denied dizziness. Pt ambulated with RW total of 125', first 79' with close sup, later min assist for another 36' and constant cues for proper upright posture. Pt progressively getting weaker.  Suddenly pt began experiencing kenny knee Endurance; Energy conservation;Patient education; Family education;Gait training;Strengthening;Stair training  Rehab Potential : Good  Frequency (Obs): 5x/week    CURRENT GOALS   Goal #1 Patient is able to demonstrate supine - sit EOB @ level: modified indep

## 2019-07-19 NOTE — PROGRESS NOTES
[facility]    Progress Note    Maxi Gracia Patient Status:  Observation    1934 MRN [id number]   [facility] 2NE-A Attending Linette Back MD   Hosp Day # 0 PCP Justina Stevenson MD       SUBJECTIVE:  Continues to hav nebulizer solution 2.5 mg 2.5 mg Nebulization Q4H PRN   Albuterol Sulfate  (90 Base) MCG/ACT inhaler 2 puff 2 puff Inhalation Q6H PRN   Vitamin D3 cap 2,000 Units 2,000 Units Oral Daily   ferrous sulfate EC tab 325 mg 325 mg Oral BID with meals   [facility] patent son wants him to be home with home pt  Discussed with the patient and his son and also the RN       Questions/concerns were discussed with patient and/or family carolina Hawk  7/19/2019  jimmy

## 2019-07-19 NOTE — PLAN OF CARE
Patient laying in bed, denies CP, SOB and dizziness.  VSS; NSR on cardiac monitor; no edema noted, lungs sounds diminished with audible and expiratory wheeze, physician aware, will continue to monitor; continent of bowel and bladder, last bowel movement tod

## 2019-07-20 PROCEDURE — 94640 AIRWAY INHALATION TREATMENT: CPT

## 2019-07-20 NOTE — PLAN OF CARE
ALERT AND ORIENTED X4 ON TELE MONITOR HR 70'S SINUS RHYTHM. HAS NOT VOID AND DENIES ANY DISCOMFORT. BLADDER SCAN DON SHOWS 222 MI. UPDATED W/ POC AND VERBALIZED UNDERSTANDING. ALL NEEDS ATTENDED AND WILL CONTINUE TO MONITOR.  CALL LIGHT WITHIN [place] Hearing

## 2019-07-20 NOTE — PROGRESS NOTES
[facility]    Progress Note    Raines Mode Patient Status:  Inpatient    1934 MRN [id number]   [facility] 2NE-A Attending Kody Velasquez MD   Hosp Day # 1 PCP Uzma Greenwood MD        Subjective:     Respiratory: N

## 2019-07-20 NOTE — PLAN OF CARE
Patient is alert and oriented. Patient denies pain, but complains of \"heaviness\" this morning in lower abdomen. Patient bladder scanned post void and had 815 ml of urine.  Patient refusing to have finch placed back in (MD orders to put finch in for blacong

## 2019-07-20 NOTE — CM/SW NOTE
HYACINTH spoke with son of Syed. DON called. Renae faxed request for SONIYA at [facility] at requested by family. The [address] notified of Pending SONIYA stay.     Jackie SIMON, 07/20/19, 2:15 PM

## 2019-07-20 NOTE — PLAN OF CARE
Patient is alert and oriented. Patient denies pain. Patient ambulating in hallway with stand by assist. Bladder scan 186 and 200ml this afternoon. Likely discharge tomorrow to [facility] if insurance approved. Vitals stable and call light in reach.      Pr and oxygenation  Description  INTERVENTIONS:  - Assess for changes in respiratory status  - Assess for changes in mentation and behavior  - Position to facilitate oxygenation and minimize respiratory effort  - Oxygen supplementation based on oxygen saturat social influences on pain and pain management  - Manage/alleviate anxiety  - Utilize distraction and/or relaxation techniques  - Monitor for opioid side effects  - Notify MD/LIP if interventions unsuccessful or patient reports new pain  - Anticipate increa

## 2019-07-20 NOTE — CM/SW NOTE
Patient accepted at [address] [facility]. Family requesting [facility]. Awaiting [employer] authorization.     Jackie SIMON, 07/20/19, 4:02 PM

## 2019-07-20 NOTE — CM/SW NOTE
Patients second SONIYA choice is [facility] room on [address] [facility]. Referral sent. Patient out of network with [facility].     Jackie SIMON, 07/20/19, 3:41 PM

## 2019-07-21 PROCEDURE — 94640 AIRWAY INHALATION TREATMENT: CPT

## 2019-07-21 PROCEDURE — 51702 INSERT TEMP BLADDER CATH: CPT

## 2019-07-21 NOTE — PLAN OF CARE
Pt alert able to make needs known. Denies any pain   Thomas in palace draining clear yellow urine   Waiting for authorization from [facility] to go to [facility]. Plan of care discussed with pt and family at bedside, verbalized understanding.   Call light wit

## 2019-07-21 NOTE — PLAN OF CARE
Rcv'd A/O/3  Denies pain or discomfort  On tele with SR  Lung sounds diminished bilateral with occasional expiratory wheezing  Voiding small amts order to bladder scan with post voiding  ABD binder noted  High fall risk     Problem: CARDIOVASCULAR - ADULT signs/symptoms of CO2 retention  Outcome: Progressing

## 2019-07-21 NOTE — DISCHARGE SUMMARY
[facility]    Discharge Summary    Genet Vences Patient Status:  Inpatient    1934 MRN [id number]   [facility] 2NE-A Attending Aristides Lobo MD   Hosp Day # 2 PCP Leon Lazar MD     Date of Admission: 2019 enlargement/tenderness/nodules; no carotid    bruit or JVD       Lungs:     Clear to auscultation bilaterally, respirations unlabored       Heart:    Regular rate and rhythm, S1 and S2 normal   Abdomen:     Soft, non-tender, bowel sounds active all four qu Sodium 20 MG Tabs  Commonly known as:  PRAVACHOL      Take 20 mg by mouth nightly.    Refills:  0     albuterol sulfate (2.5 MG/3ML) 0.083% Nebu  Commonly known as:  VENTOLIN      Take 3 mL (2.5 mg total) by nebulization every 4 (four) hours as needed for W

## 2019-07-21 NOTE — CM/SW NOTE
GREGORIO follow up. Pt accepted to [facility] clinically, however, needs insurance auth. [employer] case, GREGORIO sent request for SONIYA auth, will follow.

## 2019-07-22 ENCOUNTER — EXTERNAL FACILITY (OUTPATIENT)
Dept: FAMILY MEDICINE CLINIC | Facility: CLINIC | Age: 84
End: 2019-07-22

## 2019-07-22 VITALS
BODY MASS INDEX: 27.22 KG/M2 | HEIGHT: 61 IN | DIASTOLIC BLOOD PRESSURE: 51 MMHG | HEART RATE: 78 BPM | TEMPERATURE: 98 F | WEIGHT: 144.19 LBS | SYSTOLIC BLOOD PRESSURE: 121 MMHG | RESPIRATION RATE: 18 BRPM | OXYGEN SATURATION: 100 %

## 2019-07-22 DIAGNOSIS — J44.9 ASTHMA-COPD OVERLAP SYNDROME (HCC): ICD-10-CM

## 2019-07-22 DIAGNOSIS — R33.9 URINARY RETENTION: ICD-10-CM

## 2019-07-22 DIAGNOSIS — I10 ESSENTIAL HYPERTENSION, BENIGN: ICD-10-CM

## 2019-07-22 DIAGNOSIS — K50.919 CROHN'S DISEASE WITH COMPLICATION, UNSPECIFIED GASTROINTESTINAL TRACT LOCATION (HCC): ICD-10-CM

## 2019-07-22 DIAGNOSIS — Z97.8 INDWELLING FOLEY CATHETER PRESENT: ICD-10-CM

## 2019-07-22 DIAGNOSIS — R07.89 CHEST PAIN, ATYPICAL: ICD-10-CM

## 2019-07-22 DIAGNOSIS — R53.1 GENERALIZED WEAKNESS: Primary | ICD-10-CM

## 2019-07-22 DIAGNOSIS — E03.9 HYPOTHYROIDISM, UNSPECIFIED TYPE: ICD-10-CM

## 2019-07-22 PROCEDURE — 97116 GAIT TRAINING THERAPY: CPT

## 2019-07-22 PROCEDURE — 97110 THERAPEUTIC EXERCISES: CPT

## 2019-07-22 PROCEDURE — 99306 1ST NF CARE HIGH MDM 50: CPT | Performed by: FAMILY MEDICINE

## 2019-07-22 PROCEDURE — 97530 THERAPEUTIC ACTIVITIES: CPT

## 2019-07-22 NOTE — PLAN OF CARE
Rcv'd A/O/3  Denies pain or discomfort  On tele with SR  Lung sounds diminished bilateral  Thomas draining yellow urine  Fall risk  Bed alarm      Problem: CARDIOVASCULAR - ADULT  Goal: Maintains optimal cardiac output and hemodynamic stability  Description

## 2019-07-22 NOTE — PROGRESS NOTES
NURSING DISCHARGE NOTE    Discharged  via Wheelchair to [facility], in stable condition, w/ finch cath to go w/ the pt. Report called to [address] by Family member  Belongings Taken by patient/family   .

## 2019-07-22 NOTE — CM/SW NOTE
[employer] ins auth for tyree ludwig is still pending. Patient is medically clear for d/c.      Brittany Friend RN,   Phone [phone]  Pager [phone]

## 2019-07-22 NOTE — PAYOR COMM NOTE
--------------  CONTINUED STAY REVIEW    Payor: William  Subscriber #:  [id number]  Authorization Number: [id number]    Admit date: 7/19/19  Admit time: 2175    Began as OBS on 7/16/ changed to INPT order on 7/19    Admitting Physician: Rachel Esparza Levothyroxine Sodium (SYNTHROID) tab 50 mcg 50 mcg Oral Before breakfast   Pravastatin Sodium (PRAVACHOL) tab 20 mg 20 mg Oral Nightly   umeclidinium-vilanterol (ANORO ELLIPTA) 62.5-25 MCG/INH inhaler 1 puff 1 puff Inhalation Daily   mesalamine (DELZICOL services that will reasonably be expected to span two midnight's based on the clinical documentation in H+P. Based on patients current state of illness, I anticipate that, after discharge, patient will require TBD.            Stefan Hancock      7/19 Renard Bernstein 11:17 AM   CM spoke with son of Syed. DON called. Renae faxed request for SONIYA at [facility] at requested by family.  The [address] notified of Pending SONIYA stay.     Candis Finn, 07/20/19, 2:15 PM  Patients second SONIYA choice is [facility] room on [address]

## 2019-07-22 NOTE — PROGRESS NOTES
[facility]    Progress Note    Duke Ordoñez Patient Status:  Observation    1934 MRN [id number]   [facility] 2NE-A Attending Damon Lazar MD   Hosp Day # 3 PCP Sera Bhandari MD       SUBJECTIVE:  Continues to hav umeclidinium-vilanterol (ANORO ELLIPTA) 62.5-25 MCG/INH inhaler 1 puff 1 puff Inhalation Daily   mesalamine (DELZICOL) delayed release capsule 800 mg Oral TID AC   Pantoprazole Sodium (PROTONIX) EC tab 40 mg 40 mg Oral QAM AC   Alfuzosin HCl ER Kirstin Hester

## 2019-07-22 NOTE — PHYSICAL THERAPY NOTE
PHYSICAL THERAPY TREATMENT NOTE - INPATIENT    Room Number: 2625/2625-A     Session: 4  Number of Visits to Meet Established Goals: 5    Presenting Problem: chest pain, weakness  History related to current admission: Pt admitted from home due to chest deejay • SIGMOIDOSCOPY,DIAGNOSTIC     • TOTAL KNEE REPLACEMENT         SUBJECTIVE  \"I can try\"    Patient’s self-stated goal is to get well.     OBJECTIVE  Precautions: Cardiac    WEIGHT BEARING RESTRICTION  Weight Bearing Restriction: None                PAIN independence and safety.     Transfers    Supine<>Sit: NA  Sit<>Stand: SBA  Transfers: SBA   Gait: See above flow sheet  Chair Follow: YES - PT Aide able to provide  Sit<>Supine: NA  Stand<>Sit: SBA    Comments related to Mobility: Pt with improved  DISCHARGE RECOMMENDATIONS  PT Discharge Recommendations: Sub-acute rehabilitation - even after SONIYA stay, it is likely Pt will require 24 hour care/supervision at home. Pt's son made aware of current level of functional mobility.      PLAN  PT Treatmen

## 2019-07-22 NOTE — PAYOR COMM NOTE
--------------  ADMISSION REVIEW     Payor: [address] [place] [address] #:  [id number]  Authorization Number: [id number]    Began as OBS on 7/16/ changed to INPT order on 7/19    Admit date: 7/19/19  Admit time: 65       Admitting Physician: Rachael Ruff, problem    • Chronic lung disease    • COPD (chronic obstructive pulmonary disease) (HCC)    • Crohn's disease of large intestine without complication ([facility] 75.) 0/65/3434   • Diarrhea, unspecified    • Diverticula of small intestine    • Diverticulosis of larg atraumatic. Pupils are 4 mm equally round and reactive to light. Oropharynx is clear. Mucous membranes are moist.  NECK: There is no focal tenderness to palpation appreciated. There is no JVD. No meningeal signs or nuchal rigidity appreciated. No stridor. PLATELET.   Procedure                               Abnormality         Status                     ---------                               -----------         ------                     CBC W/ DIFFERENTIAL[[id number]]          Abnormal            Final resul with Dr. Chelsie Sanchez, and the patient will be admitted to the hospital for further care at this time.     Admission disposition: 7/16/2019  9:40 AM         Patient had IV line established and blood work drawn including CBC, chemistries, BUN and creatinine, umeclidinium-vilanterol 62.5-25 MCG/INH Inhalation Aerosol Powder, Breath Activated Inhale 1 puff into the lungs daily. Disp:  Rfl:  7/15/2019 at Unknown time   mesalamine 1.2 g Oral Tab EC Take 2 tablets (2.4 g total) by mouth daily.  Disp: 180 tablet Rf Normocephalic, without obvious abnormality, atraumatic   Eyes:    PERRL, conjunctiva/corneas clear, EOM's intact, fundi     benign, both eyes        Ears:    Normal TM's and external ear canals, both ears   Nose:   Nares normal, septum midline, mucosa norm pneumonia     At risk for falling     Lumbar spinal stenosis     Lumbosacral radiculopathy     Nontraumatic subdural hygroma     Acute encephalopathy     Anemia     Left lower lobe pneumonia (HCC)     Hyponatremia     Hyperglycemia     Leukocytosis     Acu kg)  06/06/19 : 146 lb (66.2 kg)  02/27/19 : 139 lb 9.6 oz (63.3 kg)      Echo: Conclusions: 7/16/19    1. Left ventricle: The cavity size was mildly increased. Wall thickness was     normal. The estimated ejection fraction was 60%.  Doppler parameters are

## 2019-07-22 NOTE — PAYOR COMM NOTE
--------------  CONTINUED STAY REVIEW    Payor: William  Subscriber #:  [id number]  Authorization Number: [id number]    Began as OBS on 7/16/ changed to INPT order on 7/19    Awaiting insurance Yovani sprague [facility]    Admitting Physician: Shimon Augustine This 66-year-old [place] [facility]) male was admitted through the emergency room with complaints of epigastric and lower chest pain, mostly on the right side.  Patient described intensity as 6-8 and the pain was burning in nature, not pressure or tightness.  There was °C) 78 18 121/51 100 % — None (Room air) — CM     07/21/19 2002 97.4 °F (36.3 °C) 73 18 120/85 100 % — None (Room air) — KM

## 2019-07-22 NOTE — DIETARY NOTE
[facility]   CLINICAL NUTRITION    [facility]     Admitting diagnosis:  Chest pain, atypical [R07.89]    Ht: 154.9 cm (5' 1\")  Wt: 65.4 kg (144 lb 2.9 oz). This is 129 % of IBW  Body mass index is 27.24 kg/m².   IBW: 51 kg    Labs/Meds r

## 2019-07-22 NOTE — PLAN OF CARE
Received pt this morning, A&O X3, not in any distress on RA, SR per tele, lungs sounds is clear, F/C intact draining to justin colored urine, ambulate in hallway with walker and standby assist, voiced no c/o`s. Awaiting insurance approval to go through.  Cpm ventilation and oxygenation  Description  INTERVENTIONS:  - Assess for changes in respiratory status  - Assess for changes in mentation and behavior  - Position to facilitate oxygenation and minimize respiratory effort  - Oxygen supplementation based on ox cultural and social influences on pain and pain management  - Manage/alleviate anxiety  - Utilize distraction and/or relaxation techniques  - Monitor for opioid side effects  - Notify MD/LIP if interventions unsuccessful or patient reports new pain  - Anti

## 2019-07-22 NOTE — CM/SW NOTE
Insurance auth pending for [employer]. Last PT note from 7/19 and will need updated therapy notes for insurance approval.  Spoke with PT to request pt is seen today. SW to send note to [facility] [place] [facility] once completed.   / to remain available for

## 2019-07-23 ENCOUNTER — INITIAL APN SNF VISIT (OUTPATIENT)
Dept: INTERNAL MEDICINE CLINIC | Age: 84
End: 2019-07-23

## 2019-07-23 ENCOUNTER — MOBILE ENCOUNTER (OUTPATIENT)
Dept: FAMILY MEDICINE CLINIC | Facility: CLINIC | Age: 84
End: 2019-07-23

## 2019-07-23 DIAGNOSIS — R07.89 CHEST PAIN, ATYPICAL: Primary | ICD-10-CM

## 2019-07-23 DIAGNOSIS — E03.9 HYPOTHYROIDISM, UNSPECIFIED TYPE: ICD-10-CM

## 2019-07-23 DIAGNOSIS — J44.9 COPD, MODERATE (HCC): ICD-10-CM

## 2019-07-23 DIAGNOSIS — M48.061 SPINAL STENOSIS OF LUMBAR REGION, UNSPECIFIED WHETHER NEUROGENIC CLAUDICATION PRESENT: ICD-10-CM

## 2019-07-23 DIAGNOSIS — R33.8 ACUTE RETENTION OF URINE: ICD-10-CM

## 2019-07-23 DIAGNOSIS — N40.0 BENIGN PROSTATIC HYPERPLASIA, UNSPECIFIED WHETHER LOWER URINARY TRACT SYMPTOMS PRESENT: ICD-10-CM

## 2019-07-23 DIAGNOSIS — K50.10 CROHN'S DISEASE OF LARGE INTESTINE WITHOUT COMPLICATION (HCC): ICD-10-CM

## 2019-07-23 DIAGNOSIS — Z91.81 AT RISK FOR FALLING: ICD-10-CM

## 2019-07-23 DIAGNOSIS — K50.919 CROHN'S DISEASE WITH COMPLICATION, UNSPECIFIED GASTROINTESTINAL TRACT LOCATION (HCC): ICD-10-CM

## 2019-07-23 DIAGNOSIS — D64.9 ANEMIA, UNSPECIFIED TYPE: ICD-10-CM

## 2019-07-23 DIAGNOSIS — R53.1 GENERALIZED WEAKNESS: ICD-10-CM

## 2019-07-23 DIAGNOSIS — R33.9 URINARY RETENTION: ICD-10-CM

## 2019-07-23 DIAGNOSIS — M54.40 LOW BACK PAIN WITH SCIATICA, SCIATICA LATERALITY UNSPECIFIED, UNSPECIFIED BACK PAIN LATERALITY, UNSPECIFIED CHRONICITY: ICD-10-CM

## 2019-07-23 DIAGNOSIS — I10 ESSENTIAL HYPERTENSION, BENIGN: ICD-10-CM

## 2019-07-23 DIAGNOSIS — R53.1 WEAKNESS: ICD-10-CM

## 2019-07-23 DIAGNOSIS — J44.9 ASTHMA-COPD OVERLAP SYNDROME (HCC): ICD-10-CM

## 2019-07-23 DIAGNOSIS — K58.9 IRRITABLE BOWEL SYNDROME, UNSPECIFIED TYPE: ICD-10-CM

## 2019-07-23 DIAGNOSIS — R07.9 CHEST PAIN IN ADULT: Primary | ICD-10-CM

## 2019-07-23 PROCEDURE — 99358 PROLONG SERVICE W/O CONTACT: CPT | Performed by: FAMILY MEDICINE

## 2019-07-23 PROCEDURE — 99305 1ST NF CARE MODERATE MDM 35: CPT | Performed by: NURSE PRACTITIONER

## 2019-07-24 VITALS
RESPIRATION RATE: 20 BRPM | SYSTOLIC BLOOD PRESSURE: 129 MMHG | BODY MASS INDEX: 27 KG/M2 | WEIGHT: 144.5 LBS | HEART RATE: 87 BPM | OXYGEN SATURATION: 98 % | TEMPERATURE: 97 F | DIASTOLIC BLOOD PRESSURE: 65 MMHG

## 2019-07-24 NOTE — PROGRESS NOTES
Syed Saha Author: Lea Pop MD     1934 MRN [id number]   [facility]  Admission 19      Last Hospital Discharge 19 PCP Liam Melendez MD   Hospital of Discharge  [facility]       Patient admitted to [facility]

## 2019-07-24 NOTE — PROGRESS NOTES
Syed Barroso Ambreen Poplar Author: Tabitha Forbes MD     1934 MRN [id number]   [facility]  Admission 19      Last Hospital Discharge 19 PCP Francisco Marinelli MD   Hospital of Discharge  [facility]        CC --admitted to [facility] for s cervical and lumbar spine   • Osteoporosis    • Other and unspecified hyperlipidemia    • Shortness of breath    • Thyroid disease    • Unspecified essential hypertension    • Wheezing      Past Surgical History:   Procedure Laterality Date   • ANGIOGRA mouth daily. Disp:  Rfl:    cyanocobalamin 1000 MCG/ML Injection Solution Inject 1,000 mcg into the muscle every 30 (thirty) days.    Disp:  Rfl:    Levothyroxine Sodium 50 MCG Oral Tab Take 50 mcg by mouth before breakfast. Disp:  Rfl:    ferrous sulfate - 5.1 mmol/L 4.1    Chloride 98 - 112 mmol/L 115High     CO2 21.0 - 32.0 mmol/L 24.0    Anion Gap 0 - 18 mmol/L 5    BUN 7 - 18 mg/dL 27High     Creatinine 0.70 - 1.30 mg/dL 1.19    BUN/CREA Ratio 10.0 - 20.0 22.7High     Calcium, Total 8.5 - 10.1 mg/dL 8. evaluation and treatment  Hospital medications reviewed and restarted  Thomas catheter care  Voiding trial in a week  Follow-up appointment with urology within the week  Check his CBC CMP in the morning        [address] [facility]  3421, 64

## 2019-07-25 NOTE — PROGRESS NOTES
Syed Creed Payment  : 1934  Age 80year old  male patient is admitted to Facility: [facility].     [address] [place] [address] Drive date:  19  Discharge date to [facility]:  19  ELOS:  10-13 days  Anticipated Laterality Date   • ANGIOGRAM      2009   • COLONOSCOPY N/A 6/30/2014    Performed by Olimpia Angel DO at [facility] ENDOSCOPY   • ESOPHAGOGASTRODUODENOSCOPY (EGD) N/A 1/18/2017    Performed by Olimpia Angel DO at [address] [place] [address] Take 50 mcg by mouth before breakfast. Disp:  Rfl:    ferrous sulfate 325 (65 FE) MG Oral Tab EC Take 325 mg by mouth 2 (two) times daily with meals. Disp:  Rfl:    tamsulosin HCl (FLOMAX) 0.4 MG Oral Cap Take 0.4 mg by mouth daily.  Disp:  Rfl:    [place] with audibile wheezing, kenny lungs coarse  CARDIOVASCULAR: S1, S2 normal, RRR; no S3, no S4; , no click, no murmur  ABDOMEN:  normal active BS+, soft, nondistended; no organomegaly, no masses; no bruits; nontender, no guarding, no rebound tenderness.   :De pf qd  -CXR PA and Lat for wheezing  -Up in w/c for all meals    H/O GI bleed  -Monitor    Supplements  -Vitamin B12 1000 mcq qd  -Ferrous Sulfate as stated above  -Vitamin D 1000 units qd    Labs  -CBC, CMP, Magnesium and Vitamin D level on 7/30.     Elidia Ferraro

## 2019-07-25 NOTE — PROGRESS NOTES
Syed Brenda Lyleook  : 1934  Age 80year old  male patient is admitted to Facility: [facility].     [address] [place] [address] Drive date:  19  Discharge date to [facility]:  19  ELOS:  10-13 days  Anticipated Laterality Date   • ANGIOGRAM      2009   • COLONOSCOPY N/A 6/30/2014    Performed by Gema Escoto DO at [facility] ENDOSCOPY   • ESOPHAGOGASTRODUODENOSCOPY (EGD) N/A 1/18/2017    Performed by Gema Escoto DO at [address] [place] [address] Take 50 mcg by mouth before breakfast. Disp:  Rfl:    ferrous sulfate 325 (65 FE) MG Oral Tab EC Take 325 mg by mouth 2 (two) times daily with meals. Disp:  Rfl:    tamsulosin HCl (FLOMAX) 0.4 MG Oral Cap Take 0.4 mg by mouth daily.  Disp:  Rfl:    [place] with audibile wheezing, kenny lungs coarse  CARDIOVASCULAR: S1, S2 normal, RRR; no S3, no S4; , no click, no murmur  ABDOMEN:  normal active BS+, soft, nondistended; no organomegaly, no masses; no bruits; nontender, no guarding, no rebound tenderness.   :De pf qd  -CXR PA and Lat for wheezing  -Up in w/c for all meals    H/O GI bleed  -Monitor    Supplements  -Vitamin B12 1000 mcq qd  -Ferrous Sulfate as stated above  -Vitamin D 1000 units qd    Labs  -CBC, CMP, Magnesium and Vitamin D level on 7/30.     Mary Lou Anderson

## 2019-07-26 ENCOUNTER — SNF VISIT (OUTPATIENT)
Dept: INTERNAL MEDICINE CLINIC | Age: 84
End: 2019-07-26

## 2019-07-26 DIAGNOSIS — M54.40 LOW BACK PAIN WITH SCIATICA, SCIATICA LATERALITY UNSPECIFIED, UNSPECIFIED BACK PAIN LATERALITY, UNSPECIFIED CHRONICITY: ICD-10-CM

## 2019-07-26 DIAGNOSIS — H61.20 IMPACTED CERUMEN, UNSPECIFIED LATERALITY: ICD-10-CM

## 2019-07-26 DIAGNOSIS — R33.8 ACUTE RETENTION OF URINE: ICD-10-CM

## 2019-07-26 DIAGNOSIS — J44.9 COPD, MODERATE (HCC): Primary | ICD-10-CM

## 2019-07-26 DIAGNOSIS — R53.1 WEAKNESS: ICD-10-CM

## 2019-07-26 PROCEDURE — 99308 SBSQ NF CARE LOW MDM 20: CPT | Performed by: NURSE PRACTITIONER

## 2019-07-27 VITALS
TEMPERATURE: 98 F | RESPIRATION RATE: 20 BRPM | OXYGEN SATURATION: 95 % | DIASTOLIC BLOOD PRESSURE: 70 MMHG | HEART RATE: 82 BPM | SYSTOLIC BLOOD PRESSURE: 135 MMHG

## 2019-07-27 NOTE — PROGRESS NOTES
Kiara John, 5/31/1934, 80year old, male    Chief Complaint:  Patient presents with:   Follow - Up: Atypical Chest Pain       Subjective:  79 y/o male with PMHx OA, Back problems, COPD, Crohn's Dx., came to the ER with c/o epigastric pain and present  LYMPHATIC:no lymphedema  MUSCULOSKELETAL: no acute synovitis upper or lower extremity.   Weakness R/T recent hospitalization/diagnoses/sequelae; will undergo therapies to rehab and improve strength, endurance and independence w/ ADLs  EXTREMITIES/V LIAN Givens  7/26/2019  10:52 AM

## 2019-07-29 PROCEDURE — 87086 URINE CULTURE/COLONY COUNT: CPT | Performed by: PHYSICIAN ASSISTANT

## 2019-08-02 ENCOUNTER — SNF DISCHARGE (OUTPATIENT)
Dept: INTERNAL MEDICINE CLINIC | Age: 84
End: 2019-08-02

## 2019-08-02 DIAGNOSIS — J44.9 COPD, MODERATE (HCC): Primary | ICD-10-CM

## 2019-08-02 DIAGNOSIS — R53.1 WEAKNESS: ICD-10-CM

## 2019-08-02 DIAGNOSIS — H61.20 IMPACTED CERUMEN, UNSPECIFIED LATERALITY: ICD-10-CM

## 2019-08-02 DIAGNOSIS — M54.40 LOW BACK PAIN WITH SCIATICA, SCIATICA LATERALITY UNSPECIFIED, UNSPECIFIED BACK PAIN LATERALITY, UNSPECIFIED CHRONICITY: ICD-10-CM

## 2019-08-02 PROCEDURE — 99316 NF DSCHRG MGMT 30 MIN+: CPT | Performed by: NURSE PRACTITIONER

## 2019-08-03 VITALS
RESPIRATION RATE: 18 BRPM | BODY MASS INDEX: 29 KG/M2 | DIASTOLIC BLOOD PRESSURE: 64 MMHG | WEIGHT: 146.5 LBS | OXYGEN SATURATION: 95 % | HEART RATE: 80 BPM | TEMPERATURE: 97 F | SYSTOLIC BLOOD PRESSURE: 130 MMHG

## 2019-08-03 NOTE — PROGRESS NOTES
Chris Yaneth, 5/31/1934, 80year old, male is being discharged from Facility: [address] [place] [address]    Date of Admission: 7/22/19    Date of Anticipated Discharge: 8/3/19                                 Admitting Diagnoses:Atypical coarse  CARDIOVASCULAR: S1, S2 normal, RRR; no S3, no S4; , no click, no murmur  ABDOMEN:  normal active BS+, soft, nondistended; no organomegaly, no masses; no bruits; nontender, no guarding, no rebound tenderness.   :Deferred  LYMPHATIC:no lymphedema  M Sulfate 325 mg  id     COPD  -Ventolin 0.083 Neb tx q4h for wheezing  -Proair 2 p 16h   -Anoro Ellipta1 pf qd  -CXR=ng     H/O GI bleed-stable     Ear Wax  -Debrox-completed  -Ear flushes done  -Patient to follow up with PCP for further flushes     Supplem

## 2019-08-31 ENCOUNTER — APPOINTMENT (OUTPATIENT)
Dept: CT IMAGING | Facility: HOSPITAL | Age: 84
DRG: 387 | End: 2019-08-31
Attending: EMERGENCY MEDICINE
Payer: MEDICARE

## 2019-08-31 ENCOUNTER — HOSPITAL ENCOUNTER (INPATIENT)
Facility: HOSPITAL | Age: 84
LOS: 3 days | Discharge: HOME | DRG: 387 | End: 2019-09-03
Attending: EMERGENCY MEDICINE | Admitting: INTERNAL MEDICINE
Payer: MEDICARE

## 2019-08-31 DIAGNOSIS — K56.609 SMALL BOWEL OBSTRUCTION (HCC): ICD-10-CM

## 2019-08-31 DIAGNOSIS — K50.919 CROHN'S DISEASE WITH COMPLICATION, UNSPECIFIED GASTROINTESTINAL TRACT LOCATION (HCC): Primary | ICD-10-CM

## 2019-08-31 LAB
ALBUMIN SERPL-MCNC: 3.7 G/DL (ref 3.4–5)
ALBUMIN/GLOB SERPL: 1.4 {RATIO} (ref 1–2)
ALP LIVER SERPL-CCNC: 135 U/L (ref 45–117)
ALT SERPL-CCNC: 22 U/L (ref 16–61)
ANION GAP SERPL CALC-SCNC: 8 MMOL/L (ref 0–18)
AST SERPL-CCNC: 27 U/L (ref 15–37)
BASOPHILS # BLD AUTO: 0.01 X10(3) UL (ref 0–0.2)
BASOPHILS # BLD AUTO: 0.02 X10(3) UL (ref 0–0.2)
BASOPHILS NFR BLD AUTO: 0.1 %
BASOPHILS NFR BLD AUTO: 0.1 %
BILIRUB SERPL-MCNC: 0.8 MG/DL (ref 0.1–2)
BILIRUB UR QL STRIP.AUTO: NEGATIVE
BUN BLD-MCNC: 29 MG/DL (ref 7–18)
BUN/CREAT SERPL: 18.6 (ref 10–20)
CALCIUM BLD-MCNC: 9 MG/DL (ref 8.5–10.1)
CHLORIDE SERPL-SCNC: 109 MMOL/L (ref 98–112)
CLARITY UR REFRACT.AUTO: CLEAR
CO2 SERPL-SCNC: 26 MMOL/L (ref 21–32)
COLOR UR AUTO: YELLOW
CREAT BLD-MCNC: 1.56 MG/DL (ref 0.7–1.3)
DEPRECATED RDW RBC AUTO: 41.7 FL (ref 35.1–46.3)
DEPRECATED RDW RBC AUTO: 42.3 FL (ref 35.1–46.3)
EOSINOPHIL # BLD AUTO: 0.01 X10(3) UL (ref 0–0.7)
EOSINOPHIL # BLD AUTO: 0.13 X10(3) UL (ref 0–0.7)
EOSINOPHIL NFR BLD AUTO: 0.1 %
EOSINOPHIL NFR BLD AUTO: 0.9 %
ERYTHROCYTE [DISTWIDTH] IN BLOOD BY AUTOMATED COUNT: 12.6 % (ref 11–15)
ERYTHROCYTE [DISTWIDTH] IN BLOOD BY AUTOMATED COUNT: 12.6 % (ref 11–15)
GLOBULIN PLAS-MCNC: 2.7 G/DL (ref 2.8–4.4)
GLUCOSE BLD-MCNC: 142 MG/DL (ref 70–99)
GLUCOSE UR STRIP.AUTO-MCNC: NEGATIVE MG/DL
HCT VFR BLD AUTO: 34.5 % (ref 39–53)
HCT VFR BLD AUTO: 36.6 % (ref 39–53)
HGB BLD-MCNC: 12.1 G/DL (ref 13–17.5)
HGB BLD-MCNC: 12.5 G/DL (ref 13–17.5)
IMM GRANULOCYTES # BLD AUTO: 0.06 X10(3) UL (ref 0–1)
IMM GRANULOCYTES # BLD AUTO: 0.06 X10(3) UL (ref 0–1)
IMM GRANULOCYTES NFR BLD: 0.4 %
IMM GRANULOCYTES NFR BLD: 0.5 %
INR BLD: 1.13 (ref 0.9–1.1)
KETONES UR STRIP.AUTO-MCNC: NEGATIVE MG/DL
LACTATE SERPL-SCNC: 0.9 MMOL/L (ref 0.4–2)
LEUKOCYTE ESTERASE UR QL STRIP.AUTO: NEGATIVE
LYMPHOCYTES # BLD AUTO: 0.82 X10(3) UL (ref 1–4)
LYMPHOCYTES # BLD AUTO: 1.39 X10(3) UL (ref 1–4)
LYMPHOCYTES NFR BLD AUTO: 6.7 %
LYMPHOCYTES NFR BLD AUTO: 9.7 %
M PROTEIN MFR SERPL ELPH: 6.4 G/DL (ref 6.4–8.2)
MCH RBC QN AUTO: 31.3 PG (ref 26–34)
MCH RBC QN AUTO: 31.7 PG (ref 26–34)
MCHC RBC AUTO-ENTMCNC: 34.2 G/DL (ref 31–37)
MCHC RBC AUTO-ENTMCNC: 35.1 G/DL (ref 31–37)
MCV RBC AUTO: 90.3 FL (ref 80–100)
MCV RBC AUTO: 91.5 FL (ref 80–100)
MONOCYTES # BLD AUTO: 0.76 X10(3) UL (ref 0.1–1)
MONOCYTES # BLD AUTO: 0.85 X10(3) UL (ref 0.1–1)
MONOCYTES NFR BLD AUTO: 6 %
MONOCYTES NFR BLD AUTO: 6.2 %
NEUTROPHILS # BLD AUTO: 10.61 X10 (3) UL (ref 1.5–7.7)
NEUTROPHILS # BLD AUTO: 10.61 X10(3) UL (ref 1.5–7.7)
NEUTROPHILS # BLD AUTO: 11.82 X10 (3) UL (ref 1.5–7.7)
NEUTROPHILS # BLD AUTO: 11.82 X10(3) UL (ref 1.5–7.7)
NEUTROPHILS NFR BLD AUTO: 82.9 %
NEUTROPHILS NFR BLD AUTO: 86.4 %
NITRITE UR QL STRIP.AUTO: NEGATIVE
OSMOLALITY SERPL CALC.SUM OF ELEC: 304 MOSM/KG (ref 275–295)
PH UR STRIP.AUTO: 6 [PH] (ref 4.5–8)
PLATELET # BLD AUTO: 148 10(3)UL (ref 150–450)
PLATELET # BLD AUTO: 159 10(3)UL (ref 150–450)
POTASSIUM SERPL-SCNC: 4.2 MMOL/L (ref 3.5–5.1)
PROT UR STRIP.AUTO-MCNC: NEGATIVE MG/DL
PSA SERPL DL<=0.01 NG/ML-MCNC: 15 SECONDS (ref 12.5–14.7)
RBC # BLD AUTO: 3.82 X10(6)UL (ref 3.8–5.8)
RBC # BLD AUTO: 4 X10(6)UL (ref 3.8–5.8)
SED RATE-ML: 5 MM/HR (ref 0–12)
SODIUM SERPL-SCNC: 143 MMOL/L (ref 136–145)
SP GR UR STRIP.AUTO: 1.03 (ref 1–1.03)
UROBILINOGEN UR STRIP.AUTO-MCNC: <2 MG/DL
WBC # BLD AUTO: 12.3 X10(3) UL (ref 4–11)
WBC # BLD AUTO: 14.3 X10(3) UL (ref 4–11)

## 2019-08-31 PROCEDURE — 99223 1ST HOSP IP/OBS HIGH 75: CPT | Performed by: COLON & RECTAL SURGERY

## 2019-08-31 PROCEDURE — 74177 CT ABD & PELVIS W/CONTRAST: CPT | Performed by: EMERGENCY MEDICINE

## 2019-08-31 RX ORDER — MESALAMINE 1.2 G/1
1.2 TABLET, DELAYED RELEASE ORAL 2 TIMES DAILY
COMMUNITY
End: 2019-11-01

## 2019-08-31 RX ORDER — MORPHINE SULFATE 4 MG/ML
2 INJECTION, SOLUTION INTRAMUSCULAR; INTRAVENOUS EVERY 4 HOURS PRN
Status: DISCONTINUED | OUTPATIENT
Start: 2019-08-31 | End: 2019-09-03

## 2019-08-31 RX ORDER — SODIUM CHLORIDE 9 MG/ML
INJECTION, SOLUTION INTRAVENOUS CONTINUOUS
Status: DISCONTINUED | OUTPATIENT
Start: 2019-08-31 | End: 2019-09-02

## 2019-08-31 RX ORDER — ALBUTEROL SULFATE 2.5 MG/3ML
2.5 SOLUTION RESPIRATORY (INHALATION) EVERY 4 HOURS PRN
Status: DISCONTINUED | OUTPATIENT
Start: 2019-08-31 | End: 2019-09-03

## 2019-08-31 RX ORDER — CARVEDILOL 3.12 MG/1
3.12 TABLET ORAL 2 TIMES DAILY WITH MEALS
COMMUNITY

## 2019-08-31 RX ORDER — ONDANSETRON 2 MG/ML
4 INJECTION INTRAMUSCULAR; INTRAVENOUS EVERY 6 HOURS PRN
Status: DISCONTINUED | OUTPATIENT
Start: 2019-08-31 | End: 2019-09-03

## 2019-08-31 RX ORDER — ALBUTEROL SULFATE 90 UG/1
2 AEROSOL, METERED RESPIRATORY (INHALATION) EVERY 6 HOURS PRN
Status: DISCONTINUED | OUTPATIENT
Start: 2019-08-31 | End: 2019-09-03

## 2019-08-31 RX ORDER — FUROSEMIDE 20 MG/1
20 TABLET ORAL DAILY
COMMUNITY
End: 2021-12-11 | Stop reason: CLARIF

## 2019-08-31 RX ORDER — MORPHINE SULFATE 4 MG/ML
4 INJECTION, SOLUTION INTRAMUSCULAR; INTRAVENOUS ONCE
Status: COMPLETED | OUTPATIENT
Start: 2019-08-31 | End: 2019-08-31

## 2019-08-31 NOTE — CONSULTS
[facility]  Report of Consultation    Darlin Martinez Patient Status:  Inpatient    1934 MRN [id number]   [facility] 4NW-A Attending Hien Young MD   Hosp Day # 0 PCP Anil Gates MD     Requesting Physician:  Toni Trevizo pulmonary disease) ([facility]    • Crohn's disease of large intestine without complication ([facility] 2/82/8557   • Diarrhea, unspecified    • Diverticula of small intestine    • Diverticulosis of large intestine    • Frequent use of laxatives    • High blood pressu Delayed Release Take 40 mg by mouth daily. umeclidinium-vilanterol 62.5-25 MCG/INH Inhalation Aerosol Powder, Breath Activated Inhale 1 puff into the lungs daily. Pravastatin Sodium 20 MG Oral Tab Take 20 mg by mouth nightly.    albuterol sulfate (2.5 M hallucinations and confusion. Physical Exam:  /69   Pulse 90   Temp 98.1 °F (36.7 °C) (Temporal)   Resp 17   Wt 146 lb 9.7 oz   SpO2 97%   BMI 28.63 kg/m²   Physical Exam    Constitutional: He is oriented to person, place, and time.  He appears the right hemidiaphragm with fluid. Findings may reflect inflammatory bowel disease with mild obstructive changes. Large ventral hernia with marked rectus diastasis.   The hernia contains a loop of relatively healthy appearing transverse colon and a coupl adhesive small bowel obstruction.    -Patient denies nausea or vomiting. His stomach is relatively decompressed on CT scan, will hold off on nasogastric tube for now  -Various loops of thickened inflamed small intestine.   There does appear to be equalizat

## 2019-08-31 NOTE — ED PROVIDER NOTES
Patient Seen in: [facility] Emergency Department    History   Patient presents with:  Abdomen/Flank Pain (GI/)    Stated Complaint: abd pain    HPI    15-year-old male presents emergency department complaining of mid abdominal pain that started ab guarding  no hepatosplenomegaly bowel sounds are present , no pulsatile mass  Extremities: No clubbing cyanosis or edema 2+ distal pulses. Neuro: Cranial nerves II through XII intact with no gross focal sensory or motor abnormality.       ED Course     Lab Final result                 Please view results for these tests on the individual orders. CBC WITH DIFFERENTIAL WITH PLATELET    Narrative: The following orders were created for panel order CBC WITH DIFFERENTIAL WITH PLATELET.   Procedure management of medical condition. Patient was stable in the emergency department and will be transferred to floor for further definitive care. Patient's questions were answered.     Admission disposition: 8/31/2019  8:35 AM                 Disposition and

## 2019-08-31 NOTE — ED INITIAL ASSESSMENT (HPI)
Pt arrives with c/o mid abdominal pain that started 3 hours ago. Pt states his abdomen is more distended and firm, last BM yesterday, some nausea, denies vomiting or fever, no urinary sx.

## 2019-08-31 NOTE — CONSULTS
[facility]  Report of Consultation    Alissa Qureshilman Patient Status:  Inpatient    1934 MRN [id number]   [facility] 4NW-A Attending Shad Markham MD   Date of Consult 2019 PCP Josue Chen MD     [facility]) • Other and unspecified hyperlipidemia    • Shortness of breath    • Thyroid disease    • Unspecified essential hypertension    • Wheezing      Past Surgical History:   Procedure Laterality Date   • ANGIOGRAM      2009   • COLONOSCOPY N/A 6/30/2014    Pe push, 40 mg, Intravenous, Daily  •  ondansetron HCl (ZOFRAN) injection 4 mg, 4 mg, Intravenous, Q6H PRN    Review of Systems:    Except for what is noted on the HPI the remainder 10 point review of systems is negative.     Physical Exam:    Blood pressure 1 hernia contains a loop of relatively healthy appearing transverse colon and a couple of small bowel loops within the hernia sac. Stool filled colon. Sizable calcified gallstone  measuring 1.7 x 2.0 cm.      Assessment/Plan:  Patient Active Problem L

## 2019-08-31 NOTE — PROGRESS NOTES
NURSING ADMISSION NOTE      Patient admitted via Cart accompanied per son Jose;alert and oriented x 4;dr. Rosy Breen here discussed POC  Oriented to room. Safety precautions initiated. Bed in low position. Call light in reach.

## 2019-09-01 ENCOUNTER — APPOINTMENT (OUTPATIENT)
Dept: GENERAL RADIOLOGY | Facility: HOSPITAL | Age: 84
DRG: 387 | End: 2019-09-01
Attending: COLON & RECTAL SURGERY
Payer: MEDICARE

## 2019-09-01 ENCOUNTER — APPOINTMENT (OUTPATIENT)
Dept: CT IMAGING | Facility: HOSPITAL | Age: 84
DRG: 387 | End: 2019-09-01
Attending: INTERNAL MEDICINE
Payer: MEDICARE

## 2019-09-01 LAB
ALBUMIN SERPL-MCNC: 3.1 G/DL (ref 3.4–5)
ALBUMIN/GLOB SERPL: 1.1 {RATIO} (ref 1–2)
ALP LIVER SERPL-CCNC: 86 U/L (ref 45–117)
ALT SERPL-CCNC: 19 U/L (ref 16–61)
ANION GAP SERPL CALC-SCNC: 7 MMOL/L (ref 0–18)
AST SERPL-CCNC: 27 U/L (ref 15–37)
BASOPHILS # BLD AUTO: 0.01 X10(3) UL (ref 0–0.2)
BASOPHILS NFR BLD AUTO: 0.1 %
BILIRUB SERPL-MCNC: 1.1 MG/DL (ref 0.1–2)
BUN BLD-MCNC: 33 MG/DL (ref 7–18)
BUN/CREAT SERPL: 17 (ref 10–20)
CALCIUM BLD-MCNC: 8.3 MG/DL (ref 8.5–10.1)
CHLORIDE SERPL-SCNC: 112 MMOL/L (ref 98–112)
CO2 SERPL-SCNC: 24 MMOL/L (ref 21–32)
CREAT BLD-MCNC: 1.94 MG/DL (ref 0.7–1.3)
DEPRECATED RDW RBC AUTO: 43.9 FL (ref 35.1–46.3)
EOSINOPHIL # BLD AUTO: 0 X10(3) UL (ref 0–0.7)
EOSINOPHIL NFR BLD AUTO: 0 %
ERYTHROCYTE [DISTWIDTH] IN BLOOD BY AUTOMATED COUNT: 12.6 % (ref 11–15)
GLOBULIN PLAS-MCNC: 2.9 G/DL (ref 2.8–4.4)
GLUCOSE BLD-MCNC: 143 MG/DL (ref 70–99)
HCT VFR BLD AUTO: 37.6 % (ref 39–53)
HGB BLD-MCNC: 12.3 G/DL (ref 13–17.5)
IMM GRANULOCYTES # BLD AUTO: 0.05 X10(3) UL (ref 0–1)
IMM GRANULOCYTES NFR BLD: 0.4 %
LYMPHOCYTES # BLD AUTO: 0.9 X10(3) UL (ref 1–4)
LYMPHOCYTES NFR BLD AUTO: 7.2 %
M PROTEIN MFR SERPL ELPH: 6 G/DL (ref 6.4–8.2)
MCH RBC QN AUTO: 31 PG (ref 26–34)
MCHC RBC AUTO-ENTMCNC: 32.7 G/DL (ref 31–37)
MCV RBC AUTO: 94.7 FL (ref 80–100)
MONOCYTES # BLD AUTO: 0.45 X10(3) UL (ref 0.1–1)
MONOCYTES NFR BLD AUTO: 3.6 %
NEUTROPHILS # BLD AUTO: 11.05 X10 (3) UL (ref 1.5–7.7)
NEUTROPHILS # BLD AUTO: 11.05 X10(3) UL (ref 1.5–7.7)
NEUTROPHILS NFR BLD AUTO: 88.7 %
OSMOLALITY SERPL CALC.SUM OF ELEC: 306 MOSM/KG (ref 275–295)
PLATELET # BLD AUTO: 140 10(3)UL (ref 150–450)
POTASSIUM SERPL-SCNC: 3.5 MMOL/L (ref 3.5–5.1)
RBC # BLD AUTO: 3.97 X10(6)UL (ref 3.8–5.8)
SODIUM SERPL-SCNC: 143 MMOL/L (ref 136–145)
WBC # BLD AUTO: 12.5 X10(3) UL (ref 4–11)

## 2019-09-01 PROCEDURE — 74177 CT ABD & PELVIS W/CONTRAST: CPT | Performed by: INTERNAL MEDICINE

## 2019-09-01 PROCEDURE — 99232 SBSQ HOSP IP/OBS MODERATE 35: CPT | Performed by: SURGERY

## 2019-09-01 PROCEDURE — 74019 RADEX ABDOMEN 2 VIEWS: CPT | Performed by: COLON & RECTAL SURGERY

## 2019-09-01 NOTE — PLAN OF CARE
Pt alert and oriented x4. VSS and afebrile. Pt denies pain or nausea. Pt continues on IV antibiotics, tolerating well. Pt resting in bed without complaints. All needs attended to. No acute events overnight. Call light within reach.   Will continue to

## 2019-09-01 NOTE — CONSULTS
[facility] Pharmacy Note:  Renal Adjustment for piperacillin/tazobactam (Nazario Armenta)    Syed Holt is a 80year old male who has been prescribed piperacillin/tazobactam (ZOSYN) 3.375 gm every 8 hrs.   CrCl is estimated creatinine clearance is 19.7 mL/min (A)

## 2019-09-01 NOTE — PHYSICAL THERAPY NOTE
PHYSICAL THERAPY QUICK EVALUATION - INPATIENT    Room Number: 430/430-A  Evaluation Date: 9/1/2019  Presenting Problem: abdominal pain  Physician Order: PT Eval and Treat    Problem List  Principal Problem:    Crohn's disease with complication, unspecifi SURGICAL HISTORY      6 yeara ago colonscopy with polpys   • SIGMOIDOSCOPY,DIAGNOSTIC     • TOTAL KNEE REPLACEMENT         HOME SITUATION  Type of Home: [facility]   Home Layout: One level  Stairs to Enter : 16  Railing: Yes          Lives With: Alone;Careg STATUS  Gait Assessment  Gait Assistance: Modified independent  Distance (ft): 200  Assistive Device: Cane             Skilled Therapy Provided:   Evaluation as above  Supine>sit mod I  Donning of abdominal binder indep in standing  Sitting reach for objec

## 2019-09-01 NOTE — PROGRESS NOTES
[facility]  Progress Note    Efrain Gunter Patient Status:  Inpatient    1934 MRN [id number]   [facility] 4NW-A Attending Bong Suarez MD   Date 2019 PCP Daphne Fitzgerald MD     Subjective:  Efrain Gunter is a Assessment:  Patient Active Problem List:     Abdominal pain     Back pain with radiation     Community acquired pneumonia     At risk for falling     Lumbar spinal stenosis     Lumbosacral radiculopathy     Nontraumatic subdural hygroma     Acute en

## 2019-09-01 NOTE — PROGRESS NOTES
[facility]    Progress Note    Pili Johnson Patient Status:  Inpatient    1934 MRN [id number]   [facility] 4NW-A Attending Jaelyn Florez MD   Hosp Day # 1 PCP Agapito Baer MD       SUBJECTIVE:  Abdominal pain is gett NaCl infusion  Intravenous Continuous   morphINE sulfate (PF) 4 MG/ML injection 2 mg 2 mg Intravenous Q4H PRN   albuterol sulfate (VENTOLIN) (2.5 MG/3ML) 0.083% nebulizer solution 2.5 mg 2.5 mg Nebulization Q4H PRN   Albuterol Sulfate  (90 Base) MCG family by bedside.             Nazanin Allen  9/1/2019  11:49 AM

## 2019-09-01 NOTE — PROGRESS NOTES
[facility]  Progress Note    Des Alcazar Patient Status:  Inpatient    1934 MRN [id number]   [facility] 4NW-A Attending Riley Jim MD   Hosp Day # 1 PCP Yovany Tony MD     Subjective:  No new complaints.  Dencarlos pa Hyponatremia     Hyperglycemia     Leukocytosis     Acute renal failure (ARF) (HCC)     ANTHONY (acute kidney injury) (HCC)     Azotemia     Metabolic acidosis     Metabolic alkalosis     Respiratory alkalosis     Gastrointestinal hemorrhage     Gastrointestin described in this documentation, as documented  by  Ms Brennan Davis PA-C,  and they are both accurate and complete.

## 2019-09-01 NOTE — H&P
[facility]    PATIENT'S NAME: Amy Steinberg   ATTENDING PHYSICIAN: Sera Bhandari M.D.    PATIENT ACCOUNT#:   [de-identified]    LOCATION:  [address] [place] [address]  MEDICAL RECORD #:   [id number]       YOB: 1934  ADMISSION DATE:       08/31/ bed, in no apparent distress. VITAL SIGNS:  Reviewed and stable. HEENT:  Head is atraumatic, normocephalic. Pupils are equally reactive to light. Extraocular muscle movements are intact.   Ears are normal.  Nose is normal.  Oral cavity is normal.  NEC

## 2019-09-01 NOTE — PROGRESS NOTES
Alert and oriented x 3;denies any pain;stated passing gas only small;continue to keepNPO. iv abt and solucortef  12 noon-ambulating in hallway with son,staff and phy.th;including climbing the stairs 6x;toleating good with cane and walker;noted good bm-forme

## 2019-09-02 LAB
ALBUMIN SERPL-MCNC: 2.6 G/DL (ref 3.4–5)
ALBUMIN/GLOB SERPL: 1 {RATIO} (ref 1–2)
ALP LIVER SERPL-CCNC: 59 U/L (ref 45–117)
ALT SERPL-CCNC: 19 U/L (ref 16–61)
ANION GAP SERPL CALC-SCNC: 8 MMOL/L (ref 0–18)
AST SERPL-CCNC: 20 U/L (ref 15–37)
BASOPHILS # BLD AUTO: 0 X10(3) UL (ref 0–0.2)
BASOPHILS NFR BLD AUTO: 0 %
BILIRUB SERPL-MCNC: 0.6 MG/DL (ref 0.1–2)
BUN BLD-MCNC: 27 MG/DL (ref 7–18)
BUN/CREAT SERPL: 20.1 (ref 10–20)
CALCIUM BLD-MCNC: 7.7 MG/DL (ref 8.5–10.1)
CHLORIDE SERPL-SCNC: 116 MMOL/L (ref 98–112)
CO2 SERPL-SCNC: 21 MMOL/L (ref 21–32)
CREAT BLD-MCNC: 1.34 MG/DL (ref 0.7–1.3)
DEPRECATED RDW RBC AUTO: 42.2 FL (ref 35.1–46.3)
EOSINOPHIL # BLD AUTO: 0 X10(3) UL (ref 0–0.7)
EOSINOPHIL NFR BLD AUTO: 0 %
ERYTHROCYTE [DISTWIDTH] IN BLOOD BY AUTOMATED COUNT: 12.7 % (ref 11–15)
GLOBULIN PLAS-MCNC: 2.5 G/DL (ref 2.8–4.4)
GLUCOSE BLD-MCNC: 129 MG/DL (ref 70–99)
HCT VFR BLD AUTO: 31.6 % (ref 39–53)
HGB BLD-MCNC: 10.8 G/DL (ref 13–17.5)
IMM GRANULOCYTES # BLD AUTO: 0.04 X10(3) UL (ref 0–1)
IMM GRANULOCYTES NFR BLD: 0.4 %
LYMPHOCYTES # BLD AUTO: 0.74 X10(3) UL (ref 1–4)
LYMPHOCYTES NFR BLD AUTO: 7.7 %
M PROTEIN MFR SERPL ELPH: 5.1 G/DL (ref 6.4–8.2)
MCH RBC QN AUTO: 31.5 PG (ref 26–34)
MCHC RBC AUTO-ENTMCNC: 34.2 G/DL (ref 31–37)
MCV RBC AUTO: 92.1 FL (ref 80–100)
MONOCYTES # BLD AUTO: 0.3 X10(3) UL (ref 0.1–1)
MONOCYTES NFR BLD AUTO: 3.1 %
NEUTROPHILS # BLD AUTO: 8.54 X10 (3) UL (ref 1.5–7.7)
NEUTROPHILS # BLD AUTO: 8.54 X10(3) UL (ref 1.5–7.7)
NEUTROPHILS NFR BLD AUTO: 88.8 %
OSMOLALITY SERPL CALC.SUM OF ELEC: 307 MOSM/KG (ref 275–295)
PLATELET # BLD AUTO: 122 10(3)UL (ref 150–450)
POTASSIUM SERPL-SCNC: 3.2 MMOL/L (ref 3.5–5.1)
POTASSIUM SERPL-SCNC: 3.5 MMOL/L (ref 3.5–5.1)
RBC # BLD AUTO: 3.43 X10(6)UL (ref 3.8–5.8)
SODIUM SERPL-SCNC: 145 MMOL/L (ref 136–145)
WBC # BLD AUTO: 9.6 X10(3) UL (ref 4–11)

## 2019-09-02 PROCEDURE — 99232 SBSQ HOSP IP/OBS MODERATE 35: CPT | Performed by: COLON & RECTAL SURGERY

## 2019-09-02 RX ORDER — POTASSIUM CHLORIDE 20 MEQ/1
40 TABLET, EXTENDED RELEASE ORAL ONCE
Status: COMPLETED | OUTPATIENT
Start: 2019-09-02 | End: 2019-09-02

## 2019-09-02 RX ORDER — CARVEDILOL 3.12 MG/1
3.12 TABLET ORAL 2 TIMES DAILY WITH MEALS
Status: DISCONTINUED | OUTPATIENT
Start: 2019-09-02 | End: 2019-09-03

## 2019-09-02 RX ORDER — FUROSEMIDE 20 MG/1
20 TABLET ORAL DAILY
Status: DISCONTINUED | OUTPATIENT
Start: 2019-09-02 | End: 2019-09-03

## 2019-09-02 RX ORDER — HYDRALAZINE HYDROCHLORIDE 20 MG/ML
10 INJECTION INTRAMUSCULAR; INTRAVENOUS EVERY 6 HOURS PRN
Status: DISCONTINUED | OUTPATIENT
Start: 2019-09-02 | End: 2019-09-03

## 2019-09-02 RX ORDER — POTASSIUM CHLORIDE 20 MEQ/1
40 TABLET, EXTENDED RELEASE ORAL EVERY 4 HOURS
Status: DISCONTINUED | OUTPATIENT
Start: 2019-09-02 | End: 2019-09-02

## 2019-09-02 RX ORDER — POTASSIUM CHLORIDE 750 MG/1
10 TABLET, EXTENDED RELEASE ORAL ONCE
Status: DISCONTINUED | OUTPATIENT
Start: 2019-09-02 | End: 2019-09-02

## 2019-09-02 RX ORDER — POTASSIUM CHLORIDE 20 MEQ/1
40 TABLET, EXTENDED RELEASE ORAL EVERY 4 HOURS
Status: COMPLETED | OUTPATIENT
Start: 2019-09-02 | End: 2019-09-03

## 2019-09-02 RX ORDER — FUROSEMIDE 10 MG/ML
20 INJECTION INTRAMUSCULAR; INTRAVENOUS ONCE
Status: COMPLETED | OUTPATIENT
Start: 2019-09-02 | End: 2019-09-02

## 2019-09-02 RX ORDER — POTASSIUM CHLORIDE 20 MEQ/1
40 TABLET, EXTENDED RELEASE ORAL DAILY
Status: DISCONTINUED | OUTPATIENT
Start: 2019-09-02 | End: 2019-09-02

## 2019-09-02 NOTE — PLAN OF CARE
Patient alert and oriented, ambulating to the bathroom with standby assistance. No complaints of pain throughout the night reports 3 formed bowel movements, unwitnessed by staff as patient flushed toilet. He is aware that staff wants  to see bms.     Carole Faustin

## 2019-09-02 NOTE — PROGRESS NOTES
[facility]    Progress Note    Jaiden Tabares Patient Status:  Inpatient    1934 MRN [id number]   [facility] 4NW-A Attending Orly Page MD   Hosp Day # 2 PCP Asif Menjivar MD       SUBJECTIVE:  Has been doing better (PF) 4 MG/ML injection 2 mg 2 mg Intravenous Q4H PRN   albuterol sulfate (VENTOLIN) (2.5 MG/3ML) 0.083% nebulizer solution 2.5 mg 2.5 mg Nebulization Q4H PRN   Albuterol Sulfate  (90 Base) MCG/ACT inhaler 2 puff 2 puff Inhalation Q6H PRN   umeclidin Hospitalization - Initial Certification    Patient will require inpatient services that will reasonably be expected to span two midnight's based on the clinical documentation in H+P.    Based on patients current state of illness, I anticipate that, after [facility]

## 2019-09-02 NOTE — PROGRESS NOTES
[facility]  Progress Note    Beulah Moreira Patient Status:  Inpatient    1934 MRN [id number]   [facility] 4NW-A Attending Bella Juares MD   Hosp Day # 2 PCP Lillian Gómez MD     Subjective:  No abdominal pain.   No nause Acute renal failure (ARF) (HCC)     ANTHONY (acute kidney injury) (HCC)     Azotemia     Metabolic acidosis     Metabolic alkalosis     Respiratory alkalosis     Gastrointestinal hemorrhage     Gastrointestinal hemorrhage, unspecified gastrointestinal hemorr

## 2019-09-02 NOTE — PLAN OF CARE
Resting in bed comfortably. No complaints voiced. Voiding w/ no difficulty. Passing gas but no stools. Encouraged to ambulate. Diet advanced to soft for dinner. Will continue to monitor.

## 2019-09-02 NOTE — PROGRESS NOTES
[facility] Pharmacy Note:  Renal Adjustment for piperacillin/tazobactam (Sergio Carrington)    Syed Alexis Felipe is a 80year old male who has been prescribed piperacillin/tazobactam (ZOSYN) 3.375 gm every 12 hrs.   CrCl is estimated creatinine clearance is 28.5 mL/min (A)

## 2019-09-02 NOTE — PLAN OF CARE
Awake & alert,ambulating the halls. Denies having pain,patient is passing gas,claims he had couple BMs lat night. Diet advanced to clear liquids as ordered by GI. Remains on IV steroids & IV protonix.  Oral potassium replacement rendered for potassium of 3.2

## 2019-09-02 NOTE — PROGRESS NOTES
[facility]  Progress Note    Toni Fox Patient Status:  Inpatient    1934 MRN [id number]   [facility] 4NW-A Attending Phyllis Curiel MD   Date 2019 PCP Terri Murphy MD     Subjective:  Toni Fox is a transition point identified. No sign of colonic obstruction. No development of ascites or free air. Development of small right pleural effusion.        Assessment:  Patient Active Problem List:     Abdominal pain     Back pain with radiation     Commun

## 2019-09-03 VITALS
HEART RATE: 63 BPM | WEIGHT: 140.5 LBS | BODY MASS INDEX: 27 KG/M2 | OXYGEN SATURATION: 99 % | TEMPERATURE: 98 F | DIASTOLIC BLOOD PRESSURE: 75 MMHG | SYSTOLIC BLOOD PRESSURE: 159 MMHG | RESPIRATION RATE: 18 BRPM

## 2019-09-03 LAB — POTASSIUM SERPL-SCNC: 4.2 MMOL/L (ref 3.5–5.1)

## 2019-09-03 PROCEDURE — 99232 SBSQ HOSP IP/OBS MODERATE 35: CPT | Performed by: COLON & RECTAL SURGERY

## 2019-09-03 RX ORDER — PREDNISONE 10 MG/1
TABLET ORAL
Qty: 42 TABLET | Refills: 0 | Status: SHIPPED | OUTPATIENT
Start: 2019-09-03 | End: 2019-09-11 | Stop reason: ALTCHOICE

## 2019-09-03 NOTE — DIETARY NOTE
[facility]   CLINICAL NUTRITION    [place]     Admitting diagnosis:  Small bowel obstruction ([facility] [D78.539]  Crohn's disease with complication, unspecified gastrointestinal tract location [facility] [K50.919]    Ht:  5'  Wt: 63.7 kg (140 lb 8

## 2019-09-03 NOTE — OCCUPATIONAL THERAPY NOTE
OCCUPATIONAL THERAPY QUICK EVALUATION - INPATIENT    Room Number: 430/430-A  Evaluation Date: 9/3/2019     Type of Evaluation: Quick Eval  Presenting Problem: SBO, Chron's disease    Physician Order: IP Consult to Occupational Therapy  Reason for Therapy: Performed by Jaclyn Leigh MD at [facility] MA[facility] OR   [facility]    Performed by Najma Cody DO at [address] 6/15/2018    Performed by Syed Johnson MD at [address] [facility] None    AM-PAC Score:  Score: 23  Approx Degree of Impairment: 15.86%  Standardized Score (AM-PAC Scale): 51.12  CMS Modifier (G-Code): CI    FUNCTIONAL TRANSFER ASSESSMENT  Supine to Sit : Supervision  Sit to Stand: Modified independent    Skilled Therapy admission.     Patient was able to achieve the following goals:  Patient able to toilet transfer: at previous functional level  Patient able to dress lower extremities: at previous functional level  Patient/Caregiver able to demonstrate safety with ADLS: at

## 2019-09-03 NOTE — PLAN OF CARE
Patient A+Ox4. BP running in 160's at bedtime still,gave PRN hydralazine with good results. Started having some increased SOB and wheezing. Stopped IVF and paged MD. Maretta Aschoff to MD updated on patient condition.  Ordered to discontinue IVF and given one time do as ordered  - Monitor response to electrolyte replacements, including rhythm and repeat lab results as appropriate  - Fluid restriction as ordered  - Instruct patient on fluid and nutrition restrictions as appropriate  Outcome: Not Progressing     Problem:

## 2019-09-03 NOTE — PROGRESS NOTES
[facility]  Progress Note    Ruddy Rodriguez Patient Status:  Inpatient    1934 MRN [id number]   [facility] 4NW-A Attending Renny Leung MD   Hosp Day # 3 PCP Ramiro Casillas MD     Subjective:  Doing well, no pain, +loose hypertension, benign     Acute exacerbation of chronic obstructive pulmonary disease (COPD) (HCC)     Renal insufficiency     Diarrhea, unspecified type     Hypernatremia     Crohn's disease of large intestine without complication (HCC)     Chest pain, aty

## 2019-09-03 NOTE — PLAN OF CARE
Awake & alert,able to make needs known,no c/o abdominal pain,no SOB. Maintained on RA,sats 100%. Ambulating the halls. Passing gas & having BMs,had 2 Bms this morning. Tolerating soft food. Remains on IV steroids & Zosyn IV antibiotics.  No adverse reactions

## 2019-09-03 NOTE — PROGRESS NOTES
Gastroenterology Follow-Up Note      Darlin Carlos Patient Status:  Inpatient    1934 MRN [id number]   [facility] 4NW-A Attending Michele Carbone MD   Hosp Day # 3 PCP Nohemy De La Rosa MD     Chief Complaint/Reason for [facility]

## 2019-09-03 NOTE — PROGRESS NOTES
[facility]    Progress Note    Marcus Morrison Patient Status:  Inpatient    1934 MRN [id number]   [facility] [place] 4NW-A Attending Lauri Altman MD   Hosp Day # 3 PCP John Kang MD        Subjective:     Constitutional: Decrease in the caliber of previously dilated loops of small bowel in the right upper quadrant interposed between the liver and the diaphragm.   Other loops of bowel appear more dilated with fluid, but the patient has been challenged with a large volume of

## 2019-09-03 NOTE — PAYOR COMM NOTE
--------------  ADMISSION REVIEW     Payor: [address] #:  [id number]  Authorization Number: [id number]    Admit date: 8/31/19  Admit time: 200       Admitting Physician: Stefani Piper MD  Attending Physician:  Stefani Piper MD components:    Spec Gravity 1.032 (*)     Blood Urine Small (*)     All other components within normal limits   PROTHROMBIN TIME (PT) - Abnormal; Notable for the following components:    PT 15.0 (*)     INR 1.13 (*)     All other components within normal l 2/27/2019          ICD-10-CM Noted POA    * (Principal) Crohn's disease with complication, unspecified gastrointestinal tract location [facility]) K50.919 8/31/2019     Crohn's disease with complication [facility]) I04.085 8/31/2019 Unknown    Small bowel obstruction Consultation:  SBO    Impression:  Questionable IBD/CD colitis  PSBO vs enteritis vs IBD/CD small bowel     Plan:  NPO  Empiric IV steroids  Appreciate surgical input  Consider enteroscopy/capsule endoscopy once acute process resolved        HISTORY AND PH count  3. NPO except contrast  4. Ambulate, increase activity, up to chair, DVT prophylaxis  5.   REMAINS ON IV ZOSYN AND IV SOLUCORTEF      9/2/19 -     09/02/19 2028 97.5 °F (36.4 °C) 62 18 158/61 99 % — None (Room air) — LO   09/02/19 1715 — 73 — 152/59

## 2019-09-03 NOTE — PAYOR COMM NOTE
--------------  CONTINUED STAY REVIEW    Payor: William  Subscriber #:  [id number]  Authorization Number: [id number]    Admit date: 8/31/19  Admit time: 200    Admitting Physician: Bruce Doe MD  Attending Physician:  Bruce Doe MD

## 2019-09-05 NOTE — PAYOR COMM NOTE
--------------  DISCHARGE REVIEW    Payor: [id number] [place] [address] #:  [id number]  Authorization Number: [id number]    Admit date: 8/31/19  Admit time:  8804  Discharge Date: 9/3/2019  3:52 PM     Admitting Physician: Brandon Rubin MD  Primary

## 2019-10-09 ENCOUNTER — HOSPITAL ENCOUNTER (OUTPATIENT)
Dept: CT IMAGING | Facility: HOSPITAL | Age: 84
Discharge: HOME | End: 2019-10-09
Attending: INTERNAL MEDICINE
Payer: MEDICARE

## 2019-10-09 DIAGNOSIS — K56.609 SMALL BOWEL OBSTRUCTION (HCC): ICD-10-CM

## 2019-10-09 PROCEDURE — 74177 CT ABD & PELVIS W/CONTRAST: CPT | Performed by: INTERNAL MEDICINE

## 2019-10-09 PROCEDURE — 82565 ASSAY OF CREATININE: CPT

## 2019-10-12 ENCOUNTER — LAB ENCOUNTER (OUTPATIENT)
Dept: LAB | Facility: HOSPITAL | Age: 84
End: 2019-10-12
Attending: INTERNAL MEDICINE
Payer: MEDICARE

## 2019-10-12 DIAGNOSIS — K52.9 INFLAMMATORY BOWEL DISEASE: ICD-10-CM

## 2019-10-12 DIAGNOSIS — E03.9 HYPOTHYROIDISM: ICD-10-CM

## 2019-10-12 DIAGNOSIS — Z12.5 SPECIAL SCREENING, PROSTATE CANCER: Primary | ICD-10-CM

## 2019-10-12 DIAGNOSIS — I25.10 CAD (CORONARY ARTERY DISEASE): ICD-10-CM

## 2019-10-12 PROCEDURE — 84153 ASSAY OF PSA TOTAL: CPT

## 2019-10-12 PROCEDURE — 80061 LIPID PANEL: CPT

## 2019-10-12 PROCEDURE — 84443 ASSAY THYROID STIM HORMONE: CPT

## 2019-10-12 PROCEDURE — 80053 COMPREHEN METABOLIC PANEL: CPT

## 2019-10-12 PROCEDURE — 81001 URINALYSIS AUTO W/SCOPE: CPT

## 2019-10-12 PROCEDURE — 36415 COLL VENOUS BLD VENIPUNCTURE: CPT

## 2019-10-12 PROCEDURE — 85025 COMPLETE CBC W/AUTO DIFF WBC: CPT

## 2019-10-12 PROCEDURE — 84439 ASSAY OF FREE THYROXINE: CPT

## 2020-01-30 ENCOUNTER — APPOINTMENT (OUTPATIENT)
Dept: CT IMAGING | Age: 85
End: 2020-01-30
Attending: PHYSICIAN ASSISTANT
Payer: MEDICARE

## 2020-01-30 ENCOUNTER — HOSPITAL ENCOUNTER (OUTPATIENT)
Age: 85
Discharge: HOME | End: 2020-01-30
Attending: EMERGENCY MEDICINE
Payer: MEDICARE

## 2020-01-30 ENCOUNTER — APPOINTMENT (OUTPATIENT)
Dept: GENERAL RADIOLOGY | Age: 85
End: 2020-01-30
Attending: PHYSICIAN ASSISTANT
Payer: MEDICARE

## 2020-01-30 VITALS
RESPIRATION RATE: 18 BRPM | SYSTOLIC BLOOD PRESSURE: 176 MMHG | DIASTOLIC BLOOD PRESSURE: 77 MMHG | HEART RATE: 66 BPM | OXYGEN SATURATION: 99 % | TEMPERATURE: 99 F

## 2020-01-30 DIAGNOSIS — S51.811A SKIN TEAR OF RIGHT FOREARM WITHOUT COMPLICATION, INITIAL ENCOUNTER: Primary | ICD-10-CM

## 2020-01-30 DIAGNOSIS — S00.03XA CONTUSION OF SCALP, INITIAL ENCOUNTER: ICD-10-CM

## 2020-01-30 PROCEDURE — 99214 OFFICE O/P EST MOD 30 MIN: CPT

## 2020-01-30 PROCEDURE — 70450 CT HEAD/BRAIN W/O DYE: CPT | Performed by: PHYSICIAN ASSISTANT

## 2020-01-30 PROCEDURE — 73110 X-RAY EXAM OF WRIST: CPT | Performed by: PHYSICIAN ASSISTANT

## 2020-01-30 RX ORDER — ACETAMINOPHEN 500 MG
1000 TABLET ORAL ONCE
Status: COMPLETED | OUTPATIENT
Start: 2020-01-30 | End: 2020-01-30

## 2020-01-30 NOTE — ED PROVIDER NOTES
Patient Seen in: [facility]      History   Patient presents with:  Fall    Stated Complaint: Right arm injury/fell today    HPI    41-year-old male. Moderate medical history. Arrives with family.   2 and half hours prior to arr SIGMOIDOSCOPY N/A 8/8/2018    Performed by Omar Ramey DO at [address] N/A 6/15/2018    Performed by Kennedi Johnson MD at [address],[address]     • OTHER SURGICAL HISTORY radial pulse. Some tenderness to palpation of the wrist.  Pronates and supinates without difficulty. Opposition of all digits intact. Patient allows full passive range of motion to the bilateral lower extremities. Ambulates without difficulty.   Back: F prominence of the ventricles. There are patchy areas of low attenuation in the periventricular and deep white matter which are nonspecific but most consistent with small vessel ischemic changes. There is no evidence of midline shift.   There is a hygroma al

## 2020-01-30 NOTE — ED INITIAL ASSESSMENT (HPI)
Sts that he had tripped and fell while at the store. Sts that he tripped over a pallet and hit his right arm and head. Abrasions noted to left side of forehead and skin tear to right forearm. Denies any LOC, but report mild dizziness.

## 2020-03-16 NOTE — DISCHARGE SUMMARY
[facility]    PATIENT'S NAME: Kacie Fischer   ATTENDING PHYSICIAN: Stuart Cerna M.D.    PATIENT ACCOUNT#:   [de-identified]    LOCATION:  [address] [place] [address]  MEDICAL RECORD #:   [id number]       YOB: 1934  ADMISSION DATE:       08/31/

## 2020-09-05 ENCOUNTER — LAB ENCOUNTER (OUTPATIENT)
Dept: LAB | Facility: HOSPITAL | Age: 85
End: 2020-09-05
Attending: DENTIST
Payer: MEDICARE

## 2020-09-05 DIAGNOSIS — E03.9 MYXEDEMA HEART DISEASE: ICD-10-CM

## 2020-09-05 DIAGNOSIS — K52.9 INFLAMMATORY BOWEL DISEASE: ICD-10-CM

## 2020-09-05 DIAGNOSIS — I51.9 MYXEDEMA HEART DISEASE: ICD-10-CM

## 2020-09-05 DIAGNOSIS — I25.10 CORONARY ATHEROSCLEROSIS OF NATIVE CORONARY ARTERY: ICD-10-CM

## 2020-09-05 DIAGNOSIS — Z12.5 SPECIAL SCREENING FOR MALIGNANT NEOPLASM OF PROSTATE: Primary | ICD-10-CM

## 2020-09-05 LAB
ALBUMIN SERPL-MCNC: 3 G/DL (ref 3.4–5)
ALBUMIN/GLOB SERPL: 1.1 {RATIO} (ref 1–2)
ALP LIVER SERPL-CCNC: 141 U/L (ref 45–117)
ALT SERPL-CCNC: 17 U/L (ref 16–61)
ANION GAP SERPL CALC-SCNC: 2 MMOL/L (ref 0–18)
AST SERPL-CCNC: 21 U/L (ref 15–37)
BASOPHILS # BLD AUTO: 0.03 X10(3) UL (ref 0–0.2)
BASOPHILS NFR BLD AUTO: 0.4 %
BILIRUB SERPL-MCNC: 0.7 MG/DL (ref 0.1–2)
BILIRUB UR QL STRIP.AUTO: NEGATIVE
BUN BLD-MCNC: 17 MG/DL (ref 7–18)
BUN/CREAT SERPL: 13.1 (ref 10–20)
CALCIUM BLD-MCNC: 8.4 MG/DL (ref 8.5–10.1)
CHLORIDE SERPL-SCNC: 112 MMOL/L (ref 98–112)
CHOLEST SMN-MCNC: 67 MG/DL (ref ?–200)
CLARITY UR REFRACT.AUTO: CLEAR
CO2 SERPL-SCNC: 28 MMOL/L (ref 21–32)
COLOR UR AUTO: YELLOW
COMPLEXED PSA SERPL-MCNC: 1.98 NG/ML (ref ?–4)
CREAT BLD-MCNC: 1.3 MG/DL (ref 0.7–1.3)
DEPRECATED RDW RBC AUTO: 44 FL (ref 35.1–46.3)
EOSINOPHIL # BLD AUTO: 0.31 X10(3) UL (ref 0–0.7)
EOSINOPHIL NFR BLD AUTO: 3.7 %
ERYTHROCYTE [DISTWIDTH] IN BLOOD BY AUTOMATED COUNT: 12.7 % (ref 11–15)
GLOBULIN PLAS-MCNC: 2.8 G/DL (ref 2.8–4.4)
GLUCOSE BLD-MCNC: 87 MG/DL (ref 70–99)
GLUCOSE UR STRIP.AUTO-MCNC: NEGATIVE MG/DL
HCT VFR BLD AUTO: 37.4 % (ref 39–53)
HDLC SERPL-MCNC: 40 MG/DL (ref 40–59)
HGB BLD-MCNC: 12.2 G/DL (ref 13–17.5)
IMM GRANULOCYTES # BLD AUTO: 0.12 X10(3) UL (ref 0–1)
IMM GRANULOCYTES NFR BLD: 1.4 %
KETONES UR STRIP.AUTO-MCNC: NEGATIVE MG/DL
LDLC SERPL CALC-MCNC: 13 MG/DL (ref ?–100)
LEUKOCYTE ESTERASE UR QL STRIP.AUTO: NEGATIVE
LYMPHOCYTES # BLD AUTO: 1.91 X10(3) UL (ref 1–4)
LYMPHOCYTES NFR BLD AUTO: 22.7 %
M PROTEIN MFR SERPL ELPH: 5.8 G/DL (ref 6.4–8.2)
MCH RBC QN AUTO: 30.8 PG (ref 26–34)
MCHC RBC AUTO-ENTMCNC: 32.6 G/DL (ref 31–37)
MCV RBC AUTO: 94.4 FL (ref 80–100)
MONOCYTES # BLD AUTO: 0.6 X10(3) UL (ref 0.1–1)
MONOCYTES NFR BLD AUTO: 7.1 %
NEUTROPHILS # BLD AUTO: 5.46 X10 (3) UL (ref 1.5–7.7)
NEUTROPHILS # BLD AUTO: 5.46 X10(3) UL (ref 1.5–7.7)
NEUTROPHILS NFR BLD AUTO: 64.7 %
NITRITE UR QL STRIP.AUTO: NEGATIVE
NONHDLC SERPL-MCNC: 27 MG/DL (ref ?–130)
OSMOLALITY SERPL CALC.SUM OF ELEC: 295 MOSM/KG (ref 275–295)
PATIENT FASTING Y/N/NP: YES
PATIENT FASTING Y/N/NP: YES
PH UR STRIP.AUTO: 6 [PH] (ref 4.5–8)
PLATELET # BLD AUTO: 185 10(3)UL (ref 150–450)
POTASSIUM SERPL-SCNC: 4.5 MMOL/L (ref 3.5–5.1)
PROT UR STRIP.AUTO-MCNC: 30 MG/DL
RBC # BLD AUTO: 3.96 X10(6)UL (ref 3.8–5.8)
SODIUM SERPL-SCNC: 142 MMOL/L (ref 136–145)
SP GR UR STRIP.AUTO: 1.01 (ref 1–1.03)
T4 FREE SERPL-MCNC: 0.9 NG/DL (ref 0.8–1.7)
TRIGL SERPL-MCNC: 69 MG/DL (ref 30–149)
TSI SER-ACNC: 3.87 MIU/ML (ref 0.36–3.74)
UROBILINOGEN UR STRIP.AUTO-MCNC: <2 MG/DL
VLDLC SERPL CALC-MCNC: 14 MG/DL (ref 0–30)
WBC # BLD AUTO: 8.4 X10(3) UL (ref 4–11)

## 2020-09-05 PROCEDURE — 85025 COMPLETE CBC W/AUTO DIFF WBC: CPT

## 2020-09-05 PROCEDURE — 84439 ASSAY OF FREE THYROXINE: CPT

## 2020-09-05 PROCEDURE — 80053 COMPREHEN METABOLIC PANEL: CPT

## 2020-09-05 PROCEDURE — 84443 ASSAY THYROID STIM HORMONE: CPT

## 2020-09-05 PROCEDURE — 80061 LIPID PANEL: CPT

## 2020-09-05 PROCEDURE — 81001 URINALYSIS AUTO W/SCOPE: CPT

## 2020-09-05 PROCEDURE — 36415 COLL VENOUS BLD VENIPUNCTURE: CPT

## 2020-10-09 LAB
BASO%: 0.3 %
BASO: 0 10^3/UL
EOS%: 1.4 %
EOS: 0.1 10^3/UL
HCT: 29.2 % (ref 38–54)
HGB: 9.8 G/DL (ref 12–18)
LYMPH%: 22.4 % (ref 12–44)
LYMPH: 1.7 10^3/UL (ref 0.8–2.8)
MCH: 31.5 PG (ref 26–33)
MCHC: 33.6 G/DL (ref 31–36)
MCV: 93.9 FML (ref 82–100)
MONO%: 7.2 % (ref 2–12)
MONO: 0.6 10^3/UL (ref 0.2–1)
MPV: 10.3 FML (ref 8.6–11.7)
NEUT%: 68.7 % (ref 47–76)
NEUT: 5.3 10^3/UL (ref 1.5–7.1)
PLT: 114 10^3/UL (ref 150–375)
RBC: 3.11 10^6/UL (ref 4.2–6.2)
RDW-CV: 16.7 %
RDW-SD: 55.1 FML (ref 36–50)
WBC: 7.6 10^3/UL (ref 4.3–11)

## 2020-10-19 NOTE — CM/SW NOTE
09/03/19 1400   CM/SW Screening   Referral Source Social Work (self-referral)   Information Source Chart review;Nursing rounds   Patient's Mental Status Alert;Oriented       HOME SITUATION  [place]   Home Layout: One level  Stairs to [facility]
Detail Level: Generalized

## 2020-12-02 ENCOUNTER — HOSPITAL ENCOUNTER (EMERGENCY)
Facility: HOSPITAL | Age: 85
Discharge: HOME | End: 2020-12-02
Attending: EMERGENCY MEDICINE
Payer: MEDICARE

## 2020-12-02 ENCOUNTER — APPOINTMENT (OUTPATIENT)
Dept: CT IMAGING | Facility: HOSPITAL | Age: 85
End: 2020-12-02
Attending: EMERGENCY MEDICINE
Payer: MEDICARE

## 2020-12-02 VITALS
HEART RATE: 59 BPM | RESPIRATION RATE: 20 BRPM | SYSTOLIC BLOOD PRESSURE: 161 MMHG | TEMPERATURE: 98 F | DIASTOLIC BLOOD PRESSURE: 76 MMHG | OXYGEN SATURATION: 99 % | HEIGHT: 63 IN | WEIGHT: 145 LBS | BODY MASS INDEX: 25.69 KG/M2

## 2020-12-02 DIAGNOSIS — R55 SYNCOPE AND COLLAPSE: Primary | ICD-10-CM

## 2020-12-02 PROCEDURE — 80053 COMPREHEN METABOLIC PANEL: CPT | Performed by: EMERGENCY MEDICINE

## 2020-12-02 PROCEDURE — 81003 URINALYSIS AUTO W/O SCOPE: CPT | Performed by: EMERGENCY MEDICINE

## 2020-12-02 PROCEDURE — 85025 COMPLETE CBC W/AUTO DIFF WBC: CPT | Performed by: EMERGENCY MEDICINE

## 2020-12-02 PROCEDURE — 93010 ELECTROCARDIOGRAM REPORT: CPT

## 2020-12-02 PROCEDURE — 70450 CT HEAD/BRAIN W/O DYE: CPT | Performed by: EMERGENCY MEDICINE

## 2020-12-02 PROCEDURE — 36415 COLL VENOUS BLD VENIPUNCTURE: CPT

## 2020-12-02 PROCEDURE — 99285 EMERGENCY DEPT VISIT HI MDM: CPT

## 2020-12-02 PROCEDURE — 99284 EMERGENCY DEPT VISIT MOD MDM: CPT

## 2020-12-02 PROCEDURE — 84484 ASSAY OF TROPONIN QUANT: CPT | Performed by: EMERGENCY MEDICINE

## 2020-12-02 PROCEDURE — 93005 ELECTROCARDIOGRAM TRACING: CPT

## 2020-12-02 NOTE — ED INITIAL ASSESSMENT (HPI)
Pt presents to ED via NFD. EMS reports pt family called due to pt becoming unresponsive for approx 3 min while sitting at table about to eat dinner. Pt states he remembers feeling dizzy and fatigue prior to syncope. Pt denies head injury or trauma.

## 2020-12-03 NOTE — ED PROVIDER NOTES
Patient Seen in: [facility] Emergency Department      History   Patient presents with:  Syncope    Stated Complaint: dizziness/syncope    HPI    80year-old male presents after a syncopal episode.   The patient's daughter reports that he came over fo Osteoporosis    • Other and unspecified hyperlipidemia    • Shortness of breath    • Thyroid disease    • Unspecified essential hypertension    • Wheezing               Past Surgical History:   Procedure Laterality Date   • ANGIOGRAM      2009   • [address] Abdomen: Soft and nontender. No abdominal masses.   No peritoneal signs   Extremities: no edema, normal peripheral pulses   Neuro: Alert oriented and nonfocal   Skin: no rashes or nodules    ED Course     Labs Reviewed   COMP METABOLIC PANEL (14) - Abnor performed through the brain. Dose reduction techniques were used. Dose information is transmitted to the  [facility] of Radiology) NRDR ([address]) which includes the Dose Index Registry.   PATIENT STATED HISTORY: (As transc laboratory evaluation. Minimal change in creatinine from baseline. Negative troponin. Negative Covid. No significant anemia. No risk factors on the [place] syncope score. He is neurologically intact.   I believe he may have overheated as the rep

## 2020-12-03 NOTE — ED NOTES
Pt assisted getting dressed into clothes. Pt wheeled to daughter car and assisted into car with no complications.

## 2021-01-27 DIAGNOSIS — Z23 NEED FOR VACCINATION: ICD-10-CM

## 2021-02-06 ENCOUNTER — IMMUNIZATION (OUTPATIENT)
Dept: LAB | Age: 86
End: 2021-02-06
Attending: HOSPITALIST
Payer: MEDICARE

## 2021-02-06 DIAGNOSIS — Z23 NEED FOR VACCINATION: Primary | ICD-10-CM

## 2021-02-06 PROCEDURE — 0001A SARSCOV2 VAC 30MCG/0.3ML IM: CPT

## 2021-02-27 ENCOUNTER — IMMUNIZATION (OUTPATIENT)
Dept: LAB | Age: 86
End: 2021-02-27
Attending: HOSPITALIST
Payer: MEDICARE

## 2021-02-27 DIAGNOSIS — Z23 NEED FOR VACCINATION: Primary | ICD-10-CM

## 2021-02-27 PROCEDURE — 0002A SARSCOV2 VAC 30MCG/0.3ML IM: CPT

## 2021-09-14 ENCOUNTER — LAB ENCOUNTER (OUTPATIENT)
Dept: LAB | Facility: HOSPITAL | Age: 86
End: 2021-09-14
Attending: INTERNAL MEDICINE
Payer: MEDICARE

## 2021-09-14 DIAGNOSIS — E55.9 AVITAMINOSIS D: ICD-10-CM

## 2021-09-14 DIAGNOSIS — E03.9 MYXEDEMA HEART DISEASE: Primary | ICD-10-CM

## 2021-09-14 DIAGNOSIS — N13.8 HYPERTROPHY OF PROSTATE WITH URINARY OBSTRUCTION: ICD-10-CM

## 2021-09-14 DIAGNOSIS — N40.1 HYPERTROPHY OF PROSTATE WITH URINARY OBSTRUCTION: ICD-10-CM

## 2021-09-14 DIAGNOSIS — J44.9 OBSTRUCTIVE CHRONIC BRONCHITIS WITHOUT EXACERBATION (HCC): ICD-10-CM

## 2021-09-14 DIAGNOSIS — I51.9 MYXEDEMA HEART DISEASE: Primary | ICD-10-CM

## 2021-09-14 DIAGNOSIS — I10 ESSENTIAL HYPERTENSION, MALIGNANT: ICD-10-CM

## 2021-09-14 LAB
ALBUMIN SERPL-MCNC: 2.8 G/DL (ref 3.4–5)
ALBUMIN/GLOB SERPL: 0.9 {RATIO} (ref 1–2)
ALP LIVER SERPL-CCNC: 133 U/L
ALT SERPL-CCNC: 17 U/L
ANION GAP SERPL CALC-SCNC: 5 MMOL/L (ref 0–18)
AST SERPL-CCNC: 18 U/L (ref 15–37)
BASOPHILS # BLD AUTO: 0.03 X10(3) UL (ref 0–0.2)
BASOPHILS NFR BLD AUTO: 0.5 %
BILIRUB SERPL-MCNC: 0.7 MG/DL (ref 0.1–2)
BILIRUB UR QL STRIP.AUTO: NEGATIVE
BUN BLD-MCNC: 23 MG/DL (ref 7–18)
CALCIUM BLD-MCNC: 8.5 MG/DL (ref 8.5–10.1)
CHLORIDE SERPL-SCNC: 114 MMOL/L (ref 98–112)
CHOLEST SERPL-MCNC: 67 MG/DL (ref ?–200)
CLARITY UR REFRACT.AUTO: CLEAR
CO2 SERPL-SCNC: 24 MMOL/L (ref 21–32)
COLOR UR AUTO: YELLOW
CREAT BLD-MCNC: 1.38 MG/DL
EOSINOPHIL # BLD AUTO: 0.23 X10(3) UL (ref 0–0.7)
EOSINOPHIL NFR BLD AUTO: 3.5 %
ERYTHROCYTE [DISTWIDTH] IN BLOOD BY AUTOMATED COUNT: 12.7 %
GLOBULIN PLAS-MCNC: 3.1 G/DL (ref 2.8–4.4)
GLUCOSE BLD-MCNC: 93 MG/DL (ref 70–99)
GLUCOSE UR STRIP.AUTO-MCNC: NEGATIVE MG/DL
HCT VFR BLD AUTO: 36.4 %
HDLC SERPL-MCNC: 33 MG/DL (ref 40–59)
HGB BLD-MCNC: 12 G/DL
IMM GRANULOCYTES # BLD AUTO: 0.03 X10(3) UL (ref 0–1)
IMM GRANULOCYTES NFR BLD: 0.5 %
KETONES UR STRIP.AUTO-MCNC: NEGATIVE MG/DL
LDLC SERPL CALC-MCNC: 17 MG/DL (ref ?–100)
LEUKOCYTE ESTERASE UR QL STRIP.AUTO: NEGATIVE
LYMPHOCYTES # BLD AUTO: 1.47 X10(3) UL (ref 1–4)
LYMPHOCYTES NFR BLD AUTO: 22.7 %
MCH RBC QN AUTO: 30.7 PG (ref 26–34)
MCHC RBC AUTO-ENTMCNC: 33 G/DL (ref 31–37)
MCV RBC AUTO: 93.1 FL
MONOCYTES # BLD AUTO: 0.68 X10(3) UL (ref 0.1–1)
MONOCYTES NFR BLD AUTO: 10.5 %
NEUTROPHILS # BLD AUTO: 4.05 X10 (3) UL (ref 1.5–7.7)
NEUTROPHILS # BLD AUTO: 4.05 X10(3) UL (ref 1.5–7.7)
NEUTROPHILS NFR BLD AUTO: 62.3 %
NITRITE UR QL STRIP.AUTO: NEGATIVE
NONHDLC SERPL-MCNC: 34 MG/DL (ref ?–130)
OSMOLALITY SERPL CALC.SUM OF ELEC: 299 MOSM/KG (ref 275–295)
PH UR STRIP.AUTO: 5 [PH] (ref 5–8)
PLATELET # BLD AUTO: 156 10(3)UL (ref 150–450)
POTASSIUM SERPL-SCNC: 4.4 MMOL/L (ref 3.5–5.1)
PROT SERPL-MCNC: 5.9 G/DL (ref 6.4–8.2)
PROT UR STRIP.AUTO-MCNC: 30 MG/DL
PSA SERPL-MCNC: 2.06 NG/ML (ref ?–4)
RBC # BLD AUTO: 3.91 X10(6)UL
SODIUM SERPL-SCNC: 143 MMOL/L (ref 136–145)
SP GR UR STRIP.AUTO: 1.01 (ref 1–1.03)
TRIGL SERPL-MCNC: 77 MG/DL (ref 30–149)
TSI SER-ACNC: 2.2 MIU/ML (ref 0.36–3.74)
UROBILINOGEN UR STRIP.AUTO-MCNC: <2 MG/DL
VIT D+METAB SERPL-MCNC: 47.8 NG/ML (ref 30–100)
VLDLC SERPL CALC-MCNC: 10 MG/DL (ref 0–30)
WBC # BLD AUTO: 6.5 X10(3) UL (ref 4–11)

## 2021-09-14 PROCEDURE — 84443 ASSAY THYROID STIM HORMONE: CPT

## 2021-09-14 PROCEDURE — 36415 COLL VENOUS BLD VENIPUNCTURE: CPT

## 2021-09-14 PROCEDURE — 80053 COMPREHEN METABOLIC PANEL: CPT

## 2021-09-14 PROCEDURE — 80061 LIPID PANEL: CPT

## 2021-09-14 PROCEDURE — 81001 URINALYSIS AUTO W/SCOPE: CPT

## 2021-09-14 PROCEDURE — 84153 ASSAY OF PSA TOTAL: CPT

## 2021-09-14 PROCEDURE — 82306 VITAMIN D 25 HYDROXY: CPT

## 2021-09-14 PROCEDURE — 85025 COMPLETE CBC W/AUTO DIFF WBC: CPT

## 2021-09-17 ENCOUNTER — LAB ENCOUNTER (OUTPATIENT)
Dept: LAB | Facility: HOSPITAL | Age: 86
End: 2021-09-17
Attending: INTERNAL MEDICINE
Payer: MEDICARE

## 2021-09-17 DIAGNOSIS — I25.10 CORONARY ATHEROSCLEROSIS OF NATIVE CORONARY ARTERY: ICD-10-CM

## 2021-09-17 DIAGNOSIS — I10 HYPERTENSION, ESSENTIAL: Primary | ICD-10-CM

## 2021-09-17 DIAGNOSIS — R06.02 SHORTNESS OF BREATH: ICD-10-CM

## 2021-09-17 LAB
ANION GAP SERPL CALC-SCNC: 6 MMOL/L (ref 0–18)
BUN BLD-MCNC: 20 MG/DL (ref 7–18)
CALCIUM BLD-MCNC: 9 MG/DL (ref 8.5–10.1)
CHLORIDE SERPL-SCNC: 114 MMOL/L (ref 98–112)
CO2 SERPL-SCNC: 24 MMOL/L (ref 21–32)
CREAT BLD-MCNC: 1.56 MG/DL
D-DIMER: 0.57 UG/ML FEU (ref ?–0.87)
GLUCOSE BLD-MCNC: 156 MG/DL (ref 70–99)
NT-PROBNP SERPL-MCNC: 1101 PG/ML (ref ?–450)
OSMOLALITY SERPL CALC.SUM OF ELEC: 304 MOSM/KG (ref 275–295)
PATIENT FASTING Y/N/NP: NO
POTASSIUM SERPL-SCNC: 4.1 MMOL/L (ref 3.5–5.1)
SODIUM SERPL-SCNC: 144 MMOL/L (ref 136–145)

## 2021-09-17 PROCEDURE — 83880 ASSAY OF NATRIURETIC PEPTIDE: CPT

## 2021-09-17 PROCEDURE — 85379 FIBRIN DEGRADATION QUANT: CPT

## 2021-09-17 PROCEDURE — 36415 COLL VENOUS BLD VENIPUNCTURE: CPT

## 2021-09-17 PROCEDURE — 80048 BASIC METABOLIC PNL TOTAL CA: CPT

## 2021-09-28 ENCOUNTER — HOSPITAL ENCOUNTER (OUTPATIENT)
Dept: CT IMAGING | Facility: HOSPITAL | Age: 86
Discharge: HOME | End: 2021-09-28
Attending: SPECIALIST
Payer: MEDICARE

## 2021-09-28 DIAGNOSIS — I25.10 CAD (CORONARY ARTERY DISEASE): ICD-10-CM

## 2021-09-28 DIAGNOSIS — Q23.3 MR (CONGENITAL MITRAL REGURGITATION): ICD-10-CM

## 2021-09-28 DIAGNOSIS — R06.02 SOB (SHORTNESS OF BREATH): ICD-10-CM

## 2021-09-28 DIAGNOSIS — I26.99 PULMONARY EMBOLI (HCC): ICD-10-CM

## 2021-09-28 DIAGNOSIS — I10 HYPERTENSION, UNSPECIFIED TYPE: ICD-10-CM

## 2021-09-28 PROCEDURE — 71270 CT THORAX DX C-/C+: CPT | Performed by: SPECIALIST

## 2021-10-08 ENCOUNTER — HOSPITAL ENCOUNTER (OUTPATIENT)
Dept: CV DIAGNOSTICS | Facility: HOSPITAL | Age: 86
Discharge: HOME | End: 2021-10-08
Attending: SPECIALIST
Payer: MEDICARE

## 2021-10-08 DIAGNOSIS — Q23.3 MR (CONGENITAL MITRAL REGURGITATION): ICD-10-CM

## 2021-10-08 DIAGNOSIS — I25.10 CAD (CORONARY ARTERY DISEASE): ICD-10-CM

## 2021-10-08 DIAGNOSIS — I10 HYPERTENSION, UNSPECIFIED TYPE: ICD-10-CM

## 2021-10-08 DIAGNOSIS — R06.02 SOB (SHORTNESS OF BREATH): ICD-10-CM

## 2021-10-08 DIAGNOSIS — I26.99 PULMONARY EMBOLI (HCC): ICD-10-CM

## 2021-10-08 PROCEDURE — 93306 TTE W/DOPPLER COMPLETE: CPT | Performed by: SPECIALIST

## 2021-12-11 ENCOUNTER — APPOINTMENT (OUTPATIENT)
Dept: CT IMAGING | Facility: HOSPITAL | Age: 86
DRG: 190 | End: 2021-12-11
Attending: EMERGENCY MEDICINE
Payer: MEDICARE

## 2021-12-11 ENCOUNTER — APPOINTMENT (OUTPATIENT)
Dept: GENERAL RADIOLOGY | Facility: HOSPITAL | Age: 86
DRG: 190 | End: 2021-12-11
Attending: EMERGENCY MEDICINE
Payer: MEDICARE

## 2021-12-11 ENCOUNTER — HOSPITAL ENCOUNTER (INPATIENT)
Facility: HOSPITAL | Age: 86
LOS: 3 days | Discharge: HOME | DRG: 190 | End: 2021-12-14
Attending: EMERGENCY MEDICINE | Admitting: INTERNAL MEDICINE
Payer: MEDICARE

## 2021-12-11 DIAGNOSIS — R53.1 WEAKNESS: ICD-10-CM

## 2021-12-11 DIAGNOSIS — I26.99 PULMONARY EMBOLISM, UNSPECIFIED CHRONICITY, UNSPECIFIED PULMONARY EMBOLISM TYPE, UNSPECIFIED WHETHER ACUTE COR PULMONALE PRESENT (HCC): ICD-10-CM

## 2021-12-11 DIAGNOSIS — J44.1 COPD EXACERBATION (HCC): Primary | ICD-10-CM

## 2021-12-11 DIAGNOSIS — E86.0 DEHYDRATION: ICD-10-CM

## 2021-12-11 PROCEDURE — 71260 CT THORAX DX C+: CPT | Performed by: EMERGENCY MEDICINE

## 2021-12-11 PROCEDURE — 71045 X-RAY EXAM CHEST 1 VIEW: CPT | Performed by: EMERGENCY MEDICINE

## 2021-12-11 RX ORDER — ALBUTEROL SULFATE 2.5 MG/3ML
2.5 SOLUTION RESPIRATORY (INHALATION) ONCE
Status: COMPLETED | OUTPATIENT
Start: 2021-12-11 | End: 2021-12-11

## 2021-12-11 RX ORDER — ALBUTEROL SULFATE 2.5 MG/3ML
2.5 SOLUTION RESPIRATORY (INHALATION) EVERY 6 HOURS PRN
COMMUNITY

## 2021-12-11 RX ORDER — HEPARIN SODIUM AND DEXTROSE 10000; 5 [USP'U]/100ML; G/100ML
INJECTION INTRAVENOUS CONTINUOUS
Status: DISCONTINUED | OUTPATIENT
Start: 2021-12-11 | End: 2021-12-14

## 2021-12-11 RX ORDER — PANTOPRAZOLE SODIUM 40 MG/1
40 TABLET, DELAYED RELEASE ORAL
Status: DISCONTINUED | OUTPATIENT
Start: 2021-12-12 | End: 2021-12-14

## 2021-12-11 RX ORDER — SODIUM CHLORIDE 9 MG/ML
INJECTION, SOLUTION INTRAVENOUS CONTINUOUS
Status: ACTIVE | OUTPATIENT
Start: 2021-12-11 | End: 2021-12-11

## 2021-12-11 RX ORDER — FLUTICASONE FUROATE, UMECLIDINIUM BROMIDE AND VILANTEROL TRIFENATATE 200; 62.5; 25 UG/1; UG/1; UG/1
1 POWDER RESPIRATORY (INHALATION) DAILY
COMMUNITY

## 2021-12-11 RX ORDER — ALBUTEROL SULFATE 2.5 MG/3ML
2.5 SOLUTION RESPIRATORY (INHALATION) EVERY 6 HOURS PRN
Status: DISCONTINUED | OUTPATIENT
Start: 2021-12-11 | End: 2021-12-14

## 2021-12-11 RX ORDER — CARVEDILOL 3.12 MG/1
3.12 TABLET ORAL 2 TIMES DAILY WITH MEALS
Status: DISCONTINUED | OUTPATIENT
Start: 2021-12-11 | End: 2021-12-14

## 2021-12-11 RX ORDER — METHYLPREDNISOLONE SODIUM SUCCINATE 125 MG/2ML
125 INJECTION, POWDER, LYOPHILIZED, FOR SOLUTION INTRAMUSCULAR; INTRAVENOUS ONCE
Status: COMPLETED | OUTPATIENT
Start: 2021-12-11 | End: 2021-12-11

## 2021-12-11 RX ORDER — HEPARIN SODIUM AND DEXTROSE 10000; 5 [USP'U]/100ML; G/100ML
18 INJECTION INTRAVENOUS ONCE
Status: COMPLETED | OUTPATIENT
Start: 2021-12-11 | End: 2021-12-11

## 2021-12-11 RX ORDER — PRAVASTATIN SODIUM 20 MG
20 TABLET ORAL NIGHTLY
Status: DISCONTINUED | OUTPATIENT
Start: 2021-12-11 | End: 2021-12-14

## 2021-12-11 RX ORDER — LEVOTHYROXINE SODIUM 0.05 MG/1
50 TABLET ORAL
Status: DISCONTINUED | OUTPATIENT
Start: 2021-12-12 | End: 2021-12-14

## 2021-12-11 RX ORDER — METHYLPREDNISOLONE SODIUM SUCCINATE 125 MG/2ML
60 INJECTION, POWDER, LYOPHILIZED, FOR SOLUTION INTRAMUSCULAR; INTRAVENOUS EVERY 8 HOURS
Status: DISCONTINUED | OUTPATIENT
Start: 2021-12-11 | End: 2021-12-13

## 2021-12-11 RX ORDER — TAMSULOSIN HYDROCHLORIDE 0.4 MG/1
0.4 CAPSULE ORAL NIGHTLY
Status: DISCONTINUED | OUTPATIENT
Start: 2021-12-11 | End: 2021-12-14

## 2021-12-11 RX ORDER — ALBUTEROL SULFATE 90 UG/1
8 AEROSOL, METERED RESPIRATORY (INHALATION) 4 TIMES DAILY
Status: DISCONTINUED | OUTPATIENT
Start: 2021-12-11 | End: 2021-12-14

## 2021-12-11 RX ORDER — MESALAMINE 1.2 G/1
2.4 TABLET, DELAYED RELEASE ORAL DAILY
COMMUNITY

## 2021-12-11 RX ORDER — HEPARIN SODIUM 5000 [USP'U]/ML
80 INJECTION INTRAVENOUS; SUBCUTANEOUS ONCE
Status: COMPLETED | OUTPATIENT
Start: 2021-12-11 | End: 2021-12-11

## 2021-12-11 RX ORDER — ACETAMINOPHEN 325 MG/1
650 TABLET ORAL EVERY 6 HOURS PRN
Status: DISCONTINUED | OUTPATIENT
Start: 2021-12-11 | End: 2021-12-14

## 2021-12-11 RX ORDER — MESALAMINE 1.2 G/1
2.4 TABLET, DELAYED RELEASE ORAL DAILY
Status: DISCONTINUED | OUTPATIENT
Start: 2021-12-11 | End: 2021-12-12

## 2021-12-11 NOTE — ED INITIAL ASSESSMENT (HPI)
Family called ems for incr chapincito today   Pt arrived with audible wheezing  Ems gave two nebs pta   Pt speaking on short sentences

## 2021-12-11 NOTE — ED PROVIDER NOTES
Patient Seen in: [facility] Emergency Department      History   Patient presents with:  Difficulty Breathing    Stated Complaint: chapincito x 2 months per son       audible wheezing on arrival to ed         two nebs*    Subjective:   HPI    80year-old ma Procedure: ESOPHAGOGASTRODUODENOSCOPY (EGD); Surgeon: Corinne Genin, MD;  Location: [address] [place] [address] OR   • EGD N/A 1/18/2017    Procedure: ESOPHAGOGASTRODUODENOSCOPY (EGD);   Surgeon: Jay Fuentes DO;  Location: [address] [place] [address] ENDOSCOPY   • HERNIA SURGERY     • KNEE Glucose 118 (*)     Potassium 5.3 (*)     BUN 33 (*)     Creatinine 1.68 (*)     Calculated Osmolality 302 (*)     GFR, Non- 36 (*)     GFR, -American 42 (*)     Alkaline Phosphatase 125 (*)     Total Protein 6.3 (*)     Albumin 3.0 chest.   Dictated by (CST): Jairo Levine MD on 12/11/2021 at 1:45 PM     Finalized by (CST): Jairo Levine MD on 12/11/2021 at 1:46 PM       CT CHEST PE AORTA (IV ONLY) (CPT=71260)    Result Date: 12/11/2021  CONCLUSION:   1.  There are small bilateral pulmonary

## 2021-12-11 NOTE — ED QUICK NOTES
Orders for admission, patient is aware of plan and ready to go upstairs. Any questions, please call ED RN Mariola Holman 23  at extension [phone]. Vaccinated?  YES  Type of COVID test sent:RAPID  COVID Suspicion level: NOT DETECTED      Titratable drug(s) infusing:NA

## 2021-12-12 ENCOUNTER — APPOINTMENT (OUTPATIENT)
Dept: ULTRASOUND IMAGING | Facility: HOSPITAL | Age: 86
DRG: 190 | End: 2021-12-12
Attending: INTERNAL MEDICINE
Payer: MEDICARE

## 2021-12-12 PROCEDURE — 93970 EXTREMITY STUDY: CPT | Performed by: INTERNAL MEDICINE

## 2021-12-12 RX ORDER — SODIUM CHLORIDE 9 MG/ML
INJECTION, SOLUTION INTRAVENOUS CONTINUOUS
Status: ACTIVE | OUTPATIENT
Start: 2021-12-12 | End: 2021-12-12

## 2021-12-12 RX ORDER — MESALAMINE 1.2 G/1
1.2 TABLET, DELAYED RELEASE ORAL 2 TIMES DAILY
Status: DISCONTINUED | OUTPATIENT
Start: 2021-12-12 | End: 2021-12-14

## 2021-12-12 NOTE — H&P
[facility] Patient Status:  Inpatient    1934 MRN [id number]   [facility] 2NE-A Attending Ming Dobson MD   Hosp Day # 1 PCP Sandhya Rubio MD     History of Present Illness:  Jefe Will Procedure: ESOPHAGOGASTRODUODENOSCOPY (EGD); Surgeon: Carol Swartz MD;  Location: [address] [place] [address] OR   • EGD N/A 1/18/2017    Procedure: ESOPHAGOGASTRODUODENOSCOPY (EGD);   Surgeon: Fanny Garcia DO;  Location: [address] [place] [address] ENDOSCOPY   • HERNIA SURGERY     • KNEE times daily with meals.   , Disp: , Rfl: , 12/10/2021 at 2000  tamsulosin HCl (FLOMAX) 0.4 MG Oral Cap, Take 0.4 mg by mouth nightly.  , Disp: , Rfl: , 12/10/2021 at 2000  albuterol 108 (90 Base) MCG/ACT Inhalation Aero Soln, Inhale 2 puffs into the lungs e COMPARISON:  [facility] , XR, XR CHEST AP PORTABLE  (CPT=71045), 7/16/2019, 6:49 AM.  [facility] , XR, XR CHEST PA + LAT CHEST (CPT=71046), 6/06/2019, 6:11 PM.  INDICATIONS:  chapincito x 2 months per son       audible wheezing on arrival to ed         two vandana singh t suggesting this is more chronic scar. .  Elevation of the right hemidiaphragm is noted. The large airways are clear.  VASCULATURE:  There are small pulmonary emboli which are partially occlusive involving the anterior segment of the right upper lobe and the Lumbosacral radiculopathy     Nontraumatic subdural hygroma     Acute encephalopathy     Anemia     Left lower lobe pneumonia     Hyponatremia     Hyperglycemia     Leukocytosis     Acute renal failure (ARF) (HCC)     ANTHONY (acute kidney injury) ([facility] [address]     A

## 2021-12-12 NOTE — PHYSICAL THERAPY NOTE
Orders received, chart reviewed. Patient was brought in secondary to difficulty breathing x 2 months. Per 12/11 CT chest, there are small bilateral pulmonary emboli which have a subacute to chronic appearance.   Patient was started on heparin per Dr. Albino Pinon

## 2021-12-12 NOTE — CONSULTS
[facility]  Report of Consultation    Brittani Setting Patient Status:  Inpatient    1934 MRN [id number]   [facility] 2NE-A Attending Wendy Chavez MD   Hosp Day # 1 PCP Yovany Tony MD     Reason for Consultation:  E Diarrhea, unspecified    • Disorder of thyroid    • Diverticula of small intestine    • Diverticulosis of large intestine    • Frequent use of laxatives    • Hearing impairment    • High blood pressure    • High cholesterol    • History of blood transfusio Disp: , Rfl: , Past Month at Unknown time  Levothyroxine Sodium 50 MCG Oral Tab, Take 50 mcg by mouth before breakfast., Disp: , Rfl: , 12/10/2021 at 0600  ferrous sulfate 325 (65 FE) MG Oral Tab EC, Take 325 mg by mouth 2 (two) times daily with meals.   , tongue normal.  No thrush noted. Small posterior pharyngeal area 90 degrees   Neck: trachea midline, no adenopathy, no thyromegaly. No JVD. Lungs:  Moderate bilateral expiratory rhonchi diffusely slight crackles at the bases   Chest wall: No tenderness o lower lobe pneumonia     Hyponatremia     Hyperglycemia     Leukocytosis     Acute renal failure (ARF) (HCC)     ANTHONY (acute kidney injury) (HCC)     Azotemia     Metabolic acidosis     Metabolic alkalosis     Respiratory alkalosis     Gastrointestinal hemo

## 2021-12-13 ENCOUNTER — APPOINTMENT (OUTPATIENT)
Dept: ULTRASOUND IMAGING | Facility: HOSPITAL | Age: 86
DRG: 190 | End: 2021-12-13
Attending: INTERNAL MEDICINE
Payer: MEDICARE

## 2021-12-13 PROCEDURE — 76770 US EXAM ABDO BACK WALL COMP: CPT | Performed by: INTERNAL MEDICINE

## 2021-12-13 PROCEDURE — 99223 1ST HOSP IP/OBS HIGH 75: CPT | Performed by: INTERNAL MEDICINE

## 2021-12-13 RX ORDER — BENZONATATE 200 MG/1
200 CAPSULE ORAL 3 TIMES DAILY PRN
Status: DISCONTINUED | OUTPATIENT
Start: 2021-12-13 | End: 2021-12-14

## 2021-12-13 RX ORDER — AZITHROMYCIN 250 MG/1
500 TABLET, FILM COATED ORAL
Status: DISCONTINUED | OUTPATIENT
Start: 2021-12-13 | End: 2021-12-14

## 2021-12-13 RX ORDER — SODIUM CHLORIDE, SODIUM LACTATE, POTASSIUM CHLORIDE, CALCIUM CHLORIDE 600; 310; 30; 20 MG/100ML; MG/100ML; MG/100ML; MG/100ML
INJECTION, SOLUTION INTRAVENOUS CONTINUOUS
Status: DISCONTINUED | OUTPATIENT
Start: 2021-12-13 | End: 2021-12-14

## 2021-12-13 RX ORDER — IPRATROPIUM BROMIDE AND ALBUTEROL SULFATE 2.5; .5 MG/3ML; MG/3ML
3 SOLUTION RESPIRATORY (INHALATION)
Status: DISCONTINUED | OUTPATIENT
Start: 2021-12-13 | End: 2021-12-14

## 2021-12-13 RX ORDER — PREDNISONE 20 MG/1
40 TABLET ORAL
Status: DISCONTINUED | OUTPATIENT
Start: 2021-12-14 | End: 2021-12-14

## 2021-12-13 RX ORDER — IPRATROPIUM BROMIDE AND ALBUTEROL SULFATE 2.5; .5 MG/3ML; MG/3ML
3 SOLUTION RESPIRATORY (INHALATION)
Status: DISCONTINUED | OUTPATIENT
Start: 2021-12-13 | End: 2021-12-13

## 2021-12-13 RX ORDER — FLUTICASONE PROPIONATE 50 MCG
1 SPRAY, SUSPENSION (ML) NASAL DAILY
Status: DISCONTINUED | OUTPATIENT
Start: 2021-12-13 | End: 2021-12-14

## 2021-12-13 NOTE — PLAN OF CARE
Assumed care of the pt at [address]. Patient alert and oriented x4. Adequate saturation on RA. NSR on cardiac monitor. Heparin gtt at 500units/hr. Next PTT 0800. Bed alarm is on. Bed at the lowest position. Call light within reach.        Problem: Patient/Family

## 2021-12-13 NOTE — PROGRESS NOTES
[facility]  Progress Note    Viri Felipe Patient Status:  Inpatient    1934 MRN [id number]   [facility] 2NE-A Attending Dior Tan MD   Hosp Day # 2 PCP Javier Higgins MD       SUBJECTIVE:  Coughing and wheezing s rate and rhythm, no peripheral edema  Abd: Abdomen soft, nontender, nondistended, no organomegaly, bowel sounds present  MSK: Full range of motion in extremities, no clubbing, no cyanosis  Skin: no rashes or lesions    Data Review:     Labs:   No component Essential hypertension, benign     Acute exacerbation of chronic obstructive pulmonary disease (COPD) (HCC)     Renal insufficiency     Diarrhea, unspecified type     Hypernatremia     Crohn's disease of large intestine without complication ([address]     Chest

## 2021-12-13 NOTE — CONSULTS
[facility]  Report of Consultation    Jeanette Party Patient Status:  Inpatient    1934 MRN [id number]   [facility] 2NE-A Attending Ming Dobson MD   Hosp Day # 2 PCP Sandhya Rubio MD       Assessment / Plan:    1) CK Back problem    • Chronic lung disease    • COPD (chronic obstructive pulmonary disease) (HCC)    • Crohn's disease of large intestine without complication ([facility] 4/53/4056   • Diarrhea, unspecified    • Disorder of thyroid    • Diverticula of small intesti Oral, Daily with breakfast  •  azithromycin (ZITHROMAX) tab 500 mg, 500 mg, Oral, Daily  •  dextromethorphan-guaiFENesin (ROBITUSSIN-DM)  MG/5ML liquid 10 mL, 10 mL, Oral, Q6H PRN  •  benzonatate (TESSALON) cap 200 mg, 200 mg, Oral, TID PRN  •  mesal scleral icterus, MMM  Neck: Supple, no KANG or thyromegaly  Cardiac: Regular rate and rhythm, S1, S2 normal, no murmur, rub, or gallop  Lungs: Decreased breath sounds at the bases bilaterally.    Abdomen: Soft, non-tender. + bowel sounds, no palpable organom

## 2021-12-13 NOTE — PROGRESS NOTES
[facility] Pulmonary/Critical Care Progress Note     SUBJECTIVE/24H Events: All events, procedures, notes reviewed. Cough continues. Phlegm a bit looser. No fever. No chest pain.   On room air      Review of Systems: 31.6 30.6 31.6   MCHC 34.7   < > 34.5 33.4 35.0   RDW 12.7   < > 12.6 12.7 12.7   NEPRELIM 10.59*  --   --  9.24* 10.23*   WBC 12.5*   < > 12.4* 10.2 11.1*   .0   < > 151.0 154.0 149.0*    < > = values in this interval not displayed.      No results

## 2021-12-13 NOTE — PAYOR COMM NOTE
--------------  ADMISSION REVIEW     Payor: [address] #:  [id number]  Authorization Number: [id number]    Admit date: 12/11/21  Admit time:  7:02 PM       REVIEW DOCUMENTATION:     ED Provider Notes      ED Provider Notes signed b • Osteoarthrosis, unspecified whether generalized or localized, unspecified site     cervical and lumbar spine   • Osteoporosis    • Other and unspecified hyperlipidemia    • Shortness of breath    • Thyroid disease    • Unspecified essential hypertensio the following components:    Pro-Beta Natriuretic Peptide 715 (*)     All other components within normal limits   D-DIMER - Abnormal; Notable for the following components:    D-Dimer 2.64 (*)     All other components within normal limits   CBC W/ DIFFERENT safely change since previous study may represent an area of chronic scarring. Were called to Dr. Patricia Pitt at [phone] hours on 12/11/2021. Critical findings called to Dr. Patricia Pitt at 06 [phone] 74 hours on 12/11/2021 with read back.    Dictated by (CST): Tanja Homans, MD patient's family at bedside-has a longstanding history of COPD maintained on Trelegy as well as a history of hypertension hyperlipidemia and mitral valve disease followed by Dr. Karin Schirmer.   Is history of Crohn's disease underwent surgery with resultant abdomin hyperlipidemia     • Shortness of breath     • Thyroid disease     • Unspecified essential hypertension     • Wheezing              Family History   Problem Relation Age of Onset   • Cancer Brother         reports that he has quit smoking.  He quit after 30 puffs into the lungs every 6 (six) hours as needed for Wheezing., Disp: , Rfl: , Past Week at Unknown time           Review of Systems:     Constitutional: Per his family his weight has been stable  Eyes: Patient is unaware of any eye changes  Ears, nose, Abdomen: soft, non-distended, no masses, no guarding, no                       Rebound. Seems nontender no obvious hepatosplenomegaly or ascites               Extremity: No edema no clubbing               Skin: No rashes or lesions.                Neurolog Gastrointestinal hemorrhage, unspecified gastrointestinal hemorrhage type     Anemia, unspecified type     Dyspnea, unspecified type     Upper GI bleeding     Asthma-COPD overlap syndrome (HCC)     Essential hypertension, benign     Acute exacerbation of c    Intake/Output:     Intake/Output Summary (Last 24 hours) at 12/13/2021 0847  Last data filed at 12/12/2021 2100      Gross per 24 hour   Intake 360 ml   Output 200 ml   Net 160 ml         Wt Readings from Last 3 Encounters:  12/11/21 : 145 lb (65.8 kg 39.8   < > 36.5* 35.0* 31.7*   MCV 91.3   < > 91.5 91.6 90.3   MCH 31.7   < > 31.6 30.6 31.6   MCHC 34.7   < > 34.5 33.4 35.0   RDW 12.7   < > 12.6 12.7 12.7   NEPRELIM 10.59*  --   --  9.24* 10.23*   WBC 12.5*   < > 12.4* 10.2 11.1*   .0   < > 151. obstruction (HCC)     COPD exacerbation ([facility] [address]     Weakness     Dehydration                     Questions/concerns were discussed with patient and/or family by bedside.     Kathy Leung MD  12/13/2021  8:47 AM        MEDICATIONS ADMINISTERED IN LAST 1 DAY: Estrella Sommer RN    12/12/2021 1457 Given 60 mg Intravenous Abel Talbot RN      pantoprazole (PROTONIX) EC tab 40 mg     Date Action Dose Route User    12/13/2021 4631 Given 40 mg Oral Mekhi Mckinney RN      pravastatin (PRAVACHOL) tab 20 mg     Date A

## 2021-12-13 NOTE — PHYSICAL THERAPY NOTE
Following pt for PT eval this date. Pt now >24 hours since heparin administration, however COVID PCR sent to r/o COVID. Will hold PT eval as per hospital policy, pt is unable to ambulate in hallway and has been up within room. D/w RN.   Will continue to

## 2021-12-13 NOTE — CM/SW NOTE
[facility] sent to [employer] to check cost. Anticipate medication is going to need a prior auth d/t pt's insurance.      ALEC Reeder, [facility]   / Discharge Planner  [id number]

## 2021-12-13 NOTE — PHYSICAL THERAPY NOTE
PHYSICAL THERAPY EVALUATION - INPATIENT     Room Number: 6095/3687-U  Evaluation Date: 12/13/2021  Type of Evaluation: Initial  Physician Order: PT Eval and Treat    Presenting Problem: SOB  Co-Morbidities : COPD  Reason for Therapy: Mobility Dysfunc kimo.     Goal #3 Patient is able to ambulate 300 feet with assist device: least restrictive device at assistance level: supervision     Goal #4 Pt will verbalize 3/3 energy conservation techniques to support independence at home.    Goal #5    Goal #6    [facility] currently need. ..    Help from Another: Moving to and from a bed to a chair (including a wheelchair)?: A Little   Help from Another: Need to walk in hospital room?: A Little   Help from Another: Climbing 3-5 steps with a railing?: A Little       AM-PAC Scor

## 2021-12-14 VITALS
RESPIRATION RATE: 16 BRPM | WEIGHT: 145 LBS | BODY MASS INDEX: 25.69 KG/M2 | OXYGEN SATURATION: 100 % | SYSTOLIC BLOOD PRESSURE: 159 MMHG | HEART RATE: 72 BPM | DIASTOLIC BLOOD PRESSURE: 58 MMHG | HEIGHT: 63 IN | TEMPERATURE: 98 F

## 2021-12-14 PROCEDURE — 99233 SBSQ HOSP IP/OBS HIGH 50: CPT | Performed by: INTERNAL MEDICINE

## 2021-12-14 RX ORDER — PREDNISONE 20 MG/1
40 TABLET ORAL
Qty: 10 TABLET | Refills: 0 | Status: SHIPPED | OUTPATIENT
Start: 2021-12-15 | End: 2021-12-14

## 2021-12-14 RX ORDER — PREDNISONE 10 MG/1
TABLET ORAL
Qty: 36 TABLET | Refills: 0 | Status: SHIPPED | OUTPATIENT
Start: 2021-12-14

## 2021-12-14 RX ORDER — ALBUTEROL SULFATE 90 UG/1
2 AEROSOL, METERED RESPIRATORY (INHALATION) 4 TIMES DAILY
Status: DISCONTINUED | OUTPATIENT
Start: 2021-12-14 | End: 2021-12-14

## 2021-12-14 RX ORDER — AZITHROMYCIN 250 MG/1
TABLET, FILM COATED ORAL
Qty: 3 TABLET | Refills: 0 | Status: SHIPPED | OUTPATIENT
Start: 2021-12-15

## 2021-12-14 RX ORDER — ALBUTEROL SULFATE 90 UG/1
2 AEROSOL, METERED RESPIRATORY (INHALATION)
Status: DISCONTINUED | OUTPATIENT
Start: 2021-12-14 | End: 2021-12-14

## 2021-12-14 RX ORDER — HEPARIN SODIUM 5000 [USP'U]/ML
30 INJECTION, SOLUTION INTRAVENOUS; SUBCUTANEOUS ONCE
Status: COMPLETED | OUTPATIENT
Start: 2021-12-14 | End: 2021-12-14

## 2021-12-14 RX ORDER — PREDNISONE 20 MG/1
TABLET ORAL
Qty: 36 TABLET | Refills: 0 | Status: SHIPPED | OUTPATIENT
Start: 2021-12-14 | End: 2021-12-14

## 2021-12-14 RX ORDER — HEPARIN SODIUM 5000 [USP'U]/ML
30 INJECTION, SOLUTION INTRAVENOUS; SUBCUTANEOUS ONCE
Status: DISCONTINUED | OUTPATIENT
Start: 2021-12-14 | End: 2021-12-14

## 2021-12-14 NOTE — PAYOR COMM NOTE
--------------  CONTINUED STAY REVIEW    Payor: [facility]  Subscriber #:  [id number]  Authorization Number: [id number]    Admit date: 12/11/21  Admit time:  7:02 PM    Admitting Physician: Starleen Fleischer, MD  Attending Physician:  Starleen Fleischer 7. 6* 7.0*    138 133*   K 4.4 4.5 4.1    110 106   CO2 20.0* 18.0* 18.0*                Recent Labs   Lab 12/11/21  1324 12/11/21  1324 12/11/21  2254 12/11/21  2254 12/12/21  0644 12/13/21  0621 12/14/21  0632   RBC 4.36   < > 3.99  --  3.82 3 insurance coverage for Eliquis. Ok to stop heparin gtt and start Eliquis. Will need at least 3-6 months therapy. · Complete prednisone course. Continue azithro and Trelegy;  Albuterol MDI  · Continues on RA O2 at rest. Check home O2 evaluation prior to [facility] Units Intravenous Aracelis Edmond RN      ipratropium-albuterol (DUONEB) nebulizer solution 3 mL     Date Action Dose Route User    12/13/2021 1519 Given 3 mL Nebulization Marely Espinoza RCP      ipratropium-albuterol (DUONEB) nebulizer solution 3 mL % — None (Room air) 0 L/min     12/13/21 0809 98.6 °F (37 °C) 74 18 130/58 98 % — None (Room air) 0 L/min     12/13/21 0436 97.5 °F (36.4 °C) 64 18 132/54 98 % — None (Room air) —     12/13/21 0000 97.6 °F (36.4 °C) 73 18 108/60 98 % — None (Room air

## 2021-12-14 NOTE — PLAN OF CARE
Assumed care of patient around [facility]. Alert and oriented x4. Denies any chest pain, lightheadedness or shortness of breath. On room air, O2 sat 97%. Pt does have expiratory wheezes and coughs occasionally. NSR on tele. Voids.  Fall precautions in place, bed

## 2021-12-14 NOTE — PLAN OF CARE
Pt is ok to discharge per primary and consults. Discharge instructions including meds & follow ups given. Patient verbalizes understanding & questions answered. IV removed, tele monitor discontinued, all belongings taken with patient.  Pt has left anterior

## 2021-12-14 NOTE — PROGRESS NOTES
[facility] Pulmonary Progress Note     SUBJECTIVE/24H Events:  Son at bedside. Patient complains of wheezing and waiting for nebulizer treatment. No shortness of breath today.        Review of Systems:   A comprehensive 1 35.0* 31.7*  --    MCV 91.3   < > 91.5  --  91.6 90.3  --    MCH 31.7   < > 31.6  --  30.6 31.6  --    MCHC 34.7   < > 34.5  --  33.4 35.0  --    RDW 12.7   < > 12.6  --  12.7 12.7  --    NEPRELIM 10.59*  --   --   --  9.24* 10.23*  --    WBC 12.5*   < > 1 post discharge. Cameron Carpio DNP, ACNP-[facility]    Patient was seen and examined and note reviewed  Okay to discharge from pulmonary standpoint with plans for pulmonary follow-up  Plans to proceed with PET scan as discussed--glenng

## 2021-12-14 NOTE — PLAN OF CARE
Received pt at 0700. Pt is A&Ox4, no complaints of pain. Pt is on room air, lungs are diminished bilaterally with expiratory wheezing, dry cough present. He is in NSR, no complaints of chest pain.  Pt is continent of B&B, active bowel sounds, abdomen is n

## 2021-12-14 NOTE — DISCHARGE SUMMARY
[facility]  Discharge Summary    Maxi Gracia Patient Status:  Inpatient    1934 MRN [id number]   [facility] 2NE-A Attending Felipa Lizarraga MD   Hosp Day # 3 PCP Justina Stevenson MD     Date of Admission: 2021    D Discharge Medication List    START taking these medications    azithromycin 250 MG Oral Tab  Take 1 tab daily by mouth for 3 days.   Qty: 3 tablet Refills: 0    predniSONE 20 MG Oral Tab  Take 2 tablets (40 mg total) by mouth daily with breakfast for 5 days

## 2021-12-14 NOTE — PROGRESS NOTES
[facility]  Nephrology Progress Note    ySed Luciano Attending:  Abeba Navarro MD       Assessment and Plan:    1) CKD 3- baseline Cr < 1.5 mg/dl as of 9/21 likely represents hypertensive and age-related nephrosclerosis; also consider PPI Results   Component Value Date    .0 12/14/2021    CREATSERUM 1.57 12/14/2021    BUN 50 12/14/2021     12/14/2021    K 4.1 12/14/2021     12/14/2021    CO2 18.0 12/14/2021     12/14/2021    CA 7.0 12/14/2021    PTT 47.4 12/14/2021

## 2021-12-15 NOTE — PAYOR COMM NOTE
--------------  DISCHARGE REVIEW    Payor: [address] #:  [id number]  Authorization Number: [id number]    Admit date: 12/11/21  Admit time:   7:02 PM  Discharge Date: 12/14/2021  6:00 PM     Admitting Physician: Haris Woods MD

## 2022-02-12 ENCOUNTER — LAB ENCOUNTER (OUTPATIENT)
Dept: LAB | Facility: HOSPITAL | Age: 87
End: 2022-02-12
Attending: HOSPITALIST
Payer: MEDICARE

## 2022-02-12 DIAGNOSIS — I26.99 PE (PULMONARY THROMBOEMBOLISM) (HCC): ICD-10-CM

## 2022-02-12 DIAGNOSIS — Z11.59 SCREENING FOR VIRAL DISEASE: ICD-10-CM

## 2022-02-12 DIAGNOSIS — Z01.818 PREOP EXAMINATION: ICD-10-CM

## 2022-02-13 LAB — SARS-COV-2 RNA RESP QL NAA+PROBE: NOT DETECTED

## 2022-02-14 ENCOUNTER — HOSPITAL ENCOUNTER (OUTPATIENT)
Dept: GENERAL RADIOLOGY | Facility: HOSPITAL | Age: 87
Discharge: HOME | End: 2022-02-14
Attending: HOSPITALIST
Payer: MEDICARE

## 2022-02-14 DIAGNOSIS — R06.00 DYSPNEA ON EXERTION: ICD-10-CM

## 2022-02-14 PROCEDURE — 71046 X-RAY EXAM CHEST 2 VIEWS: CPT | Performed by: HOSPITALIST

## 2022-02-15 ENCOUNTER — RT VISIT (OUTPATIENT)
Dept: RESPIRATORY THERAPY | Facility: HOSPITAL | Age: 87
End: 2022-02-15
Attending: HOSPITALIST
Payer: MEDICARE

## 2022-02-15 ENCOUNTER — HOSPITAL ENCOUNTER (OUTPATIENT)
Dept: NUCLEAR MEDICINE | Facility: HOSPITAL | Age: 87
Discharge: HOME | End: 2022-02-15
Attending: HOSPITALIST
Payer: MEDICARE

## 2022-02-15 DIAGNOSIS — I26.99 PE (PULMONARY THROMBOEMBOLISM) (HCC): ICD-10-CM

## 2022-02-15 PROCEDURE — 94729 DIFFUSING CAPACITY: CPT

## 2022-02-15 PROCEDURE — 94060 EVALUATION OF WHEEZING: CPT

## 2022-02-15 PROCEDURE — 78580 LUNG PERFUSION IMAGING: CPT | Performed by: HOSPITALIST

## 2022-02-15 PROCEDURE — 94726 PLETHYSMOGRAPHY LUNG VOLUMES: CPT

## 2022-02-16 NOTE — PROCEDURES
Findings:  Postbronchodilator FEV1 is 0.96L, 45% predicted. Postbronchodilator FVC is 1.49L, 53% predicted. FEV1/ FVC ratio is 0.65. There is no significant bronchodilator response after   administration of albuterol. The flow-volume loop suggests a mixed obstructive and restrictive pattern. The TLC is 6.13L, 97% predicted. The residual volume 4.78L, 166% predicted. The diffusion capacity is 29% predicted and 64% predicted when corrected for alveolar volume. Impression:  There is severe airway obstruction on spirometry and visualized on flow-volume loop. There is no significant bronchodilator response, but this does not preclude treatment with bronchodilators. There is evidence of air trapping (residual volume of 4.78L, 166% predicted). Diffusion capacity is severely reduced with DLCO of 29%. Given the severity of the reduced diffusion capacity, would recommend evaluation for hypoxia and supplemental oxygenation if not already performed.   When compared to previous spirometry testing dated 5/13/2017, there has been an increase in FEV1 (previously 0.83L, 36% predicted) and FVC (previously 1.28L, 40% predicted)

## 2022-02-22 ENCOUNTER — HOSPITAL ENCOUNTER (OUTPATIENT)
Dept: CV DIAGNOSTICS | Facility: HOSPITAL | Age: 87
Discharge: HOME | End: 2022-02-22
Attending: HOSPITALIST
Payer: MEDICARE

## 2022-02-22 DIAGNOSIS — I26.99 PE (PULMONARY THROMBOEMBOLISM) (HCC): ICD-10-CM

## 2022-02-22 PROCEDURE — 93306 TTE W/DOPPLER COMPLETE: CPT | Performed by: HOSPITALIST

## 2022-03-03 ENCOUNTER — LAB ENCOUNTER (OUTPATIENT)
Dept: LAB | Facility: HOSPITAL | Age: 87
End: 2022-03-03
Attending: INTERNAL MEDICINE
Payer: MEDICARE

## 2022-03-03 DIAGNOSIS — R06.09 PLATYPNEA-ORTHODEOXIA SYNDROME: Primary | ICD-10-CM

## 2022-03-03 LAB
ALBUMIN SERPL-MCNC: 3 G/DL (ref 3.4–5)
ALBUMIN/GLOB SERPL: 1.2 {RATIO} (ref 1–2)
ALP LIVER SERPL-CCNC: 103 U/L
ALT SERPL-CCNC: 15 U/L
ANION GAP SERPL CALC-SCNC: 6 MMOL/L (ref 0–18)
AST SERPL-CCNC: 20 U/L (ref 15–37)
BASOPHILS # BLD AUTO: 0.02 X10(3) UL (ref 0–0.2)
BASOPHILS NFR BLD AUTO: 0.3 %
BILIRUB SERPL-MCNC: 0.6 MG/DL (ref 0.1–2)
BUN BLD-MCNC: 21 MG/DL (ref 7–18)
CALCIUM BLD-MCNC: 8.7 MG/DL (ref 8.5–10.1)
CHLORIDE SERPL-SCNC: 111 MMOL/L (ref 98–112)
CO2 SERPL-SCNC: 25 MMOL/L (ref 21–32)
CREAT BLD-MCNC: 1.46 MG/DL
EOSINOPHIL # BLD AUTO: 0.25 X10(3) UL (ref 0–0.7)
EOSINOPHIL NFR BLD AUTO: 3.2 %
ERYTHROCYTE [DISTWIDTH] IN BLOOD BY AUTOMATED COUNT: 13.1 %
FASTING STATUS PATIENT QL REPORTED: YES
GLOBULIN PLAS-MCNC: 2.6 G/DL (ref 2.8–4.4)
HCT VFR BLD AUTO: 37.1 %
HGB BLD-MCNC: 11.8 G/DL
IMM GRANULOCYTES # BLD AUTO: 0.05 X10(3) UL (ref 0–1)
IMM GRANULOCYTES NFR BLD: 0.6 %
LYMPHOCYTES # BLD AUTO: 1.46 X10(3) UL (ref 1–4)
LYMPHOCYTES NFR BLD AUTO: 18.4 %
MCHC RBC AUTO-ENTMCNC: 31.8 G/DL (ref 31–37)
MCV RBC AUTO: 94.9 FL
MONOCYTES # BLD AUTO: 0.68 X10(3) UL (ref 0.1–1)
MONOCYTES NFR BLD AUTO: 8.6 %
NEUTROPHILS # BLD AUTO: 5.47 X10 (3) UL (ref 1.5–7.7)
NEUTROPHILS # BLD AUTO: 5.47 X10(3) UL (ref 1.5–7.7)
NEUTROPHILS NFR BLD AUTO: 68.9 %
OSMOLALITY SERPL CALC.SUM OF ELEC: 297 MOSM/KG (ref 275–295)
PLATELET # BLD AUTO: 212 10(3)UL (ref 150–450)
POTASSIUM SERPL-SCNC: 4.2 MMOL/L (ref 3.5–5.1)
PROT SERPL-MCNC: 5.6 G/DL (ref 6.4–8.2)
RBC # BLD AUTO: 3.91 X10(6)UL
SODIUM SERPL-SCNC: 142 MMOL/L (ref 136–145)
WBC # BLD AUTO: 7.9 X10(3) UL (ref 4–11)

## 2022-03-03 PROCEDURE — 85025 COMPLETE CBC W/AUTO DIFF WBC: CPT

## 2022-03-03 PROCEDURE — 80053 COMPREHEN METABOLIC PANEL: CPT

## 2022-03-03 PROCEDURE — 36415 COLL VENOUS BLD VENIPUNCTURE: CPT

## 2022-09-27 ENCOUNTER — LAB ENCOUNTER (OUTPATIENT)
Dept: LAB | Facility: HOSPITAL | Age: 87
End: 2022-09-27
Attending: INTERNAL MEDICINE
Payer: MEDICARE

## 2022-09-27 DIAGNOSIS — E55.9 VITAMIN D DEFICIENCY: ICD-10-CM

## 2022-09-27 DIAGNOSIS — I10 ESSENTIAL HYPERTENSION: Primary | ICD-10-CM

## 2022-09-27 DIAGNOSIS — Z12.5 PROSTATE CANCER SCREENING: ICD-10-CM

## 2022-09-27 DIAGNOSIS — E53.8 B12 DEFICIENCY: ICD-10-CM

## 2022-09-27 DIAGNOSIS — D64.9 ANEMIA: ICD-10-CM

## 2022-09-27 DIAGNOSIS — E78.2 MIXED HYPERLIPIDEMIA: ICD-10-CM

## 2022-09-27 LAB
ALBUMIN SERPL-MCNC: 3.1 G/DL (ref 3.4–5)
ALBUMIN/GLOB SERPL: 1.1 {RATIO} (ref 1–2)
ALP LIVER SERPL-CCNC: 97 U/L
ALT SERPL-CCNC: 19 U/L
ANION GAP SERPL CALC-SCNC: 5 MMOL/L (ref 0–18)
AST SERPL-CCNC: 18 U/L (ref 15–37)
BASOPHILS # BLD AUTO: 0.04 X10(3) UL (ref 0–0.2)
BASOPHILS NFR BLD AUTO: 0.4 %
BILIRUB SERPL-MCNC: 0.9 MG/DL (ref 0.1–2)
BILIRUB UR QL STRIP.AUTO: NEGATIVE
BUN BLD-MCNC: 24 MG/DL (ref 7–18)
CALCIUM BLD-MCNC: 9.1 MG/DL (ref 8.5–10.1)
CHLORIDE SERPL-SCNC: 112 MMOL/L (ref 98–112)
CHOLEST SERPL-MCNC: 73 MG/DL (ref ?–200)
CLARITY UR REFRACT.AUTO: CLEAR
CO2 SERPL-SCNC: 25 MMOL/L (ref 21–32)
COLOR UR AUTO: YELLOW
COMPLEXED PSA SERPL-MCNC: 2.28 NG/ML (ref ?–4)
CREAT BLD-MCNC: 1.27 MG/DL
EOSINOPHIL # BLD AUTO: 0.28 X10(3) UL (ref 0–0.7)
EOSINOPHIL NFR BLD AUTO: 3 %
ERYTHROCYTE [DISTWIDTH] IN BLOOD BY AUTOMATED COUNT: 12.8 %
FASTING PATIENT LIPID ANSWER: YES
FASTING STATUS PATIENT QL REPORTED: YES
GFR SERPLBLD BASED ON 1.73 SQ M-ARVRAT: 54 ML/MIN/1.73M2 (ref 60–?)
GLOBULIN PLAS-MCNC: 2.7 G/DL (ref 2.8–4.4)
GLUCOSE BLD-MCNC: 95 MG/DL (ref 70–99)
GLUCOSE UR STRIP.AUTO-MCNC: NEGATIVE MG/DL
HCT VFR BLD AUTO: 33.6 %
HDLC SERPL-MCNC: 32 MG/DL (ref 40–59)
HGB BLD-MCNC: 11.4 G/DL
IMM GRANULOCYTES # BLD AUTO: 0.04 X10(3) UL (ref 0–1)
IMM GRANULOCYTES NFR BLD: 0.4 %
KETONES UR STRIP.AUTO-MCNC: NEGATIVE MG/DL
LDLC SERPL CALC-MCNC: 22 MG/DL (ref ?–100)
LEUKOCYTE ESTERASE UR QL STRIP.AUTO: NEGATIVE
LYMPHOCYTES # BLD AUTO: 1.99 X10(3) UL (ref 1–4)
LYMPHOCYTES NFR BLD AUTO: 21.3 %
MCH RBC QN AUTO: 32 PG (ref 26–34)
MCHC RBC AUTO-ENTMCNC: 33.9 G/DL (ref 31–37)
MCV RBC AUTO: 94.4 FL
MONOCYTES # BLD AUTO: 0.59 X10(3) UL (ref 0.1–1)
MONOCYTES NFR BLD AUTO: 6.3 %
NEUTROPHILS # BLD AUTO: 6.42 X10 (3) UL (ref 1.5–7.7)
NEUTROPHILS # BLD AUTO: 6.42 X10(3) UL (ref 1.5–7.7)
NEUTROPHILS NFR BLD AUTO: 68.6 %
NITRITE UR QL STRIP.AUTO: NEGATIVE
NONHDLC SERPL-MCNC: 41 MG/DL (ref ?–130)
OSMOLALITY SERPL CALC.SUM OF ELEC: 298 MOSM/KG (ref 275–295)
PH UR STRIP.AUTO: 5 [PH] (ref 5–8)
PLATELET # BLD AUTO: 215 10(3)UL (ref 150–450)
POTASSIUM SERPL-SCNC: 3.8 MMOL/L (ref 3.5–5.1)
PROT SERPL-MCNC: 5.8 G/DL (ref 6.4–8.2)
PROT UR STRIP.AUTO-MCNC: NEGATIVE MG/DL
RBC # BLD AUTO: 3.56 X10(6)UL
SODIUM SERPL-SCNC: 142 MMOL/L (ref 136–145)
SP GR UR STRIP.AUTO: 1.01 (ref 1–1.03)
TRIGL SERPL-MCNC: 94 MG/DL (ref 30–149)
TSI SER-ACNC: 2.97 MIU/ML (ref 0.36–3.74)
UROBILINOGEN UR STRIP.AUTO-MCNC: <2 MG/DL
VIT B12 SERPL-MCNC: 643 PG/ML (ref 193–986)
VIT D+METAB SERPL-MCNC: 46.1 NG/ML (ref 30–100)
VLDLC SERPL CALC-MCNC: 12 MG/DL (ref 0–30)
WBC # BLD AUTO: 9.4 X10(3) UL (ref 4–11)

## 2022-09-27 PROCEDURE — 82306 VITAMIN D 25 HYDROXY: CPT

## 2022-09-27 PROCEDURE — 80061 LIPID PANEL: CPT

## 2022-09-27 PROCEDURE — 85025 COMPLETE CBC W/AUTO DIFF WBC: CPT

## 2022-09-27 PROCEDURE — 80053 COMPREHEN METABOLIC PANEL: CPT

## 2022-09-27 PROCEDURE — 82607 VITAMIN B-12: CPT

## 2022-09-27 PROCEDURE — 81001 URINALYSIS AUTO W/SCOPE: CPT

## 2022-09-27 PROCEDURE — 84443 ASSAY THYROID STIM HORMONE: CPT

## 2022-09-27 PROCEDURE — 36415 COLL VENOUS BLD VENIPUNCTURE: CPT

## 2022-11-27 NOTE — PLAN OF CARE
GASTROINTESTINAL - ADULT    • Minimal or absence of nausea and vomiting Progressing          Still with diarrhea ,no n/v  No sob, Noro virus negative  Contact plus Isolation dc'd  Lomotil tid given,   Ambulated in the halls, tolerated fine English

## 2023-01-17 NOTE — PROGRESS NOTES
[facility]  Nephrology Progress Note    Tiny Hoist Patient Status:  Inpatient    1934 MRN [id number]   [facility] 2NE-A Attending Meryle Dubonnet, MD   Hosp Day # 2 PCP Hamilton Queen MD       SUBJECTIVE:  Diarrhea pe How Severe Are They?: mild 06/14/18   0558  06/15/18   0544   NA  135*  138  143  139   K  4.8  4.5  3.7  3.7  3.7   CL  110  114*  111  105   CO2  15.0*  15.0*  22.0  27.0   BUN  53*  50*  53*  47*   CREATSERUM  3.37*  2.55*  2.06*  1.88*   CA  8.7  7.9*  7.6*  7.8*   MG   --    -- Is This A New Presentation, Or A Follow-Up?: Scar puff Inhalation Daily         Impression/Plan:    #1. ANTHONY- hx most c/w prerenal azotemia due to severe diarrhea. Creat again better today. Cont isotonic IVF and will decr rate today     #2. Acidosis- due to severe diarrhea.   Improved with sodium bicarb

## 2023-02-28 ENCOUNTER — LAB ENCOUNTER (OUTPATIENT)
Dept: LAB | Facility: HOSPITAL | Age: 88
End: 2023-02-28
Attending: INTERNAL MEDICINE
Payer: MEDICARE

## 2023-02-28 DIAGNOSIS — E03.9 HYPOTHYROIDISM: ICD-10-CM

## 2023-02-28 DIAGNOSIS — D64.9 ANEMIA: Primary | ICD-10-CM

## 2023-02-28 DIAGNOSIS — E55.9 VITAMIN D DEFICIENCY: ICD-10-CM

## 2023-02-28 DIAGNOSIS — I50.32 CHRONIC HEART FAILURE WITH PRESERVED EJECTION FRACTION (HCC): ICD-10-CM

## 2023-02-28 DIAGNOSIS — R63.4 WEIGHT LOSS: ICD-10-CM

## 2023-02-28 LAB
ALBUMIN SERPL-MCNC: 2.8 G/DL (ref 3.4–5)
ALBUMIN/GLOB SERPL: 1.1 {RATIO} (ref 1–2)
ALP LIVER SERPL-CCNC: 117 U/L
ALT SERPL-CCNC: 18 U/L
ANION GAP SERPL CALC-SCNC: 5 MMOL/L (ref 0–18)
AST SERPL-CCNC: 22 U/L (ref 15–37)
BASOPHILS # BLD AUTO: 0.03 X10(3) UL (ref 0–0.2)
BASOPHILS NFR BLD AUTO: 0.4 %
BILIRUB SERPL-MCNC: 0.9 MG/DL (ref 0.1–2)
BUN BLD-MCNC: 20 MG/DL (ref 7–18)
CALCIUM BLD-MCNC: 8.3 MG/DL (ref 8.5–10.1)
CHLORIDE SERPL-SCNC: 113 MMOL/L (ref 98–112)
CO2 SERPL-SCNC: 25 MMOL/L (ref 21–32)
CREAT BLD-MCNC: 1.28 MG/DL
DEPRECATED HBV CORE AB SER IA-ACNC: 593.1 NG/ML
EOSINOPHIL # BLD AUTO: 0.26 X10(3) UL (ref 0–0.7)
EOSINOPHIL NFR BLD AUTO: 3.6 %
ERYTHROCYTE [DISTWIDTH] IN BLOOD BY AUTOMATED COUNT: 13.2 %
FASTING STATUS PATIENT QL REPORTED: YES
FOLATE SERPL-MCNC: 33.4 NG/ML (ref 8.7–?)
GFR SERPLBLD BASED ON 1.73 SQ M-ARVRAT: 54 ML/MIN/1.73M2 (ref 60–?)
GLOBULIN PLAS-MCNC: 2.5 G/DL (ref 2.8–4.4)
GLUCOSE BLD-MCNC: 94 MG/DL (ref 70–99)
HCT VFR BLD AUTO: 35 %
HGB BLD-MCNC: 11.9 G/DL
IMM GRANULOCYTES # BLD AUTO: 0.03 X10(3) UL (ref 0–1)
IMM GRANULOCYTES NFR BLD: 0.4 %
IRON SATN MFR SERPL: 44 %
IRON SERPL-MCNC: 112 UG/DL
LYMPHOCYTES # BLD AUTO: 1.25 X10(3) UL (ref 1–4)
LYMPHOCYTES NFR BLD AUTO: 17.4 %
MCH RBC QN AUTO: 32.1 PG (ref 26–34)
MCHC RBC AUTO-ENTMCNC: 34 G/DL (ref 31–37)
MCV RBC AUTO: 94.3 FL
MONOCYTES # BLD AUTO: 0.54 X10(3) UL (ref 0.1–1)
MONOCYTES NFR BLD AUTO: 7.5 %
NEUTROPHILS # BLD AUTO: 5.08 X10 (3) UL (ref 1.5–7.7)
NEUTROPHILS # BLD AUTO: 5.08 X10(3) UL (ref 1.5–7.7)
NEUTROPHILS NFR BLD AUTO: 70.7 %
OSMOLALITY SERPL CALC.SUM OF ELEC: 298 MOSM/KG (ref 275–295)
PLATELET # BLD AUTO: 154 10(3)UL (ref 150–450)
POTASSIUM SERPL-SCNC: 4.1 MMOL/L (ref 3.5–5.1)
PROT SERPL-MCNC: 5.3 G/DL (ref 6.4–8.2)
RBC # BLD AUTO: 3.71 X10(6)UL
SODIUM SERPL-SCNC: 143 MMOL/L (ref 136–145)
T4 FREE SERPL-MCNC: 0.9 NG/DL (ref 0.8–1.7)
TIBC SERPL-MCNC: 253 UG/DL (ref 240–450)
TRANSFERRIN SERPL-MCNC: 170 MG/DL (ref 200–360)
TSI SER-ACNC: 7.29 MIU/ML (ref 0.36–3.74)
VIT B12 SERPL-MCNC: 869 PG/ML (ref 193–986)
VIT D+METAB SERPL-MCNC: 45.1 NG/ML (ref 30–100)
WBC # BLD AUTO: 7.2 X10(3) UL (ref 4–11)

## 2023-02-28 PROCEDURE — 83550 IRON BINDING TEST: CPT

## 2023-02-28 PROCEDURE — 82728 ASSAY OF FERRITIN: CPT

## 2023-02-28 PROCEDURE — 82306 VITAMIN D 25 HYDROXY: CPT

## 2023-02-28 PROCEDURE — 82607 VITAMIN B-12: CPT

## 2023-02-28 PROCEDURE — 83540 ASSAY OF IRON: CPT

## 2023-02-28 PROCEDURE — 84439 ASSAY OF FREE THYROXINE: CPT

## 2023-02-28 PROCEDURE — 85025 COMPLETE CBC W/AUTO DIFF WBC: CPT

## 2023-02-28 PROCEDURE — 80053 COMPREHEN METABOLIC PANEL: CPT

## 2023-02-28 PROCEDURE — 82746 ASSAY OF FOLIC ACID SERUM: CPT

## 2023-02-28 PROCEDURE — 36415 COLL VENOUS BLD VENIPUNCTURE: CPT

## 2023-02-28 PROCEDURE — 84443 ASSAY THYROID STIM HORMONE: CPT

## 2023-05-31 ENCOUNTER — LAB ENCOUNTER (OUTPATIENT)
Dept: LAB | Facility: HOSPITAL | Age: 88
End: 2023-05-31
Attending: INTERNAL MEDICINE
Payer: MEDICARE

## 2023-05-31 DIAGNOSIS — E03.9 ACQUIRED HYPOTHYROIDISM: Primary | ICD-10-CM

## 2023-05-31 LAB
T4 FREE SERPL-MCNC: 1 NG/DL (ref 0.8–1.7)
TSI SER-ACNC: 4.46 MIU/ML (ref 0.36–3.74)

## 2023-05-31 PROCEDURE — 84439 ASSAY OF FREE THYROXINE: CPT

## 2023-05-31 PROCEDURE — 84443 ASSAY THYROID STIM HORMONE: CPT

## 2023-05-31 PROCEDURE — 36415 COLL VENOUS BLD VENIPUNCTURE: CPT

## 2023-09-01 ENCOUNTER — LAB ENCOUNTER (OUTPATIENT)
Dept: LAB | Facility: HOSPITAL | Age: 88
End: 2023-09-01
Attending: INTERNAL MEDICINE
Payer: MEDICARE

## 2023-09-01 DIAGNOSIS — E03.9 MYXEDEMA HEART DISEASE: Primary | ICD-10-CM

## 2023-09-01 DIAGNOSIS — I51.9 MYXEDEMA HEART DISEASE: Primary | ICD-10-CM

## 2023-09-01 LAB — TSI SER-ACNC: 2.88 MIU/ML (ref 0.36–3.74)

## 2023-09-01 PROCEDURE — 36415 COLL VENOUS BLD VENIPUNCTURE: CPT

## 2023-09-01 PROCEDURE — 84443 ASSAY THYROID STIM HORMONE: CPT

## 2023-09-06 ENCOUNTER — HOSPITAL ENCOUNTER (INPATIENT)
Facility: HOSPITAL | Age: 88
LOS: 3 days | Discharge: HOME | End: 2023-09-09
Attending: EMERGENCY MEDICINE | Admitting: INTERNAL MEDICINE
Payer: MEDICARE

## 2023-09-06 ENCOUNTER — APPOINTMENT (OUTPATIENT)
Dept: GENERAL RADIOLOGY | Facility: HOSPITAL | Age: 88
End: 2023-09-06
Attending: EMERGENCY MEDICINE
Payer: MEDICARE

## 2023-09-06 DIAGNOSIS — I50.9 ACUTE ON CHRONIC CONGESTIVE HEART FAILURE, UNSPECIFIED HEART FAILURE TYPE (HCC): ICD-10-CM

## 2023-09-06 DIAGNOSIS — J44.1 COPD EXACERBATION (HCC): ICD-10-CM

## 2023-09-06 DIAGNOSIS — R06.00 DYSPNEA, UNSPECIFIED TYPE: Primary | ICD-10-CM

## 2023-09-06 DIAGNOSIS — J98.01 ACUTE BRONCHOSPASM: ICD-10-CM

## 2023-09-06 LAB
ALBUMIN SERPL-MCNC: 2.9 G/DL (ref 3.4–5)
ALBUMIN/GLOB SERPL: 0.9 {RATIO} (ref 1–2)
ALP LIVER SERPL-CCNC: 106 U/L
ALT SERPL-CCNC: 23 U/L
ANION GAP SERPL CALC-SCNC: 4 MMOL/L (ref 0–18)
APTT PPP: 41.3 SECONDS (ref 23.3–35.6)
AST SERPL-CCNC: 18 U/L (ref 15–37)
BASOPHILS # BLD AUTO: 0.01 X10(3) UL (ref 0–0.2)
BASOPHILS NFR BLD AUTO: 0.1 %
BILIRUB SERPL-MCNC: 0.5 MG/DL (ref 0.1–2)
BUN BLD-MCNC: 22 MG/DL (ref 7–18)
CALCIUM BLD-MCNC: 9 MG/DL (ref 8.5–10.1)
CHLORIDE SERPL-SCNC: 113 MMOL/L (ref 98–112)
CO2 SERPL-SCNC: 24 MMOL/L (ref 21–32)
CREAT BLD-MCNC: 1.3 MG/DL
D DIMER PPP FEU-MCNC: 0.31 UG/ML FEU (ref ?–0.89)
EGFRCR SERPLBLD CKD-EPI 2021: 53 ML/MIN/1.73M2 (ref 60–?)
EOSINOPHIL # BLD AUTO: 0 X10(3) UL (ref 0–0.7)
EOSINOPHIL NFR BLD AUTO: 0 %
ERYTHROCYTE [DISTWIDTH] IN BLOOD BY AUTOMATED COUNT: 12.3 %
GLOBULIN PLAS-MCNC: 3.2 G/DL (ref 2.8–4.4)
GLUCOSE BLD-MCNC: 106 MG/DL (ref 70–99)
HCT VFR BLD AUTO: 34.5 %
HGB BLD-MCNC: 11.9 G/DL
IMM GRANULOCYTES # BLD AUTO: 0.1 X10(3) UL (ref 0–1)
IMM GRANULOCYTES NFR BLD: 0.8 %
INR BLD: 2.86 (ref 0.85–1.16)
LACTATE SERPL-SCNC: 1.4 MMOL/L (ref 0.4–2)
LYMPHOCYTES # BLD AUTO: 0.58 X10(3) UL (ref 1–4)
LYMPHOCYTES NFR BLD AUTO: 4.5 %
MCH RBC QN AUTO: 30.9 PG (ref 26–34)
MCHC RBC AUTO-ENTMCNC: 34.5 G/DL (ref 31–37)
MCV RBC AUTO: 89.6 FL
MONOCYTES # BLD AUTO: 0.38 X10(3) UL (ref 0.1–1)
MONOCYTES NFR BLD AUTO: 2.9 %
NEUTROPHILS # BLD AUTO: 11.88 X10 (3) UL (ref 1.5–7.7)
NEUTROPHILS # BLD AUTO: 11.88 X10(3) UL (ref 1.5–7.7)
NEUTROPHILS NFR BLD AUTO: 91.7 %
NT-PROBNP SERPL-MCNC: 2225 PG/ML (ref ?–450)
OSMOLALITY SERPL CALC.SUM OF ELEC: 296 MOSM/KG (ref 275–295)
PLATELET # BLD AUTO: 255 10(3)UL (ref 150–450)
POTASSIUM SERPL-SCNC: 4 MMOL/L (ref 3.5–5.1)
PROT SERPL-MCNC: 6.1 G/DL (ref 6.4–8.2)
PROTHROMBIN TIME: 29.6 SECONDS (ref 11.6–14.8)
RBC # BLD AUTO: 3.85 X10(6)UL
SARS-COV-2 RNA RESP QL NAA+PROBE: NOT DETECTED
SODIUM SERPL-SCNC: 141 MMOL/L (ref 136–145)
T4 FREE SERPL-MCNC: 1.2 NG/DL (ref 0.8–1.7)
TROPONIN I HIGH SENSITIVITY: 15 NG/L
TSI SER-ACNC: 2.55 MIU/ML (ref 0.36–3.74)
WBC # BLD AUTO: 13 X10(3) UL (ref 4–11)

## 2023-09-06 PROCEDURE — 85379 FIBRIN DEGRADATION QUANT: CPT | Performed by: EMERGENCY MEDICINE

## 2023-09-06 PROCEDURE — 94645 CONT INHLJ TX EACH ADDL HOUR: CPT

## 2023-09-06 PROCEDURE — 99285 EMERGENCY DEPT VISIT HI MDM: CPT

## 2023-09-06 PROCEDURE — 84484 ASSAY OF TROPONIN QUANT: CPT | Performed by: EMERGENCY MEDICINE

## 2023-09-06 PROCEDURE — 80053 COMPREHEN METABOLIC PANEL: CPT | Performed by: EMERGENCY MEDICINE

## 2023-09-06 PROCEDURE — 93005 ELECTROCARDIOGRAM TRACING: CPT

## 2023-09-06 PROCEDURE — 93010 ELECTROCARDIOGRAM REPORT: CPT

## 2023-09-06 PROCEDURE — 71045 X-RAY EXAM CHEST 1 VIEW: CPT | Performed by: EMERGENCY MEDICINE

## 2023-09-06 PROCEDURE — 84439 ASSAY OF FREE THYROXINE: CPT | Performed by: EMERGENCY MEDICINE

## 2023-09-06 PROCEDURE — 83880 ASSAY OF NATRIURETIC PEPTIDE: CPT | Performed by: EMERGENCY MEDICINE

## 2023-09-06 PROCEDURE — 85730 THROMBOPLASTIN TIME PARTIAL: CPT | Performed by: EMERGENCY MEDICINE

## 2023-09-06 PROCEDURE — 83605 ASSAY OF LACTIC ACID: CPT | Performed by: EMERGENCY MEDICINE

## 2023-09-06 PROCEDURE — 87040 BLOOD CULTURE FOR BACTERIA: CPT | Performed by: EMERGENCY MEDICINE

## 2023-09-06 PROCEDURE — 85025 COMPLETE CBC W/AUTO DIFF WBC: CPT | Performed by: EMERGENCY MEDICINE

## 2023-09-06 PROCEDURE — 94640 AIRWAY INHALATION TREATMENT: CPT

## 2023-09-06 PROCEDURE — 85610 PROTHROMBIN TIME: CPT | Performed by: EMERGENCY MEDICINE

## 2023-09-06 PROCEDURE — 94644 CONT INHLJ TX 1ST HOUR: CPT

## 2023-09-06 PROCEDURE — 96374 THER/PROPH/DIAG INJ IV PUSH: CPT

## 2023-09-06 PROCEDURE — 84443 ASSAY THYROID STIM HORMONE: CPT | Performed by: EMERGENCY MEDICINE

## 2023-09-06 PROCEDURE — 36415 COLL VENOUS BLD VENIPUNCTURE: CPT

## 2023-09-06 RX ORDER — METHYLPREDNISOLONE SODIUM SUCCINATE 40 MG/ML
40 INJECTION, POWDER, LYOPHILIZED, FOR SOLUTION INTRAMUSCULAR; INTRAVENOUS EVERY 8 HOURS
Status: DISCONTINUED | OUTPATIENT
Start: 2023-09-07 | End: 2023-09-09

## 2023-09-06 RX ORDER — METOCLOPRAMIDE HYDROCHLORIDE 5 MG/ML
5 INJECTION INTRAMUSCULAR; INTRAVENOUS EVERY 8 HOURS PRN
Status: DISCONTINUED | OUTPATIENT
Start: 2023-09-06 | End: 2023-09-09

## 2023-09-06 RX ORDER — ACETAMINOPHEN 325 MG/1
650 TABLET ORAL EVERY 4 HOURS PRN
Status: DISCONTINUED | OUTPATIENT
Start: 2023-09-06 | End: 2023-09-09

## 2023-09-06 RX ORDER — ONDANSETRON 2 MG/ML
4 INJECTION INTRAMUSCULAR; INTRAVENOUS EVERY 6 HOURS PRN
Status: DISCONTINUED | OUTPATIENT
Start: 2023-09-06 | End: 2023-09-09

## 2023-09-06 RX ORDER — PRAVASTATIN SODIUM 20 MG
20 TABLET ORAL NIGHTLY
Status: DISCONTINUED | OUTPATIENT
Start: 2023-09-06 | End: 2023-09-09

## 2023-09-06 RX ORDER — SENNOSIDES 8.6 MG
17.2 TABLET ORAL NIGHTLY PRN
Status: DISCONTINUED | OUTPATIENT
Start: 2023-09-06 | End: 2023-09-09

## 2023-09-06 RX ORDER — HYDRALAZINE HYDROCHLORIDE 25 MG/1
25 TABLET, FILM COATED ORAL EVERY 4 HOURS PRN
Status: DISCONTINUED | OUTPATIENT
Start: 2023-09-06 | End: 2023-09-09

## 2023-09-06 RX ORDER — ALBUTEROL SULFATE 90 UG/1
2 AEROSOL, METERED RESPIRATORY (INHALATION) EVERY 6 HOURS PRN
Status: DISCONTINUED | OUTPATIENT
Start: 2023-09-06 | End: 2023-09-09

## 2023-09-06 RX ORDER — TEMAZEPAM 15 MG/1
15 CAPSULE ORAL NIGHTLY PRN
Status: DISCONTINUED | OUTPATIENT
Start: 2023-09-06 | End: 2023-09-09

## 2023-09-06 RX ORDER — METHYLPREDNISOLONE SODIUM SUCCINATE 125 MG/2ML
125 INJECTION, POWDER, LYOPHILIZED, FOR SOLUTION INTRAMUSCULAR; INTRAVENOUS ONCE
Status: COMPLETED | OUTPATIENT
Start: 2023-09-06 | End: 2023-09-06

## 2023-09-06 RX ORDER — IPRATROPIUM BROMIDE AND ALBUTEROL SULFATE 2.5; .5 MG/3ML; MG/3ML
3 SOLUTION RESPIRATORY (INHALATION) ONCE
Status: DISCONTINUED | OUTPATIENT
Start: 2023-09-06 | End: 2023-09-06

## 2023-09-06 RX ORDER — MESALAMINE 400 MG/1
800 CAPSULE, DELAYED RELEASE ORAL 3 TIMES DAILY
Status: DISCONTINUED | OUTPATIENT
Start: 2023-09-06 | End: 2023-09-09

## 2023-09-06 RX ORDER — TAMSULOSIN HYDROCHLORIDE 0.4 MG/1
0.4 CAPSULE ORAL NIGHTLY
Status: DISCONTINUED | OUTPATIENT
Start: 2023-09-07 | End: 2023-09-09

## 2023-09-06 RX ORDER — LEVOTHYROXINE SODIUM 0.05 MG/1
50 TABLET ORAL
Status: DISCONTINUED | OUTPATIENT
Start: 2023-09-07 | End: 2023-09-09

## 2023-09-06 RX ORDER — HYDROCODONE BITARTRATE AND ACETAMINOPHEN 5; 325 MG/1; MG/1
1 TABLET ORAL EVERY 4 HOURS PRN
Status: DISCONTINUED | OUTPATIENT
Start: 2023-09-06 | End: 2023-09-09

## 2023-09-06 RX ORDER — BENZONATATE 200 MG/1
200 CAPSULE ORAL 3 TIMES DAILY PRN
Status: DISCONTINUED | OUTPATIENT
Start: 2023-09-06 | End: 2023-09-09

## 2023-09-06 RX ORDER — MESALAMINE 1.2 G/1
2.4 TABLET, DELAYED RELEASE ORAL DAILY
Status: DISCONTINUED | OUTPATIENT
Start: 2023-09-06 | End: 2023-09-06

## 2023-09-06 RX ORDER — POLYETHYLENE GLYCOL 3350 17 G/17G
17 POWDER, FOR SOLUTION ORAL DAILY PRN
Status: DISCONTINUED | OUTPATIENT
Start: 2023-09-06 | End: 2023-09-09

## 2023-09-06 RX ORDER — IPRATROPIUM BROMIDE AND ALBUTEROL SULFATE 2.5; .5 MG/3ML; MG/3ML
3 SOLUTION RESPIRATORY (INHALATION)
Status: DISCONTINUED | OUTPATIENT
Start: 2023-09-06 | End: 2023-09-09

## 2023-09-06 RX ORDER — PANTOPRAZOLE SODIUM 40 MG/1
40 TABLET, DELAYED RELEASE ORAL
Status: DISCONTINUED | OUTPATIENT
Start: 2023-09-07 | End: 2023-09-09

## 2023-09-06 RX ORDER — HYDROCODONE BITARTRATE AND ACETAMINOPHEN 5; 325 MG/1; MG/1
2 TABLET ORAL EVERY 4 HOURS PRN
Status: DISCONTINUED | OUTPATIENT
Start: 2023-09-06 | End: 2023-09-09

## 2023-09-06 RX ORDER — BISACODYL 10 MG
10 SUPPOSITORY, RECTAL RECTAL
Status: DISCONTINUED | OUTPATIENT
Start: 2023-09-06 | End: 2023-09-09

## 2023-09-06 NOTE — ED INITIAL ASSESSMENT (HPI)
CHRIS since Saturday, seen pcp on Saturday and prescribed azithromycin and prednisone. Pt has been taking medication as prescribed and states that it has been getting worse + wet cough. Dyspnea on exertion and at rest. Pt states he feels like he can't take a full breath. Hx COPD but on oxygen daily. Previous hx of hospitalization for COPD exacerbation.

## 2023-09-06 NOTE — ED QUICK NOTES
Patient has had cough X10 days. Moist, nonproductive. Audible expiratory wheezing. Was seen at his PCP and started on Azithromycin & Prednisone since Sunday. Patient continues to have cough, no fevers. Hx of COPD and PE, on Eliquis.

## 2023-09-06 NOTE — ED QUICK NOTES
Orders for admission, patient is aware of plan and ready to go upstairs. Any questions, please call ED RN Jesse Ryan at extension [phone]. Patient Covid vaccination status: Fully vaccinated     COVID Test Ordered in ED: Rapid SARS-CoV-2 by PCR    COVID Suspicion at Admission: N/A    Running Infusions:  None    Mental Status/LOC at time of transport: Alert, oriented X4.     Other pertinent information:   CIWA score: N/A   NIH score:  N/A

## 2023-09-07 ENCOUNTER — APPOINTMENT (OUTPATIENT)
Dept: CV DIAGNOSTICS | Facility: HOSPITAL | Age: 88
End: 2023-09-07
Attending: INTERNAL MEDICINE
Payer: MEDICARE

## 2023-09-07 LAB
ANION GAP SERPL CALC-SCNC: 5 MMOL/L (ref 0–18)
ATRIAL RATE: 64 BPM
BASOPHILS # BLD AUTO: 0.01 X10(3) UL (ref 0–0.2)
BASOPHILS NFR BLD AUTO: 0.1 %
BUN BLD-MCNC: 28 MG/DL (ref 7–18)
CALCIUM BLD-MCNC: 8.7 MG/DL (ref 8.5–10.1)
CHLORIDE SERPL-SCNC: 110 MMOL/L (ref 98–112)
CO2 SERPL-SCNC: 26 MMOL/L (ref 21–32)
CREAT BLD-MCNC: 1.44 MG/DL
EGFRCR SERPLBLD CKD-EPI 2021: 46 ML/MIN/1.73M2 (ref 60–?)
EOSINOPHIL # BLD AUTO: 0 X10(3) UL (ref 0–0.7)
EOSINOPHIL NFR BLD AUTO: 0 %
ERYTHROCYTE [DISTWIDTH] IN BLOOD BY AUTOMATED COUNT: 12.2 %
GLUCOSE BLD-MCNC: 176 MG/DL (ref 70–99)
HCT VFR BLD AUTO: 31.3 %
HGB BLD-MCNC: 10.9 G/DL
IMM GRANULOCYTES # BLD AUTO: 0.08 X10(3) UL (ref 0–1)
IMM GRANULOCYTES NFR BLD: 0.8 %
LYMPHOCYTES # BLD AUTO: 0.49 X10(3) UL (ref 1–4)
LYMPHOCYTES NFR BLD AUTO: 4.8 %
MCH RBC QN AUTO: 30.9 PG (ref 26–34)
MCHC RBC AUTO-ENTMCNC: 34.8 G/DL (ref 31–37)
MCV RBC AUTO: 88.7 FL
MONOCYTES # BLD AUTO: 0.21 X10(3) UL (ref 0.1–1)
MONOCYTES NFR BLD AUTO: 2.1 %
NEUTROPHILS # BLD AUTO: 9.42 X10 (3) UL (ref 1.5–7.7)
NEUTROPHILS # BLD AUTO: 9.42 X10(3) UL (ref 1.5–7.7)
NEUTROPHILS NFR BLD AUTO: 92.2 %
OSMOLALITY SERPL CALC.SUM OF ELEC: 302 MOSM/KG (ref 275–295)
P AXIS: 55 DEGREES
P-R INTERVAL: 150 MS
PLATELET # BLD AUTO: 245 10(3)UL (ref 150–450)
POTASSIUM SERPL-SCNC: 4 MMOL/L (ref 3.5–5.1)
Q-T INTERVAL: 540 MS
QRS DURATION: 84 MS
QTC CALCULATION (BEZET): 557 MS
R AXIS: 22 DEGREES
RBC # BLD AUTO: 3.53 X10(6)UL
SODIUM SERPL-SCNC: 141 MMOL/L (ref 136–145)
T AXIS: 108 DEGREES
VENTRICULAR RATE: 64 BPM
WBC # BLD AUTO: 10.2 X10(3) UL (ref 4–11)

## 2023-09-07 PROCEDURE — 97116 GAIT TRAINING THERAPY: CPT

## 2023-09-07 PROCEDURE — 85025 COMPLETE CBC W/AUTO DIFF WBC: CPT | Performed by: INTERNAL MEDICINE

## 2023-09-07 PROCEDURE — 94640 AIRWAY INHALATION TREATMENT: CPT

## 2023-09-07 PROCEDURE — 94799 UNLISTED PULMONARY SVC/PX: CPT

## 2023-09-07 PROCEDURE — 93306 TTE W/DOPPLER COMPLETE: CPT | Performed by: INTERNAL MEDICINE

## 2023-09-07 PROCEDURE — 97161 PT EVAL LOW COMPLEX 20 MIN: CPT

## 2023-09-07 PROCEDURE — 80048 BASIC METABOLIC PNL TOTAL CA: CPT | Performed by: INTERNAL MEDICINE

## 2023-09-07 RX ORDER — AMLODIPINE BESYLATE 5 MG/1
5 TABLET ORAL DAILY
Status: DISCONTINUED | OUTPATIENT
Start: 2023-09-07 | End: 2023-09-09

## 2023-09-07 NOTE — PHYSICAL THERAPY NOTE
PHYSICAL THERAPY EVALUATION - INPATIENT     Room Number: 522/522-A  Evaluation Date: 9/7/2023  Type of Evaluation: Initial  Physician Order: PT Eval and Treat    Presenting Problem: dyspnea  Co-Morbidities : COPD, PE, thyroid, Crohns  Reason for Therapy: Mobility Dysfunction and Discharge Planning    History related to current admission: Patient is a 80year old male admitted on 9/6/2023 from home for cough x10 days. Per ED note, patient saw his primary care over the weekend, started on prednisone and Zithromax, finished 4 days of Zithromax, finished 3 days of prednisone 60 mg, took 40 mg today. Still with a cough that is worsening, more short of breath, unable to even stand long enough to take a shower. No fevers chills sweats. .  Pt diagnosed with dyspnea. ASSESSMENT   In this PT evaluation, the patient presents with the following impairments: impaired transfers, impaired gait, dyspnea with exertion and decreased overall functional activity tolerance. These impairments and comorbidities manifest themselves as functional limitations in independent bed mobility, transfers, and gait requiring use of a RW. The patient is below baseline and would benefit from skilled inpatient PT to address the above deficits to assist patient in returning to prior to level of function. Functional outcome measures completed include [facility]. The AM-PAC '6-Clicks' Inpatient Basic Mobility Short Form was completed and this patient is demonstrating a Approx Degree of Impairment: 35.83%  degree of impairment in mobility. Research supports that patients with this level of impairment may benefit from home with HHPT. DISCHARGE RECOMMENDATIONS  PT Discharge Recommendations: Home with home health PT    PLAN  PT Treatment Plan: Endurance; Energy conservation;Patient education; Family education;Gait training;Strengthening;Stair training;Transfer training  Rehab Potential : Good  Frequency (Obs): 3x/week  Number of Visits to Meet Established Goals: 2      CURRENT GOALS    Goal #1 Patient is able to demonstrate supine - sit EOB @ level: independent     Goal #2 Patient is able to demonstrate transfers Sit to/from Stand at assistance level: modified independent     Goal #3 Patient is able to ambulate 200 feet with assist device: walker - rolling at assistance level: modified independent     Goal #4 Patient will ascend/descend 14 steps with handrail and supervision. Goal #5    Goal #6    Goal Comments: Goals established on 2023    HOME SITUATION  Type of Home: House   Home Layout: Two level  Stairs to Enter : 3  Railing: Yes  Stairs to Bedroom: 14  Railing: Yes    Lives With: Son  Drives: Yes (\"occasionally\" per son)  Patient Owned Equipment: Rollator;Cane (primarily uses cane)  Patient Regularly Uses: Glasses; Hearing aides    Prior Level of [place]: History obtained from patient and then patient requesting to call his son whom he lives with, thus information verified with son. Patient currently resides with his son who assists patient as needed with bathing, dressing and mobility. At baseline, patient is primarily ambulatory with his cane, however does have a rollator available for use. Patient has a caretaker 7 days/week, 140 hours/month per son. Patient occasionally drives. Patient is transported to/from the [facility] 3 days/wk (M, , ) for activities including singing, lecture and exercises. Per son, patient participates in 40-50 minutes daily of walking or light exercise in presence of son or caretaker. Son open to HHPT. SUBJECTIVE  \"I got up and walked yesterday. \"       OBJECTIVE  Precautions: Bed/chair alarm  Fall Risk: Standard fall risk    WEIGHT BEARING RESTRICTION  Weight Bearing Restriction: None                PAIN ASSESSMENT  Ratin  Location: patient denies       COGNITION  Following Commands:  follows all commands and directions without difficulty    RANGE OF MOTION AND STRENGTH ASSESSMENT  Upper extremity ROM and strength - NT    Lower extremity ROM is within functional limits    Lower extremity strength is within functional limits      BALANCE  Static Sitting: Good  Dynamic Sitting: Good  Static Standing: Good  Dynamic Standing: Good    ADDITIONAL TESTS                                    ACTIVITY TOLERANCE     Heart Rate Source: Monitor                   O2 WALK  Oxygen Therapy  SPO2% on Room Air at Rest: 100    NEUROLOGICAL FINDINGS                        AM-PAC '6-Clicks' INPATIENT SHORT FORM - BASIC MOBILITY  How much difficulty does the patient currently have. .. Patient Difficulty: Turning over in bed (including adjusting bedclothes, sheets and blankets)?: None   Patient Difficulty: Sitting down on and standing up from a chair with arms (e.g., wheelchair, bedside commode, etc.): A Little   Patient Difficulty: Moving from lying on back to sitting on the side of the bed?: None   How much help from another person does the patient currently need. ..    Help from Another: Moving to and from a bed to a chair (including a wheelchair)?: A Little   Help from Another: Need to walk in hospital room?: A Little   Help from Another: Climbing 3-5 steps with a railing?: A Little       AM-PAC Score:  Raw Score: 20   Approx Degree of Impairment: 35.83%   Standardized Score (AM-PAC Scale): 47.67   CMS Modifier (G-Code): CJ    FUNCTIONAL ABILITY STATUS  Gait Assessment   Functional Mobility/Gait Assessment  Gait Assistance: Supervision  Distance (ft): 125  Assistive Device: Rolling walker  Pattern: Comment (forward flexed posture)    Skilled Therapy Provided     Bed Mobility:  Rolling: independent  Supine to sit: supervision   Sit to supine: NT     Transfer Mobility:  Sit to stand: supervision to RW   Stand to sit: supervision to toilet, RW, use of grab bar  Gait = x125 ft., with RW, supervision; x3 verbal cuing for closer proximity to the RW for safety    Therapist's Comments: Patient presents to PT supine in bed, agreeable to therapy eval and stating he mobilized with assistance from nursing staff yesterday - using a RW. Patient progressed well with PT perform all mobility tasks including ambulation using a RW in the halls with supervision. Does require some safety cues to stay inside the RW. Patient with some dyspnea with exertion, no pulse oximeter available. Patient returned to room and left sitting on the toilet, use of grab bar for transer, with nursing tech present to assist patient in getting washed up for the day. Encouraged patient to sit in the chair throughout the day and to walk using his RW with nursing staff and at home with his family/caretaker. Recommend dc home with HHPT. Patient will continue to benefit from skilled IP PT to address stairs and progress his functional mobility towards his baseline. Exercise/Education Provided:  Energy conservation  Functional activity tolerated  Posture  Strengthening  Transfer training    Patient End of Session: With [address] [place] [address] staff;Needs met;Call light within reach;RN aware of session/findings; All patient questions and concerns addressed; In bathroom - nursing staff aware;Discussed recommendations with /; Other (Comment) (nursing tech present)      Patient Evaluation Complexity Level:  History Low - no personal factors and/or co-morbidities   Examination of body systems Low - addressing 1-2 elements   Clinical Presentation Low - Stable   Clinical Decision Making Low - Stable       PT Session Time: 37 minutes  Gait Training: 10 minutes  Therapeutic Activity: 0 minutes  Neuromuscular Re-education: 0 minutes  Therapeutic Exercise: 0 minutes

## 2023-09-07 NOTE — PROGRESS NOTES
NURSING ADMISSION NOTE      Patient admitted via Cart  Oriented to room. Safety precautions initiated. Bed in low position. Call light in reach. Admitted to room 522    Pt arrived alert and oriented x4, primarily [place] speaking, [place], on RA. Admission navigator, med rec, and flowsheets completed with family at bedside. Educated pt on POC, verbalizes understanding, no further questions.

## 2023-09-07 NOTE — CM/SW NOTE
09/07/23 1500   CM/SW Referral Data   Referral Source Nurse;Social Work (self-referral)   Reason for Referral Discharge planning   Informant EMR;Clinical Staff Member;Son   Medical Hx   Does patient have an established PCP? Yes   Patient [address]   Number of Levels in Home 2   Patient lives with Son   Discharge Needs   Anticipated D/C needs To be determined     HOME SITUATION  Type of Home: House   Home Layout: Two level  Stairs to Enter : 3  Railing: Yes  Stairs to Bedroom: 14  Railing: Yes     Lives With: Son  Drives: Yes (\"occasionally\" per son)  Patient Owned Equipment: Rollator;Cane (primarily uses cane)  Patient Regularly Uses: Glasses; Hearing aides     Prior Level of [place] per PT eval: History obtained from patient and then patient requesting to call his son whom he lives with, thus information verified with son. Patient currently resides with his son who assists patient as needed with bathing, dressing and mobility. At baseline, patient is primarily ambulatory with his cane, however does have a rollator available for use. Patient has a caretaker 7 days/week, 140 hours/month per son. Patient occasionally drives. Patient is transported to/from the [facility] 3 days/wk (M, Th, F) for activities including singing, lecture and exercises. Per son, patient participates in 40-50 minutes daily of walking or light exercise in presence of son or caretaker. Son open to HHPT. Pt is a 79 y/o male admitted with dyspnea. GREGORIO received order for [facility] services and noted PT recommendation for [facility]. GREGORIO met with pt and pt's dtr at bedside to discuss discharge planning, pt's dtr requested SW call her brother, Brenda Santizo. GREGORIO spoke with pt's son Brenda Santizo via phone regarding PT recommendation for [facility]. Pt's son stated he is considering HH, but would like to discuss recommendation with pt first. Pt's son stated he will visit pt this afternoon to discuss.  SW offered to send [facility] referrals, pt's son declined and stated he will reach out to SW if interested in [address] at discharge. SW provided pt's dtr at bedside with SW contact information. SW will continue to follow.     JOSE A Gomez  Discharge Planner

## 2023-09-07 NOTE — PLAN OF CARE
Pt is A&Ox4, primarily [place] speaking, understands English ok. Wears glasses, [place]- b/l HA at home. BP elevated- see mar. Afebrile. Maintaining O2 sats WDL on RA. YOUSIF. Tele, NSR. Xarelto. EP. Last BM 9/6. General/vegetarian diet. Voids. Up x1 and walker, PT to eval. Denies pain. PIV, IV ABX x1. No further needs at this time, continue POC. Safety precautions in place. Son requesting to speak with MD about plan of care- MD paged, no call to son. Endorsed to day time RN.     Problem: Patient/Family Goals  Goal: Patient/Family Long Term Goal  Description: Patient's Long Term Goal: discharge back home    Interventions:  - follow and update POC  - See additional Care Plan goals for specific interventions  Outcome: Progressing  Goal: Patient/Family Short Term Goal  Description: Patient's Short Term Goal:   9/6 NOC: sleep    Interventions:   - cluster care  - See additional Care Plan goals for specific interventions  Outcome: Progressing

## 2023-09-08 ENCOUNTER — PATIENT OUTREACH (OUTPATIENT)
Dept: CASE MANAGEMENT | Age: 88
End: 2023-09-08

## 2023-09-08 PROCEDURE — 97530 THERAPEUTIC ACTIVITIES: CPT

## 2023-09-08 PROCEDURE — 97110 THERAPEUTIC EXERCISES: CPT

## 2023-09-08 PROCEDURE — 94640 AIRWAY INHALATION TREATMENT: CPT

## 2023-09-08 PROCEDURE — 97116 GAIT TRAINING THERAPY: CPT

## 2023-09-08 NOTE — PROGRESS NOTES
AO x3/4. [place] - Hearing aids at homes AUBREE Davalos scheduled. Congested cough. Tele - NSR. PRN hydralazine given. Xarelto. Continent. Up standby with walker. Solumedrol. Zithromax. IV SL. Regular diet - Vegetarian. Pt updated on POC. No further needs at this moment.

## 2023-09-08 NOTE — PROGRESS NOTES
Patient provided with the following COPD education: disease process, what to watch out for in terms of flare-up signs and symptoms, medications, home action and management plan. Reviewed with the patient the purpose and proper techniques on how to use an inhaler and incentive spirometer- patient return demonstrated understanding satisfactorily. Verified with the patient's son (primary caregiver) the patient's current home meds and preferred pharmacy for discharge planning. Meds to beds service offered, patient opting for his preferred OP pharmacy. COPD follow-up order placed per RN protocol.

## 2023-09-08 NOTE — PHYSICAL THERAPY NOTE
PHYSICAL THERAPY TREATMENT NOTE - INPATIENT    Room Number: 522/522-A     Session: 1     Number of Visits to Meet Established Goals: 2    Presenting Problem: dyspnea  Co-Morbidities : COPD, PE, thyroid, Crohns  ASSESSMENT     The patient demonstrates progress in functional mobility. Pt is able to ambulate with  feet with sup assist. STS transfers with mod ind. Tolerated session well. Patient is progressing towards goals. The patient continues to be below baseline for functional mobility, strength,endurance and balance. Will benefit from ongoing skilled PT. Called aide to deliver RW prior to dc on Saturday. DISCHARGE RECOMMENDATIONS  PT Discharge Recommendations: Home with home health PT     PLAN  PT Treatment Plan: Endurance; Energy conservation;Patient education; Family education;Gait training;Strengthening;Stair training;Transfer training  Rehab Potential : Good  Frequency (Obs): 3x/week    CURRENT GOALS     Goal #1 Patient is able to demonstrate supine - sit EOB @ level: independent     Goal #2 Patient is able to demonstrate transfers Sit to/from Stand at assistance level: modified independent  MET     Goal #3 Patient is able to ambulate 200 feet with assist device: walker - rolling at assistance level: modified independent     Goal #4 Patient will ascend/descend 14 steps with handrail and supervision. MET   Goal #5    Goal #6    Goal Comments: Goals established on 2023 goals progressing see above. SUBJECTIVE  \" I like to exercise. \"    OBJECTIVE  Precautions: Bed/chair alarm    WEIGHT BEARING RESTRICTION  Weight Bearing Restriction: None                PAIN ASSESSMENT   Ratin  Location: patient denies       BALANCE                                                                                                                       Static Sitting: Good  Dynamic Sitting: Good           Static Standing: Good  Dynamic Standing: Good    ACTIVITY TOLERANCE O2 WALK         AM-PAC '6-Clicks' INPATIENT SHORT FORM - BASIC MOBILITY  How much difficulty does the patient currently have. .. Patient Difficulty: Turning over in bed (including adjusting bedclothes, sheets and blankets)?: None   Patient Difficulty: Sitting down on and standing up from a chair with arms (e.g., wheelchair, bedside commode, etc.): None   Patient Difficulty: Moving from lying on back to sitting on the side of the bed?: None   How much help from another person does the patient currently need. .. Help from Another: Moving to and from a bed to a chair (including a wheelchair)?: None   Help from Another: Need to walk in hospital room?: A Little   Help from Another: Climbing 3-5 steps with a railing?: A Little       AM-PAC Score:  Raw Score: 22   Approx Degree of Impairment: 20.91%   Standardized Score (AM-PAC Scale): 53.28   CMS Modifier (G-Code): CJ    FUNCTIONAL ABILITY STATUS  Gait Assessment   Functional Mobility/Gait Assessment  Gait Assistance: Supervision  Distance (ft): 400  Assistive Device: Rolling walker  Pattern: Within Functional Limits  Stairs: Stairs  How Many Stairs: 12  Device: 2 Rails  Assist: Supervision    Skilled Therapy Provided         Transfer Mobility:  Sit<>Stand: mod ind  Stand<>Sit: modind   Gait: RW and sup. Patient needs cues to keep RW in close proximity. Maintained step through and no need of standing rest break during gait. Cues for purse lip breath. Therapist's Comments: stair training provided. Patient performed ex in standing and sitting. THERAPEUTIC EXERCISES  Lower Extremity Alternating marching  Ankle pumps  Hip AB/AD  Heel raises  LAQ     Upper Extremity PNF 02, Scapular Retraction, and lateral trunk rotation with arm raise, shoulder raises and rotation. Pelvic tilts. Position Sitting & Standing     Repetitions   15   Sets   1     Patient End of Session: Up in chair;Needs met;Call light within reach;RN aware of session/findings; All patient questions and concerns addressed; Alarm set; Family present    PT Session Time: 40 minutes  Gait Trainin minutes  Therapeutic Activity: 10 minutes  Therapeutic Exercise: 10 minutes   Neuromuscular Re-education: 0 minutes

## 2023-09-08 NOTE — PLAN OF CARE
Pt is admitted for COPD exacerbation. Pt is AOX4. VSS, afebrile and denies any pain. SpO2 maintained on FRA. . SOB with ambulation. Congested cough. Lung sounds expiratory wheezy. Neb and inhaler. IS-750. Tele-NSR. PO XArelto. Reg/vegetarian  diet. Denies any n/v/d. Voids. Up with SBA/walker. IV steroid. IV ABX. WCTM. Pt is updated with plan of care. Problem: Patient/Family Goals  Goal: Patient/Family Long Term Goal  Description: Patient's Long Term Goal: discharge back home    Interventions:  - follow and update POC  - See additional Care Plan goals for specific interventions  Outcome: Progressing  Goal: Patient/Family Short Term Goal  Description: Patient's Short Term Goal:   9/6 NOC: sleep  9/7 AM: Work with PT  9/8: breath better     Interventions:   -IS, meds  - cluster care  - See additional Care Plan goals for specific interventions  Outcome: Progressing     Problem: PAIN - ADULT  Goal: Verbalizes/displays adequate comfort level or patient's stated pain goal  Description: INTERVENTIONS:  - Encourage pt to monitor pain and request assistance  - Assess pain using appropriate pain scale  - Administer analgesics based on type and severity of pain and evaluate response  - Implement non-pharmacological measures as appropriate and evaluate response  - Consider cultural and social influences on pain and pain management  - Manage/alleviate anxiety  - Utilize distraction and/or relaxation techniques  - Monitor for opioid side effects  - Notify MD/LIP if interventions unsuccessful or patient reports new pain  - Anticipate increased pain with activity and pre-medicate as appropriate  Outcome: Progressing     Problem: SAFETY ADULT - FALL  Goal: Free from fall injury  Description: INTERVENTIONS:  - Assess pt frequently for physical needs  - Identify cognitive and physical deficits and behaviors that affect risk of falls.   - [employer] fall precautions as indicated by assessment.  - Educate pt/family on patient safety including physical limitations  - Instruct pt to call for assistance with activity based on assessment  - Modify environment to reduce risk of injury  - Provide assistive devices as appropriate  - Consider OT/PT consult to assist with strengthening/mobility  - Encourage toileting schedule  Outcome: Progressing     Problem: DISCHARGE PLANNING  Goal: Discharge to home or other facility with appropriate resources  Description: INTERVENTIONS:  - Identify barriers to discharge w/pt and caregiver  - Include patient/family/discharge partner in discharge planning  - Arrange for needed discharge resources and transportation as appropriate  - Identify discharge learning needs (meds, wound care, etc)  - Arrange for interpreters to assist at discharge as needed  - Consider post-discharge preferences of patient/family/discharge partner  - Complete POLST form as appropriate  - Assess patient's ability to be responsible for managing their own health  - Refer to Case Management Department for coordinating discharge planning if the patient needs post-hospital services based on physician/LIP order or complex needs related to functional status, cognitive ability or social support system  Outcome: Progressing     Problem: RESPIRATORY - ADULT  Goal: Achieves optimal ventilation and oxygenation  Description: INTERVENTIONS:  - Assess for changes in respiratory status  - Assess for changes in mentation and behavior  - Position to facilitate oxygenation and minimize respiratory effort  - Oxygen supplementation based on oxygen saturation or ABGs  - Provide Smoking Cessation handout, if applicable  - Encourage broncho-pulmonary hygiene including cough, deep breathe, Incentive Spirometry  - Assess the need for suctioning and perform as needed  - Assess and instruct to report SOB or any respiratory difficulty  - Respiratory Therapy support as indicated  - Manage/alleviate anxiety  - Monitor for signs/symptoms of CO2 retention  Outcome: Progressing

## 2023-09-08 NOTE — CM/SW NOTE
GREGORIO spoke with pt's son Eldon Leah via phone regarding [address] at discharge. Pt's son declined [address] at this time but requested a walker for pt. GREGORIO messaged therapy team to dispense a walker to pt at discharge. SW will continue to remain available for any additional discharge needs.      JOSE A Paul  Discharge Planner

## 2023-09-08 NOTE — PROGRESS NOTES
VM received; Jazmine Cannon RN from Pulmonary Med called to inform us that patient to be discharged tomorrow. Jazmine Cannon (RN) also requesting for us to contact son Leonard Mayes ([phone]) to schedule for father. Patient requesting assistance w/scheduling appts. Schedule Follow-Up Appointments with: Follow up with Bailee Duke MD (Internal Medicine) in 1 week (9/8/2023); Post-hospitalization follow-up    Follow up with Victoria Hurt MD (PULMONARY DISEASES) in 2 weeks (9/22/2023);  Post-hospitalization follow-up, Follow-up for COPD

## 2023-09-08 NOTE — CM/SW NOTE
SW received order to arrange home nebulizer. SW placed order for nebulizer and sent referral to [facility] in [address] [place] [address], await response.      JOSE A Fields  Discharge Planner

## 2023-09-09 VITALS
SYSTOLIC BLOOD PRESSURE: 146 MMHG | HEIGHT: 60 IN | RESPIRATION RATE: 18 BRPM | OXYGEN SATURATION: 100 % | TEMPERATURE: 98 F | BODY MASS INDEX: 27.48 KG/M2 | DIASTOLIC BLOOD PRESSURE: 64 MMHG | WEIGHT: 140 LBS | HEART RATE: 94 BPM

## 2023-09-09 PROBLEM — I50.9 ACUTE ON CHRONIC CONGESTIVE HEART FAILURE, UNSPECIFIED HEART FAILURE TYPE (HCC): Status: RESOLVED | Noted: 2023-09-06 | Resolved: 2023-09-09

## 2023-09-09 PROBLEM — R06.00 DYSPNEA, UNSPECIFIED TYPE: Status: RESOLVED | Noted: 2017-01-04 | Resolved: 2023-09-09

## 2023-09-09 PROBLEM — J98.01 ACUTE BRONCHOSPASM: Status: RESOLVED | Noted: 2023-09-06 | Resolved: 2023-09-09

## 2023-09-09 LAB
ANION GAP SERPL CALC-SCNC: 7 MMOL/L (ref 0–18)
BASOPHILS # BLD AUTO: 0.04 X10(3) UL (ref 0–0.2)
BASOPHILS NFR BLD AUTO: 0.3 %
BUN BLD-MCNC: 41 MG/DL (ref 7–18)
CALCIUM BLD-MCNC: 8.7 MG/DL (ref 8.5–10.1)
CHLORIDE SERPL-SCNC: 113 MMOL/L (ref 98–112)
CO2 SERPL-SCNC: 23 MMOL/L (ref 21–32)
CREAT BLD-MCNC: 1.61 MG/DL
EGFRCR SERPLBLD CKD-EPI 2021: 41 ML/MIN/1.73M2 (ref 60–?)
EOSINOPHIL # BLD AUTO: 0 X10(3) UL (ref 0–0.7)
EOSINOPHIL NFR BLD AUTO: 0 %
ERYTHROCYTE [DISTWIDTH] IN BLOOD BY AUTOMATED COUNT: 12.5 %
GLUCOSE BLD-MCNC: 159 MG/DL (ref 70–99)
HCT VFR BLD AUTO: 32.3 %
HGB BLD-MCNC: 11 G/DL
IMM GRANULOCYTES # BLD AUTO: 0.28 X10(3) UL (ref 0–1)
IMM GRANULOCYTES NFR BLD: 1.8 %
LYMPHOCYTES # BLD AUTO: 0.44 X10(3) UL (ref 1–4)
LYMPHOCYTES NFR BLD AUTO: 2.8 %
MCH RBC QN AUTO: 31.1 PG (ref 26–34)
MCHC RBC AUTO-ENTMCNC: 34.1 G/DL (ref 31–37)
MCV RBC AUTO: 91.2 FL
MONOCYTES # BLD AUTO: 0.76 X10(3) UL (ref 0.1–1)
MONOCYTES NFR BLD AUTO: 4.8 %
NEUTROPHILS # BLD AUTO: 14.41 X10 (3) UL (ref 1.5–7.7)
NEUTROPHILS # BLD AUTO: 14.41 X10(3) UL (ref 1.5–7.7)
NEUTROPHILS NFR BLD AUTO: 90.3 %
OSMOLALITY SERPL CALC.SUM OF ELEC: 309 MOSM/KG (ref 275–295)
PLATELET # BLD AUTO: 277 10(3)UL (ref 150–450)
POTASSIUM SERPL-SCNC: 4 MMOL/L (ref 3.5–5.1)
RBC # BLD AUTO: 3.54 X10(6)UL
SODIUM SERPL-SCNC: 143 MMOL/L (ref 136–145)
WBC # BLD AUTO: 15.9 X10(3) UL (ref 4–11)

## 2023-09-09 PROCEDURE — 85025 COMPLETE CBC W/AUTO DIFF WBC: CPT | Performed by: INTERNAL MEDICINE

## 2023-09-09 PROCEDURE — 80048 BASIC METABOLIC PNL TOTAL CA: CPT | Performed by: INTERNAL MEDICINE

## 2023-09-09 PROCEDURE — 94640 AIRWAY INHALATION TREATMENT: CPT

## 2023-09-09 RX ORDER — BENZONATATE 200 MG/1
200 CAPSULE ORAL 3 TIMES DAILY PRN
Qty: 21 CAPSULE | Refills: 0 | Status: SHIPPED | OUTPATIENT
Start: 2023-09-09

## 2023-09-09 RX ORDER — PREDNISONE 10 MG/1
TABLET ORAL
Qty: 11 TABLET | Refills: 0 | Status: SHIPPED | OUTPATIENT
Start: 2023-09-09 | End: 2023-09-18

## 2023-09-09 RX ORDER — IPRATROPIUM BROMIDE AND ALBUTEROL SULFATE 2.5; .5 MG/3ML; MG/3ML
3 SOLUTION RESPIRATORY (INHALATION)
Qty: 50 EACH | Refills: 0 | Status: SHIPPED | OUTPATIENT
Start: 2023-09-09

## 2023-09-09 RX ORDER — AMLODIPINE BESYLATE 5 MG/1
5 TABLET ORAL DAILY
Qty: 30 TABLET | Refills: 0 | Status: SHIPPED | OUTPATIENT
Start: 2023-09-09

## 2023-09-09 NOTE — DISCHARGE INSTRUCTIONS
BMP TO BE DONE IN FEW DAYS IN PCP [address] for quad cane  [address], [place], [address]  Phone:  [phone]

## 2023-09-09 NOTE — PLAN OF CARE
Problem: Patient/Family Goals  Goal: Patient/Family Long Term Goal  Description: Patient's Long Term Goal: discharge back home    Interventions:  - follow and update POC  - See additional Care Plan goals for specific interventions  Outcome: Progressing     Problem: PAIN - ADULT  Goal: Verbalizes/displays adequate comfort level or patient's stated pain goal  Description: INTERVENTIONS:  - Encourage pt to monitor pain and request assistance  - Assess pain using appropriate pain scale  - Administer analgesics based on type and severity of pain and evaluate response  - Implement non-pharmacological measures as appropriate and evaluate response  - Consider cultural and social influences on pain and pain management  - Manage/alleviate anxiety  - Utilize distraction and/or relaxation techniques  - Monitor for opioid side effects  - Notify MD/LIP if interventions unsuccessful or patient reports new pain  - Anticipate increased pain with activity and pre-medicate as appropriate  Outcome: Progressing     Problem: SAFETY ADULT - FALL  Goal: Free from fall injury  Description: INTERVENTIONS:  - Assess pt frequently for physical needs  - Identify cognitive and physical deficits and behaviors that affect risk of falls.   - [place] fall precautions as indicated by assessment.  - Educate pt/family on patient safety including physical limitations  - Instruct pt to call for assistance with activity based on assessment  - Modify environment to reduce risk of injury  - Provide assistive devices as appropriate  - Consider OT/PT consult to assist with strengthening/mobility  - Encourage toileting schedule  Outcome: Progressing     Problem: DISCHARGE PLANNING  Goal: Discharge to home or other facility with appropriate resources  Description: INTERVENTIONS:  - Identify barriers to discharge w/pt and caregiver  - Include patient/family/discharge partner in discharge planning  - Arrange for needed discharge resources and transportation as appropriate  - Identify discharge learning needs (meds, wound care, etc)  - Arrange for interpreters to assist at discharge as needed  - Consider post-discharge preferences of patient/family/discharge partner  - Complete POLST form as appropriate  - Assess patient's ability to be responsible for managing their own health  - Refer to Case Management Department for coordinating discharge planning if the patient needs post-hospital services based on physician/LIP order or complex needs related to functional status, cognitive ability or social support system  Outcome: Progressing     Problem: RESPIRATORY - ADULT  Goal: Achieves optimal ventilation and oxygenation  Description: INTERVENTIONS:  - Assess for changes in respiratory status  - Assess for changes in mentation and behavior  - Position to facilitate oxygenation and minimize respiratory effort  - Oxygen supplementation based on oxygen saturation or ABGs  - Provide Smoking Cessation handout, if applicable  - Encourage broncho-pulmonary hygiene including cough, deep breathe, Incentive Spirometry  - Assess the need for suctioning and perform as needed  - Assess and instruct to report SOB or any respiratory difficulty  - Respiratory Therapy support as indicated  - Manage/alleviate anxiety  - Monitor for signs/symptoms of CO2 retention  Outcome: Progressing

## 2023-09-09 NOTE — PLAN OF CARE
Aox4. RA. Expiratory wheezes on right lower lobe. Diminished lung sounds in left lower lobe. neb and inhaler. . NSR. Regular diet, pt is a vegetarian. Up with walker x1 assist. PIV R upper arm saline locked. Duoneb q6. Solumedrol q6. Xarelto. Pt is cleared for discharge, Pt is being given information for [address] [place] [address] for home neb machine and supplies. Pt updated on POC. All questions answered. Call light within reach.           Problem: DISCHARGE PLANNING  Goal: Discharge to home or other facility with appropriate resources  Description: INTERVENTIONS:  - Identify barriers to discharge w/pt and caregiver  - Include patient/family/discharge partner in discharge planning  - Arrange for needed discharge resources and transportation as appropriate  - Identify discharge learning needs (meds, wound care, etc)  - Arrange for interpreters to assist at discharge as needed  - Consider post-discharge preferences of patient/family/discharge partner  - Complete POLST form as appropriate  - Assess patient's ability to be responsible for managing their own health  - Refer to Case Management Department for coordinating discharge planning if the patient needs post-hospital services based on physician/LIP order or complex needs related to functional status, cognitive ability or social support system  Outcome: Progressing     Problem: RESPIRATORY - ADULT  Goal: Achieves optimal ventilation and oxygenation  Description: INTERVENTIONS:  - Assess for changes in respiratory status  - Assess for changes in mentation and behavior  - Position to facilitate oxygenation and minimize respiratory effort  - Oxygen supplementation based on oxygen saturation or ABGs  - Provide Smoking Cessation handout, if applicable  - Encourage broncho-pulmonary hygiene including cough, deep breathe, Incentive Spirometry  - Assess the need for suctioning and perform as needed  - Assess and instruct to report SOB or any respiratory difficulty  - Respiratory Therapy support as indicated  - Manage/alleviate anxiety  - Monitor for signs/symptoms of CO2 retention  Outcome: Progressing

## 2023-09-09 NOTE — CM/SW NOTE
Patient in need of home nebulizer. HME reserved in [address]. HME to reach out to patient/family to drop off home nebulizer. RN updated.     Zenia Riley LCSW  /Discharge Planner

## 2023-09-09 NOTE — DISCHARGE SUMMARY
[facility]    Discharge Summary    Jluis Navas Patient Status:  Inpatient    1934 MRN [id number]   [facility] 5NW-A Attending No att. providers found   Hosp Day # 3 PCP Gurvinder Ryan MD     Date of Admission: 2023 Disposition: Home or Self Care     Date of Discharge: 2023  3:23 PM    Admitting Diagnosis: Acute bronchospasm [J98.01]  COPD exacerbation ([address] [J44.1]  Dyspnea, unspecified type [R06.00]  Acute on chronic congestive heart failure, unspecified heart failure type ([address] [I50.9]    Discharge Diagnosis:       COPD EXACERBATION  ACUTE  BRONCHITIS  FORMER CHRONIC HEAVY SMOKER. ELEVATED BNP  CHRONIC DIASTOLIC HEART FAILURE.  Echo normal  HTN  CHRONIC ANTICOAGULATION FOR H/O PE           Patient Active Problem List:     Abdominal pain     Back pain with radiation     Community acquired pneumonia     At risk for falling     Lumbar spinal stenosis     Lumbosacral radiculopathy     Nontraumatic subdural hygroma     Acute encephalopathy     Anemia     Left lower lobe pneumonia     Hyponatremia     Hyperglycemia     Leukocytosis     Acute renal failure (ARF) (HCC)     ANTHONY (acute kidney injury) (HCC)     Azotemia     Metabolic acidosis     Metabolic alkalosis     Respiratory alkalosis     Gastrointestinal hemorrhage     Gastrointestinal hemorrhage, unspecified gastrointestinal hemorrhage type     Anemia, unspecified type     Upper GI bleeding     Asthma-COPD overlap syndrome (HCC)     Essential hypertension, benign     Acute exacerbation of chronic obstructive pulmonary disease (COPD) (HCC)     Renal insufficiency     Diarrhea, unspecified type     Hypernatremia     Crohn's disease of large intestine without complication (HCC)     Chest pain, atypical     Crohn's disease with complication ([address]     Crohn's disease with complication, unspecified gastrointestinal tract location ([address]     Small bowel obstruction ([address]     COPD exacerbation ([address]     Weakness Dehydration     Stage 3 chronic kidney disease (HCC)     ATN (acute tubular necrosis) (HCC)     PE (pulmonary thromboembolism) [facility]      Reason for Admission:SOB / copd exacerbation     History of Present Illness:     Pt came to ER with SOB and cough. He is a chronic smoker and was diagnosed with copd exacerbation as outpatient by PCP - started on azithromycin, prednisone- as outpatient - not improving hence came to ED - no hypoxia but actively wheezing despite hour klong juju. No infiltrates or pulmonary edema on cxr  BNP high. Hence no fluids given . Admited to floor for management  of copd exacerbation failed OP therapy. Hospital Course:     9/9/23-   Dc plan to home on duonebs, prednisone, tessalon perles. Amlodipine for better BP control. 9/8/23-   Continue nebs and solumedrol and iv azithromycin  Ss consult to arrange nebulizer for home. Home with [place] recommended - family refused. Will call son to discuss      9/7/23:    ADMIT INPATIENT. Duonebs Q6H  Iv solumedrol  40 mg q6h  Resume home meds  O2 prn  Called son andrew and left message in detail explaining plan of care. Dvt / gi px. Pt consult  Up as tolerated. Full code. Dc planning to home when able. Questions/concerns were discussed with patient  by bedside. Discharge Condition: Stable         Discharge Medications:      Discharge Medications        START taking these medications        Instructions Prescription details   amLODIPine 5 MG Tabs  Commonly known as: Norvasc      Take 1 tablet (5 mg total) by mouth daily. Quantity: 30 tablet  Refills: 0     benzonatate 200 MG Caps  Commonly known as: Tessalon      Take 1 capsule (200 mg total) by mouth 3 (three) times daily as needed for cough. Quantity: 21 capsule  Refills: 0     ipratropium-albuterol 0.5-2.5 (3) MG/3ML Soln  Commonly known as: Duoneb      Take 3 mL by nebulization every 4 (four) hours.    Quantity: 50 each  Refills: 0     predniSONE 10 MG Tabs  Commonly known as: Deltasone  Start taking on: September 9, 2023      Take 2 tablets (20 mg total) by mouth daily for 3 days, THEN 1 tablet (10 mg total) daily for 3 days, THEN 0.5 tablets (5 mg total) daily for 3 days. Stop taking on: September 18, 2023  Quantity: 11 tablet  Refills: 0            CHANGE how you take these medications        Instructions Prescription details   albuterol 108 (90 Base) MCG/ACT Aers  Commonly known as: Ventolin HFA  What changed: Another medication with the same name was removed. Continue taking this medication, and follow the directions you see here. Inhale 2 puffs into the lungs every 6 (six) hours as needed for Wheezing. Refills: 0            CONTINUE taking these medications        Instructions Prescription details   carvedilol 3.125 MG Tabs  Commonly known as: Coreg      Take 1 tablet (3.125 mg total) by mouth 2 (two) times daily with meals. Refills: 0     cyanocobalamin 1000 MCG/ML Soln  Commonly known as: Vitamin B12      Inject 1 mL (1,000 mcg total) into the muscle every 30 (thirty) days. Refills: 0     ferrous sulfate 325 (65 FE) MG Tbec      Take 1 tablet (325 mg total) by mouth 2 (two) times daily with meals. Refills: 0     levothyroxine 50 MCG Tabs  Commonly known as: Synthroid      Take 1 tablet (50 mcg total) by mouth before breakfast.   Refills: 0     mesalamine 1.2 g Tbec  Commonly known as: LIALDA      Take 2 tablets (2.4 g total) by mouth daily. Refills: 0     Omeprazole 40 MG Cpdr      Take 1 capsule (40 mg total) by mouth daily. Refills: 0     pravastatin 20 MG Tabs  Commonly known as: Pravachol      Take 1 tablet (20 mg total) by mouth nightly. Refills: 0     tamsulosin 0.4 MG Caps  Commonly known as: Flomax      Take 1 capsule (0.4 mg total) by mouth nightly. Refills: 0     Trelegy Ellipta 200-62.5-25 MCG/ACT Aepb  Generic drug: fluticasone-umeclidin-vilant      Inhale 1 puff into the lungs daily.    Refills: 0     Vitamin D 1000 units Tabs      Take 2,000 Units by mouth daily. Refills: 0     Xarelto 15 MG Tabs  Generic drug: rivaroxaban      Take 1 tablet (15 mg total) by mouth daily. Refills: 0            STOP taking these medications      azithromycin 250 MG Tabs  Commonly known as: Zithromax                  Where to Get Your Medications        These medications were sent to [facility], [address], [address], [place] [address] [address]      Phone: [phone]   amLODIPine 5 MG Tabs  benzonatate 200 MG Caps  ipratropium-albuterol 0.5-2.5 (3) MG/3ML Soln  predniSONE 10 MG Tabs         Follow up Visits:      Follow-up with PCP in 2-3 days  BMP in 2-3 days    Raghu De La Rosa MD  9/10/2023  10:21 PM

## 2023-09-09 NOTE — PROGRESS NOTES
Discharged  home via wheelchair  Accompanied by Support staff   Belongings taken by patient/family  PIV removed  Tele box removed & placed in the drawer  Discharge Navigator completed  Discharge instructions reviewed with patient a bedside  All questions & concerns addressed at his time. HME will bring Neb machine at home today .

## 2023-09-09 NOTE — PROGRESS NOTES
09/09/23 1200   Mobility   O2 walk?  Yes   SPO2% on Room Air at Rest 98   SPO2% Ambulation on Room Air 89

## 2023-09-09 NOTE — PROGRESS NOTES
AO x3/4. TONIO. MAYITO. Nebs scheduled. Congested cough. Tele - NSR. Xarelto. Continent. Up standby with walker. Solumedrol. Zithromax. IV SL. Regular diet - Vegetarian. Pt updated on POC. No further needs at this moment.

## 2023-09-11 ENCOUNTER — PATIENT OUTREACH (OUTPATIENT)
Dept: CASE MANAGEMENT | Age: 88
End: 2023-09-11

## 2023-09-11 NOTE — PROGRESS NOTES
Initial Post Discharge Follow Up   Discharge Date: 9/9/23  Contact Date: 9/11/2023    Consent Verification:  Assessment Completed With: Other: Son-Jose Permission received per patient?  verbal  HIPAA Verified? Yes    Discharge Dx:     COPD EXACERBATION  ACUTE  BRONCHITIS  FORMER CHRONIC HEAVY SMOKER. ELEVATED BNP  CHRONIC DIASTOLIC HEART FAILURE. Echo normal  HTN  CHRONIC ANTICOAGULATION FOR H/O PE    Was TCC ordered: yes, pt's son declined, prefers to follow up with PCP. General:   How have you been since your discharge from the hospital? Spoke with patients son Tayla Gallegos oriana from patient, states pt is feeling overall better. Pt's Blood pressures on average at 140/60, 165/80. Pt continues to have slight cough, but feeling better. Pt denies fevers, chills, nausea, vomiting, shortness of breath, chest pain or any other symptoms. Pt will be following up with outside PCP Dr. Rey Soriano in the next week or two and pulmonology. Son declined TCC. NCM closing encounter.

## 2023-09-11 NOTE — PROGRESS NOTES
VM received  Calling pt son, Eliud Page back    Dr Jay Natarajan  [address] [address] [address] [phone]  Pt son advised pt has apt on 10/05 @1:20pm  Closing encounter

## 2023-09-11 NOTE — PAYOR COMM NOTE
--------------  DISCHARGE REVIEW    Payor:  [address] #:  [id number]  Authorization Number: Danial Angela date: 23  Admit time:   7:55 PM  Discharge Date: 2023  3:23 PM     Admitting Physician: Carl Jimenez MD  Attending Physician:  No att. providers found  Primary Care Physician: Bailee Duke MD          Discharge Summary Notes        Discharge Summary signed by Carl Jimenez MD at 9/10/2023 10:23 PM       Author: Carl Jimenez MD Specialty: Internal Medicine, IP Consult to Primary Care Author Type: Physician    Filed: 9/10/2023 10:23 PM Date of Service: 2023  7:21 AM Status: Signed    : Carl Jimenez MD (Physician)           [facility]    Discharge Summary    Ryan Encinas Patient Status:  Inpatient    1934 MRN [id number]   [facility] 5NW-A Attending No att. providers found   Hosp Day # 3 PCP Patricio Ying MD     Date of Admission: 2023 Disposition: Home or Self Care     Date of Discharge: 2023  3:23 PM    Admitting Diagnosis: Acute bronchospasm [J98.01]  COPD exacerbation ([address] [J44.1]  Dyspnea, unspecified type [R06.00]  Acute on chronic congestive heart failure, unspecified heart failure type ([address] [I50.9]    Discharge Diagnosis:       COPD EXACERBATION  ACUTE  BRONCHITIS  FORMER CHRONIC HEAVY SMOKER. ELEVATED BNP  CHRONIC DIASTOLIC HEART FAILURE.  Echo normal  HTN  CHRONIC ANTICOAGULATION FOR H/O PE           Patient Active Problem List:     Abdominal pain     Back pain with radiation     Community acquired pneumonia     At risk for falling     Lumbar spinal stenosis     Lumbosacral radiculopathy     Nontraumatic subdural hygroma     Acute encephalopathy     Anemia     Left lower lobe pneumonia     Hyponatremia     Hyperglycemia     Leukocytosis     Acute renal failure (ARF) (HCC)     ANTHONY (acute kidney injury) (HCC)     Azotemia     Metabolic acidosis     Metabolic alkalosis Respiratory alkalosis     Gastrointestinal hemorrhage     Gastrointestinal hemorrhage, unspecified gastrointestinal hemorrhage type     Anemia, unspecified type     Upper GI bleeding     Asthma-COPD overlap syndrome (HCC)     Essential hypertension, benign     Acute exacerbation of chronic obstructive pulmonary disease (COPD) ([facility])     Renal insufficiency     Diarrhea, unspecified type     Hypernatremia     Crohn's disease of large intestine without complication (HCC)     Chest pain, atypical     Crohn's disease with complication ([facility]     Crohn's disease with complication, unspecified gastrointestinal tract location ([facility]     Small bowel obstruction (HCC)     COPD exacerbation ([address]     Weakness     Dehydration     Stage 3 chronic kidney disease ([facility]     ATN (acute tubular necrosis) ([facility]     PE (pulmonary thromboembolism) ([facility]      Reason for Admission:SOB / copd exacerbation     History of Present Illness:     Pt came to ER with SOB and cough. He is a chronic smoker and was diagnosed with copd exacerbation as outpatient by PCP - started on azithromycin, prednisone- as outpatient - not improving hence came to ED - no hypoxia but actively wheezing despite hour klong juju. No infiltrates or pulmonary edema on cxr  BNP high. Hence no fluids given . Admited to floor for management  of copd exacerbation failed OP therapy. Hospital Course:     9/9/23-   Dc plan to home on duonebs, prednisone, tessalon perles. Amlodipine for better BP control. 9/8/23-   Continue nebs and solumedrol and iv azithromycin  Ss consult to arrange nebulizer for home. Home with [place] recommended - family refused. Will call son to discuss      9/7/23:    ADMIT INPATIENT. Duonebs Q6H  Iv solumedrol  40 mg q6h  Resume home meds  O2 prn  Called son andrew and left message in detail explaining plan of care. Dvt / gi px. Pt consult  Up as tolerated. Full code. Dc planning to home when able.       Questions/concerns were discussed with patient  by bedside. Discharge Condition: Stable         Discharge Medications:      Discharge Medications        START taking these medications        Instructions Prescription details   amLODIPine 5 MG Tabs  Commonly known as: Norvasc      Take 1 tablet (5 mg total) by mouth daily. Quantity: 30 tablet  Refills: 0     benzonatate 200 MG Caps  Commonly known as: Tessalon      Take 1 capsule (200 mg total) by mouth 3 (three) times daily as needed for cough. Quantity: 21 capsule  Refills: 0     ipratropium-albuterol 0.5-2.5 (3) MG/3ML Soln  Commonly known as: Duoneb      Take 3 mL by nebulization every 4 (four) hours. Quantity: 50 each  Refills: 0     predniSONE 10 MG Tabs  Commonly known as: Deltasone  Start taking on: September 9, 2023      Take 2 tablets (20 mg total) by mouth daily for 3 days, THEN 1 tablet (10 mg total) daily for 3 days, THEN 0.5 tablets (5 mg total) daily for 3 days. Stop taking on: September 18, 2023  Quantity: 11 tablet  Refills: 0            CHANGE how you take these medications        Instructions Prescription details   albuterol 108 (90 Base) MCG/ACT Aers  Commonly known as: Ventolin HFA  What changed: Another medication with the same name was removed. Continue taking this medication, and follow the directions you see here. Inhale 2 puffs into the lungs every 6 (six) hours as needed for Wheezing. Refills: 0            CONTINUE taking these medications        Instructions Prescription details   carvedilol 3.125 MG Tabs  Commonly known as: Coreg      Take 1 tablet (3.125 mg total) by mouth 2 (two) times daily with meals. Refills: 0     cyanocobalamin 1000 MCG/ML Soln  Commonly known as: Vitamin B12      Inject 1 mL (1,000 mcg total) into the muscle every 30 (thirty) days. Refills: 0     ferrous sulfate 325 (65 FE) MG Tbec      Take 1 tablet (325 mg total) by mouth 2 (two) times daily with meals.    Refills: 0     levothyroxine 50 MCG Tabs  Commonly known as: Synthroid      Take 1 tablet (50 mcg total) by mouth before breakfast.   Refills: 0     mesalamine 1.2 g Tbec  Commonly known as: LIALDA      Take 2 tablets (2.4 g total) by mouth daily. Refills: 0     Omeprazole 40 MG Cpdr      Take 1 capsule (40 mg total) by mouth daily. Refills: 0     pravastatin 20 MG Tabs  Commonly known as: Pravachol      Take 1 tablet (20 mg total) by mouth nightly. Refills: 0     tamsulosin 0.4 MG Caps  Commonly known as: Flomax      Take 1 capsule (0.4 mg total) by mouth nightly. Refills: 0     Trelegy Ellipta 200-62.5-25 MCG/ACT Aepb  Generic drug: fluticasone-umeclidin-vilant      Inhale 1 puff into the lungs daily. Refills: 0     Vitamin D 1000 units Tabs      Take 2,000 Units by mouth daily. Refills: 0     Xarelto 15 MG Tabs  Generic drug: rivaroxaban      Take 1 tablet (15 mg total) by mouth daily. Refills: 0            STOP taking these medications      azithromycin 250 MG Tabs  Commonly known as: Zithromax                  Where to Get Your Medications        These medications were sent to [facility], [address], [address], [place] [address]      Phone: [phone]   amLODIPine 5 MG Tabs  benzonatate 200 MG Caps  ipratropium-albuterol 0.5-2.5 (3) MG/3ML Soln  predniSONE 10 MG Tabs         Follow up Visits:      Follow-up with PCP in 2-3 days  BMP in 2-3 days    Beau Feldman MD  9/10/2023  10:21 PM    Electronically signed by Jostin Benítez MD on 9/10/2023 10:23 PM         REVIEWER COMMENTS

## 2023-10-05 ENCOUNTER — LAB ENCOUNTER (OUTPATIENT)
Dept: LAB | Age: 88
End: 2023-10-05
Attending: INTERNAL MEDICINE
Payer: MEDICARE

## 2023-10-05 DIAGNOSIS — Z86.711 HISTORY OF PULMONARY EMBOLISM: Primary | ICD-10-CM

## 2023-10-05 LAB — D DIMER PPP FEU-MCNC: 0.27 UG/ML FEU (ref ?–0.89)

## 2023-10-05 PROCEDURE — 85379 FIBRIN DEGRADATION QUANT: CPT

## 2023-10-05 PROCEDURE — 36415 COLL VENOUS BLD VENIPUNCTURE: CPT

## 2023-10-06 NOTE — CM/SW NOTE
[facility] [place] for SONIYA still pending. Updates with PT note from today sent via [address]. Updated Garrel Left from [facility]. / to remain available for support and/or discharge planning.      Adalgisa Bass LCSW  Discharge Planner Moderate Malnutrition

## 2023-12-10 ENCOUNTER — LAB ENCOUNTER (OUTPATIENT)
Dept: LAB | Facility: HOSPITAL | Age: 88
End: 2023-12-10
Attending: INTERNAL MEDICINE
Payer: MEDICARE

## 2023-12-10 DIAGNOSIS — E78.2 MIXED HYPERLIPIDEMIA: ICD-10-CM

## 2023-12-10 DIAGNOSIS — D51.9 VITAMIN B12 DEFICIENCY ANEMIA: ICD-10-CM

## 2023-12-10 DIAGNOSIS — I50.32 CHRONIC DIASTOLIC HEART FAILURE (HCC): ICD-10-CM

## 2023-12-10 DIAGNOSIS — J44.1 OBSTRUCTIVE CHRONIC BRONCHITIS WITH EXACERBATION (HCC): Primary | ICD-10-CM

## 2023-12-10 DIAGNOSIS — E55.9 AVITAMINOSIS D: ICD-10-CM

## 2023-12-10 DIAGNOSIS — E03.9 ACQUIRED HYPOTHYROIDISM: ICD-10-CM

## 2023-12-10 DIAGNOSIS — Z12.5 SPECIAL SCREENING FOR MALIGNANT NEOPLASM OF PROSTATE: ICD-10-CM

## 2023-12-10 LAB
ALBUMIN SERPL-MCNC: 2.7 G/DL (ref 3.4–5)
ALBUMIN/GLOB SERPL: 1.2 {RATIO} (ref 1–2)
ALP LIVER SERPL-CCNC: 127 U/L
ALT SERPL-CCNC: 17 U/L
ANION GAP SERPL CALC-SCNC: 2 MMOL/L (ref 0–18)
AST SERPL-CCNC: 22 U/L (ref 15–37)
BASOPHILS # BLD AUTO: 0.02 X10(3) UL (ref 0–0.2)
BASOPHILS NFR BLD AUTO: 0.3 %
BILIRUB SERPL-MCNC: 1 MG/DL (ref 0.1–2)
BILIRUB UR QL STRIP.AUTO: NEGATIVE
BUN BLD-MCNC: 21 MG/DL (ref 9–23)
CALCIUM BLD-MCNC: 7.9 MG/DL (ref 8.5–10.1)
CHLORIDE SERPL-SCNC: 115 MMOL/L (ref 98–112)
CHOLEST SERPL-MCNC: 54 MG/DL (ref ?–200)
CLARITY UR REFRACT.AUTO: CLEAR
CO2 SERPL-SCNC: 26 MMOL/L (ref 21–32)
COLOR UR AUTO: YELLOW
COMPLEXED PSA SERPL-MCNC: 2.14 NG/ML (ref ?–4)
CREAT BLD-MCNC: 1.47 MG/DL
EGFRCR SERPLBLD CKD-EPI 2021: 45 ML/MIN/1.73M2 (ref 60–?)
EOSINOPHIL # BLD AUTO: 0.28 X10(3) UL (ref 0–0.7)
EOSINOPHIL NFR BLD AUTO: 4 %
ERYTHROCYTE [DISTWIDTH] IN BLOOD BY AUTOMATED COUNT: 13.2 %
FASTING PATIENT LIPID ANSWER: YES
FASTING STATUS PATIENT QL REPORTED: YES
FOLATE SERPL-MCNC: 39.5 NG/ML (ref 8.7–?)
GLOBULIN PLAS-MCNC: 2.3 G/DL (ref 2.8–4.4)
GLUCOSE BLD-MCNC: 90 MG/DL (ref 70–99)
GLUCOSE UR STRIP.AUTO-MCNC: NORMAL MG/DL
HCT VFR BLD AUTO: 36.7 %
HDLC SERPL-MCNC: 40 MG/DL (ref 40–59)
HGB BLD-MCNC: 12.5 G/DL
HYALINE CASTS #/AREA URNS AUTO: PRESENT /LPF
IMM GRANULOCYTES # BLD AUTO: 0.03 X10(3) UL (ref 0–1)
IMM GRANULOCYTES NFR BLD: 0.4 %
KETONES UR STRIP.AUTO-MCNC: NEGATIVE MG/DL
LDLC SERPL CALC-MCNC: <1 MG/DL (ref ?–100)
LEUKOCYTE ESTERASE UR QL STRIP.AUTO: NEGATIVE
LYMPHOCYTES # BLD AUTO: 1.08 X10(3) UL (ref 1–4)
LYMPHOCYTES NFR BLD AUTO: 15.6 %
MCH RBC QN AUTO: 31.6 PG (ref 26–34)
MCHC RBC AUTO-ENTMCNC: 34.1 G/DL (ref 31–37)
MCV RBC AUTO: 92.9 FL
MONOCYTES # BLD AUTO: 0.47 X10(3) UL (ref 0.1–1)
MONOCYTES NFR BLD AUTO: 6.8 %
NEUTROPHILS # BLD AUTO: 5.06 X10 (3) UL (ref 1.5–7.7)
NEUTROPHILS # BLD AUTO: 5.06 X10(3) UL (ref 1.5–7.7)
NEUTROPHILS NFR BLD AUTO: 72.9 %
NITRITE UR QL STRIP.AUTO: NEGATIVE
NONHDLC SERPL-MCNC: 14 MG/DL (ref ?–130)
OSMOLALITY SERPL CALC.SUM OF ELEC: 299 MOSM/KG (ref 275–295)
PH UR STRIP.AUTO: 5.5 [PH] (ref 5–8)
PLATELET # BLD AUTO: 159 10(3)UL (ref 150–450)
POTASSIUM SERPL-SCNC: 3.9 MMOL/L (ref 3.5–5.1)
PROT SERPL-MCNC: 5 G/DL (ref 6.4–8.2)
PROT UR STRIP.AUTO-MCNC: 30 MG/DL
RBC # BLD AUTO: 3.95 X10(6)UL
SODIUM SERPL-SCNC: 143 MMOL/L (ref 136–145)
SP GR UR STRIP.AUTO: 1.01 (ref 1–1.03)
T4 FREE SERPL-MCNC: 1 NG/DL (ref 0.8–1.7)
TRIGL SERPL-MCNC: 62 MG/DL (ref 30–149)
TSI SER-ACNC: 5.1 MIU/ML (ref 0.36–3.74)
UROBILINOGEN UR STRIP.AUTO-MCNC: NORMAL MG/DL
VIT B12 SERPL-MCNC: >2000 PG/ML (ref 193–986)
VIT D+METAB SERPL-MCNC: 42.9 NG/ML (ref 30–100)
VLDLC SERPL CALC-MCNC: 7 MG/DL (ref 0–30)
WBC # BLD AUTO: 6.9 X10(3) UL (ref 4–11)

## 2023-12-10 PROCEDURE — 84443 ASSAY THYROID STIM HORMONE: CPT

## 2023-12-10 PROCEDURE — 82746 ASSAY OF FOLIC ACID SERUM: CPT

## 2023-12-10 PROCEDURE — 82607 VITAMIN B-12: CPT

## 2023-12-10 PROCEDURE — 36415 COLL VENOUS BLD VENIPUNCTURE: CPT

## 2023-12-10 PROCEDURE — 83520 IMMUNOASSAY QUANT NOS NONAB: CPT

## 2023-12-10 PROCEDURE — 84439 ASSAY OF FREE THYROXINE: CPT

## 2023-12-10 PROCEDURE — 81001 URINALYSIS AUTO W/SCOPE: CPT

## 2023-12-10 PROCEDURE — 80053 COMPREHEN METABOLIC PANEL: CPT

## 2023-12-10 PROCEDURE — 80061 LIPID PANEL: CPT

## 2023-12-10 PROCEDURE — 82306 VITAMIN D 25 HYDROXY: CPT

## 2023-12-10 PROCEDURE — 85025 COMPLETE CBC W/AUTO DIFF WBC: CPT

## 2023-12-12 ENCOUNTER — APPOINTMENT (OUTPATIENT)
Dept: CT IMAGING | Facility: HOSPITAL | Age: 88
End: 2023-12-12
Attending: EMERGENCY MEDICINE
Payer: MEDICARE

## 2023-12-12 ENCOUNTER — HOSPITAL ENCOUNTER (EMERGENCY)
Facility: HOSPITAL | Age: 88
Discharge: HOME | End: 2023-12-12
Attending: EMERGENCY MEDICINE
Payer: MEDICARE

## 2023-12-12 ENCOUNTER — APPOINTMENT (OUTPATIENT)
Dept: GENERAL RADIOLOGY | Facility: HOSPITAL | Age: 88
End: 2023-12-12
Payer: MEDICARE

## 2023-12-12 VITALS
WEIGHT: 136 LBS | OXYGEN SATURATION: 100 % | HEIGHT: 60 IN | BODY MASS INDEX: 26.7 KG/M2 | HEART RATE: 59 BPM | DIASTOLIC BLOOD PRESSURE: 62 MMHG | RESPIRATION RATE: 16 BRPM | TEMPERATURE: 97 F | SYSTOLIC BLOOD PRESSURE: 164 MMHG

## 2023-12-12 DIAGNOSIS — K59.00 CONSTIPATION, UNSPECIFIED CONSTIPATION TYPE: Primary | ICD-10-CM

## 2023-12-12 DIAGNOSIS — K80.20 GALLSTONES: ICD-10-CM

## 2023-12-12 LAB
ALBUMIN SERPL-MCNC: 2.6 G/DL (ref 3.4–5)
ALBUMIN/GLOB SERPL: 1.2 {RATIO} (ref 1–2)
ALP LIVER SERPL-CCNC: 110 U/L
ALT SERPL-CCNC: 18 U/L
ANION GAP SERPL CALC-SCNC: 5 MMOL/L (ref 0–18)
AST SERPL-CCNC: 24 U/L (ref 15–37)
BASOPHILS # BLD AUTO: 0.02 X10(3) UL (ref 0–0.2)
BASOPHILS NFR BLD AUTO: 0.3 %
BILIRUB SERPL-MCNC: 0.9 MG/DL (ref 0.1–2)
BILIRUB UR QL STRIP.AUTO: NEGATIVE
BUN BLD-MCNC: 26 MG/DL (ref 9–23)
CALCIUM BLD-MCNC: 8.2 MG/DL (ref 8.5–10.1)
CHLORIDE SERPL-SCNC: 115 MMOL/L (ref 98–112)
CLARITY UR REFRACT.AUTO: CLEAR
CO2 SERPL-SCNC: 24 MMOL/L (ref 21–32)
COLOR UR AUTO: COLORLESS
CREAT BLD-MCNC: 1.6 MG/DL
EGFRCR SERPLBLD CKD-EPI 2021: 41 ML/MIN/1.73M2 (ref 60–?)
EOSINOPHIL # BLD AUTO: 0.16 X10(3) UL (ref 0–0.7)
EOSINOPHIL NFR BLD AUTO: 2.6 %
ERYTHROCYTE [DISTWIDTH] IN BLOOD BY AUTOMATED COUNT: 13.1 %
GLOBULIN PLAS-MCNC: 2.1 G/DL (ref 2.8–4.4)
GLUCOSE BLD-MCNC: 106 MG/DL (ref 70–99)
GLUCOSE UR STRIP.AUTO-MCNC: NORMAL MG/DL
HCT VFR BLD AUTO: 32.8 %
HGB BLD-MCNC: 11.5 G/DL
IMM GRANULOCYTES # BLD AUTO: 0.02 X10(3) UL (ref 0–1)
IMM GRANULOCYTES NFR BLD: 0.3 %
KETONES UR STRIP.AUTO-MCNC: NEGATIVE MG/DL
LEUKOCYTE ESTERASE UR QL STRIP.AUTO: NEGATIVE
LIPASE SERPL-CCNC: 30 U/L (ref 13–75)
LYMPHOCYTES # BLD AUTO: 0.82 X10(3) UL (ref 1–4)
LYMPHOCYTES NFR BLD AUTO: 13.2 %
MCH RBC QN AUTO: 31.5 PG (ref 26–34)
MCHC RBC AUTO-ENTMCNC: 35.1 G/DL (ref 31–37)
MCV RBC AUTO: 89.9 FL
MONOCYTES # BLD AUTO: 0.39 X10(3) UL (ref 0.1–1)
MONOCYTES NFR BLD AUTO: 6.3 %
NEUTROPHILS # BLD AUTO: 4.79 X10 (3) UL (ref 1.5–7.7)
NEUTROPHILS # BLD AUTO: 4.79 X10(3) UL (ref 1.5–7.7)
NEUTROPHILS NFR BLD AUTO: 77.3 %
NITRITE UR QL STRIP.AUTO: NEGATIVE
OSMOLALITY SERPL CALC.SUM OF ELEC: 303 MOSM/KG (ref 275–295)
PH UR STRIP.AUTO: 5.5 [PH] (ref 5–8)
PLATELET # BLD AUTO: 146 10(3)UL (ref 150–450)
POTASSIUM SERPL-SCNC: 3.8 MMOL/L (ref 3.5–5.1)
PROT SERPL-MCNC: 4.7 G/DL (ref 6.4–8.2)
PROT UR STRIP.AUTO-MCNC: NEGATIVE MG/DL
RBC # BLD AUTO: 3.65 X10(6)UL
SODIUM SERPL-SCNC: 144 MMOL/L (ref 136–145)
SP GR UR STRIP.AUTO: 1.02 (ref 1–1.03)
TROPONIN I SERPL HS-MCNC: 15 NG/L
UROBILINOGEN UR STRIP.AUTO-MCNC: NORMAL MG/DL
WBC # BLD AUTO: 6.2 X10(3) UL (ref 4–11)

## 2023-12-12 PROCEDURE — 71275 CT ANGIOGRAPHY CHEST: CPT | Performed by: EMERGENCY MEDICINE

## 2023-12-12 PROCEDURE — 93010 ELECTROCARDIOGRAM REPORT: CPT

## 2023-12-12 PROCEDURE — 71045 X-RAY EXAM CHEST 1 VIEW: CPT

## 2023-12-12 PROCEDURE — 80053 COMPREHEN METABOLIC PANEL: CPT | Performed by: EMERGENCY MEDICINE

## 2023-12-12 PROCEDURE — 99285 EMERGENCY DEPT VISIT HI MDM: CPT

## 2023-12-12 PROCEDURE — 84484 ASSAY OF TROPONIN QUANT: CPT | Performed by: EMERGENCY MEDICINE

## 2023-12-12 PROCEDURE — 80053 COMPREHEN METABOLIC PANEL: CPT

## 2023-12-12 PROCEDURE — 85025 COMPLETE CBC W/AUTO DIFF WBC: CPT

## 2023-12-12 PROCEDURE — 83690 ASSAY OF LIPASE: CPT | Performed by: EMERGENCY MEDICINE

## 2023-12-12 PROCEDURE — 84484 ASSAY OF TROPONIN QUANT: CPT

## 2023-12-12 PROCEDURE — 93005 ELECTROCARDIOGRAM TRACING: CPT

## 2023-12-12 PROCEDURE — 85025 COMPLETE CBC W/AUTO DIFF WBC: CPT | Performed by: EMERGENCY MEDICINE

## 2023-12-12 PROCEDURE — 74177 CT ABD & PELVIS W/CONTRAST: CPT | Performed by: EMERGENCY MEDICINE

## 2023-12-12 PROCEDURE — 36415 COLL VENOUS BLD VENIPUNCTURE: CPT

## 2023-12-12 PROCEDURE — 81001 URINALYSIS AUTO W/SCOPE: CPT | Performed by: EMERGENCY MEDICINE

## 2023-12-12 RX ORDER — DOCUSATE SODIUM 100 MG/1
100 CAPSULE, LIQUID FILLED ORAL 2 TIMES DAILY
Qty: 30 CAPSULE | Refills: 0 | Status: SHIPPED | OUTPATIENT
Start: 2023-12-12 | End: 2023-12-27

## 2023-12-12 RX ORDER — POLYETHYLENE GLYCOL 3350 17 G/17G
17 POWDER, FOR SOLUTION ORAL DAILY PRN
Qty: 12 EACH | Refills: 0 | Status: SHIPPED | OUTPATIENT
Start: 2023-12-12 | End: 2024-01-11

## 2023-12-12 NOTE — ED INITIAL ASSESSMENT (HPI)
Patient to ED c/o left sided chest pain, left sided abdominal pain and pain to the center of his chest.  Pain for 4 days, saw PMD on Monday, reports if the pain got worse, to come to the hospital to be admitted

## 2023-12-13 LAB
ATRIAL RATE: 72 BPM
P AXIS: 62 DEGREES
P-R INTERVAL: 154 MS
Q-T INTERVAL: 420 MS
QRS DURATION: 82 MS
QTC CALCULATION (BEZET): 459 MS
R AXIS: -6 DEGREES
T AXIS: 53 DEGREES
VENTRICULAR RATE: 72 BPM

## 2023-12-14 LAB
INTERFERON GAMMA: <4.2 PG/ML
INTERFERON GAMMA: <4.2 PG/ML
INTERLEUKIN 1 BETA: <6.5 PG/ML
INTERLEUKIN 1 BETA: <6.5 PG/ML
INTERLEUKIN 10: 7.8 PG/ML
INTERLEUKIN 10: 7.8 PG/ML
INTERLEUKIN 12: <1.9 PG/ML
INTERLEUKIN 12: <1.9 PG/ML
INTERLEUKIN 13: <1.7 PG/ML
INTERLEUKIN 13: <1.7 PG/ML
INTERLEUKIN 17: <1.4 PG/ML
INTERLEUKIN 17: <1.4 PG/ML
INTERLEUKIN 2 REC (CD25), SOLU: 1275.5 PG/ML
INTERLEUKIN 2 REC (CD25), SOLU: 1275.5 PG/ML
INTERLEUKIN 2: <2.1 PG/ML
INTERLEUKIN 2: <2.1 PG/ML
INTERLEUKIN 4: <2.2 PG/ML
INTERLEUKIN 4: <2.2 PG/ML
INTERLEUKIN 5: <2.1 PG/ML
INTERLEUKIN 5: <2.1 PG/ML
INTERLEUKIN 6: 3 PG/ML
INTERLEUKIN 6: 3 PG/ML
INTERLEUKIN 8: <3 PG/ML
INTERLEUKIN 8: <3 PG/ML
TUMOR NECROSIS FACTOR - ALPHA: 2.5 PG/ML
TUMOR NECROSIS FACTOR - ALPHA: 2.5 PG/ML

## 2024-01-15 NOTE — ED NOTES
Assisted patient to bathroom via wheelchair. Patient aware of plan of care. Repeat troponin drawn. Patient denies complaints at this time. Son remains at bedside.
Report received from [facility].
trauma

## 2024-02-23 NOTE — CM/SW NOTE
Pt not seen by [facility] physiatrist Saturday. sw sent [facility] referral to [facility]. [facility] referral also sent [facility] for back up planning.       Plan: awaiting physiatry consult, referral pending to [facility] for SON[facility] [place]

## 2024-03-19 ENCOUNTER — LAB ENCOUNTER (OUTPATIENT)
Dept: LAB | Facility: HOSPITAL | Age: 89
End: 2024-03-19
Attending: INTERNAL MEDICINE
Payer: MEDICARE

## 2024-03-19 DIAGNOSIS — E03.9 ACQUIRED HYPOTHYROIDISM: ICD-10-CM

## 2024-03-19 DIAGNOSIS — D51.9 VITAMIN B12 DEFICIENCY ANEMIA: ICD-10-CM

## 2024-03-19 DIAGNOSIS — I50.32 CHRONIC DIASTOLIC HEART FAILURE (HCC): Primary | ICD-10-CM

## 2024-03-19 DIAGNOSIS — K21.00 REFLUX ESOPHAGITIS: ICD-10-CM

## 2024-03-19 LAB
ALBUMIN SERPL-MCNC: 2.3 G/DL (ref 3.4–5)
ALBUMIN/GLOB SERPL: 0.8 {RATIO} (ref 1–2)
ALP LIVER SERPL-CCNC: 132 U/L
ALT SERPL-CCNC: 19 U/L
ANION GAP SERPL CALC-SCNC: 4 MMOL/L (ref 0–18)
AST SERPL-CCNC: 12 U/L (ref 15–37)
BASOPHILS # BLD AUTO: 0.03 X10(3) UL (ref 0–0.2)
BASOPHILS NFR BLD AUTO: 0.3 %
BILIRUB SERPL-MCNC: 0.9 MG/DL (ref 0.1–2)
BUN BLD-MCNC: 22 MG/DL (ref 9–23)
CALCIUM BLD-MCNC: 8.2 MG/DL (ref 8.5–10.1)
CHLORIDE SERPL-SCNC: 112 MMOL/L (ref 98–112)
CO2 SERPL-SCNC: 25 MMOL/L (ref 21–32)
CREAT BLD-MCNC: 1.36 MG/DL
EGFRCR SERPLBLD CKD-EPI 2021: 50 ML/MIN/1.73M2 (ref 60–?)
EOSINOPHIL # BLD AUTO: 0.27 X10(3) UL (ref 0–0.7)
EOSINOPHIL NFR BLD AUTO: 2.7 %
ERYTHROCYTE [DISTWIDTH] IN BLOOD BY AUTOMATED COUNT: 13.5 %
FASTING STATUS PATIENT QL REPORTED: YES
FOLATE SERPL-MCNC: 18.7 NG/ML (ref 8.7–?)
GLOBULIN PLAS-MCNC: 2.8 G/DL (ref 2.8–4.4)
GLUCOSE BLD-MCNC: 93 MG/DL (ref 70–99)
HCT VFR BLD AUTO: 33.9 %
HGB BLD-MCNC: 11.6 G/DL
IMM GRANULOCYTES # BLD AUTO: 0.06 X10(3) UL (ref 0–1)
IMM GRANULOCYTES NFR BLD: 0.6 %
LYMPHOCYTES # BLD AUTO: 1.48 X10(3) UL (ref 1–4)
LYMPHOCYTES NFR BLD AUTO: 14.6 %
MCH RBC QN AUTO: 31.6 PG (ref 26–34)
MCHC RBC AUTO-ENTMCNC: 34.2 G/DL (ref 31–37)
MCV RBC AUTO: 92.4 FL
MONOCYTES # BLD AUTO: 0.62 X10(3) UL (ref 0.1–1)
MONOCYTES NFR BLD AUTO: 6.1 %
NEUTROPHILS # BLD AUTO: 7.69 X10 (3) UL (ref 1.5–7.7)
NEUTROPHILS # BLD AUTO: 7.69 X10(3) UL (ref 1.5–7.7)
NEUTROPHILS NFR BLD AUTO: 75.7 %
OSMOLALITY SERPL CALC.SUM OF ELEC: 295 MOSM/KG (ref 275–295)
PLATELET # BLD AUTO: 249 10(3)UL (ref 150–450)
POTASSIUM SERPL-SCNC: 3.6 MMOL/L (ref 3.5–5.1)
PROT SERPL-MCNC: 5.1 G/DL (ref 6.4–8.2)
RBC # BLD AUTO: 3.67 X10(6)UL
SODIUM SERPL-SCNC: 141 MMOL/L (ref 136–145)
TSI SER-ACNC: 2.96 MIU/ML (ref 0.36–3.74)
VIT B12 SERPL-MCNC: 1243 PG/ML (ref 193–986)
WBC # BLD AUTO: 10.2 X10(3) UL (ref 4–11)

## 2024-03-19 PROCEDURE — 84443 ASSAY THYROID STIM HORMONE: CPT

## 2024-03-19 PROCEDURE — 85025 COMPLETE CBC W/AUTO DIFF WBC: CPT

## 2024-03-19 PROCEDURE — 36415 COLL VENOUS BLD VENIPUNCTURE: CPT

## 2024-03-19 PROCEDURE — 82746 ASSAY OF FOLIC ACID SERUM: CPT

## 2024-03-19 PROCEDURE — 82607 VITAMIN B-12: CPT

## 2024-03-19 PROCEDURE — 80053 COMPREHEN METABOLIC PANEL: CPT

## 2024-05-25 PROBLEM — D53.1 MEGALOBLASTIC ANEMIA DUE TO VITAMIN B12 DEFICIENCY: Status: ACTIVE | Noted: 2024-05-25

## 2024-05-25 PROBLEM — E55.9 VITAMIN D DEFICIENCY: Status: ACTIVE | Noted: 2024-05-25

## 2024-05-25 PROBLEM — N40.0 BENIGN PROSTATIC HYPERPLASIA WITHOUT URINARY OBSTRUCTION: Status: RESOLVED | Noted: 2024-05-25 | Resolved: 2024-05-25

## 2024-05-25 PROBLEM — N40.0 BENIGN PROSTATIC HYPERPLASIA WITHOUT URINARY OBSTRUCTION: Status: ACTIVE | Noted: 2024-05-25

## 2024-05-25 PROBLEM — I50.30 HEART FAILURE WITH PRESERVED EJECTION FRACTION  (CMD): Status: ACTIVE | Noted: 2023-12-31

## 2024-05-25 PROBLEM — N40.1 LOWER URINARY TRACT SYMPTOMS DUE TO BENIGN PROSTATIC HYPERPLASIA: Status: ACTIVE | Noted: 2024-05-25

## 2024-05-25 PROBLEM — E03.9 HYPOTHYROIDISM: Status: ACTIVE | Noted: 2024-05-25

## 2024-05-25 PROBLEM — J44.9 CHRONIC OBSTRUCTIVE PULMONARY DISEASE  (CMD): Status: ACTIVE | Noted: 2017-01-18

## 2024-05-25 PROBLEM — I25.10 ATHEROSCLEROSIS OF CORONARY ARTERY: Status: ACTIVE | Noted: 2024-05-25

## 2024-05-25 PROBLEM — N18.30 STAGE 3 CHRONIC KIDNEY DISEASE  (CMD): Status: ACTIVE | Noted: 2024-05-25

## 2024-05-25 PROBLEM — D50.0 IRON DEFICIENCY ANEMIA DUE TO CHRONIC BLOOD LOSS: Status: ACTIVE | Noted: 2024-05-25

## 2024-05-25 PROBLEM — R73.9 HYPERGLYCEMIA: Status: ACTIVE | Noted: 2017-01-04

## 2024-05-25 PROBLEM — E78.2 MIXED HYPERLIPIDEMIA: Status: ACTIVE | Noted: 2024-05-25

## 2024-05-25 PROBLEM — M47.819 SPONDYLOSIS WITHOUT MYELOPATHY: Status: ACTIVE | Noted: 2024-05-25

## 2024-05-25 PROBLEM — K50.10 CROHN'S DISEASE OF LARGE INTESTINE WITHOUT COMPLICATION  (CMD): Status: ACTIVE | Noted: 2018-07-11

## 2024-05-25 PROBLEM — I10 BENIGN ESSENTIAL HYPERTENSION: Chronic | Status: ACTIVE | Noted: 2017-01-18

## 2024-05-25 PROBLEM — I26.99 PE (PULMONARY THROMBOEMBOLISM)  (CMD): Status: ACTIVE | Noted: 2024-05-25

## 2024-05-25 PROBLEM — K21.00 GASTROESOPHAGEAL REFLUX DISEASE WITH ESOPHAGITIS: Status: ACTIVE | Noted: 2024-05-25

## 2024-06-04 ENCOUNTER — HOSPITAL ENCOUNTER (EMERGENCY)
Facility: HOSPITAL | Age: 89
Discharge: HOME | End: 2024-06-04
Attending: EMERGENCY MEDICINE
Payer: MEDICARE

## 2024-06-04 ENCOUNTER — APPOINTMENT (OUTPATIENT)
Dept: GENERAL RADIOLOGY | Facility: HOSPITAL | Age: 89
End: 2024-06-04
Payer: MEDICARE

## 2024-06-04 VITALS
SYSTOLIC BLOOD PRESSURE: 165 MMHG | DIASTOLIC BLOOD PRESSURE: 61 MMHG | RESPIRATION RATE: 26 BRPM | TEMPERATURE: 98 F | HEART RATE: 70 BPM | BODY MASS INDEX: 25 KG/M2 | OXYGEN SATURATION: 99 % | WEIGHT: 130 LBS

## 2024-06-04 DIAGNOSIS — J44.1 COPD EXACERBATION (HCC): Primary | ICD-10-CM

## 2024-06-04 LAB
ALBUMIN SERPL-MCNC: 2.9 G/DL (ref 3.4–5)
ALBUMIN/GLOB SERPL: 1.1 {RATIO} (ref 1–2)
ALP LIVER SERPL-CCNC: 117 U/L
ALT SERPL-CCNC: 17 U/L
ANION GAP SERPL CALC-SCNC: 7 MMOL/L (ref 0–18)
AST SERPL-CCNC: 20 U/L (ref 15–37)
BASE EXCESS BLDV CALC-SCNC: 0.3 MMOL/L
BASOPHILS # BLD AUTO: 0.02 X10(3) UL (ref 0–0.2)
BASOPHILS NFR BLD AUTO: 0.3 %
BILIRUB SERPL-MCNC: 0.4 MG/DL (ref 0.1–2)
BUN BLD-MCNC: 25 MG/DL (ref 9–23)
CALCIUM BLD-MCNC: 8.8 MG/DL (ref 8.5–10.1)
CHLORIDE SERPL-SCNC: 111 MMOL/L (ref 98–112)
CO2 SERPL-SCNC: 23 MMOL/L (ref 21–32)
CREAT BLD-MCNC: 1.33 MG/DL
D DIMER PPP FEU-MCNC: 0.69 UG/ML FEU (ref ?–0.9)
EGFRCR SERPLBLD CKD-EPI 2021: 51 ML/MIN/1.73M2 (ref 60–?)
EOSINOPHIL # BLD AUTO: 0.24 X10(3) UL (ref 0–0.7)
EOSINOPHIL NFR BLD AUTO: 3.4 %
ERYTHROCYTE [DISTWIDTH] IN BLOOD BY AUTOMATED COUNT: 12.8 %
GLOBULIN PLAS-MCNC: 2.6 G/DL (ref 2.8–4.4)
GLUCOSE BLD-MCNC: 121 MG/DL (ref 70–99)
HCO3 BLDV-SCNC: 24.5 MEQ/L (ref 22–26)
HCT VFR BLD AUTO: 32 %
HGB BLD-MCNC: 11.3 G/DL
IMM GRANULOCYTES # BLD AUTO: 0.03 X10(3) UL (ref 0–1)
IMM GRANULOCYTES NFR BLD: 0.4 %
LYMPHOCYTES # BLD AUTO: 1.22 X10(3) UL (ref 1–4)
LYMPHOCYTES NFR BLD AUTO: 17.5 %
MCH RBC QN AUTO: 32.6 PG (ref 26–34)
MCHC RBC AUTO-ENTMCNC: 35.3 G/DL (ref 31–37)
MCV RBC AUTO: 92.2 FL
MONOCYTES # BLD AUTO: 0.57 X10(3) UL (ref 0.1–1)
MONOCYTES NFR BLD AUTO: 8.2 %
NEUTROPHILS # BLD AUTO: 4.88 X10 (3) UL (ref 1.5–7.7)
NEUTROPHILS # BLD AUTO: 4.88 X10(3) UL (ref 1.5–7.7)
NEUTROPHILS NFR BLD AUTO: 70.2 %
NT-PROBNP SERPL-MCNC: 1199 PG/ML (ref ?–450)
OSMOLALITY SERPL CALC.SUM OF ELEC: 298 MOSM/KG (ref 275–295)
OXYHGB MFR BLDV: 73.4 % (ref 72–78)
PCO2 BLDV: 42 MM HG (ref 38–50)
PH BLDV: 7.39 [PH] (ref 7.33–7.43)
PLATELET # BLD AUTO: 157 10(3)UL (ref 150–450)
PO2 BLDV: 43 MM HG (ref 30–50)
POTASSIUM SERPL-SCNC: 4 MMOL/L (ref 3.5–5.1)
PROT SERPL-MCNC: 5.5 G/DL (ref 6.4–8.2)
RBC # BLD AUTO: 3.47 X10(6)UL
SODIUM SERPL-SCNC: 141 MMOL/L (ref 136–145)
TROPONIN I SERPL HS-MCNC: 10 NG/L
WBC # BLD AUTO: 7 X10(3) UL (ref 4–11)

## 2024-06-04 PROCEDURE — 85379 FIBRIN DEGRADATION QUANT: CPT | Performed by: EMERGENCY MEDICINE

## 2024-06-04 PROCEDURE — 83880 ASSAY OF NATRIURETIC PEPTIDE: CPT | Performed by: EMERGENCY MEDICINE

## 2024-06-04 PROCEDURE — 99284 EMERGENCY DEPT VISIT MOD MDM: CPT

## 2024-06-04 PROCEDURE — 82803 BLOOD GASES ANY COMBINATION: CPT | Performed by: EMERGENCY MEDICINE

## 2024-06-04 PROCEDURE — 93010 ELECTROCARDIOGRAM REPORT: CPT

## 2024-06-04 PROCEDURE — 93005 ELECTROCARDIOGRAM TRACING: CPT

## 2024-06-04 PROCEDURE — 94640 AIRWAY INHALATION TREATMENT: CPT

## 2024-06-04 PROCEDURE — 85025 COMPLETE CBC W/AUTO DIFF WBC: CPT | Performed by: EMERGENCY MEDICINE

## 2024-06-04 PROCEDURE — 71045 X-RAY EXAM CHEST 1 VIEW: CPT

## 2024-06-04 PROCEDURE — 96374 THER/PROPH/DIAG INJ IV PUSH: CPT

## 2024-06-04 PROCEDURE — 84484 ASSAY OF TROPONIN QUANT: CPT | Performed by: EMERGENCY MEDICINE

## 2024-06-04 PROCEDURE — 99285 EMERGENCY DEPT VISIT HI MDM: CPT

## 2024-06-04 PROCEDURE — 80053 COMPREHEN METABOLIC PANEL: CPT | Performed by: EMERGENCY MEDICINE

## 2024-06-04 RX ORDER — PREDNISONE 20 MG/1
60 TABLET ORAL ONCE
Status: COMPLETED | OUTPATIENT
Start: 2024-06-04 | End: 2024-06-04

## 2024-06-04 RX ORDER — BUDESONIDE 0.25 MG/2ML
0.25 INHALANT ORAL DAILY
COMMUNITY

## 2024-06-04 RX ORDER — METHYLPREDNISOLONE SODIUM SUCCINATE 125 MG/2ML
125 INJECTION, POWDER, LYOPHILIZED, FOR SOLUTION INTRAMUSCULAR; INTRAVENOUS ONCE
Status: COMPLETED | OUTPATIENT
Start: 2024-06-04 | End: 2024-06-04

## 2024-06-04 RX ORDER — METHYLPREDNISOLONE 4 MG/1
TABLET ORAL
Qty: 1 EACH | Refills: 0 | Status: SHIPPED | OUTPATIENT
Start: 2024-06-04

## 2024-06-04 RX ORDER — IPRATROPIUM BROMIDE AND ALBUTEROL SULFATE 2.5; .5 MG/3ML; MG/3ML
3 SOLUTION RESPIRATORY (INHALATION) ONCE
Status: COMPLETED | OUTPATIENT
Start: 2024-06-04 | End: 2024-06-04

## 2024-06-04 RX ORDER — ARFORMOTEROL TARTRATE 15 UG/2ML
15 SOLUTION RESPIRATORY (INHALATION) 2 TIMES DAILY
COMMUNITY

## 2024-06-04 NOTE — DISCHARGE INSTRUCTIONS
Prednisone as prescribed start today  Continue nebulizer treatments at home up to every 4-6 hours  Follow with your pulmonologist tomorrow  Return if worse  Continue your regular medications

## 2024-06-04 NOTE — ED PROVIDER NOTES
Patient Seen in: [facility] Emergency Department      History     Chief Complaint   Patient presents with    Chest Pain Angina    Difficulty Breathing     Stated Complaint: difficulty breathing, chest pain    Subjective:   HPI    This is a 89-year-old female past medical history COPD, anemia, hypothyroidism, PE not anticoagulated, hypertension, high cholesterol presents with shortness of breath which began this morning.  Patient states he has been compliant with his medications.  He used his breathing treatment at home this morning and it did not help.  He states his breathing is a 9/10.  It is much worse than his baseline.  He has some chest tightness which she normally gets with COPD flares.  No fever.  No cough.  No nausea vomiting or diarrhea.  He presents here from home with his son for further evaluation.    Objective:   Past Medical History:    Abdominal distention    Acute encephalopathy    Anemia    Arthritis    Back problem    Cataract    Chronic lung disease    Congestive heart disease (HCC)    COPD (chronic obstructive pulmonary disease) (HCC)    Crohn disease (HCC)    Crohn's disease of large intestine without complication (HCC)    Diarrhea, unspecified    Disorder of thyroid    Diverticula of small intestine    Diverticulosis of large intestine    Frequent use of laxatives    Hearing impairment    High blood pressure    High cholesterol    History of blood transfusion    Leg swelling    Mitral valve disorder    leaky mitral valve    Muscle weakness    Osteoarthrosis, unspecified whether generalized or localized, unspecified site    cervical and lumbar spine    Osteoporosis    Other and unspecified hyperlipidemia    Pulmonary embolism (HCC)    Pulmonary emphysema (HCC)    Renal insufficiency    Shortness of breath    Thyroid disease    Unspecified essential hypertension    Wheezing              Past Surgical History:   Procedure Laterality Date    Angiogram      2009    Colonoscopy  6/30/2014     Procedure: COLONOSCOPY;  Surgeon: Jaz Carrasquillo DO;  Location:  ENDOSCOPY    Egd N/A 1/4/2017    Procedure: ESOPHAGOGASTRODUODENOSCOPY (EGD);  Surgeon: Gustabo Johnson MD;  Location:  MAIN OR    Egd N/A 1/18/2017    Procedure: ESOPHAGOGASTRODUODENOSCOPY (EGD);  Surgeon: Jaz Carrasquillo DO;  Location:  ENDOSCOPY    Hernia surgery      Knee replacement surgery      Other surgical history      6 yeara ago colonscopy with polpys    Sigmoidoscopy,diagnostic      Total knee replacement                  Social History     Socioeconomic History    Marital status:    Tobacco Use    Smoking status: Former     Types: Cigarettes    Smokeless tobacco: Never   Vaping Use    Vaping status: Never Used   Substance and Sexual Activity    Alcohol use: Yes     Comment: 1-2 drinks month    Drug use: No              Review of Systems    Positive for stated complaint: difficulty breathing, chest pain  Other systems are as noted in HPI.  Constitutional and vital signs reviewed.      All other systems reviewed and negative except as noted above.    Physical Exam     ED Triage Vitals [06/04/24 1611]   BP (!) 184/85   Pulse 74   Resp 18   Temp 98.4 °F (36.9 °C)   Temp src Temporal   SpO2 98 %   O2 Device None (Room air)       Current Vitals:   Vital Signs  BP: (!) 165/61  Pulse: 70  Resp: 26  Temp: 98.4 °F (36.9 °C)  Temp src: Temporal    Oxygen Therapy  SpO2: 99 %  O2 Device: None (Room air)            Physical Exam    GENERAL: Awake, alert oriented x3, nontoxic appearing.   SKIN: Normal, warm, and dry.  HEENT:  Pupils equally round and reactive to light. Conjuctiva clear.  Oropharynx is clear and moist.   Lungs: Tight expiratory wheeze.    HEART:  Regular rate and rhythm. S1 and S2. No murmurs, no rubs or gallops.   ABDOMEN: Soft, nontender and nondistended. Normoactive bowel sounds. No rebound. No guarding.   EXTREMITIES: Warm with brisk capillary refill.         ED Course     Labs Reviewed   COMP METABOLIC PANEL  (14) - Abnormal; Notable for the following components:       Result Value    Glucose 121 (*)     BUN 25 (*)     Creatinine 1.33 (*)     Calculated Osmolality 298 (*)     eGFR-Cr 51 (*)     Total Protein 5.5 (*)     Albumin 2.9 (*)     Globulin  2.6 (*)     All other components within normal limits   PRO BETA NATRIURETIC PEPTIDE - Abnormal; Notable for the following components:    Pro-Beta Natriuretic Peptide 1,199 (*)     All other components within normal limits   CBC W/ DIFFERENTIAL - Abnormal; Notable for the following components:    RBC 3.47 (*)     HGB 11.3 (*)     HCT 32.0 (*)     All other components within normal limits   TROPONIN I HIGH SENSITIVITY - Normal   D-DIMER - Normal   CBC WITH DIFFERENTIAL WITH PLATELET    Narrative:     The following orders were created for panel order CBC With Differential With Platelet.  Procedure                               Abnormality         Status                     ---------                               -----------         ------                     CBC W/ DIFFERENTIAL[[id number]]          Abnormal            Final result                 Please view results for these tests on the individual orders.   VENOUS BLOOD GAS   RAINBOW DRAW LAVENDER   RAINBOW DRAW LIGHT GREEN   RAINBOW DRAW BLUE     EKG    Rate, intervals and axes as noted on EKG Report.  Rate: 74  Rhythm: Sinus Rhythm  Reading: Q waves in the septal inferior leads.  Present on old EKG from 12/12/2023                 XR CHEST AP PORTABLE  (CPT=71045)    Result Date: 6/4/2024  PROCEDURE:  XR CHEST AP PORTABLE  (CPT=71045)  TECHNIQUE:  AP chest radiograph was obtained.  COMPARISON:  [facility] , XR, XR CHEST AP PORTABLE  (CPT=71045), 12/12/2023, 1:45 PM.  INDICATIONS:  difficulty breathing, chest pain  PATIENT STATED HISTORY: (As transcribed by Technologist)               CONCLUSION:  Elevation of the right hemidiaphragm.  Atelectasis in the right lung base.  Normal heart size and pulmonary vascularity.   LOCATION:   [id number]      Dictated by (CST): Lorraine Jones MD on 6/04/2024 at 4:41 PM     Finalized by (CST): Lorraine Jones MD on 6/04/2024 at 4:42 PM              MDM          This is a 89-year-old female past medical history COPD, anemia, hypothyroidism, PE not anticoagulated, hypertension, high cholesterol presents with shortness of breath which began this morning.  Includes PE, COPD exacerbation, acute coronary syndrome.    Patient placed on cardiac monitor, continuous pulse oximetry and IV line was established of normal saline.  Patient was given a DuoNeb and IV Solu-Medrol.    Venous blood gas pH 7.39/pCO2 42/pO2 43/bicarb 24       Basic labs were obtained.  CBC: White blood cell count 7.0.  Hemoglobin 11.3.  Platelet 157.  D-dimer negative CMP: BUN 25.  Creatinine 1.33.  Glucose 121.  Bicarb 23.  proBNP 1199.  Troponin 10.  D-dimer negative.          I independently viewed the chest x-ray which shows some elevation of right hemidiaphragm no evidence of pneumonia.  I also reviewed the radiology interpretation and agreement.      Patient felt better after the DuoNeb.  His O2 sats was 99 to 100%.  He is resting comfortably.  He states he would like to go home.  Discussed case with Dr. Smith  pulmonary agrees with Medrol Dosepak and continuing and nebulizers at home.  Return if worse.  He states office will check in with him tomorrow.  Patient discharged home in good condition was with his son.        2D echo 9/7/2023 EF 55 to 60%.      Reviewed pulmonology note from 4/16/2024 as below by Dr. Henson.        ASSESSMENT AND PLAN:  COPD - moderate. Now with worsening dyspnea despite Trelegy  -worse after hospitalization and exacerbation  -change to LABA / ICS nebulized BID and would consider adding LAMA neb if responds  -continue albuterol / duoneb prn    History of PE - bilateral subsegmental in 2021 and treated with xarelto since  -D-dimer normal  -trial off anticoagulation after discussion of risks / benefits    The  patient indicates understanding of these issues and all questions were answered.  ID#[id number]  Electronically signed by Josep Henson MD at 04/16/2024 3:01 PM CD     Disposition and Plan     Clinical Impression:  1. COPD exacerbation (HCC)         Disposition:  Discharge  6/4/2024  5:49 pm    Follow-up:  Josep Henson MD  [address] [address] [phone]    Follow up in 1 day(s)            Medications Prescribed:  Current Discharge Medication List        START taking these medications    Details   methylPREDNISolone (MEDROL) 4 MG Oral Tablet Therapy Pack Dosepack: take as directed  Qty: 1 each, Refills: 0

## 2024-06-04 NOTE — ED INITIAL ASSESSMENT (HPI)
Pt arrives in wheelchair, hx of COPD, developed SOB this morning. Does take nebulizers, no home o2. 98% on RA. A&O x4

## 2024-06-05 LAB
ATRIAL RATE: 74 BPM
P AXIS: 55 DEGREES
P-R INTERVAL: 160 MS
Q-T INTERVAL: 376 MS
QRS DURATION: 76 MS
QTC CALCULATION (BEZET): 417 MS
R AXIS: 23 DEGREES
T AXIS: 54 DEGREES
VENTRICULAR RATE: 74 BPM

## 2024-08-17 PROBLEM — R53.1 GENERALIZED WEAKNESS: Status: ACTIVE | Noted: 2024-08-17

## 2024-08-17 PROBLEM — N18.31 STAGE 3A CHRONIC KIDNEY DISEASE  (CMD): Status: ACTIVE | Noted: 2024-05-25

## 2024-08-17 PROBLEM — R26.9 ABNORMALITY OF GAIT AND MOBILITY: Status: ACTIVE | Noted: 2024-08-17

## 2024-08-17 PROBLEM — I26.99 PE (PULMONARY THROMBOEMBOLISM)  (CMD): Status: RESOLVED | Noted: 2024-05-25 | Resolved: 2024-08-17

## 2024-08-17 PROBLEM — I10 ESSENTIAL HYPERTENSION: Status: ACTIVE | Noted: 2017-01-18

## 2024-08-17 PROBLEM — E78.5 DYSLIPIDEMIA, GOAL LDL BELOW 70: Status: ACTIVE | Noted: 2024-05-25

## 2024-08-18 ENCOUNTER — LAB ENCOUNTER (OUTPATIENT)
Dept: LAB | Facility: HOSPITAL | Age: 89
End: 2024-08-18
Attending: INTERNAL MEDICINE
Payer: MEDICARE

## 2024-08-18 DIAGNOSIS — I50.32 CHRONIC DIASTOLIC HEART FAILURE (HCC): Primary | ICD-10-CM

## 2024-08-18 DIAGNOSIS — E03.9 ADULT MYXEDEMA: ICD-10-CM

## 2024-08-18 DIAGNOSIS — D50.0 IRON DEFICIENCY ANEMIA SECONDARY TO BLOOD LOSS (CHRONIC): ICD-10-CM

## 2024-08-18 DIAGNOSIS — I10 BENIGN HYPERTENSION: ICD-10-CM

## 2024-08-18 LAB
ALBUMIN SERPL-MCNC: 3.6 G/DL (ref 3.2–4.8)
ALBUMIN/GLOB SERPL: 1.8 {RATIO} (ref 1–2)
ALP LIVER SERPL-CCNC: 111 U/L
ALT SERPL-CCNC: 19 U/L
ANION GAP SERPL CALC-SCNC: 2 MMOL/L (ref 0–18)
AST SERPL-CCNC: 21 U/L (ref ?–34)
BASOPHILS # BLD AUTO: 0.02 X10(3) UL (ref 0–0.2)
BASOPHILS NFR BLD AUTO: 0.3 %
BILIRUB SERPL-MCNC: 0.7 MG/DL (ref 0.2–0.9)
BILIRUB UR QL STRIP.AUTO: NEGATIVE
BUN BLD-MCNC: 30 MG/DL (ref 9–23)
CALCIUM BLD-MCNC: 9.6 MG/DL (ref 8.7–10.4)
CHLORIDE SERPL-SCNC: 108 MMOL/L (ref 98–112)
CHOLEST SERPL-MCNC: 62 MG/DL (ref ?–200)
CLARITY UR REFRACT.AUTO: CLEAR
CO2 SERPL-SCNC: 27 MMOL/L (ref 21–32)
CREAT BLD-MCNC: 1.28 MG/DL
DEPRECATED HBV CORE AB SER IA-ACNC: 413.1 NG/ML
EGFRCR SERPLBLD CKD-EPI 2021: 53 ML/MIN/1.73M2 (ref 60–?)
EOSINOPHIL # BLD AUTO: 0.26 X10(3) UL (ref 0–0.7)
EOSINOPHIL NFR BLD AUTO: 3.6 %
ERYTHROCYTE [DISTWIDTH] IN BLOOD BY AUTOMATED COUNT: 12.8 %
FASTING PATIENT LIPID ANSWER: YES
FASTING STATUS PATIENT QL REPORTED: YES
FOLATE SERPL-MCNC: 18.5 NG/ML (ref 5.4–?)
GLOBULIN PLAS-MCNC: 2 G/DL (ref 2–3.5)
GLUCOSE BLD-MCNC: 92 MG/DL (ref 70–99)
GLUCOSE UR STRIP.AUTO-MCNC: NORMAL MG/DL
HCT VFR BLD AUTO: 30.8 %
HDLC SERPL-MCNC: 20 MG/DL (ref 40–59)
HGB BLD-MCNC: 10.7 G/DL
IMM GRANULOCYTES # BLD AUTO: 0.06 X10(3) UL (ref 0–1)
IMM GRANULOCYTES NFR BLD: 0.8 %
IRON SATN MFR SERPL: 24 %
IRON SERPL-MCNC: 58 UG/DL
KETONES UR STRIP.AUTO-MCNC: NEGATIVE MG/DL
LDLC SERPL CALC-MCNC: 25 MG/DL (ref ?–100)
LEUKOCYTE ESTERASE UR QL STRIP.AUTO: NEGATIVE
LYMPHOCYTES # BLD AUTO: 1.47 X10(3) UL (ref 1–4)
LYMPHOCYTES NFR BLD AUTO: 20.2 %
MCH RBC QN AUTO: 32 PG (ref 26–34)
MCHC RBC AUTO-ENTMCNC: 34.7 G/DL (ref 31–37)
MCV RBC AUTO: 92.2 FL
MONOCYTES # BLD AUTO: 0.46 X10(3) UL (ref 0.1–1)
MONOCYTES NFR BLD AUTO: 6.3 %
NEUTROPHILS # BLD AUTO: 4.99 X10 (3) UL (ref 1.5–7.7)
NEUTROPHILS # BLD AUTO: 4.99 X10(3) UL (ref 1.5–7.7)
NEUTROPHILS NFR BLD AUTO: 68.8 %
NITRITE UR QL STRIP.AUTO: NEGATIVE
NONHDLC SERPL-MCNC: 42 MG/DL (ref ?–130)
OSMOLALITY SERPL CALC.SUM OF ELEC: 290 MOSM/KG (ref 275–295)
PH UR STRIP.AUTO: 6.5 [PH] (ref 5–8)
PLATELET # BLD AUTO: 257 10(3)UL (ref 150–450)
POTASSIUM SERPL-SCNC: 4.2 MMOL/L (ref 3.5–5.1)
PROT SERPL-MCNC: 5.6 G/DL (ref 5.7–8.2)
PROT UR STRIP.AUTO-MCNC: 20 MG/DL
RBC # BLD AUTO: 3.34 X10(6)UL
RBC UR QL AUTO: NEGATIVE
SODIUM SERPL-SCNC: 137 MMOL/L (ref 136–145)
SP GR UR STRIP.AUTO: 1.01 (ref 1–1.03)
T4 FREE SERPL-MCNC: 1.3 NG/DL (ref 0.8–1.7)
TOTAL IRON BINDING CAPACITY: 245 UG/DL (ref 250–425)
TRANSFERRIN SERPL-MCNC: 182 MG/DL (ref 215–365)
TRIGL SERPL-MCNC: 79 MG/DL (ref 30–149)
TSI SER-ACNC: 5.42 MIU/ML (ref 0.55–4.78)
UROBILINOGEN UR STRIP.AUTO-MCNC: 2 MG/DL
VIT B12 SERPL-MCNC: 277 PG/ML (ref 211–911)
VLDLC SERPL CALC-MCNC: 10 MG/DL (ref 0–30)
WBC # BLD AUTO: 7.3 X10(3) UL (ref 4–11)

## 2024-08-18 PROCEDURE — 82746 ASSAY OF FOLIC ACID SERUM: CPT

## 2024-08-18 PROCEDURE — 82607 VITAMIN B-12: CPT

## 2024-08-18 PROCEDURE — 82728 ASSAY OF FERRITIN: CPT

## 2024-08-18 PROCEDURE — 81001 URINALYSIS AUTO W/SCOPE: CPT

## 2024-08-18 PROCEDURE — 83550 IRON BINDING TEST: CPT

## 2024-08-18 PROCEDURE — 80053 COMPREHEN METABOLIC PANEL: CPT

## 2024-08-18 PROCEDURE — 83540 ASSAY OF IRON: CPT

## 2024-08-18 PROCEDURE — 80061 LIPID PANEL: CPT

## 2024-08-18 PROCEDURE — 36415 COLL VENOUS BLD VENIPUNCTURE: CPT

## 2024-08-18 PROCEDURE — 85025 COMPLETE CBC W/AUTO DIFF WBC: CPT

## 2024-08-18 PROCEDURE — 84439 ASSAY OF FREE THYROXINE: CPT

## 2024-08-18 PROCEDURE — 84443 ASSAY THYROID STIM HORMONE: CPT

## 2024-08-27 ENCOUNTER — LAB ENCOUNTER (OUTPATIENT)
Dept: LAB | Facility: HOSPITAL | Age: 89
End: 2024-08-27
Attending: INTERNAL MEDICINE
Payer: MEDICARE

## 2024-08-27 DIAGNOSIS — E06.9 THYROIDITIS, UNSPECIFIED: Primary | ICD-10-CM

## 2024-08-27 DIAGNOSIS — E03.9 ACQUIRED HYPOTHYROIDISM: ICD-10-CM

## 2024-08-27 LAB
T3FREE SERPL-MCNC: 2.4 PG/ML (ref 2.4–4.2)
T4 FREE SERPL-MCNC: 1 NG/DL (ref 0.8–1.7)
TSI SER-ACNC: 4.05 MIU/ML (ref 0.55–4.78)

## 2024-08-27 PROCEDURE — 84443 ASSAY THYROID STIM HORMONE: CPT

## 2024-08-27 PROCEDURE — 84439 ASSAY OF FREE THYROXINE: CPT

## 2024-08-27 PROCEDURE — 36415 COLL VENOUS BLD VENIPUNCTURE: CPT

## 2024-08-27 PROCEDURE — 84481 FREE ASSAY (FT-3): CPT

## 2024-08-28 ENCOUNTER — OFFICE VISIT (OUTPATIENT)
Dept: HEMATOLOGY/ONCOLOGY | Age: 89
End: 2024-08-28
Attending: INTERNAL MEDICINE

## 2024-08-28 VITALS
SYSTOLIC BLOOD PRESSURE: 123 MMHG | DIASTOLIC BLOOD PRESSURE: 62 MMHG | TEMPERATURE: 97.7 F | BODY MASS INDEX: 24.48 KG/M2 | RESPIRATION RATE: 16 BRPM | OXYGEN SATURATION: 95 % | WEIGHT: 124.7 LBS | HEIGHT: 60 IN | HEART RATE: 74 BPM

## 2024-08-28 DIAGNOSIS — D53.1 MEGALOBLASTIC ANEMIA DUE TO VITAMIN B12 DEFICIENCY: Primary | ICD-10-CM

## 2024-08-28 PROCEDURE — 99205 OFFICE O/P NEW HI 60 MIN: CPT | Performed by: INTERNAL MEDICINE

## 2024-08-28 PROCEDURE — 99211 OFF/OP EST MAY X REQ PHY/QHP: CPT

## 2024-08-28 ASSESSMENT — ENCOUNTER SYMPTOMS
CONSTIPATION: 0
NUMBNESS: 0
SHORTNESS OF BREATH: 0
COUGH: 0
DEPRESSION: 0
BRUISES/BLEEDS EASILY: 0
HEMOPTYSIS: 0
NERVOUS/ANXIOUS: 0
ADENOPATHY: 0
BLOOD IN STOOL: 0
DIARRHEA: 0
NAUSEA: 0
VOMITING: 0
EYE PROBLEMS: 0
TROUBLE SWALLOWING: 0
ABDOMINAL PAIN: 0
APPETITE CHANGE: 0
FEVER: 0
FATIGUE: 1
HEADACHES: 0
BACK PAIN: 0
SORE THROAT: 0
CHILLS: 0
HOT FLASHES: 0

## 2024-08-28 ASSESSMENT — PATIENT HEALTH QUESTIONNAIRE - PHQ9
SUM OF ALL RESPONSES TO PHQ9 QUESTIONS 1 AND 2: 0
2. FEELING DOWN, DEPRESSED OR HOPELESS: NOT AT ALL
SUM OF ALL RESPONSES TO PHQ9 QUESTIONS 1 AND 2: 0
CLINICAL INTERPRETATION OF PHQ2 SCORE: NO FURTHER SCREENING NEEDED
1. LITTLE INTEREST OR PLEASURE IN DOING THINGS: NOT AT ALL

## 2024-08-28 ASSESSMENT — PAIN SCALES - GENERAL: PAINLEVEL: 0

## 2024-09-10 ENCOUNTER — HOSPITAL ENCOUNTER (EMERGENCY)
Facility: HOSPITAL | Age: 89
Discharge: HOME | End: 2024-09-10
Attending: EMERGENCY MEDICINE
Payer: MEDICARE

## 2024-09-10 ENCOUNTER — APPOINTMENT (OUTPATIENT)
Dept: GENERAL RADIOLOGY | Facility: HOSPITAL | Age: 89
End: 2024-09-10
Attending: EMERGENCY MEDICINE
Payer: MEDICARE

## 2024-09-10 VITALS
RESPIRATION RATE: 26 BRPM | BODY MASS INDEX: 24 KG/M2 | DIASTOLIC BLOOD PRESSURE: 62 MMHG | OXYGEN SATURATION: 100 % | WEIGHT: 125 LBS | TEMPERATURE: 97 F | SYSTOLIC BLOOD PRESSURE: 150 MMHG | HEART RATE: 65 BPM

## 2024-09-10 DIAGNOSIS — R06.02 SHORTNESS OF BREATH: Primary | ICD-10-CM

## 2024-09-10 LAB
ALBUMIN SERPL-MCNC: 3.4 G/DL (ref 3.2–4.8)
ALBUMIN/GLOB SERPL: 1.5 {RATIO} (ref 1–2)
ALP LIVER SERPL-CCNC: 102 U/L
ALT SERPL-CCNC: 15 U/L
ANION GAP SERPL CALC-SCNC: 1 MMOL/L (ref 0–18)
AST SERPL-CCNC: 23 U/L (ref ?–34)
BASOPHILS # BLD AUTO: 0.03 X10(3) UL (ref 0–0.2)
BASOPHILS NFR BLD AUTO: 0.5 %
BILIRUB SERPL-MCNC: 0.3 MG/DL (ref 0.2–0.9)
BUN BLD-MCNC: 32 MG/DL (ref 9–23)
CALCIUM BLD-MCNC: 9.5 MG/DL (ref 8.7–10.4)
CHLORIDE SERPL-SCNC: 109 MMOL/L (ref 98–112)
CO2 SERPL-SCNC: 29 MMOL/L (ref 21–32)
CREAT BLD-MCNC: 1.46 MG/DL
D DIMER PPP FEU-MCNC: 0.64 UG/ML FEU (ref ?–0.9)
EGFRCR SERPLBLD CKD-EPI 2021: 45 ML/MIN/1.73M2 (ref 60–?)
EOSINOPHIL # BLD AUTO: 0.34 X10(3) UL (ref 0–0.7)
EOSINOPHIL NFR BLD AUTO: 5.2 %
ERYTHROCYTE [DISTWIDTH] IN BLOOD BY AUTOMATED COUNT: 12.9 %
GLOBULIN PLAS-MCNC: 2.2 G/DL (ref 2–3.5)
GLUCOSE BLD-MCNC: 103 MG/DL (ref 70–99)
HCT VFR BLD AUTO: 32.1 %
HGB BLD-MCNC: 11.1 G/DL
IMM GRANULOCYTES # BLD AUTO: 0.02 X10(3) UL (ref 0–1)
IMM GRANULOCYTES NFR BLD: 0.3 %
LYMPHOCYTES # BLD AUTO: 1.31 X10(3) UL (ref 1–4)
LYMPHOCYTES NFR BLD AUTO: 19.9 %
MCH RBC QN AUTO: 32.5 PG (ref 26–34)
MCHC RBC AUTO-ENTMCNC: 34.6 G/DL (ref 31–37)
MCV RBC AUTO: 93.9 FL
MONOCYTES # BLD AUTO: 0.64 X10(3) UL (ref 0.1–1)
MONOCYTES NFR BLD AUTO: 9.7 %
NEUTROPHILS # BLD AUTO: 4.23 X10 (3) UL (ref 1.5–7.7)
NEUTROPHILS # BLD AUTO: 4.23 X10(3) UL (ref 1.5–7.7)
NEUTROPHILS NFR BLD AUTO: 64.4 %
OSMOLALITY SERPL CALC.SUM OF ELEC: 295 MOSM/KG (ref 275–295)
PLATELET # BLD AUTO: 152 10(3)UL (ref 150–450)
POTASSIUM SERPL-SCNC: 4.2 MMOL/L (ref 3.5–5.1)
PROT SERPL-MCNC: 5.6 G/DL (ref 5.7–8.2)
RBC # BLD AUTO: 3.42 X10(6)UL
SARS-COV-2 RNA RESP QL NAA+PROBE: NOT DETECTED
SODIUM SERPL-SCNC: 139 MMOL/L (ref 136–145)
WBC # BLD AUTO: 6.6 X10(3) UL (ref 4–11)

## 2024-09-10 PROCEDURE — 36415 COLL VENOUS BLD VENIPUNCTURE: CPT

## 2024-09-10 PROCEDURE — 85025 COMPLETE CBC W/AUTO DIFF WBC: CPT | Performed by: EMERGENCY MEDICINE

## 2024-09-10 PROCEDURE — 99284 EMERGENCY DEPT VISIT MOD MDM: CPT

## 2024-09-10 PROCEDURE — 80053 COMPREHEN METABOLIC PANEL: CPT | Performed by: EMERGENCY MEDICINE

## 2024-09-10 PROCEDURE — 71046 X-RAY EXAM CHEST 2 VIEWS: CPT | Performed by: EMERGENCY MEDICINE

## 2024-09-10 PROCEDURE — 85379 FIBRIN DEGRADATION QUANT: CPT | Performed by: EMERGENCY MEDICINE

## 2024-09-10 RX ORDER — DEXAMETHASONE 4 MG/1
10 TABLET ORAL ONCE
Status: COMPLETED | OUTPATIENT
Start: 2024-09-10 | End: 2024-09-10

## 2024-09-10 NOTE — ED PROVIDER NOTES
Patient Seen in: [facility] Emergency Department      History     Chief Complaint   Patient presents with    Difficulty Breathing     Stated Complaint: CHRIS since this afternoon    Subjective:   HPI    Patient called son at 1 PM and told him he was having difficulty breathing son was unable to get to him until about 230 and then brought him into the hospital he has a history of COPD he sees Dr. Henson he is compliant with all of his inhaled corticosteroids and medications.  He is starting to feel better now and feels like it has passed.  The patient does have a history of a leaky mitral valve, generalized muscle weakness renal insufficiency COPD Crohn's disease.  He has not had any fevers or chills he has not had a cough of note in July he was taken off of Xarelto which she had been on from a history of a PE multiple years previously.  Patient has not had any chest pain and symptoms seem to have resolved.    Objective:   Past Medical History:    Abdominal distention    Acute encephalopathy    Anemia    Arthritis    Back problem    Cataract    Chronic lung disease    Congestive heart disease (HCC)    COPD (chronic obstructive pulmonary disease) (HCC)    Crohn disease (HCC)    Crohn's disease of large intestine without complication (HCC)    Diarrhea, unspecified    Disorder of thyroid    Diverticula of small intestine    Diverticulosis of large intestine    Frequent use of laxatives    Hearing impairment    High blood pressure    High cholesterol    History of blood transfusion    Leg swelling    Mitral valve disorder    leaky mitral valve    Muscle weakness    Osteoarthrosis, unspecified whether generalized or localized, unspecified site    cervical and lumbar spine    Osteoporosis    Other and unspecified hyperlipidemia    Pulmonary embolism (HCC)    Pulmonary emphysema (HCC)    Renal insufficiency    Shortness of breath    Thyroid disease    Unspecified essential hypertension    Wheezing              Past  Surgical History:   Procedure Laterality Date    Angiogram      2009    Colonoscopy  6/30/2014    Procedure: COLONOSCOPY;  Surgeon: Jaz Carrasquillo DO;  Location:  ENDOSCOPY    Egd N/A 1/4/2017    Procedure: ESOPHAGOGASTRODUODENOSCOPY (EGD);  Surgeon: Gustabo Johnson MD;  Location:  MAIN OR    Egd N/A 1/18/2017    Procedure: ESOPHAGOGASTRODUODENOSCOPY (EGD);  Surgeon: Jaz Carrasquillo DO;  Location:  ENDOSCOPY    Hernia surgery      Knee replacement surgery      Other surgical history      6 yeara ago colonscopy with polpys    Sigmoidoscopy,diagnostic      Total knee replacement                  Social History     Socioeconomic History    Marital status:    Tobacco Use    Smoking status: Former     Types: Cigarettes    Smokeless tobacco: Never   Vaping Use    Vaping status: Never Used   Substance and Sexual Activity    Alcohol use: Yes     Comment: 1-2 drinks month    Drug use: No              Review of Systems    Positive for stated Chief Complaint: Difficulty Breathing    Other systems are as noted in HPI.  Constitutional and vital signs reviewed.      All other systems reviewed and negative except as noted above.    Physical Exam     ED Triage Vitals [09/10/24 1646]   /78   Pulse 72   Resp 18   Temp 96.8 °F (36 °C)   Temp src    SpO2 97 %   O2 Device        Current Vitals:   No data recorded          Physical Exam    Thin well-developed well-nourished very pleasant 89-year-old lying on an emergency department bed though he has a very advanced age she is very well-appearing.  His son is at bedside.  He is speaking in complete sentences his pupils are equal round reactive to light his extraocular movements are intact his neck is supple his lungs are clear to auscultation without wheezes rales or rhonchi no signs of diminished breath sounds that would suggest pneumothorax or hemothorax his heart has a regular rate and rhythm he does have a systolic murmur likely of aortic stenosis as it is  a 2/6 systolic murmur.  His abdomen is soft nontender nondistended his upper and lower extremities are evaluated and benign.    ED Course     Labs Reviewed   CBC WITH DIFFERENTIAL WITH PLATELET - Abnormal; Notable for the following components:       Result Value    RBC 3.42 (*)     HGB 11.1 (*)     HCT 32.1 (*)     All other components within normal limits   COMP METABOLIC PANEL (14) - Abnormal; Notable for the following components:    Glucose 103 (*)     BUN 32 (*)     Creatinine 1.46 (*)     eGFR-Cr 45 (*)     Total Protein 5.6 (*)     All other components within normal limits   D-DIMER - Normal   RAPID SARS-COV-2 BY PCR - Normal   RAINBOW DRAW LAVENDER   RAINBOW DRAW LIGHT GREEN   RAINBOW DRAW BLUE        Chest x-rays independently interpreted by myself does show some gaseous distention in the intestinal fields but the chest x-ray itself while the patient is kyphotic and scoliotic shows no lobar pneumonia signs of venous vascular congestion to suggest congestive heart failure no significant cardiomegaly.  This is a benign chest x-ray              MDM      80-year-old presents to the emergency department with son after having an episode of shortness of breath this afternoon.  That shortness of breath could be related to cardiac arrhythmia, mucous plug, asthma exacerbation, COVID, allergic reaction, fatigue, pulmonary embolus, he does have a history of COPD this could have been a very very mild COPD exacerbation.  He seems much better now.  We will evaluate for early pneumonia, COVID, significant anemia, or electrolyte abnormalities that could have precipitated this.      Thankfully workup is very benign COVID is a  D-dimer is negative in this individual while he does have a history of blood clot in the past, his blood thinners were stopped because he is a significantly advanced age and at some point they do become more dangerous than the actual clots.  At the time that they were stopped a D-dimer was  drawn and evaluated that D-dimer was negative at that time that is how we determined that likely he does not have at least acute clot the D-dimer remains negative and I do not think that the patient has acute clot he is clinically very well.        Given that patient has clinically improved and is no longer symptomatic in the emergency department I do think it is reasonable for the patient to go home and follow-up with his primary care physician and his pulmonologist Dr. Henson he will continue on his inhalers he was given 1 dose of Decadron here as this certainly could be the start of his COPD exacerbation and I certainly would like to avoid if possible a long-term steroid taper.  Patient is ambulating to and from the restroom with the help of his son he is in no acute distress he is stable for discharge home no signs of pneumonia, COVID, vascular congestion, pulmonary embolus.  I do think that the D-dimer is adequate to rule this out in the 89-year-old who has no other symptoms oxygen saturations are 100% and pulse is 65      A single Dose of steroids was given here in the emergency department I discussed with the family why and I do think it is important for him to follow-up with his PCP or Dr. Henson if his symptoms should continue but at this time the patient is quite well emergency presentations have been evaluated and ruled out I do not see signs of significant cardiac or pulmonary pathology              Medical Decision Making      Disposition and Plan     Clinical Impression:  1. Shortness of breath         Disposition:  Discharge  9/10/2024  6:54 pm    Follow-up:  No follow-up provider specified.        Medications Prescribed:  Discharge Medication List as of 9/10/2024  7:15 PM

## 2024-09-10 NOTE — ED INITIAL ASSESSMENT (HPI)
Pt c/o CHRIS today with no known reason. Pt used home nebulizer with minimal use. Pt NAD at this time however pt feels like he cannot get enough air in.

## 2024-11-05 PROBLEM — R53.81 PHYSICAL DECONDITIONING: Status: ACTIVE | Noted: 2024-11-05

## 2024-11-16 PROBLEM — B96.89 ACUTE BACTERIAL BRONCHITIS: Status: ACTIVE | Noted: 2024-11-16

## 2024-11-16 PROBLEM — R31.0 GROSS HEMATURIA: Status: ACTIVE | Noted: 2024-11-16

## 2024-11-16 PROBLEM — J20.8 ACUTE BACTERIAL BRONCHITIS: Status: ACTIVE | Noted: 2024-11-16

## 2024-11-27 ENCOUNTER — LAB SERVICES (OUTPATIENT)
Dept: LAB | Age: 89
End: 2024-11-27
Attending: INTERNAL MEDICINE

## 2024-11-27 ENCOUNTER — OFFICE VISIT (OUTPATIENT)
Dept: HEMATOLOGY/ONCOLOGY | Age: 89
End: 2024-11-27
Attending: INTERNAL MEDICINE

## 2024-11-27 VITALS
SYSTOLIC BLOOD PRESSURE: 132 MMHG | DIASTOLIC BLOOD PRESSURE: 73 MMHG | TEMPERATURE: 98.2 F | HEIGHT: 60 IN | WEIGHT: 125.4 LBS | HEART RATE: 70 BPM | OXYGEN SATURATION: 97 % | RESPIRATION RATE: 16 BRPM | BODY MASS INDEX: 24.62 KG/M2

## 2024-11-27 DIAGNOSIS — D53.1 MEGALOBLASTIC ANEMIA DUE TO VITAMIN B12 DEFICIENCY: ICD-10-CM

## 2024-11-27 DIAGNOSIS — E55.9 VITAMIN D DEFICIENCY: ICD-10-CM

## 2024-11-27 DIAGNOSIS — E55.9 VITAMIN D DEFICIENCY: Primary | ICD-10-CM

## 2024-11-27 DIAGNOSIS — D50.0 IRON DEFICIENCY ANEMIA DUE TO CHRONIC BLOOD LOSS: Primary | ICD-10-CM

## 2024-11-27 LAB
25(OH)D3+25(OH)D2 SERPL-MCNC: 65.5 NG/ML (ref 30–100)
BASOPHILS # BLD: 0 K/MCL (ref 0–0.3)
BASOPHILS NFR BLD: 0 %
DEPRECATED RDW RBC: 45.1 FL (ref 39–50)
EOSINOPHIL # BLD: 0.3 K/MCL (ref 0–0.5)
EOSINOPHIL NFR BLD: 3 %
ERYTHROCYTE [DISTWIDTH] IN BLOOD: 12.7 % (ref 11–15)
HCT VFR BLD CALC: 34.5 % (ref 39–51)
HGB BLD-MCNC: 11.4 G/DL (ref 13–17)
IMM GRANULOCYTES # BLD AUTO: 0 K/MCL (ref 0–0.2)
IMM GRANULOCYTES # BLD: 0 %
LYMPHOCYTES # BLD: 1.2 K/MCL (ref 1–4)
LYMPHOCYTES NFR BLD: 16 %
MCH RBC QN AUTO: 31.8 PG (ref 26–34)
MCHC RBC AUTO-ENTMCNC: 33 G/DL (ref 32–36.5)
MCV RBC AUTO: 96.1 FL (ref 78–100)
MONOCYTES # BLD: 0.6 K/MCL (ref 0.3–0.9)
MONOCYTES NFR BLD: 7 %
NEUTROPHILS # BLD: 5.7 K/MCL (ref 1.8–7.7)
NEUTROPHILS NFR BLD: 74 %
NRBC BLD MANUAL-RTO: 0 /100 WBC
PLATELET # BLD AUTO: 205 K/MCL (ref 140–450)
RBC # BLD: 3.59 MIL/MCL (ref 4.5–5.9)
WBC # BLD: 7.8 K/MCL (ref 4.2–11)

## 2024-11-27 PROCEDURE — 36415 COLL VENOUS BLD VENIPUNCTURE: CPT

## 2024-11-27 PROCEDURE — 82306 VITAMIN D 25 HYDROXY: CPT

## 2024-11-27 PROCEDURE — 85025 COMPLETE CBC W/AUTO DIFF WBC: CPT

## 2024-11-27 PROCEDURE — 99214 OFFICE O/P EST MOD 30 MIN: CPT | Performed by: INTERNAL MEDICINE

## 2024-11-27 ASSESSMENT — ENCOUNTER SYMPTOMS
SHORTNESS OF BREATH: 0
DIARRHEA: 0
WEAKNESS: 0
ACTIVITY CHANGE: 0
EYE PAIN: 0
ABDOMINAL PAIN: 0
CONSTIPATION: 0
BRUISES/BLEEDS EASILY: 0
COUGH: 0
HEADACHES: 0
ABDOMINAL DISTENTION: 0
WHEEZING: 0
NAUSEA: 0
VOMITING: 0
CHEST TIGHTNESS: 0
CHILLS: 0
ADENOPATHY: 0
VOICE CHANGE: 0
FATIGUE: 0
STRIDOR: 0
EYE REDNESS: 0
FEVER: 0
NUMBNESS: 0
TROUBLE SWALLOWING: 0
APPETITE CHANGE: 0
SORE THROAT: 0

## 2024-11-27 ASSESSMENT — PATIENT HEALTH QUESTIONNAIRE - PHQ9
1. LITTLE INTEREST OR PLEASURE IN DOING THINGS: NOT AT ALL
CLINICAL INTERPRETATION OF PHQ2 SCORE: NO FURTHER SCREENING NEEDED
SUM OF ALL RESPONSES TO PHQ9 QUESTIONS 1 AND 2: 0
SUM OF ALL RESPONSES TO PHQ9 QUESTIONS 1 AND 2: 0
2. FEELING DOWN, DEPRESSED OR HOPELESS: NOT AT ALL

## 2024-11-27 ASSESSMENT — PAIN SCALES - GENERAL: PAINLEVEL: 0

## 2024-12-02 ENCOUNTER — TELEPHONE (OUTPATIENT)
Dept: HEMATOLOGY/ONCOLOGY | Age: 89
End: 2024-12-02

## 2024-12-08 ENCOUNTER — APPOINTMENT (OUTPATIENT)
Dept: CT IMAGING | Facility: HOSPITAL | Age: 89
End: 2024-12-08
Attending: EMERGENCY MEDICINE
Payer: MEDICARE

## 2024-12-08 ENCOUNTER — HOSPITAL ENCOUNTER (EMERGENCY)
Facility: HOSPITAL | Age: 89
Discharge: HOME | End: 2024-12-08
Attending: EMERGENCY MEDICINE
Payer: MEDICARE

## 2024-12-08 ENCOUNTER — APPOINTMENT (OUTPATIENT)
Dept: GENERAL RADIOLOGY | Facility: HOSPITAL | Age: 89
End: 2024-12-08
Attending: EMERGENCY MEDICINE
Payer: MEDICARE

## 2024-12-08 VITALS
BODY MASS INDEX: 24.35 KG/M2 | DIASTOLIC BLOOD PRESSURE: 73 MMHG | SYSTOLIC BLOOD PRESSURE: 174 MMHG | WEIGHT: 124 LBS | OXYGEN SATURATION: 100 % | TEMPERATURE: 97 F | HEIGHT: 60 IN | HEART RATE: 62 BPM | RESPIRATION RATE: 19 BRPM

## 2024-12-08 DIAGNOSIS — J44.1 COPD EXACERBATION (HCC): ICD-10-CM

## 2024-12-08 DIAGNOSIS — K59.00 CONSTIPATION, UNSPECIFIED CONSTIPATION TYPE: ICD-10-CM

## 2024-12-08 DIAGNOSIS — R31.9 HEMATURIA, UNSPECIFIED TYPE: Primary | ICD-10-CM

## 2024-12-08 LAB
ALBUMIN SERPL-MCNC: 3.7 G/DL (ref 3.2–4.8)
ALBUMIN/GLOB SERPL: 1.8 {RATIO} (ref 1–2)
ALP LIVER SERPL-CCNC: 98 U/L
ALT SERPL-CCNC: 10 U/L
ANION GAP SERPL CALC-SCNC: 4 MMOL/L (ref 0–18)
ANTIBODY SCREEN: NEGATIVE
AST SERPL-CCNC: 20 U/L (ref ?–34)
BASOPHILS # BLD AUTO: 0.02 X10(3) UL (ref 0–0.2)
BASOPHILS NFR BLD AUTO: 0.3 %
BILIRUB SERPL-MCNC: 0.5 MG/DL (ref 0.2–0.9)
BILIRUB UR QL STRIP.AUTO: NEGATIVE
BUN BLD-MCNC: 40 MG/DL (ref 9–23)
CALCIUM BLD-MCNC: 9.6 MG/DL (ref 8.7–10.4)
CHLORIDE SERPL-SCNC: 111 MMOL/L (ref 98–112)
CLARITY UR REFRACT.AUTO: CLEAR
CO2 SERPL-SCNC: 27 MMOL/L (ref 21–32)
COLOR UR AUTO: YELLOW
CREAT BLD-MCNC: 1.45 MG/DL
EGFRCR SERPLBLD CKD-EPI 2021: 46 ML/MIN/1.73M2 (ref 60–?)
EOSINOPHIL # BLD AUTO: 0.21 X10(3) UL (ref 0–0.7)
EOSINOPHIL NFR BLD AUTO: 2.9 %
ERYTHROCYTE [DISTWIDTH] IN BLOOD BY AUTOMATED COUNT: 12.6 %
GLOBULIN PLAS-MCNC: 2.1 G/DL (ref 2–3.5)
GLUCOSE BLD-MCNC: 96 MG/DL (ref 70–99)
GLUCOSE UR STRIP.AUTO-MCNC: NORMAL MG/DL
HCT VFR BLD AUTO: 34.7 %
HGB BLD-MCNC: 11.7 G/DL
IMM GRANULOCYTES # BLD AUTO: 0.02 X10(3) UL (ref 0–1)
IMM GRANULOCYTES NFR BLD: 0.3 %
KETONES UR STRIP.AUTO-MCNC: NEGATIVE MG/DL
LEUKOCYTE ESTERASE UR QL STRIP.AUTO: NEGATIVE
LIPASE SERPL-CCNC: 51 U/L (ref 12–53)
LYMPHOCYTES # BLD AUTO: 1.03 X10(3) UL (ref 1–4)
LYMPHOCYTES NFR BLD AUTO: 14 %
MCH RBC QN AUTO: 31.5 PG (ref 26–34)
MCHC RBC AUTO-ENTMCNC: 33.7 G/DL (ref 31–37)
MCV RBC AUTO: 93.5 FL
MONOCYTES # BLD AUTO: 0.47 X10(3) UL (ref 0.1–1)
MONOCYTES NFR BLD AUTO: 6.4 %
NEUTROPHILS # BLD AUTO: 5.61 X10 (3) UL (ref 1.5–7.7)
NEUTROPHILS # BLD AUTO: 5.61 X10(3) UL (ref 1.5–7.7)
NEUTROPHILS NFR BLD AUTO: 76.1 %
NITRITE UR QL STRIP.AUTO: NEGATIVE
OSMOLALITY SERPL CALC.SUM OF ELEC: 304 MOSM/KG (ref 275–295)
PH UR STRIP.AUTO: 5.5 [PH] (ref 5–8)
PLATELET # BLD AUTO: 194 10(3)UL (ref 150–450)
POTASSIUM SERPL-SCNC: 4.2 MMOL/L (ref 3.5–5.1)
PROT SERPL-MCNC: 5.8 G/DL (ref 5.7–8.2)
PROT UR STRIP.AUTO-MCNC: 50 MG/DL
RBC # BLD AUTO: 3.71 X10(6)UL
RH BLOOD TYPE: POSITIVE
SODIUM SERPL-SCNC: 142 MMOL/L (ref 136–145)
SP GR UR STRIP.AUTO: 1.02 (ref 1–1.03)
UROBILINOGEN UR STRIP.AUTO-MCNC: 2 MG/DL
WBC # BLD AUTO: 7.4 X10(3) UL (ref 4–11)

## 2024-12-08 PROCEDURE — 80053 COMPREHEN METABOLIC PANEL: CPT

## 2024-12-08 PROCEDURE — 99285 EMERGENCY DEPT VISIT HI MDM: CPT

## 2024-12-08 PROCEDURE — 85025 COMPLETE CBC W/AUTO DIFF WBC: CPT

## 2024-12-08 PROCEDURE — 86850 RBC ANTIBODY SCREEN: CPT | Performed by: EMERGENCY MEDICINE

## 2024-12-08 PROCEDURE — 85025 COMPLETE CBC W/AUTO DIFF WBC: CPT | Performed by: EMERGENCY MEDICINE

## 2024-12-08 PROCEDURE — 86900 BLOOD TYPING SEROLOGIC ABO: CPT | Performed by: EMERGENCY MEDICINE

## 2024-12-08 PROCEDURE — 83690 ASSAY OF LIPASE: CPT

## 2024-12-08 PROCEDURE — 71045 X-RAY EXAM CHEST 1 VIEW: CPT | Performed by: EMERGENCY MEDICINE

## 2024-12-08 PROCEDURE — 86900 BLOOD TYPING SEROLOGIC ABO: CPT

## 2024-12-08 PROCEDURE — 81001 URINALYSIS AUTO W/SCOPE: CPT | Performed by: EMERGENCY MEDICINE

## 2024-12-08 PROCEDURE — 87086 URINE CULTURE/COLONY COUNT: CPT | Performed by: EMERGENCY MEDICINE

## 2024-12-08 PROCEDURE — 86901 BLOOD TYPING SEROLOGIC RH(D): CPT

## 2024-12-08 PROCEDURE — 74177 CT ABD & PELVIS W/CONTRAST: CPT | Performed by: EMERGENCY MEDICINE

## 2024-12-08 PROCEDURE — 86901 BLOOD TYPING SEROLOGIC RH(D): CPT | Performed by: EMERGENCY MEDICINE

## 2024-12-08 PROCEDURE — 93010 ELECTROCARDIOGRAM REPORT: CPT

## 2024-12-08 PROCEDURE — 83690 ASSAY OF LIPASE: CPT | Performed by: EMERGENCY MEDICINE

## 2024-12-08 PROCEDURE — 96361 HYDRATE IV INFUSION ADD-ON: CPT

## 2024-12-08 PROCEDURE — 86850 RBC ANTIBODY SCREEN: CPT

## 2024-12-08 PROCEDURE — 81001 URINALYSIS AUTO W/SCOPE: CPT

## 2024-12-08 PROCEDURE — 80053 COMPREHEN METABOLIC PANEL: CPT | Performed by: EMERGENCY MEDICINE

## 2024-12-08 PROCEDURE — 93005 ELECTROCARDIOGRAM TRACING: CPT

## 2024-12-08 PROCEDURE — 94640 AIRWAY INHALATION TREATMENT: CPT

## 2024-12-08 PROCEDURE — 96360 HYDRATION IV INFUSION INIT: CPT

## 2024-12-08 RX ORDER — PREDNISONE 20 MG/1
40 TABLET ORAL ONCE
Status: COMPLETED | OUTPATIENT
Start: 2024-12-08 | End: 2024-12-08

## 2024-12-08 RX ORDER — IPRATROPIUM BROMIDE AND ALBUTEROL SULFATE 2.5; .5 MG/3ML; MG/3ML
3 SOLUTION RESPIRATORY (INHALATION) ONCE
Status: COMPLETED | OUTPATIENT
Start: 2024-12-08 | End: 2024-12-08

## 2024-12-08 RX ORDER — PREDNISONE 20 MG/1
40 TABLET ORAL DAILY
Qty: 10 TABLET | Refills: 0 | Status: SHIPPED | OUTPATIENT
Start: 2024-12-08 | End: 2024-12-13

## 2024-12-08 RX ORDER — SODIUM CHLORIDE 9 MG/ML
125 INJECTION, SOLUTION INTRAVENOUS CONTINUOUS
Status: DISCONTINUED | OUTPATIENT
Start: 2024-12-08 | End: 2024-12-08

## 2024-12-08 NOTE — ED INITIAL ASSESSMENT (HPI)
Blood in the urine ongoing since November 10th.  Given abx for uti, frequency and pain.  Fatigue and sob.

## 2024-12-08 NOTE — ED PROVIDER NOTES
Patient Seen in: [facility] Emergency Department      History     Chief Complaint   Patient presents with    Bleeding     Stated Complaint: Hematuria    Subjective:   HPI  Patient is a 89-year-old male who lives with family members.  Patient started having hematuria November 10.  Patient saw his primary physician and started on antibiotics November 16 for 10 days.  Family states he is continue to have some blood in his urine.  Patient does complain of some dysuria though cramping when he tries to urinate.  Patient denies nausea vomiting, no fevers or chills, no diarrhea.  Patient does feel slightly short of breath does have history of COPD and has been using his nebulizer 3 times a day and increase use of his direct inhaler.  Patient denies fevers or chills, no chest pain, remainder of review of systems negative.  Family is requesting imaging of his abdomen.        Objective:     Past Medical History:    Abdominal distention    Acute encephalopathy    Anemia    Arthritis    Back problem    Cataract    Chronic lung disease    Congestive heart disease (HCC)    COPD (chronic obstructive pulmonary disease) (HCC)    Crohn disease (HCC)    Crohn's disease of large intestine without complication (HCC)    Diarrhea, unspecified    Disorder of thyroid    Diverticula of small intestine    Diverticulosis of large intestine    Frequent use of laxatives    Hearing impairment    High blood pressure    High cholesterol    History of blood transfusion    Leg swelling    Mitral valve disorder    leaky mitral valve    Muscle weakness    Osteoarthrosis, unspecified whether generalized or localized, unspecified site    cervical and lumbar spine    Osteoporosis    Other and unspecified hyperlipidemia    Pulmonary embolism (HCC)    Pulmonary emphysema (HCC)    Renal insufficiency    Shortness of breath    Thyroid disease    Unspecified essential hypertension    Wheezing              Past Surgical History:   Procedure Laterality  Date    Angiogram      2009    Colonoscopy  6/30/2014    Procedure: COLONOSCOPY;  Surgeon: Jaz Carrasquillo DO;  Location:  ENDOSCOPY    Egd N/A 1/4/2017    Procedure: ESOPHAGOGASTRODUODENOSCOPY (EGD);  Surgeon: Gustabo Johnson MD;  Location:  MAIN OR    Egd N/A 1/18/2017    Procedure: ESOPHAGOGASTRODUODENOSCOPY (EGD);  Surgeon: Jaz Carrasquillo DO;  Location:  ENDOSCOPY    Hernia surgery      Knee replacement surgery      Other surgical history      6 yeara ago colonscopy with polpys    Sigmoidoscopy,diagnostic      Total knee replacement                  Social History     Socioeconomic History    Marital status:    Tobacco Use    Smoking status: Former     Types: Cigarettes    Smokeless tobacco: Never   Vaping Use    Vaping status: Never Used   Substance and Sexual Activity    Alcohol use: Yes     Comment: 1-2 drinks month    Drug use: No                  Physical Exam     ED Triage Vitals [12/08/24 1408]   /67   Pulse 81   Resp 18   Temp 97.4 °F (36.3 °C)   Temp src Oral   SpO2 97 %   O2 Device None (Room air)       Current Vitals:   Vital Signs  BP: (!) 174/73  Pulse: 62  Resp: 19  Temp: 97.4 °F (36.3 °C)  Temp src: Oral  MAP (mmHg): 97    Oxygen Therapy  SpO2: 100 %  O2 Device: None (Room air)        Physical Exam   GENERAL: Patient resting on the cart in no acute distress.  HEENT: Extraocular muscles intact, pupils equal round reactive to light.  Mouth normal, neck supple, no meningismus.  LUNGS: Lungs trace expiratory wheeze bilaterally.  CARDIOVASCULAR: + S1-S2, regular rate and rhythm, no murmurs.  BACK: No CVA tenderness, no midline bony tenderness.  ABDOMEN: + Bowel sounds, soft, mild tenderness suprapubic, nondistended.  No rebound, no guarding, no hepatosplenomegaly.  EXTREMITIES: Full range of motion, no tenderness, good capillary refill.  SKIN: No rash, good turgor.  NEURO: Patient answers questions appropriately.  No focal deficits appreciated.        ED Course     Labs  Reviewed   COMP METABOLIC PANEL (14) - Abnormal; Notable for the following components:       Result Value    BUN 40 (*)     Creatinine 1.45 (*)     Calculated Osmolality 304 (*)     eGFR-Cr 46 (*)     All other components within normal limits   CBC WITH DIFFERENTIAL WITH PLATELET - Abnormal; Notable for the following components:    RBC 3.71 (*)     HGB 11.7 (*)     HCT 34.7 (*)     All other components within normal limits   URINALYSIS WITH CULTURE REFLEX - Abnormal; Notable for the following components:    Blood Urine Trace (*)     Protein Urine 50 (*)     Urobilinogen Urine 2 (*)     RBC Urine 3-5 (*)     Squamous Epi. Cells Few (*)     All other components within normal limits   LIPASE - Normal   TYPE AND SCREEN    Narrative:     The following orders were created for panel order Type and screen.  Procedure                               Abnormality         Status                     ---------                               -----------         ------                     ABORH (Blood Type)[[id number]]                               Final result               Antibody Screen[[id number]]                                  Final result                 Please view results for these tests on the individual orders.   ABORH (BLOOD TYPE)   ANTIBODY SCREEN   URINE CULTURE, ROUTINE     EKG    Rate, intervals and axes as noted on EKG Report.  Rate: 64  Rhythm: Sinus Rhythm  Reading: Normal sinus rhythm, nonspecific ST changes                Chest x-rayCONCLUSION:    Stable cardiac enlargement.  No sign of CHF, pneumonia, pleural effusion.  Redemonstration elevated right diaphragm with bowel in the right upper quadrant underneath the right diaphragm interposed between the liver and the diaphragm stable    since the prior.   Independent reviewed by myself, no pneumothorax    CT abdomen pelvis1. Large amount of stool in the rectum and right colon, moderate stool throughout the rest the colon.      2. Redemonstration broad anterior  abdominal wall hernia mostly left of midline containing fat, colon and small bowel, with a thickened enhancing portion of sigmoid colon within the hernia could reflect area of colitis.  No pericolonic abscess, or colonic    obstruction seen.      3. Considerable dilation of a segment of small bowel, but this appears in a region of surgical staples or sutures and is probably a chronically denervated small bowel segment in the setting of previous surgery.  Nothing specific for small bowel   obstruction.  No ascites or free air.      4. Gallstone, but no sign of biliary dilation or acute cholecystitis by CT.           MDM      Patient was stable during his stay in the emergency department.  Patient able to tolerate p.o. liquids.  Patient was given prednisone.  Patient given a DuoNeb nebulizer treatment.  Patient did feel improved.  Patient be treated with prednisone for 5 days for COPD flare.  Patient x-ray showed no pneumonia.  Patient's family did request CT with contrast to evaluate for possible mass.  Patient CT not show any mass.  Patient does have stool in the rectum Family will increase fluids and MiraLAX if needed.  Patient felt comfortable going home.  They did request to see Dr. Houston for the hematuria will be referred to him.  Patient felt comfortable going home.  I did consider bladder mass, kidney stone, kidney mass, pneumonia, COPD flare.  Return if new or worse symptoms.        Medical Decision Making      Disposition and Plan     Clinical Impression:  1. Hematuria, unspecified type    2. COPD exacerbation (HCC)    3. Constipation, unspecified constipation type         Disposition:  Discharge  12/8/2024  6:06 pm    Follow-up:  Ibrahima Gan MD  [address]  [facility] [address]  [phone]    Follow up in 2 day(s)      Josep Henson MD  [address]  [facility] [address] [phone]    Follow up in 2 day(s)      Keron Houston MD  [address]  [facility] [address]  [phone]    Follow up in 3 day(s)            Medications Prescribed:  Discharge Medication List as of 12/8/2024  6:14 PM        START taking these medications    Details   predniSONE 20 MG Oral Tab Take 2 tablets (40 mg total) by mouth daily for 5 days., Normal, Disp-10 tablet, R-0                 Supplementary Documentation:

## 2024-12-09 LAB
ATRIAL RATE: 64 BPM
P AXIS: 68 DEGREES
P-R INTERVAL: 172 MS
Q-T INTERVAL: 426 MS
QRS DURATION: 82 MS
QTC CALCULATION (BEZET): 439 MS
R AXIS: 16 DEGREES
T AXIS: 90 DEGREES
VENTRICULAR RATE: 64 BPM

## 2024-12-09 NOTE — DISCHARGE INSTRUCTIONS
Follow-up for further evaluation with urology, primary physician and pulmonology.  Continue your inhalers.  Prednisone as prescribed.  Plenty of fluids.  May use MiraLAX as discussed.  Return if fever, vomiting, new or worse symptoms.

## 2024-12-12 ENCOUNTER — TELEPHONE (OUTPATIENT)
Dept: SURGERY | Facility: CLINIC | Age: 89
End: 2024-12-12

## 2024-12-12 NOTE — TELEPHONE ENCOUNTER
Patient daughter in law calling needs clarification on an appointment that was cancelled with BRIGIDA [facility]  for 12/20, daughter in law stating  patient is 89 years old and needs to be seen as soon as possible  other than 12/24 because he is having blood in his urine, per daughter in law if no call is return within 1 hour she will come to the office I explain to daughter in law that BRIGIDA Wilhelm is not available for 12/20.Daughter in law refused to give her name she is requesting to please call  number who is the POA of the patient

## 2024-12-12 NOTE — TELEPHONE ENCOUNTER
[phone] Returned call to LUCIE Cooney  Patient has appointment scheduled 12/17/2024  11:30am Dr. Yamile Garcia (POA) verbalizes is already aware of this appointment and patient plans on coming to this appointment. Verbalizes appreciative of return call.

## 2024-12-16 NOTE — PROGRESS NOTES
Urology Clinic Note - New Patient    Referring Provider:  No referring provider defined for this encounter.     Primary Care Provider:  Ibrahima Gan MD     Chief Complaint:   Gross hematuria     HPI:     Syed Cooney is a 89 year old male with history of HLD, HTN, CKD, PE (no blood thinners)  referred for gross hematuria.   Family serving as interpreters per preference.    Patient saw [facility] in 2019- had retention. Later pass void trial. Imaging was normal. Was on flomax. PVR was 5cc after. PSA was normal as below.   More recently, in the ED several times with hematuria.  He initially saw his PCP in November with hematuria, was given antibiotics for possible infection.  He was then in the emergency room on 12/8/2024 with gross hematuria.  At that time, his labs were normal.  CT scan was done that showed constipation and a large abdominal hernia.  His bladder and kidneys were normal. On my review- no non contrast imaging was done. Delays were obtained with no obvious filling defects.   He is doing well today.  No significant urinary concerns per family.  He has been on Flomax for many years.  Hematuria has improved but he continues to have intermittent pink urine.  No clots.  UA today with trace blood, no infection.  PVR 40 mL.  No abdominal pain or systemic symptoms.  Patient remains active.  Patient does have Crohn's disease, at times does have constipation.  His family manages this with increasing water.    PSA:  Lab Results   Component Value Date    PSA 2.06 09/14/2021    PSA 1.53 10/12/2019    PSA 1.45 08/01/2018    PSAS 2.14 12/10/2023    PSAS 2.28 09/27/2022    PSAS 1.98 09/05/2020      Last Cr was 1.45 done on 12/8/2024.      History:     Past Medical History:    Abdominal distention    Acute encephalopathy    Anemia    Arthritis    Back problem    Cataract    Chronic lung disease    Congestive heart disease (HCC)    COPD (chronic obstructive pulmonary disease) (HCC)    Crohn disease  (HCC)    Crohn's disease of large intestine without complication (HCC)    Diarrhea, unspecified    Disorder of thyroid    Diverticula of small intestine    Diverticulosis of large intestine    Frequent use of laxatives    Hearing impairment    High blood pressure    High cholesterol    History of blood transfusion    Leg swelling    Mitral valve disorder    leaky mitral valve    Muscle weakness    Osteoarthrosis, unspecified whether generalized or localized, unspecified site    cervical and lumbar spine    Osteoporosis    Other and unspecified hyperlipidemia    Pulmonary embolism (HCC)    Pulmonary emphysema (HCC)    Renal insufficiency    Shortness of breath    Thyroid disease    Unspecified essential hypertension    Wheezing       Past Surgical History:   Procedure Laterality Date    Angiogram      2009    Colonoscopy  6/30/2014    Procedure: COLONOSCOPY;  Surgeon: Jaz Carrasquillo DO;  Location:  ENDOSCOPY    Egd N/A 1/4/2017    Procedure: ESOPHAGOGASTRODUODENOSCOPY (EGD);  Surgeon: Gustabo Johnson MD;  Location:  MAIN OR    Egd N/A 1/18/2017    Procedure: ESOPHAGOGASTRODUODENOSCOPY (EGD);  Surgeon: Jaz Carrasquillo DO;  Location:  ENDOSCOPY    Hernia surgery      Knee replacement surgery      Other surgical history      6 yeara ago colonscopy with polpys    Sigmoidoscopy,diagnostic      Total knee replacement         Family History   Problem Relation Age of Onset    Cancer Brother        Social History     Socioeconomic History    Marital status:    Tobacco Use    Smoking status: Former     Types: Cigarettes    Smokeless tobacco: Never   Vaping Use    Vaping status: Never Used   Substance and Sexual Activity    Alcohol use: Yes     Comment: 1-2 drinks month    Drug use: No       Medications (Active prior to today's visit):  Current Outpatient Medications   Medication Sig Dispense Refill    arformoterol 15 MCG/2ML Inhalation Nebu Soln Take 2 mL (15 mcg total) by nebulization 2 (two) times  daily.      budesonide 0.25 MG/2ML Inhalation Suspension Take 2 mL (0.25 mg total) by nebulization daily.      methylPREDNISolone (MEDROL) 4 MG Oral Tablet Therapy Pack Dosepack: take as directed (Patient not taking: Reported on 9/10/2024) 1 each 0    benzonatate 200 MG Oral Cap Take 1 capsule (200 mg total) by mouth 3 (three) times daily as needed for cough. (Patient not taking: Reported on 6/4/2024) 21 capsule 0    ipratropium-albuterol 0.5-2.5 (3) MG/3ML Inhalation Solution Take 3 mL by nebulization every 4 (four) hours. 50 each 0    amLODIPine 5 MG Oral Tab Take 1 tablet (5 mg total) by mouth daily. (Patient not taking: Reported on 6/4/2024) 30 tablet 0    rivaroxaban (XARELTO) 15 MG Oral Tab Take 1 tablet (15 mg total) by mouth daily. (Patient not taking: Reported on 6/4/2024)      fluticasone-umeclidin-vilant (TRELEGY ELLIPTA) 200-62.5-25 MCG/INH Inhalation Aerosol Powder, Breath Activated Inhale 1 puff into the lungs daily. (Patient not taking: Reported on 6/4/2024)      mesalamine 1.2 g Oral Tab EC Take 2 tablets (2.4 g total) by mouth daily.      carvedilol 3.125 MG Oral Tab Take 1 tablet (3.125 mg total) by mouth 2 (two) times daily with meals.      Omeprazole 40 MG Oral Capsule Delayed Release Take 1 capsule (40 mg total) by mouth daily.      Pravastatin Sodium 20 MG Oral Tab Take 1 tablet (20 mg total) by mouth nightly. (Patient not taking: Reported on 6/4/2024)      Cholecalciferol (VITAMIN D) 1000 UNITS Oral Tab Take 2,000 Units by mouth daily.        cyanocobalamin 1000 MCG/ML Injection Solution Inject 1 mL (1,000 mcg total) into the muscle every 30 (thirty) days.      Levothyroxine Sodium 50 MCG Oral Tab Take 1 tablet (50 mcg total) by mouth before breakfast.      ferrous sulfate 325 (65 FE) MG Oral Tab EC Take 1 tablet (325 mg total) by mouth 2 (two) times daily with meals.      tamsulosin HCl (FLOMAX) 0.4 MG Oral Cap Take 1 capsule (0.4 mg total) by mouth nightly.      albuterol 108 (90 Base)  MCG/ACT Inhalation Aero Soln Inhale 2 puffs into the lungs every 6 (six) hours as needed for Wheezing.         Allergies:  Allergies[1]      Review of Systems:   A comprehensive 10-point review of systems was completed.  Pertinent positives and negatives are noted in the the HPI.    Physical Exam:   CONSTITUTIONAL: Well developed, well nourished, in no acute distress  NEUROLOGIC: Alert and oriented  HEAD: Normocephalic, atraumatic  ENT: Hearing intact   RESPIRATORY: Normal respiratory effort  SKIN: No evident rashes  ABDOMEN: Soft, non-tender, non-distended     CT ABDOMEN+PELVIS(CONTRAST ONLY)(CPT=74177)    Result Date: 12/8/2024  CONCLUSION:   1. Large amount of stool in the rectum and right colon, moderate stool throughout the rest the colon.  2. Redemonstration broad anterior abdominal wall hernia mostly left of midline containing fat, colon and small bowel, with a thickened enhancing portion of sigmoid colon within the hernia could reflect area of colitis.  No pericolonic abscess, or colonic  obstruction seen.  3. Considerable dilation of a segment of small bowel, but this appears in a region of surgical staples or sutures and is probably a chronically denervated small bowel segment in the setting of previous surgery.  Nothing specific for small bowel obstruction.  No ascites or free air.  4. Gallstone, but no sign of biliary dilation or acute cholecystitis by CT.    LOCATION:  [facility]   Dictated by (CST): Declan Morin MD on 12/08/2024 at 5:47 PM     Finalized by (CST): Declan Morin MD on 12/08/2024 at 5:55 PM       XR CHEST AP PORTABLE  (CPT=71045)    Result Date: 12/8/2024  CONCLUSION:    Stable cardiac enlargement.  No sign of CHF, pneumonia, pleural effusion.  Redemonstration elevated right diaphragm with bowel in the right upper quadrant underneath the right diaphragm interposed between the liver and the diaphragm stable  since the prior.  LOCATION:  [facility]      Dictated by (CST): Declan Morin MD on  12/08/2024 at 4:58 PM     Finalized by (CST): Declan Morin MD on 12/08/2024 at 4:58 PM          Assessment & Plan:     Gross hematuria   We had a long discussion regarding patient's gross hematuria.  This appears mild in nature without clots.  This is also been improving since his emergency room visit.  I reviewed his imaging.  We discussed that this was not a urogram but does have pretty decent delayed imaging.  I do not see any obvious lesions.  For now, given his age family wants to hold off on further imaging.  We discussed further workup with cystoscopy, we will schedule this.  Will send cytology at that time.  Continue hydration.  Avoid constipation and straining for bowel movements.  They will let me know if there is any changes in the interval.          Thank you for this consult.    I have personally reviewed all relevant medical records, labs, and imaging.     Chucho Ovalle MD  Staff Urologist  [facility]  Office: [phone]           [1] No Known Allergies

## 2024-12-17 ENCOUNTER — OFFICE VISIT (OUTPATIENT)
Dept: SURGERY | Facility: CLINIC | Age: 89
End: 2024-12-17
Payer: MEDICAID

## 2024-12-17 DIAGNOSIS — R82.90 URINE FINDING: Primary | ICD-10-CM

## 2024-12-17 LAB
APPEARANCE: CLEAR
BILIRUBIN: NEGATIVE
GLUCOSE (URINE DIPSTICK): NEGATIVE MG/DL
KETONES (URINE DIPSTICK): NEGATIVE MG/DL
LEUKOCYTES: NEGATIVE
MULTISTIX LOT#: ABNORMAL NUMERIC
NITRITE, URINE: NEGATIVE
PH, URINE: 5.5 (ref 4.5–8)
PROTEIN (URINE DIPSTICK): 100 MG/DL
SPECIFIC GRAVITY: 1.01 (ref 1–1.03)
URINE-COLOR: YELLOW
UROBILINOGEN,SEMI-QN: 1 MG/DL (ref 0–1.9)

## 2024-12-17 PROCEDURE — 81003 URINALYSIS AUTO W/O SCOPE: CPT | Performed by: UROLOGY

## 2024-12-17 PROCEDURE — 51798 US URINE CAPACITY MEASURE: CPT | Performed by: UROLOGY

## 2024-12-17 PROCEDURE — 99204 OFFICE O/P NEW MOD 45 MIN: CPT | Performed by: UROLOGY

## 2024-12-19 ENCOUNTER — PROCEDURE (OUTPATIENT)
Dept: SURGERY | Facility: CLINIC | Age: 89
End: 2024-12-19

## 2024-12-19 VITALS — HEART RATE: 72 BPM | DIASTOLIC BLOOD PRESSURE: 66 MMHG | SYSTOLIC BLOOD PRESSURE: 134 MMHG

## 2024-12-19 DIAGNOSIS — R31.0 GROSS HEMATURIA: ICD-10-CM

## 2024-12-19 DIAGNOSIS — R82.90 URINE FINDING: Primary | ICD-10-CM

## 2024-12-19 PROCEDURE — 52000 CYSTOURETHROSCOPY: CPT | Performed by: UROLOGY

## 2024-12-19 RX ORDER — SULFAMETHOXAZOLE AND TRIMETHOPRIM 800; 160 MG/1; MG/1
1 TABLET ORAL ONCE
Status: COMPLETED | OUTPATIENT
Start: 2024-12-19 | End: 2024-12-19

## 2024-12-19 RX ADMIN — SULFAMETHOXAZOLE AND TRIMETHOPRIM 1 TABLET: 800; 160 TABLET ORAL at 13:45:00

## 2024-12-19 NOTE — PROGRESS NOTES
Clinic Procedure Note    INDICATIONS:      Syed Cooney is a 89 year old male with history of HLD, HTN, CKD, PE (no blood thinners)  referred for gross hematuria.   Family serving as interpreters per preference.     Patient saw [facility] in 2019- had retention. Later pass void trial. Imaging was normal. Was on flomax. PVR was 5cc after. PSA was normal as below.   More recently, in the ED several times with hematuria.  He initially saw his PCP in November with hematuria, was given antibiotics for possible infection.  He was then in the emergency room on 12/8/2024 with gross hematuria.  At that time, his labs were normal.  CT scan was done that showed constipation and a large abdominal hernia.  His bladder and kidneys were normal. On my review- no non contrast imaging was done. Delays were obtained with no obvious filling defects.   He establish care with me on 12/17/2024.   No significant urinary concerns per family.  He has been on Flomax for many years.  Hematuria has improved but he continues to have intermittent pink urine.  No clots.  UA today with trace blood, no infection.  PVR 40 mL.  No abdominal pain or systemic symptoms.  Patient remains active.  Patient does have Crohn's disease, at times does have constipation.  His family manages this with increasing water.    At that visit, cystoscopy was discussed.  He now presents for this procedure.  He was otherwise told to continue hydration, avoid constipation.  Given decent delayed imaging on the above CT scan and his age family wanted to hold off on further imaging for now.  Angiogram of the abdomen pelvis in 2023 which also did not show any significant abnormalities in the  system.      PROCEDURE:       1. Flexible cystourethroscopy    DATE OF PROCEDURE: 12/19/2024     PRE-PROCEDURE DIAGNOSIS: Gross hematuria    POST-PROCEDURE DIAGNOSIS: Same     SURGEON: Chucho Ovalle MD    FINDINGS:    Urethra: Orthotopic meatus, normal caliber urethra  throughout without lesions    Prostate: Mildly enlarged without signs of obstruction, mildly hypervascular prostate, mildly high bladder neck, minimal intravesical protrusion    Bladder: Normal mucosa with no papillary lesions or erythema, moderately trabeculated with posterior cellules    Ureteral orifices: Orthotopic    Other findings: None    PROCEDURE:   Patient was brought to the procedure suite and a time-out was performed identifiying the patient,  and procedure to be performed. The risks and benefits of the procedure were once again discussed with the patient including bleeding, infection, and dysuria. The patient agreed to proceed. The patient did not have any signs or symptoms of active UTI.    He was placed in supine position on the table and the penis was prepped and draped in the standard sterile fashion. Urojet was instilled per urethra for local anesthetic effect. A flexible cystoscope was inserted per urethra. The bladder was fully inspected (including retroflexion) and showed findings as above. Both ureteral orifices were orthotopic.  A bladder cytology was obtained.  The prostate was carefully viewed on removal of the scope, with findings as above. The scope was then carefully removed.    There were no complications and the patient tolerated the procedure well.    IMPRESSION AND PLAN:   Gross hematuria  Workup as above.  Patient with CT angiogram last year and recent CT abdomen pelvis with contrast in the emergency room.  No lesions seen.  Cystoscopy as above, mildly enlarged prostate slightly hypervascular but otherwise no abnormal findings.  Urine was crystal clear today and his previous urine analysis in our clinic was negative for significant blood.  Does not appear that he is taking any dietary medications or food that would alter the appearance of his urine.  For now family will continue to observe.  If does not improve we can consider starting finasteride.  Continue hydration.  Will  follow-up cytology.         Chucho Ovalle MD  Staff Urologist  [facility]  Office: [phone]

## 2024-12-20 LAB — NON GYNE INTERPRETATION: NEGATIVE

## 2024-12-30 ENCOUNTER — TELEPHONE (OUTPATIENT)
Dept: FAMILY MEDICINE CLINIC | Facility: CLINIC | Age: 89
End: 2024-12-30

## 2024-12-30 NOTE — TELEPHONE ENCOUNTER
Marcie, Daughter in law called because she received a VM regarding an apt she made on mychart for Jan. The apt was canceled because Dr. Gan is not taking anymore new pts this month. I offered to RS to march but she wants it in jan to do refills. Marcie said Dr. Gan saw family member Nica Cheung [birth year] on 12/16 and gave ok to see pt sooner. Ok to put back on schedule for jan?

## 2025-01-01 ENCOUNTER — APPOINTMENT (OUTPATIENT)
Dept: CT IMAGING | Facility: HOSPITAL | Age: OVER 89
End: 2025-01-01
Attending: EMERGENCY MEDICINE
Payer: MEDICARE

## 2025-01-01 ENCOUNTER — HOSPITAL ENCOUNTER (INPATIENT)
Facility: HOSPITAL | Age: OVER 89
LOS: 11 days | End: 2025-01-01
Attending: EMERGENCY MEDICINE | Admitting: INTERNAL MEDICINE
Payer: MEDICARE

## 2025-01-01 ENCOUNTER — APPOINTMENT (OUTPATIENT)
Dept: GENERAL RADIOLOGY | Facility: HOSPITAL | Age: OVER 89
End: 2025-01-01
Attending: NURSE PRACTITIONER
Payer: MEDICARE

## 2025-01-01 ENCOUNTER — APPOINTMENT (OUTPATIENT)
Dept: GENERAL RADIOLOGY | Facility: HOSPITAL | Age: OVER 89
End: 2025-01-01
Attending: INTERNAL MEDICINE
Payer: MEDICARE

## 2025-01-01 ENCOUNTER — APPOINTMENT (OUTPATIENT)
Dept: GENERAL RADIOLOGY | Facility: HOSPITAL | Age: OVER 89
End: 2025-01-01
Attending: STUDENT IN AN ORGANIZED HEALTH CARE EDUCATION/TRAINING PROGRAM
Payer: MEDICARE

## 2025-01-01 ENCOUNTER — APPOINTMENT (OUTPATIENT)
Dept: GENERAL RADIOLOGY | Facility: HOSPITAL | Age: OVER 89
DRG: 329 | End: 2025-01-01
Payer: MEDICARE

## 2025-01-01 ENCOUNTER — APPOINTMENT (OUTPATIENT)
Dept: GENERAL RADIOLOGY | Facility: HOSPITAL | Age: OVER 89
DRG: 329 | End: 2025-01-01
Attending: ANESTHESIOLOGY
Payer: MEDICARE

## 2025-01-01 ENCOUNTER — APPOINTMENT (OUTPATIENT)
Dept: GENERAL RADIOLOGY | Facility: HOSPITAL | Age: OVER 89
End: 2025-01-01
Payer: MEDICARE

## 2025-01-01 ENCOUNTER — HOSPITAL ENCOUNTER (INPATIENT)
Facility: HOSPITAL | Age: OVER 89
LOS: 11 days | DRG: 329 | End: 2025-01-01
Attending: EMERGENCY MEDICINE | Admitting: INTERNAL MEDICINE
Payer: MEDICARE

## 2025-01-01 ENCOUNTER — APPOINTMENT (OUTPATIENT)
Dept: GENERAL RADIOLOGY | Facility: HOSPITAL | Age: OVER 89
DRG: 329 | End: 2025-01-01
Attending: STUDENT IN AN ORGANIZED HEALTH CARE EDUCATION/TRAINING PROGRAM
Payer: MEDICARE

## 2025-01-01 ENCOUNTER — APPOINTMENT (OUTPATIENT)
Dept: GENERAL RADIOLOGY | Facility: HOSPITAL | Age: OVER 89
DRG: 329 | End: 2025-01-01
Attending: INTERNAL MEDICINE
Payer: MEDICARE

## 2025-01-01 ENCOUNTER — APPOINTMENT (OUTPATIENT)
Dept: GENERAL RADIOLOGY | Facility: HOSPITAL | Age: OVER 89
DRG: 329 | End: 2025-01-01
Attending: NURSE PRACTITIONER
Payer: MEDICARE

## 2025-01-01 ENCOUNTER — APPOINTMENT (OUTPATIENT)
Dept: GENERAL RADIOLOGY | Facility: HOSPITAL | Age: OVER 89
End: 2025-01-01
Attending: EMERGENCY MEDICINE
Payer: MEDICARE

## 2025-01-01 ENCOUNTER — APPOINTMENT (OUTPATIENT)
Dept: GENERAL RADIOLOGY | Facility: HOSPITAL | Age: OVER 89
DRG: 329 | End: 2025-01-01
Attending: EMERGENCY MEDICINE
Payer: MEDICARE

## 2025-01-01 ENCOUNTER — APPOINTMENT (OUTPATIENT)
Dept: GENERAL RADIOLOGY | Facility: HOSPITAL | Age: OVER 89
End: 2025-01-01
Attending: ANESTHESIOLOGY
Payer: MEDICARE

## 2025-01-01 ENCOUNTER — ANESTHESIA EVENT (OUTPATIENT)
Dept: MEDSURG UNIT | Facility: HOSPITAL | Age: OVER 89
End: 2025-01-01
Payer: MEDICARE

## 2025-01-01 ENCOUNTER — ANESTHESIA (OUTPATIENT)
Dept: MEDSURG UNIT | Facility: HOSPITAL | Age: OVER 89
End: 2025-01-01
Payer: MEDICARE

## 2025-01-01 ENCOUNTER — APPOINTMENT (OUTPATIENT)
Dept: CT IMAGING | Facility: HOSPITAL | Age: OVER 89
DRG: 329 | End: 2025-01-01
Attending: EMERGENCY MEDICINE
Payer: MEDICARE

## 2025-01-01 VITALS
TEMPERATURE: 94 F | BODY MASS INDEX: 27.14 KG/M2 | OXYGEN SATURATION: 71 % | HEIGHT: 60 IN | SYSTOLIC BLOOD PRESSURE: 111 MMHG | WEIGHT: 138.25 LBS | DIASTOLIC BLOOD PRESSURE: 54 MMHG

## 2025-01-01 DIAGNOSIS — K56.2 VOLVULUS (HCC): ICD-10-CM

## 2025-01-01 DIAGNOSIS — Q43.3: Primary | ICD-10-CM

## 2025-01-01 LAB
ALBUMIN SERPL-MCNC: 2.8 G/DL (ref 3.2–4.8)
ALBUMIN SERPL-MCNC: 2.9 G/DL (ref 3.2–4.8)
ALBUMIN SERPL-MCNC: 2.9 G/DL (ref 3.2–4.8)
ALBUMIN SERPL-MCNC: 3 G/DL (ref 3.2–4.8)
ALBUMIN SERPL-MCNC: 4 G/DL (ref 3.2–4.8)
ALBUMIN/GLOB SERPL: 1.3 (ref 1–2)
ALBUMIN/GLOB SERPL: 1.4 (ref 1–2)
ALBUMIN/GLOB SERPL: 1.5 (ref 1–2)
ALP LIVER SERPL-CCNC: 131 U/L (ref 45–117)
ALP LIVER SERPL-CCNC: 57 U/L (ref 45–117)
ALP LIVER SERPL-CCNC: 58 U/L (ref 45–117)
ALP LIVER SERPL-CCNC: 59 U/L (ref 45–117)
ALP LIVER SERPL-CCNC: 69 U/L (ref 45–117)
ALP LIVER SERPL-CCNC: 77 U/L (ref 45–117)
ALP LIVER SERPL-CCNC: 94 U/L (ref 45–117)
ALT SERPL-CCNC: 14 U/L (ref 10–49)
ALT SERPL-CCNC: 38 U/L (ref 10–49)
ALT SERPL-CCNC: 9 U/L (ref 10–49)
ALT SERPL-CCNC: <7 U/L (ref 10–49)
ANION GAP SERPL CALC-SCNC: 10 MMOL/L (ref 0–18)
ANION GAP SERPL CALC-SCNC: 4 MMOL/L (ref 0–18)
ANION GAP SERPL CALC-SCNC: 5 MMOL/L (ref 0–18)
ANION GAP SERPL CALC-SCNC: 5 MMOL/L (ref 0–18)
ANION GAP SERPL CALC-SCNC: 7 MMOL/L (ref 0–18)
ANION GAP SERPL CALC-SCNC: 8 MMOL/L (ref 0–18)
ANION GAP SERPL CALC-SCNC: 9 MMOL/L (ref 0–18)
ANION GAP SERPL CALC-SCNC: 9 MMOL/L (ref 0–18)
ANTIBODY SCREEN: NEGATIVE
AST SERPL-CCNC: 17 U/L (ref ?–34)
AST SERPL-CCNC: 17 U/L (ref ?–34)
AST SERPL-CCNC: 19 U/L (ref ?–34)
AST SERPL-CCNC: 21 U/L (ref ?–34)
AST SERPL-CCNC: 23 U/L (ref ?–34)
AST SERPL-CCNC: 27 U/L (ref ?–34)
AST SERPL-CCNC: 52 U/L (ref ?–34)
ATRIAL RATE: 101 BPM
ATRIAL RATE: 92 BPM
BASE EXCESS BLD CALC-SCNC: -5 MMOL/L
BASE EXCESS BLDA CALC-SCNC: -2.5 MMOL/L (ref ?–2)
BASE EXCESS BLDA CALC-SCNC: -4.3 MMOL/L (ref ?–2)
BASE EXCESS BLDA CALC-SCNC: -9.6 MMOL/L (ref ?–2)
BASE EXCESS BLDV CALC-SCNC: -9.6
BASOPHILS # BLD AUTO: 0 X10(3) UL (ref 0–0.2)
BASOPHILS # BLD AUTO: 0.01 X10(3) UL (ref 0–0.2)
BASOPHILS # BLD AUTO: 0.02 X10(3) UL (ref 0–0.2)
BASOPHILS NFR BLD AUTO: 0 %
BASOPHILS NFR BLD AUTO: 0.1 %
BASOPHILS NFR BLD AUTO: 0.2 %
BASOPHILS NFR BLD AUTO: 0.2 %
BILIRUB SERPL-MCNC: 0.4 MG/DL (ref 0.2–0.9)
BILIRUB SERPL-MCNC: 0.5 MG/DL (ref 0.2–0.9)
BILIRUB SERPL-MCNC: 0.5 MG/DL (ref 0.2–0.9)
BILIRUB SERPL-MCNC: 0.6 MG/DL (ref 0.2–0.9)
BODY TEMPERATURE: 98.6 F
BUN BLD-MCNC: 16 MG/DL (ref 9–23)
BUN BLD-MCNC: 18 MG/DL (ref 9–23)
BUN BLD-MCNC: 19 MG/DL (ref 9–23)
BUN BLD-MCNC: 21 MG/DL (ref 9–23)
BUN BLD-MCNC: 27 MG/DL (ref 9–23)
BUN BLD-MCNC: 31 MG/DL (ref 9–23)
BUN BLD-MCNC: 32 MG/DL (ref 9–23)
BUN BLD-MCNC: 32 MG/DL (ref 9–23)
BUN BLD-MCNC: 33 MG/DL (ref 9–23)
BUN BLD-MCNC: 38 MG/DL (ref 9–23)
BUN BLD-MCNC: 42 MG/DL (ref 9–23)
BUN BLD-MCNC: 49 MG/DL (ref 9–23)
BUN BLD-MCNC: 49 MG/DL (ref 9–23)
CA-I BLD-SCNC: 1.1 MMOL/L (ref 0.95–1.32)
CA-I BLD-SCNC: 1.13 MMOL/L (ref 0.95–1.32)
CA-I BLD-SCNC: 1.16 MMOL/L (ref 1.12–1.32)
CA-I BLD-SCNC: 1.19 MMOL/L (ref 0.95–1.32)
CALCIUM BLD-MCNC: 7.3 MG/DL (ref 8.7–10.6)
CALCIUM BLD-MCNC: 7.5 MG/DL (ref 8.7–10.6)
CALCIUM BLD-MCNC: 7.5 MG/DL (ref 8.7–10.6)
CALCIUM BLD-MCNC: 7.6 MG/DL (ref 8.7–10.6)
CALCIUM BLD-MCNC: 7.6 MG/DL (ref 8.7–10.6)
CALCIUM BLD-MCNC: 7.7 MG/DL (ref 8.7–10.6)
CALCIUM BLD-MCNC: 7.8 MG/DL (ref 8.7–10.6)
CALCIUM BLD-MCNC: 8 MG/DL (ref 8.7–10.6)
CALCIUM BLD-MCNC: 8.1 MG/DL (ref 8.7–10.6)
CALCIUM BLD-MCNC: 8.2 MG/DL (ref 8.7–10.6)
CALCIUM BLD-MCNC: 8.2 MG/DL (ref 8.7–10.6)
CALCIUM BLD-MCNC: 8.4 MG/DL (ref 8.7–10.6)
CALCIUM BLD-MCNC: 9.4 MG/DL (ref 8.7–10.6)
CHLORIDE SERPL-SCNC: 101 MMOL/L (ref 98–112)
CHLORIDE SERPL-SCNC: 104 MMOL/L (ref 98–112)
CHLORIDE SERPL-SCNC: 106 MMOL/L (ref 98–112)
CHLORIDE SERPL-SCNC: 109 MMOL/L (ref 98–112)
CHLORIDE SERPL-SCNC: 109 MMOL/L (ref 98–112)
CHLORIDE SERPL-SCNC: 113 MMOL/L (ref 98–112)
CHLORIDE SERPL-SCNC: 114 MMOL/L (ref 98–112)
CHLORIDE SERPL-SCNC: 114 MMOL/L (ref 98–112)
CHLORIDE SERPL-SCNC: 116 MMOL/L (ref 98–112)
CHLORIDE SERPL-SCNC: 116 MMOL/L (ref 98–112)
CO2 BLD-SCNC: 22 MMOL/L (ref 22–32)
CO2 SERPL-SCNC: 23 MMOL/L (ref 21–32)
CO2 SERPL-SCNC: 23 MMOL/L (ref 21–32)
CO2 SERPL-SCNC: 24 MMOL/L (ref 21–32)
CO2 SERPL-SCNC: 25 MMOL/L (ref 21–32)
CO2 SERPL-SCNC: 27 MMOL/L (ref 21–32)
CO2 SERPL-SCNC: 28 MMOL/L (ref 21–32)
COHGB MFR BLD: 0.6 % SAT (ref 0–3)
COHGB MFR BLD: 0.7 % SAT (ref 0–3)
COHGB MFR BLD: 1.3 % SAT (ref 0–3)
CREAT BLD-MCNC: 1.27 MG/DL (ref 0.7–1.3)
CREAT BLD-MCNC: 1.43 MG/DL (ref 0.7–1.3)
CREAT BLD-MCNC: 1.49 MG/DL (ref 0.7–1.3)
CREAT BLD-MCNC: 1.52 MG/DL (ref 0.7–1.3)
CREAT BLD-MCNC: 1.75 MG/DL (ref 0.7–1.3)
CREAT BLD-MCNC: 1.89 MG/DL (ref 0.7–1.3)
CREAT BLD-MCNC: 1.94 MG/DL (ref 0.7–1.3)
CREAT BLD-MCNC: 2.01 MG/DL (ref 0.7–1.3)
CREAT BLD-MCNC: 2.06 MG/DL (ref 0.7–1.3)
CREAT BLD-MCNC: 2.06 MG/DL (ref 0.7–1.3)
CREAT BLD-MCNC: 2.13 MG/DL (ref 0.7–1.3)
CREAT BLD-MCNC: 2.16 MG/DL (ref 0.7–1.3)
CREAT BLD-MCNC: 2.47 MG/DL (ref 0.7–1.3)
EGFRCR SERPLBLD CKD-EPI 2021: 24 ML/MIN/1.73M2 (ref 60–?)
EGFRCR SERPLBLD CKD-EPI 2021: 28 ML/MIN/1.73M2 (ref 60–?)
EGFRCR SERPLBLD CKD-EPI 2021: 29 ML/MIN/1.73M2 (ref 60–?)
EGFRCR SERPLBLD CKD-EPI 2021: 30 ML/MIN/1.73M2 (ref 60–?)
EGFRCR SERPLBLD CKD-EPI 2021: 30 ML/MIN/1.73M2 (ref 60–?)
EGFRCR SERPLBLD CKD-EPI 2021: 31 ML/MIN/1.73M2 (ref 60–?)
EGFRCR SERPLBLD CKD-EPI 2021: 32 ML/MIN/1.73M2 (ref 60–?)
EGFRCR SERPLBLD CKD-EPI 2021: 33 ML/MIN/1.73M2 (ref 60–?)
EGFRCR SERPLBLD CKD-EPI 2021: 36 ML/MIN/1.73M2 (ref 60–?)
EGFRCR SERPLBLD CKD-EPI 2021: 43 ML/MIN/1.73M2 (ref 60–?)
EGFRCR SERPLBLD CKD-EPI 2021: 44 ML/MIN/1.73M2 (ref 60–?)
EGFRCR SERPLBLD CKD-EPI 2021: 46 ML/MIN/1.73M2 (ref 60–?)
EGFRCR SERPLBLD CKD-EPI 2021: 53 ML/MIN/1.73M2 (ref 60–?)
EOSINOPHIL # BLD AUTO: 0 X10(3) UL (ref 0–0.7)
EOSINOPHIL # BLD AUTO: 0.01 X10(3) UL (ref 0–0.7)
EOSINOPHIL # BLD AUTO: 0.02 X10(3) UL (ref 0–0.7)
EOSINOPHIL # BLD AUTO: 0.03 X10(3) UL (ref 0–0.7)
EOSINOPHIL # BLD AUTO: 0.05 X10(3) UL (ref 0–0.7)
EOSINOPHIL # BLD AUTO: 0.2 X10(3) UL (ref 0–0.7)
EOSINOPHIL # BLD AUTO: 0.24 X10(3) UL (ref 0–0.7)
EOSINOPHIL NFR BLD AUTO: 0 %
EOSINOPHIL NFR BLD AUTO: 0.1 %
EOSINOPHIL NFR BLD AUTO: 0.2 %
EOSINOPHIL NFR BLD AUTO: 0.4 %
EOSINOPHIL NFR BLD AUTO: 0.8 %
EOSINOPHIL NFR BLD AUTO: 3.6 %
EOSINOPHIL NFR BLD AUTO: 3.7 %
ERYTHROCYTE [DISTWIDTH] IN BLOOD BY AUTOMATED COUNT: 13.2 %
ERYTHROCYTE [DISTWIDTH] IN BLOOD BY AUTOMATED COUNT: 13.2 %
ERYTHROCYTE [DISTWIDTH] IN BLOOD BY AUTOMATED COUNT: 13.4 %
ERYTHROCYTE [DISTWIDTH] IN BLOOD BY AUTOMATED COUNT: 13.5 %
ERYTHROCYTE [DISTWIDTH] IN BLOOD BY AUTOMATED COUNT: 13.5 %
ERYTHROCYTE [DISTWIDTH] IN BLOOD BY AUTOMATED COUNT: 13.6 %
ERYTHROCYTE [DISTWIDTH] IN BLOOD BY AUTOMATED COUNT: 13.9 %
ERYTHROCYTE [DISTWIDTH] IN BLOOD BY AUTOMATED COUNT: 13.9 %
ERYTHROCYTE [DISTWIDTH] IN BLOOD BY AUTOMATED COUNT: 14.1 %
ERYTHROCYTE [DISTWIDTH] IN BLOOD BY AUTOMATED COUNT: 14.3 %
ERYTHROCYTE [DISTWIDTH] IN BLOOD BY AUTOMATED COUNT: 14.3 %
FIO2: 40 %
FIO2: 50 %
FIO2: 80 %
FIO2: 80 %
GLOBULIN PLAS-MCNC: 1.9 G/DL (ref 2–3.5)
GLOBULIN PLAS-MCNC: 2 G/DL (ref 2–3.5)
GLOBULIN PLAS-MCNC: 2.1 G/DL (ref 2–3.5)
GLOBULIN PLAS-MCNC: 2.2 G/DL (ref 2–3.5)
GLOBULIN PLAS-MCNC: 2.8 G/DL (ref 2–3.5)
GLUCOSE BLD-MCNC: 100 MG/DL (ref 70–99)
GLUCOSE BLD-MCNC: 100 MG/DL (ref 70–99)
GLUCOSE BLD-MCNC: 101 MG/DL (ref 70–99)
GLUCOSE BLD-MCNC: 101 MG/DL (ref 70–99)
GLUCOSE BLD-MCNC: 105 MG/DL (ref 70–99)
GLUCOSE BLD-MCNC: 110 MG/DL (ref 70–99)
GLUCOSE BLD-MCNC: 110 MG/DL (ref 70–99)
GLUCOSE BLD-MCNC: 111 MG/DL (ref 70–99)
GLUCOSE BLD-MCNC: 117 MG/DL (ref 70–99)
GLUCOSE BLD-MCNC: 119 MG/DL (ref 70–99)
GLUCOSE BLD-MCNC: 119 MG/DL (ref 70–99)
GLUCOSE BLD-MCNC: 124 MG/DL (ref 70–99)
GLUCOSE BLD-MCNC: 124 MG/DL (ref 70–99)
GLUCOSE BLD-MCNC: 126 MG/DL (ref 70–99)
GLUCOSE BLD-MCNC: 127 MG/DL (ref 70–99)
GLUCOSE BLD-MCNC: 127 MG/DL (ref 70–99)
GLUCOSE BLD-MCNC: 129 MG/DL (ref 70–99)
GLUCOSE BLD-MCNC: 129 MG/DL (ref 70–99)
GLUCOSE BLD-MCNC: 130 MG/DL (ref 70–99)
GLUCOSE BLD-MCNC: 130 MG/DL (ref 70–99)
GLUCOSE BLD-MCNC: 132 MG/DL (ref 70–99)
GLUCOSE BLD-MCNC: 133 MG/DL (ref 70–99)
GLUCOSE BLD-MCNC: 134 MG/DL (ref 70–99)
GLUCOSE BLD-MCNC: 141 MG/DL (ref 70–99)
GLUCOSE BLD-MCNC: 144 MG/DL (ref 70–99)
GLUCOSE BLD-MCNC: 144 MG/DL (ref 70–99)
GLUCOSE BLD-MCNC: 145 MG/DL (ref 70–99)
GLUCOSE BLD-MCNC: 146 MG/DL (ref 70–99)
GLUCOSE BLD-MCNC: 149 MG/DL (ref 70–99)
GLUCOSE BLD-MCNC: 155 MG/DL (ref 70–99)
GLUCOSE BLD-MCNC: 155 MG/DL (ref 70–99)
GLUCOSE BLD-MCNC: 157 MG/DL (ref 70–99)
GLUCOSE BLD-MCNC: 160 MG/DL (ref 70–99)
GLUCOSE BLD-MCNC: 161 MG/DL (ref 70–99)
GLUCOSE BLD-MCNC: 161 MG/DL (ref 70–99)
GLUCOSE BLD-MCNC: 167 MG/DL (ref 70–99)
GLUCOSE BLD-MCNC: 168 MG/DL (ref 70–99)
GLUCOSE BLD-MCNC: 170 MG/DL (ref 70–99)
GLUCOSE BLD-MCNC: 171 MG/DL (ref 70–99)
GLUCOSE BLD-MCNC: 171 MG/DL (ref 70–99)
GLUCOSE BLD-MCNC: 173 MG/DL (ref 70–99)
GLUCOSE BLD-MCNC: 174 MG/DL (ref 70–99)
GLUCOSE BLD-MCNC: 177 MG/DL (ref 70–99)
GLUCOSE BLD-MCNC: 178 MG/DL (ref 70–99)
GLUCOSE BLD-MCNC: 192 MG/DL (ref 70–99)
GLUCOSE BLD-MCNC: 193 MG/DL (ref 70–99)
GLUCOSE BLD-MCNC: 195 MG/DL (ref 70–99)
GLUCOSE BLD-MCNC: 202 MG/DL (ref 70–99)
GLUCOSE BLD-MCNC: 214 MG/DL (ref 70–99)
GLUCOSE BLD-MCNC: 243 MG/DL (ref 70–99)
GLUCOSE BLD-MCNC: 243 MG/DL (ref 70–99)
GLUCOSE BLD-MCNC: 80 MG/DL (ref 70–99)
GLUCOSE BLD-MCNC: 81 MG/DL (ref 70–99)
HCO3 BLD-SCNC: 20.5 MEQ/L
HCO3 BLDA-SCNC: 17.5 MEQ/L (ref 21–27)
HCO3 BLDA-SCNC: 21.6 MEQ/L (ref 21–27)
HCO3 BLDA-SCNC: 23 MEQ/L (ref 21–27)
HCO3 BLDV-SCNC: 17.3 MEQ/L (ref 22–26)
HCT VFR BLD AUTO: 23.8 % (ref 39–53)
HCT VFR BLD AUTO: 25.1 % (ref 39–53)
HCT VFR BLD AUTO: 25.8 % (ref 39–53)
HCT VFR BLD AUTO: 25.9 % (ref 39–53)
HCT VFR BLD AUTO: 26.5 % (ref 39–53)
HCT VFR BLD AUTO: 27.3 % (ref 39–53)
HCT VFR BLD AUTO: 27.7 % (ref 39–53)
HCT VFR BLD AUTO: 28 % (ref 39–53)
HCT VFR BLD AUTO: 28.3 % (ref 39–53)
HCT VFR BLD AUTO: 28.8 % (ref 39–53)
HCT VFR BLD AUTO: 35.8 % (ref 39–53)
HCT VFR BLD CALC: 26 % (ref 37–53)
HGB BLD-MCNC: 10 G/DL (ref 13–17.5)
HGB BLD-MCNC: 10 G/DL (ref 13–17.5)
HGB BLD-MCNC: 12.3 G/DL (ref 13–17.5)
HGB BLD-MCNC: 8.2 G/DL (ref 13–17.5)
HGB BLD-MCNC: 8.4 G/DL (ref 13–17.5)
HGB BLD-MCNC: 8.7 G/DL (ref 13–17.5)
HGB BLD-MCNC: 8.8 G/DL (ref 13–17.5)
HGB BLD-MCNC: 9.1 G/DL (ref 13–17.5)
HGB BLD-MCNC: 9.3 G/DL (ref 13–17.5)
HGB BLD-MCNC: 9.4 G/DL (ref 13–17.5)
HGB BLD-MCNC: 9.5 G/DL (ref 13–17.5)
HGB BLD-MCNC: 9.5 G/DL (ref 13–17.5)
HGB BLD-MCNC: 9.6 G/DL (ref 13–17.5)
HGB BLD-MCNC: 9.9 G/DL (ref 13–17.5)
IMM GRANULOCYTES # BLD AUTO: 0.01 X10(3) UL (ref 0–1)
IMM GRANULOCYTES # BLD AUTO: 0.02 X10(3) UL (ref 0–1)
IMM GRANULOCYTES # BLD AUTO: 0.02 X10(3) UL (ref 0–1)
IMM GRANULOCYTES # BLD AUTO: 0.03 X10(3) UL (ref 0–1)
IMM GRANULOCYTES # BLD AUTO: 0.05 X10(3) UL (ref 0–1)
IMM GRANULOCYTES # BLD AUTO: 0.05 X10(3) UL (ref 0–1)
IMM GRANULOCYTES # BLD AUTO: 0.06 X10(3) UL (ref 0–1)
IMM GRANULOCYTES # BLD AUTO: 0.09 X10(3) UL (ref 0–1)
IMM GRANULOCYTES # BLD AUTO: 0.18 X10(3) UL (ref 0–1)
IMM GRANULOCYTES NFR BLD: 0.2 %
IMM GRANULOCYTES NFR BLD: 0.2 %
IMM GRANULOCYTES NFR BLD: 0.3 %
IMM GRANULOCYTES NFR BLD: 0.3 %
IMM GRANULOCYTES NFR BLD: 0.4 %
IMM GRANULOCYTES NFR BLD: 0.4 %
IMM GRANULOCYTES NFR BLD: 0.5 %
IMM GRANULOCYTES NFR BLD: 0.8 %
IMM GRANULOCYTES NFR BLD: 1.4 %
INR BLD: 1.29 (ref 0.8–1.2)
IRON SATN MFR SERPL: 50 % (ref 20–50)
IRON SERPL-MCNC: 82 UG/DL (ref 65–175)
LACTATE BLD-SCNC: 0.9 MMOL/L (ref 0.5–2)
LACTATE BLD-SCNC: 2 MMOL/L (ref 0.5–2)
LACTATE SERPL-SCNC: 1.3 MMOL/L (ref 0.5–2)
LYMPHOCYTES # BLD AUTO: 0.41 X10(3) UL (ref 1–4)
LYMPHOCYTES # BLD AUTO: 0.52 X10(3) UL (ref 1–4)
LYMPHOCYTES # BLD AUTO: 0.6 X10(3) UL (ref 1–4)
LYMPHOCYTES # BLD AUTO: 0.67 X10(3) UL (ref 1–4)
LYMPHOCYTES # BLD AUTO: 0.86 X10(3) UL (ref 1–4)
LYMPHOCYTES # BLD AUTO: 0.91 X10(3) UL (ref 1–4)
LYMPHOCYTES # BLD AUTO: 0.93 X10(3) UL (ref 1–4)
LYMPHOCYTES # BLD AUTO: 1.02 X10(3) UL (ref 1–4)
LYMPHOCYTES # BLD AUTO: 1.17 X10(3) UL (ref 1–4)
LYMPHOCYTES # BLD AUTO: 1.2 X10(3) UL (ref 1–4)
LYMPHOCYTES # BLD AUTO: 1.4 X10(3) UL (ref 1–4)
LYMPHOCYTES NFR BLD AUTO: 10.1 %
LYMPHOCYTES NFR BLD AUTO: 11.3 %
LYMPHOCYTES NFR BLD AUTO: 11.5 %
LYMPHOCYTES NFR BLD AUTO: 12.9 %
LYMPHOCYTES NFR BLD AUTO: 14.4 %
LYMPHOCYTES NFR BLD AUTO: 16.7 %
LYMPHOCYTES NFR BLD AUTO: 18.7 %
LYMPHOCYTES NFR BLD AUTO: 3.1 %
LYMPHOCYTES NFR BLD AUTO: 4.6 %
LYMPHOCYTES NFR BLD AUTO: 5 %
LYMPHOCYTES NFR BLD AUTO: 5.7 %
MAGNESIUM SERPL-MCNC: 1.6 MG/DL (ref 1.6–2.6)
MAGNESIUM SERPL-MCNC: 2 MG/DL (ref 1.6–2.6)
MAGNESIUM SERPL-MCNC: 2.2 MG/DL (ref 1.6–2.6)
MAGNESIUM SERPL-MCNC: 2.3 MG/DL (ref 1.6–2.6)
MAGNESIUM SERPL-MCNC: 2.3 MG/DL (ref 1.6–2.6)
MAGNESIUM SERPL-MCNC: 2.4 MG/DL (ref 1.6–2.6)
MAGNESIUM SERPL-MCNC: 2.4 MG/DL (ref 1.6–2.6)
MCH RBC QN AUTO: 30.6 PG (ref 26–34)
MCH RBC QN AUTO: 30.9 PG (ref 26–34)
MCH RBC QN AUTO: 31 PG (ref 26–34)
MCH RBC QN AUTO: 31.2 PG (ref 26–34)
MCH RBC QN AUTO: 31.2 PG (ref 26–34)
MCH RBC QN AUTO: 31.3 PG (ref 26–34)
MCH RBC QN AUTO: 31.4 PG (ref 26–34)
MCH RBC QN AUTO: 31.4 PG (ref 26–34)
MCH RBC QN AUTO: 31.5 PG (ref 26–34)
MCHC RBC AUTO-ENTMCNC: 33.5 G/DL (ref 31–37)
MCHC RBC AUTO-ENTMCNC: 33.6 G/DL (ref 31–37)
MCHC RBC AUTO-ENTMCNC: 34.1 G/DL (ref 31–37)
MCHC RBC AUTO-ENTMCNC: 34.3 G/DL (ref 31–37)
MCHC RBC AUTO-ENTMCNC: 34.4 G/DL (ref 31–37)
MCHC RBC AUTO-ENTMCNC: 34.4 G/DL (ref 31–37)
MCHC RBC AUTO-ENTMCNC: 34.5 G/DL (ref 31–37)
MCHC RBC AUTO-ENTMCNC: 34.7 G/DL (ref 31–37)
MCHC RBC AUTO-ENTMCNC: 34.8 G/DL (ref 31–37)
MCV RBC AUTO: 89.9 FL (ref 80–100)
MCV RBC AUTO: 90.1 FL (ref 80–100)
MCV RBC AUTO: 90.8 FL (ref 80–100)
MCV RBC AUTO: 90.8 FL (ref 80–100)
MCV RBC AUTO: 91.1 FL (ref 80–100)
MCV RBC AUTO: 91.3 FL (ref 80–100)
MCV RBC AUTO: 91.7 FL (ref 80–100)
MCV RBC AUTO: 92.1 FL (ref 80–100)
MCV RBC AUTO: 92.1 FL (ref 80–100)
MCV RBC AUTO: 92.6 FL (ref 80–100)
MCV RBC AUTO: 93.2 FL (ref 80–100)
METHGB MFR BLD: 0 % SAT (ref 0.4–1.5)
METHGB MFR BLD: 0 % SAT (ref 0.4–1.5)
METHGB MFR BLD: 0.4 % SAT (ref 0.4–1.5)
MONOCYTES # BLD AUTO: 0.36 X10(3) UL (ref 0.1–1)
MONOCYTES # BLD AUTO: 0.4 X10(3) UL (ref 0.1–1)
MONOCYTES # BLD AUTO: 0.5 X10(3) UL (ref 0.1–1)
MONOCYTES # BLD AUTO: 0.5 X10(3) UL (ref 0.1–1)
MONOCYTES # BLD AUTO: 0.6 X10(3) UL (ref 0.1–1)
MONOCYTES # BLD AUTO: 0.6 X10(3) UL (ref 0.1–1)
MONOCYTES # BLD AUTO: 0.61 X10(3) UL (ref 0.1–1)
MONOCYTES # BLD AUTO: 0.63 X10(3) UL (ref 0.1–1)
MONOCYTES # BLD AUTO: 0.66 X10(3) UL (ref 0.1–1)
MONOCYTES # BLD AUTO: 0.81 X10(3) UL (ref 0.1–1)
MONOCYTES # BLD AUTO: 0.99 X10(3) UL (ref 0.1–1)
MONOCYTES NFR BLD AUTO: 11 %
MONOCYTES NFR BLD AUTO: 2.7 %
MONOCYTES NFR BLD AUTO: 3.4 %
MONOCYTES NFR BLD AUTO: 4.1 %
MONOCYTES NFR BLD AUTO: 5.2 %
MONOCYTES NFR BLD AUTO: 5.9 %
MONOCYTES NFR BLD AUTO: 7.9 %
MONOCYTES NFR BLD AUTO: 8.5 %
MONOCYTES NFR BLD AUTO: 9 %
MONOCYTES NFR BLD AUTO: 9 %
MONOCYTES NFR BLD AUTO: 9.4 %
MRSA DNA SPEC QL NAA+PROBE: NEGATIVE
NEUTROPHILS # BLD AUTO: 10.18 X10 (3) UL (ref 1.5–7.7)
NEUTROPHILS # BLD AUTO: 10.18 X10(3) UL (ref 1.5–7.7)
NEUTROPHILS # BLD AUTO: 10.65 X10 (3) UL (ref 1.5–7.7)
NEUTROPHILS # BLD AUTO: 10.65 X10(3) UL (ref 1.5–7.7)
NEUTROPHILS # BLD AUTO: 10.83 X10 (3) UL (ref 1.5–7.7)
NEUTROPHILS # BLD AUTO: 10.83 X10(3) UL (ref 1.5–7.7)
NEUTROPHILS # BLD AUTO: 12.17 X10 (3) UL (ref 1.5–7.7)
NEUTROPHILS # BLD AUTO: 12.17 X10(3) UL (ref 1.5–7.7)
NEUTROPHILS # BLD AUTO: 3.8 X10 (3) UL (ref 1.5–7.7)
NEUTROPHILS # BLD AUTO: 3.8 X10(3) UL (ref 1.5–7.7)
NEUTROPHILS # BLD AUTO: 4.34 X10 (3) UL (ref 1.5–7.7)
NEUTROPHILS # BLD AUTO: 4.34 X10(3) UL (ref 1.5–7.7)
NEUTROPHILS # BLD AUTO: 4.85 X10 (3) UL (ref 1.5–7.7)
NEUTROPHILS # BLD AUTO: 4.85 X10(3) UL (ref 1.5–7.7)
NEUTROPHILS # BLD AUTO: 5.18 X10 (3) UL (ref 1.5–7.7)
NEUTROPHILS # BLD AUTO: 5.18 X10(3) UL (ref 1.5–7.7)
NEUTROPHILS # BLD AUTO: 7.17 X10 (3) UL (ref 1.5–7.7)
NEUTROPHILS # BLD AUTO: 7.17 X10(3) UL (ref 1.5–7.7)
NEUTROPHILS # BLD AUTO: 9.4 X10 (3) UL (ref 1.5–7.7)
NEUTROPHILS # BLD AUTO: 9.4 X10(3) UL (ref 1.5–7.7)
NEUTROPHILS # BLD AUTO: 9.94 X10 (3) UL (ref 1.5–7.7)
NEUTROPHILS # BLD AUTO: 9.94 X10(3) UL (ref 1.5–7.7)
NEUTROPHILS NFR BLD AUTO: 67.7 %
NEUTROPHILS NFR BLD AUTO: 68.3 %
NEUTROPHILS NFR BLD AUTO: 76.4 %
NEUTROPHILS NFR BLD AUTO: 77.4 %
NEUTROPHILS NFR BLD AUTO: 79.3 %
NEUTROPHILS NFR BLD AUTO: 81 %
NEUTROPHILS NFR BLD AUTO: 83.5 %
NEUTROPHILS NFR BLD AUTO: 88.6 %
NEUTROPHILS NFR BLD AUTO: 89.3 %
NEUTROPHILS NFR BLD AUTO: 90.4 %
NEUTROPHILS NFR BLD AUTO: 92.7 %
OSMOLALITY SERPL CALC.SUM OF ELEC: 289 MOSM/KG (ref 275–295)
OSMOLALITY SERPL CALC.SUM OF ELEC: 293 MOSM/KG (ref 275–295)
OSMOLALITY SERPL CALC.SUM OF ELEC: 296 MOSM/KG (ref 275–295)
OSMOLALITY SERPL CALC.SUM OF ELEC: 304 MOSM/KG (ref 275–295)
OSMOLALITY SERPL CALC.SUM OF ELEC: 305 MOSM/KG (ref 275–295)
OSMOLALITY SERPL CALC.SUM OF ELEC: 307 MOSM/KG (ref 275–295)
OSMOLALITY SERPL CALC.SUM OF ELEC: 307 MOSM/KG (ref 275–295)
OSMOLALITY SERPL CALC.SUM OF ELEC: 309 MOSM/KG (ref 275–295)
OSMOLALITY SERPL CALC.SUM OF ELEC: 310 MOSM/KG (ref 275–295)
OSMOLALITY SERPL CALC.SUM OF ELEC: 314 MOSM/KG (ref 275–295)
OXYHGB MFR BLDA: 97.5 % (ref 92–100)
OXYHGB MFR BLDA: 98.2 % (ref 92–100)
OXYHGB MFR BLDA: 98.7 % (ref 92–100)
OXYHGB MFR BLDV: 92.2 % (ref 72–78)
P AXIS: 50 DEGREES
P AXIS: 68 DEGREES
P-R INTERVAL: 140 MS
P-R INTERVAL: 164 MS
P/F RATIO: 334 MMHG
P/F RATIO: 506 MMHG
PCO2 BLD: 36.6 MMHG
PCO2 BLDA: 31 MM HG (ref 35–45)
PCO2 BLDA: 33 MM HG (ref 35–45)
PCO2 BLDA: 51 MM HG (ref 35–45)
PCO2 BLDV: 49 MM HG (ref 38–50)
PEEP: 5 CM H2O
PH BLD: 7.36
PH BLDA: 7.17 (ref 7.35–7.45)
PH BLDA: 7.41 (ref 7.35–7.45)
PH BLDA: 7.42 (ref 7.35–7.45)
PH BLDV: 7.19 (ref 7.33–7.43)
PHOSPHATE SERPL-MCNC: 2.1 MG/DL (ref 2.4–5.1)
PHOSPHATE SERPL-MCNC: 2.2 MG/DL (ref 2.4–5.1)
PHOSPHATE SERPL-MCNC: 2.3 MG/DL (ref 2.4–5.1)
PHOSPHATE SERPL-MCNC: 2.4 MG/DL (ref 2.4–5.1)
PHOSPHATE SERPL-MCNC: 2.7 MG/DL (ref 2.4–5.1)
PHOSPHATE SERPL-MCNC: 3.1 MG/DL (ref 2.4–5.1)
PHOSPHATE SERPL-MCNC: 3.8 MG/DL (ref 2.4–5.1)
PHOSPHATE SERPL-MCNC: 4 MG/DL (ref 2.4–5.1)
PHOSPHATE SERPL-MCNC: 4.2 MG/DL (ref 2.4–5.1)
PLATELET # BLD AUTO: 177 10(3)UL (ref 150–450)
PLATELET # BLD AUTO: 179 10(3)UL (ref 150–450)
PLATELET # BLD AUTO: 186 10(3)UL (ref 150–450)
PLATELET # BLD AUTO: 186 10(3)UL (ref 150–450)
PLATELET # BLD AUTO: 188 10(3)UL (ref 150–450)
PLATELET # BLD AUTO: 196 10(3)UL (ref 150–450)
PLATELET # BLD AUTO: 202 10(3)UL (ref 150–450)
PLATELET # BLD AUTO: 230 10(3)UL (ref 150–450)
PLATELET # BLD AUTO: 250 10(3)UL (ref 150–450)
PLATELET # BLD AUTO: 250 10(3)UL (ref 150–450)
PLATELET # BLD AUTO: 259 10(3)UL (ref 150–450)
PO2 BLD: 222 MMHG
PO2 BLDA: 136 MM HG (ref 80–100)
PO2 BLDA: 167 MM HG (ref 80–100)
PO2 BLDA: 405 MM HG (ref 80–100)
PO2 BLDV: 68 MM HG (ref 30–50)
POTASSIUM BLD-SCNC: 4 MMOL/L (ref 3.6–5.1)
POTASSIUM BLD-SCNC: 4.3 MMOL/L (ref 3.6–5.1)
POTASSIUM BLD-SCNC: 5.1 MMOL/L (ref 3.6–5.1)
POTASSIUM BLD-SCNC: 5.2 MMOL/L (ref 3.6–5.1)
POTASSIUM SERPL-SCNC: 3.4 MMOL/L (ref 3.5–5.1)
POTASSIUM SERPL-SCNC: 3.4 MMOL/L (ref 3.5–5.1)
POTASSIUM SERPL-SCNC: 3.8 MMOL/L (ref 3.5–5.1)
POTASSIUM SERPL-SCNC: 3.9 MMOL/L (ref 3.5–5.1)
POTASSIUM SERPL-SCNC: 4.3 MMOL/L (ref 3.5–5.1)
POTASSIUM SERPL-SCNC: 4.6 MMOL/L (ref 3.5–5.1)
POTASSIUM SERPL-SCNC: 5.2 MMOL/L (ref 3.5–5.1)
PRESSURE SUPPORT: 5 CM H2O
PROT SERPL-MCNC: 4.7 G/DL (ref 5.7–8.2)
PROT SERPL-MCNC: 4.8 G/DL (ref 5.7–8.2)
PROT SERPL-MCNC: 4.9 G/DL (ref 5.7–8.2)
PROT SERPL-MCNC: 5 G/DL (ref 5.7–8.2)
PROT SERPL-MCNC: 5 G/DL (ref 5.7–8.2)
PROT SERPL-MCNC: 5.2 G/DL (ref 5.7–8.2)
PROT SERPL-MCNC: 6.8 G/DL (ref 5.7–8.2)
PROTHROMBIN TIME: 16.2 SECONDS (ref 11.6–14.8)
Q-T INTERVAL: 336 MS
Q-T INTERVAL: 342 MS
QRS DURATION: 72 MS
QRS DURATION: 82 MS
QTC CALCULATION (BEZET): 422 MS
QTC CALCULATION (BEZET): 435 MS
R AXIS: -23 DEGREES
R AXIS: 11 DEGREES
RBC # BLD AUTO: 2.62 X10(6)UL (ref 3.8–5.8)
RBC # BLD AUTO: 2.71 X10(6)UL (ref 3.8–5.8)
RBC # BLD AUTO: 2.78 X10(6)UL (ref 3.8–5.8)
RBC # BLD AUTO: 2.8 X10(6)UL (ref 3.8–5.8)
RBC # BLD AUTO: 2.92 X10(6)UL (ref 3.8–5.8)
RBC # BLD AUTO: 3.03 X10(6)UL (ref 3.8–5.8)
RBC # BLD AUTO: 3.04 X10(6)UL (ref 3.8–5.8)
RBC # BLD AUTO: 3.08 X10(6)UL (ref 3.8–5.8)
RBC # BLD AUTO: 3.1 X10(6)UL (ref 3.8–5.8)
RBC # BLD AUTO: 3.14 X10(6)UL (ref 3.8–5.8)
RBC # BLD AUTO: 3.93 X10(6)UL (ref 3.8–5.8)
RH BLOOD TYPE: POSITIVE
SAO2 % BLD: 100 %
SODIUM BLD-SCNC: 132 MMOL/L (ref 135–145)
SODIUM BLD-SCNC: 133 MMOL/L (ref 135–145)
SODIUM BLD-SCNC: 133 MMOL/L (ref 135–145)
SODIUM BLD-SCNC: 136 MMOL/L (ref 136–145)
SODIUM SERPL-SCNC: 135 MMOL/L (ref 136–145)
SODIUM SERPL-SCNC: 137 MMOL/L (ref 136–145)
SODIUM SERPL-SCNC: 138 MMOL/L (ref 136–145)
SODIUM SERPL-SCNC: 141 MMOL/L (ref 136–145)
SODIUM SERPL-SCNC: 142 MMOL/L (ref 136–145)
SODIUM SERPL-SCNC: 142 MMOL/L (ref 136–145)
SODIUM SERPL-SCNC: 146 MMOL/L (ref 136–145)
SODIUM SERPL-SCNC: 147 MMOL/L (ref 136–145)
SODIUM SERPL-SCNC: 147 MMOL/L (ref 136–145)
SODIUM SERPL-SCNC: 148 MMOL/L (ref 136–145)
SODIUM SERPL-SCNC: 149 MMOL/L (ref 136–145)
T AXIS: 49 DEGREES
T AXIS: 96 DEGREES
TIDAL VOLUME: 375 ML
TIDAL VOLUME: 375 ML
TIDAL VOLUME: 400 ML
TOTAL IRON BINDING CAPACITY: 165 UG/DL (ref 250–425)
TRANSFERRIN SERPL-MCNC: 107 MG/DL (ref 215–365)
TRIGL SERPL-MCNC: 170 MG/DL (ref 30–149)
TRIGL SERPL-MCNC: 194 MG/DL (ref 30–149)
VENT RATE: 15 /MIN
VENT RATE: 15 /MIN
VENT RATE: 16 /MIN
VENTRICULAR RATE: 101 BPM
VENTRICULAR RATE: 92 BPM
WBC # BLD AUTO: 11.2 X10(3) UL (ref 4–11)
WBC # BLD AUTO: 11.6 X10(3) UL (ref 4–11)
WBC # BLD AUTO: 11.8 X10(3) UL (ref 4–11)
WBC # BLD AUTO: 12.1 X10(3) UL (ref 4–11)
WBC # BLD AUTO: 12.2 X10(3) UL (ref 4–11)
WBC # BLD AUTO: 13.1 X10(3) UL (ref 4–11)
WBC # BLD AUTO: 5.6 X10(3) UL (ref 4–11)
WBC # BLD AUTO: 6.3 X10(3) UL (ref 4–11)
WBC # BLD AUTO: 6.4 X10(3) UL (ref 4–11)
WBC # BLD AUTO: 6.7 X10(3) UL (ref 4–11)
WBC # BLD AUTO: 9 X10(3) UL (ref 4–11)

## 2025-01-01 PROCEDURE — 99233 SBSQ HOSP IP/OBS HIGH 50: CPT | Performed by: INTERNAL MEDICINE

## 2025-01-01 PROCEDURE — 99232 SBSQ HOSP IP/OBS MODERATE 35: CPT | Performed by: INTERNAL MEDICINE

## 2025-01-01 PROCEDURE — 99291 CRITICAL CARE FIRST HOUR: CPT | Performed by: INTERNAL MEDICINE

## 2025-01-01 PROCEDURE — 71046 X-RAY EXAM CHEST 2 VIEWS: CPT | Performed by: STUDENT IN AN ORGANIZED HEALTH CARE EDUCATION/TRAINING PROGRAM

## 2025-01-01 PROCEDURE — 49623 RMVL NINFCT MESH HERNIA RPR: CPT | Performed by: SURGERY

## 2025-01-01 PROCEDURE — 99223 1ST HOSP IP/OBS HIGH 75: CPT | Performed by: INTERNAL MEDICINE

## 2025-01-01 PROCEDURE — 03HY32Z INSERTION OF MONITORING DEVICE INTO UPPER ARTERY, PERCUTANEOUS APPROACH: ICD-10-PCS | Performed by: INTERNAL MEDICINE

## 2025-01-01 PROCEDURE — 71045 X-RAY EXAM CHEST 1 VIEW: CPT | Performed by: NURSE PRACTITIONER

## 2025-01-01 PROCEDURE — 02HV33Z INSERTION OF INFUSION DEVICE INTO SUPERIOR VENA CAVA, PERCUTANEOUS APPROACH: ICD-10-PCS | Performed by: INTERNAL MEDICINE

## 2025-01-01 PROCEDURE — 36620 INSERTION CATHETER ARTERY: CPT

## 2025-01-01 PROCEDURE — 0WQF0ZZ REPAIR ABDOMINAL WALL, OPEN APPROACH: ICD-10-PCS | Performed by: SURGERY

## 2025-01-01 PROCEDURE — 99291 CRITICAL CARE FIRST HOUR: CPT

## 2025-01-01 PROCEDURE — 71045 X-RAY EXAM CHEST 1 VIEW: CPT | Performed by: INTERNAL MEDICINE

## 2025-01-01 PROCEDURE — 71045 X-RAY EXAM CHEST 1 VIEW: CPT | Performed by: EMERGENCY MEDICINE

## 2025-01-01 PROCEDURE — 99223 1ST HOSP IP/OBS HIGH 75: CPT | Performed by: SURGERY

## 2025-01-01 PROCEDURE — 74176 CT ABD & PELVIS W/O CONTRAST: CPT | Performed by: EMERGENCY MEDICINE

## 2025-01-01 PROCEDURE — 44055 CORRECT MALROTATION OF BOWEL: CPT | Performed by: SURGERY

## 2025-01-01 PROCEDURE — 36556 INSERT NON-TUNNEL CV CATH: CPT

## 2025-01-01 PROCEDURE — 49020 DRAINAGE ABDOM ABSCESS OPEN: CPT | Performed by: SURGERY

## 2025-01-01 PROCEDURE — 0CJS8ZZ INSPECTION OF LARYNX, VIA NATURAL OR ARTIFICIAL OPENING ENDOSCOPIC: ICD-10-PCS | Performed by: OTOLARYNGOLOGY

## 2025-01-01 PROCEDURE — 5A12012 PERFORMANCE OF CARDIAC OUTPUT, SINGLE, MANUAL: ICD-10-PCS | Performed by: INTERNAL MEDICINE

## 2025-01-01 PROCEDURE — 0DS80ZZ REPOSITION SMALL INTESTINE, OPEN APPROACH: ICD-10-PCS | Performed by: SURGERY

## 2025-01-01 PROCEDURE — 0WPF0JZ REMOVAL OF SYNTHETIC SUBSTITUTE FROM ABDOMINAL WALL, OPEN APPROACH: ICD-10-PCS | Performed by: SURGERY

## 2025-01-01 PROCEDURE — 74018 RADEX ABDOMEN 1 VIEW: CPT

## 2025-01-01 PROCEDURE — 5A1935Z RESPIRATORY VENTILATION, LESS THAN 24 CONSECUTIVE HOURS: ICD-10-PCS | Performed by: INTERNAL MEDICINE

## 2025-01-01 PROCEDURE — 49615 RPR AA HRN RCR 3-10 RDC: CPT | Performed by: SURGERY

## 2025-01-01 PROCEDURE — 71045 X-RAY EXAM CHEST 1 VIEW: CPT

## 2025-01-01 PROCEDURE — 71045 X-RAY EXAM CHEST 1 VIEW: CPT | Performed by: ANESTHESIOLOGY

## 2025-01-01 PROCEDURE — 3E033XZ INTRODUCTION OF VASOPRESSOR INTO PERIPHERAL VEIN, PERCUTANEOUS APPROACH: ICD-10-PCS | Performed by: INTERNAL MEDICINE

## 2025-01-01 PROCEDURE — 99231 SBSQ HOSP IP/OBS SF/LOW 25: CPT | Performed by: INTERNAL MEDICINE

## 2025-01-01 PROCEDURE — 0BH17EZ INSERTION OF ENDOTRACHEAL AIRWAY INTO TRACHEA, VIA NATURAL OR ARTIFICIAL OPENING: ICD-10-PCS | Performed by: ANESTHESIOLOGY

## 2025-01-01 RX ORDER — ONDANSETRON 2 MG/ML
4 INJECTION INTRAMUSCULAR; INTRAVENOUS ONCE AS NEEDED
Status: ACTIVE | OUTPATIENT
Start: 2025-01-01 | End: 2025-01-01

## 2025-01-01 RX ORDER — DOCUSATE SODIUM 50 MG/5ML
100 LIQUID ORAL 2 TIMES DAILY
Status: DISCONTINUED | OUTPATIENT
Start: 2025-01-01 | End: 2025-01-01 | Stop reason: ALTCHOICE

## 2025-01-01 RX ORDER — PANTOPRAZOLE SODIUM 40 MG/1
40 TABLET, DELAYED RELEASE ORAL
Status: DISCONTINUED | OUTPATIENT
Start: 2025-01-01 | End: 2025-01-01

## 2025-01-01 RX ORDER — ONDANSETRON 2 MG/ML
4 INJECTION INTRAMUSCULAR; INTRAVENOUS EVERY 4 HOURS PRN
Status: DISCONTINUED | OUTPATIENT
Start: 2025-01-01 | End: 2025-01-01

## 2025-01-01 RX ORDER — ALBUTEROL SULFATE 5 MG/ML
10 SOLUTION RESPIRATORY (INHALATION) CONTINUOUS
Status: DISCONTINUED | OUTPATIENT
Start: 2025-01-01 | End: 2025-01-01

## 2025-01-01 RX ORDER — MORPHINE SULFATE 2 MG/ML
1 INJECTION, SOLUTION INTRAMUSCULAR; INTRAVENOUS EVERY 2 HOUR PRN
Status: DISCONTINUED | OUTPATIENT
Start: 2025-01-01 | End: 2025-01-01 | Stop reason: ALTCHOICE

## 2025-01-01 RX ORDER — IPRATROPIUM BROMIDE AND ALBUTEROL SULFATE 2.5; .5 MG/3ML; MG/3ML
3 SOLUTION RESPIRATORY (INHALATION)
Status: DISCONTINUED | OUTPATIENT
Start: 2025-01-01 | End: 2025-01-01

## 2025-01-01 RX ORDER — IPRATROPIUM BROMIDE AND ALBUTEROL SULFATE 2.5; .5 MG/3ML; MG/3ML
SOLUTION RESPIRATORY (INHALATION)
Status: COMPLETED
Start: 2025-01-01 | End: 2025-01-01

## 2025-01-01 RX ORDER — ACETAMINOPHEN 10 MG/ML
1000 INJECTION, SOLUTION INTRAVENOUS EVERY 6 HOURS PRN
Status: DISCONTINUED | OUTPATIENT
Start: 2025-01-01 | End: 2025-01-01

## 2025-01-01 RX ORDER — BISACODYL 10 MG
10 SUPPOSITORY, RECTAL RECTAL ONCE
Status: COMPLETED | OUTPATIENT
Start: 2025-01-01 | End: 2025-01-01

## 2025-01-01 RX ORDER — FUROSEMIDE 10 MG/ML
20 INJECTION INTRAMUSCULAR; INTRAVENOUS ONCE
Status: COMPLETED | OUTPATIENT
Start: 2025-01-01 | End: 2025-01-01

## 2025-01-01 RX ORDER — LEVOTHYROXINE SODIUM 75 UG/1
75 TABLET ORAL
Status: DISCONTINUED | OUTPATIENT
Start: 2025-01-01 | End: 2025-01-01

## 2025-01-01 RX ORDER — SIMETHICONE 80 MG
160 TABLET,CHEWABLE ORAL 3 TIMES DAILY
Status: DISCONTINUED | OUTPATIENT
Start: 2025-01-01 | End: 2025-01-01

## 2025-01-01 RX ORDER — BISACODYL 10 MG
10 SUPPOSITORY, RECTAL RECTAL
Status: DISCONTINUED | OUTPATIENT
Start: 2025-01-01 | End: 2025-01-01

## 2025-01-01 RX ORDER — LIDOCAINE HYDROCHLORIDE 20 MG/ML
JELLY TOPICAL
Status: COMPLETED
Start: 2025-01-01 | End: 2025-01-01

## 2025-01-01 RX ORDER — FERROUS SULFATE 325(65) MG
325 TABLET, DELAYED RELEASE (ENTERIC COATED) ORAL 2 TIMES DAILY WITH MEALS
Status: DISCONTINUED | OUTPATIENT
Start: 2025-01-01 | End: 2025-01-01

## 2025-01-01 RX ORDER — MINERAL OIL AND PETROLATUM 150; 830 MG/G; MG/G
1 OINTMENT OPHTHALMIC NIGHTLY
Status: DISCONTINUED | OUTPATIENT
Start: 2025-01-01 | End: 2025-01-01 | Stop reason: ALTCHOICE

## 2025-01-01 RX ORDER — SODIUM CHLORIDE, SODIUM LACTATE, POTASSIUM CHLORIDE, CALCIUM CHLORIDE 600; 310; 30; 20 MG/100ML; MG/100ML; MG/100ML; MG/100ML
INJECTION, SOLUTION INTRAVENOUS CONTINUOUS
Status: DISCONTINUED | OUTPATIENT
Start: 2025-01-01 | End: 2025-01-01

## 2025-01-01 RX ORDER — FUROSEMIDE 10 MG/ML
40 INJECTION INTRAMUSCULAR; INTRAVENOUS DAILY
Status: DISCONTINUED | OUTPATIENT
Start: 2025-01-01 | End: 2025-01-01

## 2025-01-01 RX ORDER — NICOTINE POLACRILEX 4 MG
15 LOZENGE BUCCAL
Status: DISCONTINUED | OUTPATIENT
Start: 2025-01-01 | End: 2025-01-01

## 2025-01-01 RX ORDER — SODIUM CHLORIDE FOR INHALATION 3 %
3 VIAL, NEBULIZER (ML) INHALATION
Status: DISCONTINUED | OUTPATIENT
Start: 2025-01-01 | End: 2025-01-01

## 2025-01-01 RX ORDER — LORAZEPAM 2 MG/ML
1 INJECTION INTRAMUSCULAR EVERY 4 HOURS PRN
Status: DISCONTINUED | OUTPATIENT
Start: 2025-01-01 | End: 2025-01-01

## 2025-01-01 RX ORDER — NALOXONE HYDROCHLORIDE 0.4 MG/ML
0.08 INJECTION, SOLUTION INTRAMUSCULAR; INTRAVENOUS; SUBCUTANEOUS AS NEEDED
Status: ACTIVE | OUTPATIENT
Start: 2025-01-01 | End: 2025-01-01

## 2025-01-01 RX ORDER — BUDESONIDE 0.5 MG/2ML
0.5 INHALANT ORAL 2 TIMES DAILY
Status: DISCONTINUED | OUTPATIENT
Start: 2025-01-01 | End: 2025-01-01

## 2025-01-01 RX ORDER — FLUTICASONE PROPIONATE AND SALMETEROL 500; 50 UG/1; UG/1
1 POWDER RESPIRATORY (INHALATION) 2 TIMES DAILY
Status: DISCONTINUED | OUTPATIENT
Start: 2025-01-01 | End: 2025-01-01

## 2025-01-01 RX ORDER — POLYETHYLENE GLYCOL 3350 17 G/17G
17 POWDER, FOR SOLUTION ORAL DAILY PRN
Status: DISCONTINUED | OUTPATIENT
Start: 2025-01-01 | End: 2025-01-01

## 2025-01-01 RX ORDER — MORPHINE SULFATE 4 MG/ML
4 INJECTION, SOLUTION INTRAMUSCULAR; INTRAVENOUS EVERY 2 HOUR PRN
Status: DISCONTINUED | OUTPATIENT
Start: 2025-01-01 | End: 2025-01-01 | Stop reason: ALTCHOICE

## 2025-01-01 RX ORDER — LOSARTAN POTASSIUM 25 MG/1
25 TABLET ORAL DAILY
Status: DISCONTINUED | OUTPATIENT
Start: 2025-01-01 | End: 2025-01-01

## 2025-01-01 RX ORDER — LIDOCAINE HYDROCHLORIDE 20 MG/ML
10 JELLY TOPICAL ONCE
Status: COMPLETED | OUTPATIENT
Start: 2025-01-01 | End: 2025-01-01

## 2025-01-01 RX ORDER — POTASSIUM CHLORIDE 14.9 MG/ML
20 INJECTION INTRAVENOUS ONCE
Status: COMPLETED | OUTPATIENT
Start: 2025-01-01 | End: 2025-01-01

## 2025-01-01 RX ORDER — SENNOSIDES 8.8 MG/5ML
10 LIQUID ORAL 2 TIMES DAILY
Status: DISCONTINUED | OUTPATIENT
Start: 2025-01-01 | End: 2025-01-01

## 2025-01-01 RX ORDER — ACETAMINOPHEN 160 MG/5ML
650 SOLUTION ORAL EVERY 4 HOURS PRN
Status: DISCONTINUED | OUTPATIENT
Start: 2025-01-01 | End: 2025-01-01

## 2025-01-01 RX ORDER — MESALAMINE 400 MG/1
800 CAPSULE, DELAYED RELEASE ORAL 3 TIMES DAILY
Status: DISCONTINUED | OUTPATIENT
Start: 2025-01-01 | End: 2025-01-01

## 2025-01-01 RX ORDER — METHYLPREDNISOLONE SODIUM SUCCINATE 125 MG/2ML
80 INJECTION INTRAMUSCULAR; INTRAVENOUS EVERY 8 HOURS
Status: COMPLETED | OUTPATIENT
Start: 2025-01-01 | End: 2025-01-01

## 2025-01-01 RX ORDER — HYDROMORPHONE HYDROCHLORIDE 1 MG/ML
0.2 INJECTION, SOLUTION INTRAMUSCULAR; INTRAVENOUS; SUBCUTANEOUS EVERY 2 HOUR PRN
Refills: 0 | Status: DISCONTINUED | OUTPATIENT
Start: 2025-01-01 | End: 2025-01-01

## 2025-01-01 RX ORDER — MINERAL OIL AND PETROLATUM 150; 830 MG/G; MG/G
1 OINTMENT OPHTHALMIC NIGHTLY
Status: DISCONTINUED | OUTPATIENT
Start: 2025-01-01 | End: 2025-01-01

## 2025-01-01 RX ORDER — SCOPOLAMINE 1 MG/3D
1 PATCH, EXTENDED RELEASE TRANSDERMAL
Status: DISCONTINUED | OUTPATIENT
Start: 2025-01-01 | End: 2025-01-01

## 2025-01-01 RX ORDER — SODIUM CHLORIDE 9 MG/ML
INJECTION, SOLUTION INTRAVENOUS CONTINUOUS
Status: ACTIVE | OUTPATIENT
Start: 2025-01-01 | End: 2025-01-01

## 2025-01-01 RX ORDER — FUROSEMIDE 40 MG/1
40 TABLET ORAL
COMMUNITY
Start: 2025-01-01

## 2025-01-01 RX ORDER — SODIUM CHLORIDE 9 MG/ML
INJECTION, SOLUTION INTRAVENOUS CONTINUOUS
Status: DISCONTINUED | OUTPATIENT
Start: 2025-01-01 | End: 2025-01-01

## 2025-01-01 RX ORDER — HYDROMORPHONE HYDROCHLORIDE 1 MG/ML
0.6 INJECTION, SOLUTION INTRAMUSCULAR; INTRAVENOUS; SUBCUTANEOUS EVERY 2 HOUR PRN
Refills: 0 | Status: DISCONTINUED | OUTPATIENT
Start: 2025-01-01 | End: 2025-01-01

## 2025-01-01 RX ORDER — DEXTROSE, SODIUM CHLORIDE, SODIUM LACTATE, POTASSIUM CHLORIDE, AND CALCIUM CHLORIDE 5; .6; .31; .03; .02 G/100ML; G/100ML; G/100ML; G/100ML; G/100ML
INJECTION, SOLUTION INTRAVENOUS CONTINUOUS
Status: DISCONTINUED | OUTPATIENT
Start: 2025-01-01 | End: 2025-01-01

## 2025-01-01 RX ORDER — POTASSIUM CHLORIDE 1500 MG/1
40 TABLET, EXTENDED RELEASE ORAL ONCE
Status: COMPLETED | OUTPATIENT
Start: 2025-01-01 | End: 2025-01-01

## 2025-01-01 RX ORDER — MAGNESIUM SULFATE HEPTAHYDRATE 40 MG/ML
2 INJECTION, SOLUTION INTRAVENOUS ONCE
Status: COMPLETED | OUTPATIENT
Start: 2025-01-01 | End: 2025-01-01

## 2025-01-01 RX ORDER — METHYLPREDNISOLONE SODIUM SUCCINATE 40 MG/ML
40 INJECTION INTRAMUSCULAR; INTRAVENOUS EVERY 8 HOURS
Status: DISCONTINUED | OUTPATIENT
Start: 2025-01-01 | End: 2025-01-01

## 2025-01-01 RX ORDER — NYSTATIN 100000 [USP'U]/ML
5 SUSPENSION ORAL 4 TIMES DAILY
Status: DISCONTINUED | OUTPATIENT
Start: 2025-01-01 | End: 2025-01-01

## 2025-01-01 RX ORDER — NICOTINE POLACRILEX 4 MG
30 LOZENGE BUCCAL
Status: DISCONTINUED | OUTPATIENT
Start: 2025-01-01 | End: 2025-01-01

## 2025-01-01 RX ORDER — DIPHENHYDRAMINE HYDROCHLORIDE 50 MG/ML
12.5 INJECTION, SOLUTION INTRAMUSCULAR; INTRAVENOUS AS NEEDED
Status: DISCONTINUED | OUTPATIENT
Start: 2025-01-01 | End: 2025-01-01

## 2025-01-01 RX ORDER — SODIUM CHLORIDE FOR INHALATION 3 %
3 VIAL, NEBULIZER (ML) INHALATION
Status: COMPLETED | OUTPATIENT
Start: 2025-01-01 | End: 2025-01-01

## 2025-01-01 RX ORDER — CHLORHEXIDINE GLUCONATE ORAL RINSE 1.2 MG/ML
15 SOLUTION DENTAL
Status: DISCONTINUED | OUTPATIENT
Start: 2025-01-01 | End: 2025-01-01 | Stop reason: ALTCHOICE

## 2025-01-01 RX ORDER — ACETAMINOPHEN 10 MG/ML
1000 INJECTION, SOLUTION INTRAVENOUS EVERY 6 HOURS
Status: COMPLETED | OUTPATIENT
Start: 2025-01-01 | End: 2025-01-01

## 2025-01-01 RX ORDER — SODIUM CHLORIDE, SODIUM GLUCONATE, SODIUM ACETATE, POTASSIUM CHLORIDE AND MAGNESIUM CHLORIDE 526; 502; 368; 37; 30 MG/100ML; MG/100ML; MG/100ML; MG/100ML; MG/100ML
83 INJECTION, SOLUTION INTRAVENOUS CONTINUOUS
Status: DISCONTINUED | OUTPATIENT
Start: 2025-01-01 | End: 2025-01-01

## 2025-01-01 RX ORDER — ACETAMINOPHEN 650 MG/1
650 SUPPOSITORY RECTAL EVERY 4 HOURS PRN
Status: DISCONTINUED | OUTPATIENT
Start: 2025-01-01 | End: 2025-01-01

## 2025-01-01 RX ORDER — GLYCOPYRROLATE 0.2 MG/ML
0.2 INJECTION, SOLUTION INTRAMUSCULAR; INTRAVENOUS
Status: DISCONTINUED | OUTPATIENT
Start: 2025-01-01 | End: 2025-01-01

## 2025-01-01 RX ORDER — FLUTICASONE FUROATE, UMECLIDINIUM BROMIDE AND VILANTEROL TRIFENATATE 200; 62.5; 25 UG/1; UG/1; UG/1
1 POWDER RESPIRATORY (INHALATION) EVERY 24 HOURS
COMMUNITY
Start: 2025-01-01

## 2025-01-01 RX ORDER — ALBUTEROL SULFATE 0.83 MG/ML
2.5 SOLUTION RESPIRATORY (INHALATION) AS NEEDED
Status: ACTIVE | OUTPATIENT
Start: 2025-01-01 | End: 2025-01-01

## 2025-01-01 RX ORDER — PREDNISONE 20 MG/1
40 TABLET ORAL
Status: DISCONTINUED | OUTPATIENT
Start: 2025-01-01 | End: 2025-01-01

## 2025-01-01 RX ORDER — ACETAMINOPHEN 325 MG/1
650 TABLET ORAL EVERY 6 HOURS PRN
Status: DISCONTINUED | OUTPATIENT
Start: 2025-01-01 | End: 2025-01-01

## 2025-01-01 RX ORDER — HYDROMORPHONE HYDROCHLORIDE 1 MG/ML
0.4 INJECTION, SOLUTION INTRAMUSCULAR; INTRAVENOUS; SUBCUTANEOUS EVERY 5 MIN PRN
Status: DISCONTINUED | OUTPATIENT
Start: 2025-01-01 | End: 2025-01-01

## 2025-01-01 RX ORDER — DEXTROSE MONOHYDRATE 25 G/50ML
50 INJECTION, SOLUTION INTRAVENOUS
Status: DISCONTINUED | OUTPATIENT
Start: 2025-01-01 | End: 2025-01-01

## 2025-01-01 RX ORDER — GUAIFENESIN 600 MG/1
600 TABLET, EXTENDED RELEASE ORAL 2 TIMES DAILY
Status: DISCONTINUED | OUTPATIENT
Start: 2025-01-01 | End: 2025-01-01

## 2025-01-01 RX ORDER — LORAZEPAM 2 MG/ML
1 INJECTION INTRAMUSCULAR ONCE
Status: COMPLETED | OUTPATIENT
Start: 2025-01-01 | End: 2025-01-01

## 2025-01-01 RX ORDER — HYDROMORPHONE HYDROCHLORIDE 1 MG/ML
0.5 INJECTION, SOLUTION INTRAMUSCULAR; INTRAVENOUS; SUBCUTANEOUS EVERY 30 MIN PRN
Status: DISCONTINUED | OUTPATIENT
Start: 2025-01-01 | End: 2025-01-01

## 2025-01-01 RX ORDER — HYDROCODONE BITARTRATE AND ACETAMINOPHEN 5; 325 MG/1; MG/1
1 TABLET ORAL EVERY 6 HOURS PRN
Refills: 0 | Status: DISCONTINUED | OUTPATIENT
Start: 2025-01-01 | End: 2025-01-01

## 2025-01-01 RX ORDER — TAMSULOSIN HYDROCHLORIDE 0.4 MG/1
0.4 CAPSULE ORAL NIGHTLY
Status: DISCONTINUED | OUTPATIENT
Start: 2025-01-01 | End: 2025-01-01

## 2025-01-01 RX ORDER — CHLORHEXIDINE GLUCONATE ORAL RINSE 1.2 MG/ML
15 SOLUTION DENTAL
Status: DISCONTINUED | OUTPATIENT
Start: 2025-01-01 | End: 2025-01-01

## 2025-01-01 RX ORDER — SENNOSIDES 8.8 MG/5ML
10 LIQUID ORAL 2 TIMES DAILY
Status: DISCONTINUED | OUTPATIENT
Start: 2025-01-01 | End: 2025-01-01 | Stop reason: ALTCHOICE

## 2025-01-01 RX ORDER — HEPARIN SODIUM 5000 [USP'U]/ML
5000 INJECTION, SOLUTION INTRAVENOUS; SUBCUTANEOUS EVERY 8 HOURS SCHEDULED
Status: DISCONTINUED | OUTPATIENT
Start: 2025-01-01 | End: 2025-01-01

## 2025-01-01 RX ORDER — DOCUSATE SODIUM 50 MG/5ML
100 LIQUID ORAL 2 TIMES DAILY
Status: DISCONTINUED | OUTPATIENT
Start: 2025-01-01 | End: 2025-01-01

## 2025-01-01 RX ORDER — POLYETHYLENE GLYCOL 3350 17 G/17G
17 POWDER, FOR SOLUTION ORAL DAILY
Status: DISCONTINUED | OUTPATIENT
Start: 2025-01-01 | End: 2025-01-01

## 2025-01-01 RX ORDER — CARVEDILOL 3.12 MG/1
6.25 TABLET ORAL 2 TIMES DAILY WITH MEALS
Status: DISCONTINUED | OUTPATIENT
Start: 2025-01-01 | End: 2025-01-01

## 2025-01-01 RX ORDER — MORPHINE SULFATE 2 MG/ML
2 INJECTION, SOLUTION INTRAMUSCULAR; INTRAVENOUS EVERY 2 HOUR PRN
Status: DISCONTINUED | OUTPATIENT
Start: 2025-01-01 | End: 2025-01-01 | Stop reason: ALTCHOICE

## 2025-01-01 RX ORDER — ALBUTEROL SULFATE 0.83 MG/ML
SOLUTION RESPIRATORY (INHALATION)
Status: COMPLETED
Start: 2025-01-01 | End: 2025-01-01

## 2025-01-01 RX ORDER — DOCUSATE SODIUM 100 MG/1
100 CAPSULE, LIQUID FILLED ORAL 2 TIMES DAILY
Status: DISCONTINUED | OUTPATIENT
Start: 2025-01-01 | End: 2025-01-01

## 2025-01-01 RX ORDER — FUROSEMIDE 10 MG/ML
40 INJECTION INTRAMUSCULAR; INTRAVENOUS
Status: DISCONTINUED | OUTPATIENT
Start: 2025-01-01 | End: 2025-01-01

## 2025-01-01 RX ORDER — ALBUTEROL SULFATE 90 UG/1
2 INHALANT RESPIRATORY (INHALATION) EVERY 6 HOURS PRN
Status: DISCONTINUED | OUTPATIENT
Start: 2025-01-01 | End: 2025-01-01

## 2025-01-01 RX ORDER — HYDRALAZINE HYDROCHLORIDE 20 MG/ML
10 INJECTION INTRAMUSCULAR; INTRAVENOUS EVERY 6 HOURS PRN
Status: DISCONTINUED | OUTPATIENT
Start: 2025-01-01 | End: 2025-01-01

## 2025-01-01 RX ORDER — DEXTROSE MONOHYDRATE AND SODIUM CHLORIDE 5; .9 G/100ML; G/100ML
INJECTION, SOLUTION INTRAVENOUS CONTINUOUS
Status: DISCONTINUED | OUTPATIENT
Start: 2025-01-01 | End: 2025-01-01

## 2025-01-01 RX ORDER — IPRATROPIUM BROMIDE AND ALBUTEROL SULFATE 2.5; .5 MG/3ML; MG/3ML
3 SOLUTION RESPIRATORY (INHALATION) EVERY 4 HOURS PRN
Status: DISCONTINUED | OUTPATIENT
Start: 2025-01-01 | End: 2025-01-01

## 2025-01-01 RX ORDER — HYDROMORPHONE HYDROCHLORIDE 1 MG/ML
0.4 INJECTION, SOLUTION INTRAMUSCULAR; INTRAVENOUS; SUBCUTANEOUS EVERY 2 HOUR PRN
Refills: 0 | Status: DISCONTINUED | OUTPATIENT
Start: 2025-01-01 | End: 2025-01-01

## 2025-01-01 RX ORDER — MORPHINE SULFATE 2 MG/ML
2 INJECTION, SOLUTION INTRAMUSCULAR; INTRAVENOUS ONCE
Status: COMPLETED | OUTPATIENT
Start: 2025-01-01 | End: 2025-01-01

## 2025-01-15 ENCOUNTER — OFFICE VISIT (OUTPATIENT)
Dept: FAMILY MEDICINE CLINIC | Facility: CLINIC | Age: OVER 89
End: 2025-01-15
Payer: MEDICARE

## 2025-01-15 VITALS
OXYGEN SATURATION: 98 % | BODY MASS INDEX: 24.15 KG/M2 | DIASTOLIC BLOOD PRESSURE: 60 MMHG | SYSTOLIC BLOOD PRESSURE: 118 MMHG | HEART RATE: 78 BPM | HEIGHT: 60 IN | WEIGHT: 123 LBS | RESPIRATION RATE: 16 BRPM

## 2025-01-15 DIAGNOSIS — K21.9 GASTROESOPHAGEAL REFLUX DISEASE WITHOUT ESOPHAGITIS: ICD-10-CM

## 2025-01-15 DIAGNOSIS — N18.30 STAGE 3 CHRONIC KIDNEY DISEASE, UNSPECIFIED WHETHER STAGE 3A OR 3B CKD (HCC): ICD-10-CM

## 2025-01-15 DIAGNOSIS — Z00.00 LABORATORY EXAM ORDERED AS PART OF ROUTINE GENERAL MEDICAL EXAMINATION: ICD-10-CM

## 2025-01-15 DIAGNOSIS — J44.89 ASTHMA-COPD OVERLAP SYNDROME (HCC): Primary | Chronic | ICD-10-CM

## 2025-01-15 DIAGNOSIS — R54 AGE-RELATED PHYSICAL DEBILITY: ICD-10-CM

## 2025-01-15 DIAGNOSIS — N40.0 BENIGN PROSTATIC HYPERPLASIA, UNSPECIFIED WHETHER LOWER URINARY TRACT SYMPTOMS PRESENT: ICD-10-CM

## 2025-01-15 DIAGNOSIS — I10 ESSENTIAL HYPERTENSION, BENIGN: Chronic | ICD-10-CM

## 2025-01-15 DIAGNOSIS — K50.10 CROHN'S DISEASE OF LARGE INTESTINE WITHOUT COMPLICATION (HCC): ICD-10-CM

## 2025-01-15 DIAGNOSIS — E03.9 ACQUIRED HYPOTHYROIDISM: ICD-10-CM

## 2025-01-15 DIAGNOSIS — D50.9 IRON DEFICIENCY ANEMIA, UNSPECIFIED IRON DEFICIENCY ANEMIA TYPE: ICD-10-CM

## 2025-01-15 DIAGNOSIS — I26.99 PE (PULMONARY THROMBOEMBOLISM) (HCC): ICD-10-CM

## 2025-01-15 DIAGNOSIS — E53.8 VITAMIN B12 DEFICIENCY: ICD-10-CM

## 2025-01-15 DIAGNOSIS — R29.898 WEAKNESS OF BOTH LOWER EXTREMITIES: ICD-10-CM

## 2025-01-15 PROBLEM — R79.89 AZOTEMIA: Status: RESOLVED | Noted: 2017-01-04 | Resolved: 2025-01-15

## 2025-01-15 PROBLEM — R79.89 AZOTEMIA: Status: RESOLVED | Noted: 2017-01-04 | Resolved: 2025-01-01

## 2025-01-15 PROBLEM — J44.1 ACUTE EXACERBATION OF CHRONIC OBSTRUCTIVE PULMONARY DISEASE (COPD) (HCC): Status: RESOLVED | Noted: 2017-05-11 | Resolved: 2025-01-01

## 2025-01-15 PROBLEM — N17.9 AKI (ACUTE KIDNEY INJURY): Status: RESOLVED | Noted: 2017-01-04 | Resolved: 2025-01-15

## 2025-01-15 PROBLEM — R07.89 CHEST PAIN, ATYPICAL: Status: RESOLVED | Noted: 2019-07-16 | Resolved: 2025-01-01

## 2025-01-15 PROBLEM — E87.3 RESPIRATORY ALKALOSIS: Status: RESOLVED | Noted: 2017-01-04 | Resolved: 2025-01-15

## 2025-01-15 PROBLEM — E86.0 DEHYDRATION: Status: RESOLVED | Noted: 2021-12-11 | Resolved: 2025-01-01

## 2025-01-15 PROBLEM — N17.9 ACUTE RENAL FAILURE (ARF): Status: RESOLVED | Noted: 2017-01-04 | Resolved: 2025-01-01

## 2025-01-15 PROBLEM — N28.9 RENAL INSUFFICIENCY: Status: RESOLVED | Noted: 2017-05-11 | Resolved: 2025-01-15

## 2025-01-15 PROBLEM — R53.1 WEAKNESS: Status: RESOLVED | Noted: 2021-12-11 | Resolved: 2025-01-01

## 2025-01-15 PROBLEM — R53.1 WEAKNESS: Status: RESOLVED | Noted: 2021-12-11 | Resolved: 2025-01-15

## 2025-01-15 PROBLEM — N17.9 ACUTE RENAL FAILURE (ARF): Status: RESOLVED | Noted: 2017-01-04 | Resolved: 2025-01-15

## 2025-01-15 PROBLEM — J44.1 COPD EXACERBATION (HCC): Status: RESOLVED | Noted: 2021-12-11 | Resolved: 2025-01-15

## 2025-01-15 PROBLEM — D64.9 ANEMIA, UNSPECIFIED TYPE: Status: RESOLVED | Noted: 2017-01-04 | Resolved: 2025-01-15

## 2025-01-15 PROBLEM — E87.3 METABOLIC ALKALOSIS: Status: RESOLVED | Noted: 2017-01-04 | Resolved: 2025-01-15

## 2025-01-15 PROBLEM — N28.9 RENAL INSUFFICIENCY: Status: RESOLVED | Noted: 2017-05-11 | Resolved: 2025-01-01

## 2025-01-15 PROBLEM — K56.609 SMALL BOWEL OBSTRUCTION (HCC): Status: RESOLVED | Noted: 2019-08-31 | Resolved: 2025-01-01

## 2025-01-15 PROBLEM — J44.1 COPD EXACERBATION (HCC): Status: RESOLVED | Noted: 2021-12-11 | Resolved: 2025-01-01

## 2025-01-15 PROBLEM — E87.3 RESPIRATORY ALKALOSIS: Status: RESOLVED | Noted: 2017-01-04 | Resolved: 2025-01-01

## 2025-01-15 PROBLEM — J44.1 ACUTE EXACERBATION OF CHRONIC OBSTRUCTIVE PULMONARY DISEASE (COPD) (HCC): Status: RESOLVED | Noted: 2017-05-11 | Resolved: 2025-01-15

## 2025-01-15 PROBLEM — E87.20 METABOLIC ACIDOSIS: Status: RESOLVED | Noted: 2017-01-04 | Resolved: 2025-01-01

## 2025-01-15 PROBLEM — D64.9 ANEMIA, UNSPECIFIED TYPE: Status: RESOLVED | Noted: 2017-01-04 | Resolved: 2025-01-01

## 2025-01-15 PROBLEM — D72.829 LEUKOCYTOSIS: Status: RESOLVED | Noted: 2017-01-04 | Resolved: 2025-01-01

## 2025-01-15 PROBLEM — E86.0 DEHYDRATION: Status: RESOLVED | Noted: 2021-12-11 | Resolved: 2025-01-15

## 2025-01-15 PROBLEM — R07.89 CHEST PAIN, ATYPICAL: Status: RESOLVED | Noted: 2019-07-16 | Resolved: 2025-01-15

## 2025-01-15 PROBLEM — K56.609 SMALL BOWEL OBSTRUCTION (HCC): Status: RESOLVED | Noted: 2019-08-31 | Resolved: 2025-01-15

## 2025-01-15 PROBLEM — N17.9 AKI (ACUTE KIDNEY INJURY): Status: RESOLVED | Noted: 2017-01-04 | Resolved: 2025-01-01

## 2025-01-15 PROBLEM — E87.3 METABOLIC ALKALOSIS: Status: RESOLVED | Noted: 2017-01-04 | Resolved: 2025-01-01

## 2025-01-15 PROBLEM — E87.20 METABOLIC ACIDOSIS: Status: RESOLVED | Noted: 2017-01-04 | Resolved: 2025-01-15

## 2025-01-15 PROBLEM — D72.829 LEUKOCYTOSIS: Status: RESOLVED | Noted: 2017-01-04 | Resolved: 2025-01-15

## 2025-01-15 PROCEDURE — 99204 OFFICE O/P NEW MOD 45 MIN: CPT | Performed by: FAMILY MEDICINE

## 2025-01-15 RX ORDER — CYANOCOBALAMIN 1000 UG/ML
1000 INJECTION, SOLUTION INTRAMUSCULAR; SUBCUTANEOUS
Qty: 12 ML | Refills: 0 | Status: ON HOLD | OUTPATIENT
Start: 2025-01-15

## 2025-01-15 RX ORDER — OMEPRAZOLE 40 MG/1
40 CAPSULE, DELAYED RELEASE ORAL DAILY
Qty: 90 CAPSULE | Refills: 3 | Status: ON HOLD | OUTPATIENT
Start: 2025-01-15

## 2025-01-15 RX ORDER — TAMSULOSIN HYDROCHLORIDE 0.4 MG/1
0.4 CAPSULE ORAL NIGHTLY
Qty: 90 CAPSULE | Refills: 3 | Status: ON HOLD | OUTPATIENT
Start: 2025-01-15

## 2025-01-15 RX ORDER — MESALAMINE 1.2 G/1
2.4 TABLET, DELAYED RELEASE ORAL DAILY
Qty: 180 TABLET | Refills: 3 | Status: ON HOLD | OUTPATIENT
Start: 2025-01-15

## 2025-01-15 RX ORDER — FERROUS SULFATE 325(65) MG
325 TABLET, DELAYED RELEASE (ENTERIC COATED) ORAL 2 TIMES DAILY WITH MEALS
Qty: 180 TABLET | Refills: 3 | Status: ON HOLD | OUTPATIENT
Start: 2025-01-15

## 2025-01-15 RX ORDER — ALBUTEROL SULFATE 90 UG/1
2 INHALANT RESPIRATORY (INHALATION) EVERY 6 HOURS PRN
Qty: 1 EACH | Refills: 11 | Status: ON HOLD | OUTPATIENT
Start: 2025-01-15

## 2025-01-15 RX ORDER — LEVOTHYROXINE SODIUM 50 UG/1
50 TABLET ORAL
Qty: 90 TABLET | Refills: 3 | Status: ON HOLD | OUTPATIENT
Start: 2025-01-15

## 2025-01-15 RX ORDER — ARFORMOTEROL TARTRATE 15 UG/2ML
15 SOLUTION RESPIRATORY (INHALATION) 2 TIMES DAILY
Qty: 360 ML | Refills: 3 | Status: SHIPPED | OUTPATIENT
Start: 2025-01-15 | End: 2025-01-27 | Stop reason: CLARIF

## 2025-01-15 RX ORDER — CARVEDILOL 3.12 MG/1
3.12 TABLET ORAL 2 TIMES DAILY WITH MEALS
Qty: 180 TABLET | Refills: 3 | Status: ON HOLD | OUTPATIENT
Start: 2025-01-15

## 2025-01-23 NOTE — PROGRESS NOTES
[facility] Office Note  Chief Complaint:   Chief Complaint   Patient presents with    [facility]     The following individual(s) verbally consented to be recorded using ambient AI listening technology and understand that they can each withdraw their consent to this listening technology at any point by asking the clinician to turn off or pause the recording         HPI:     History of Present Illness  This is a 89 year old patient, a with a history of COPD, Crohn's disease, hypothyroidism, hypertension, and kidney disease, presents to [facility]. The patient's COPD is reportedly well-controlled with occasional exacerbations, and Crohn's disease is described as mild to moderate, managed with mesalamine and dietary modifications. The patient has not seen his gastroenterologist in a couple of years due to stable symptoms.    Recently, the patient experienced an episode of hematuria, prompting an ER visit and subsequent cystoscopy, which revealed no significant abnormalities. Following with urology, Dr Ovalle. Plan for continued surveillance.    Approximately three to four years ago, the patient had a pulmonary embolism, treated with blood thinners for eighteen months. After confirming the resolution of the clot and a normal D-dimer level, the blood thinners were discontinued. The patient has been off blood thinners for approximately twelve months.    The patient has a history of hypothyroidism and hypertension, managed with thyroid levothyroxine and carvedilol, respectively. The patient's blood pressure is reportedly well-controlled on this regimen. The patient also takes omeprazole for acid reflux and tamsulosin for BPH.    The patient has been on iron and B12 supplementation due to past deficiencies. The patient's hemoglobin has improved to around 11 with consistent use of ferrous sulfate over the past eight to nine years.    The patient has been experiencing lower back discomfort and  weakness below the knees for about a year. The patient describes the lower extremities as feeling cold all the time and lacking strength. Despite these symptoms, the patient remains active, walking about a mile daily and performing light exercises. The patient has also started seeing a chiropractor for these symptoms.    The patient has a history of bilateral knee replacements and a hernia repair, which was complicated by mesh entanglement with the small intestine. The patient now wears a binder due to a persistent hernia. Despite these challenges, the patient's mental status is reported as sharp.      Past Medical History:    Abdominal distention    Acute encephalopathy    Anemia    Arthritis    Back problem    Cataract    Chronic lung disease    Congestive heart disease (HCC)    COPD (chronic obstructive pulmonary disease) (HCC)    Crohn disease (HCC)    Crohn's disease of large intestine without complication (HCC)    Diarrhea, unspecified    Disorder of thyroid    Diverticula of small intestine    Diverticulosis of large intestine    Frequent use of laxatives    Hearing impairment    High blood pressure    High cholesterol    History of blood transfusion    Hyperthyroidism    Leg swelling    Mitral valve disorder    leaky mitral valve    Muscle weakness    Osteoarthrosis, unspecified whether generalized or localized, unspecified site    cervical and lumbar spine    Osteoporosis    Other and unspecified hyperlipidemia    Pulmonary embolism (HCC)    Pulmonary emphysema (HCC)    Renal insufficiency    Shortness of breath    Thyroid disease    Unspecified essential hypertension    Wheezing     Past Surgical History:   Procedure Laterality Date    Angiogram      2009    Cataract      Colonoscopy  06/30/2014    Procedure: COLONOSCOPY;  Surgeon: Jaz Carrasquillo DO;  Location:  ENDOSCOPY    Egd N/A 01/04/2017    Procedure: ESOPHAGOGASTRODUODENOSCOPY (EGD);  Surgeon: Gustabo Johnson MD;  Location: [facility] OR     Egd N/A 2017    Procedure: ESOPHAGOGASTRODUODENOSCOPY (EGD);  Surgeon: Jaz Carrasquillo DO;  Location:  ENDOSCOPY    Hernia surgery      Knee replacement surgery      Other surgical history      6 yeara ago colonscopy with polpys    Sigmoidoscopy,diagnostic      Total knee replacement       Social History:  Social History     Socioeconomic History    Marital status:    Tobacco Use    Smoking status: Former     Current packs/day: 0.00     Average packs/day: 2.0 packs/day for 35.0 years (70.0 ttl pk-yrs)     Types: Cigarettes     Quit date: 1987     Years since quittin.0    Smokeless tobacco: Former    Tobacco comments:     30 years   Vaping Use    Vaping status: Never Used   Substance and Sexual Activity    Alcohol use: Not Currently    Drug use: No     Family History:  Family History   Problem Relation Age of Onset    Cancer Brother      Allergies:  Allergies[1]  Current Meds:  Current Outpatient Medications   Medication Sig Dispense Refill    albuterol 108 (90 Base) MCG/ACT Inhalation Aero Soln Inhale 2 puffs into the lungs every 6 (six) hours as needed for Wheezing. 1 each 11    arformoterol 15 MCG/2ML Inhalation Nebu Soln Take 2 mL (15 mcg total) by nebulization 2 (two) times daily. 360 mL 3    carvedilol 3.125 MG Oral Tab Take 1 tablet (3.125 mg total) by mouth 2 (two) times daily with meals. 180 tablet 3    cyanocobalamin 1000 MCG/ML Injection Solution Inject 1 mL (1,000 mcg total) into the muscle every 30 (thirty) days. 12 mL 0    ferrous sulfate 325 (65 FE) MG Oral Tab EC Take 1 tablet (325 mg total) by mouth 2 (two) times daily with meals. 180 tablet 3    fluticasone-umeclidin-vilant (TRELEGY ELLIPTA) 200-62.5-25 MCG/ACT Inhalation Aerosol Powder, Breath Activated Inhale 1 puff into the lungs daily. 3 each 3    levothyroxine 50 MCG Oral Tab Take 1 tablet (50 mcg total) by mouth before breakfast. 90 tablet 3    mesalamine 1.2 g Oral Tab EC Take 2 tablets (2.4 g total) by mouth daily.  180 tablet 3    Omeprazole 40 MG Oral Capsule Delayed Release Take 1 capsule (40 mg total) by mouth daily. 90 capsule 3    tamsulosin 0.4 MG Oral Cap Take 1 capsule (0.4 mg total) by mouth nightly. 90 capsule 3    Cholecalciferol (VITAMIN D) 1000 UNITS Oral Tab Take 2,000 Units by mouth daily.        budesonide 0.25 MG/2ML Inhalation Suspension Take 2 mL (0.25 mg total) by nebulization daily. (Patient not taking: Reported on 1/15/2025)      ipratropium-albuterol 0.5-2.5 (3) MG/3ML Inhalation Solution Take 3 mL by nebulization every 4 (four) hours. (Patient not taking: Reported on 1/15/2025) 50 each 0      Counseling given: Not Answered  Tobacco comments: 30 years       REVIEW OF SYSTEMS:   Review of Systems   Constitutional: Negative.    HENT: Negative.     Eyes: Negative.    Respiratory: Negative.     Cardiovascular: Negative.    Gastrointestinal: Negative.    Endocrine: Negative.    Genitourinary: Negative.    Musculoskeletal:  Positive for back pain.   Skin: Negative.    Neurological:  Positive for weakness.   Psychiatric/Behavioral: Negative.          EXAM:   /60   Pulse 78   Resp 16   Ht 5' (1.524 m)   Wt 123 lb (55.8 kg)   SpO2 98%   BMI 24.02 kg/m²  Estimated body mass index is 24.02 kg/m² as calculated from the following:    Height as of this encounter: 5' (1.524 m).    Weight as of this encounter: 123 lb (55.8 kg).   Vital signs reviewed.Appears stated age, well groomed.  Physical Exam  Vitals and nursing note reviewed.   Constitutional:       Appearance: Normal appearance. He is well-developed.   HENT:      Head: Normocephalic and atraumatic.   Eyes:      Pupils: Pupils are equal, round, and reactive to light.   Neck:      Thyroid: No thyromegaly.   Cardiovascular:      Rate and Rhythm: Normal rate and regular rhythm.      Pulses: Normal pulses.           Dorsalis pedis pulses are 2+ on the right side and 2+ on the left side.      Heart sounds: Normal heart sounds. No murmur heard.  Pulmonary:       Effort: Pulmonary effort is normal. No respiratory distress.      Breath sounds: Normal breath sounds. No stridor. No wheezing, rhonchi or rales.   Chest:      Chest wall: No tenderness.   Abdominal:      General: Bowel sounds are normal. There is no distension.      Palpations: Abdomen is soft. There is no mass.      Tenderness: There is no abdominal tenderness. There is no guarding or rebound.      Hernia: No hernia is present.   Musculoskeletal:      Comments: Deferred back examination d/t gait instability   Feet:      Right foot:      Skin integrity: Skin integrity normal.      Left foot:      Skin integrity: Skin integrity normal.   Skin:     General: Skin is warm and dry.      Findings: No rash.   Neurological:      Mental Status: He is alert and oriented to person, place, and time.   Psychiatric:         Mood and Affect: Mood normal.         Behavior: Behavior normal.         Thought Content: Thought content normal.         Judgment: Judgment normal.          ASSESSMENT AND PLAN:   1. Asthma-COPD overlap syndrome (HCC)  Stable, CPM  - albuterol 108 (90 Base) MCG/ACT Inhalation Aero Soln; Inhale 2 puffs into the lungs every 6 (six) hours as needed for Wheezing.  Dispense: 1 each; Refill: 11  - arformoterol 15 MCG/2ML Inhalation Nebu Soln; Take 2 mL (15 mcg total) by nebulization 2 (two) times daily.  Dispense: 360 mL; Refill: 3  - fluticasone-umeclidin-vilant (TRELEGY ELLIPTA) 200-62.5-25 MCG/ACT Inhalation Aerosol Powder, Breath Activated; Inhale 1 puff into the lungs daily.  Dispense: 3 each; Refill: 3    2. Weakness of both lower extremities  Unclear of underlying etiology. Possibly could be claudication to his back, senile debility, PAD? His neurovascular exam is intact, DP pulses strong 2+ b/l with good ROM. No significant edema appreciated. Did review possible further workup we can do if needed; given age patient/family want to hold off and just monitor which I think is perfectly acceptable.  Recommend daily exercise program, increasing protein intake through diet. Reviewed return/call back precautions.     3. Age-related physical debility  See above     4. Crohn's disease of large intestine without complication (HCC)  Stable, CPM  - mesalamine 1.2 g Oral Tab EC; Take 2 tablets (2.4 g total) by mouth daily.  Dispense: 180 tablet; Refill: 3    5. Stage 3 chronic kidney disease, unspecified whether stage 3a or 3b CKD (HCC)  Stable, CTM. Stay well hydrated, avoid nephrotoxic medications.     6. PE (pulmonary thromboembolism) (HCC)  Resolved, no recurrence. CTM.     7. Essential hypertension, benign  Stable, CPM. Check labs.   - carvedilol 3.125 MG Oral Tab; Take 1 tablet (3.125 mg total) by mouth 2 (two) times daily with meals.  Dispense: 180 tablet; Refill: 3    8. Acquired hypothyroidism  Stable, CPM  - levothyroxine 50 MCG Oral Tab; Take 1 tablet (50 mcg total) by mouth before breakfast.  Dispense: 90 tablet; Refill: 3    9. Gastroesophageal reflux disease without esophagitis  Stable, CPM  - Omeprazole 40 MG Oral Capsule Delayed Release; Take 1 capsule (40 mg total) by mouth daily.  Dispense: 90 capsule; Refill: 3    10. Benign prostatic hyperplasia, unspecified whether lower urinary tract symptoms present  Stable, CPM  - tamsulosin 0.4 MG Oral Cap; Take 1 capsule (0.4 mg total) by mouth nightly.  Dispense: 90 capsule; Refill: 3    11. Iron deficiency anemia, unspecified iron deficiency anemia type  Stable, CPM. Check labs.   - ferrous sulfate 325 (65 FE) MG Oral Tab EC; Take 1 tablet (325 mg total) by mouth 2 (two) times daily with meals.  Dispense: 180 tablet; Refill: 3    12. Vitamin B12 deficiency  Stable, CPM. Check labs.   - cyanocobalamin 1000 MCG/ML Injection Solution; Inject 1 mL (1,000 mcg total) into the muscle every 30 (thirty) days.  Dispense: 12 mL; Refill: 0    13. Laboratory exam ordered as part of routine general medical examination  - CBC With Differential With Platelet; Future  - Comp  Metabolic Panel (14); Future  - Lipid Panel; Future  - TSH W Reflex To Free T4; Future      Meds & Refills for this Visit:  Requested Prescriptions     Signed Prescriptions Disp Refills    albuterol 108 (90 Base) MCG/ACT Inhalation Aero Soln 1 each 11     Sig: Inhale 2 puffs into the lungs every 6 (six) hours as needed for Wheezing.    arformoterol 15 MCG/2ML Inhalation Nebu Soln 360 mL 3     Sig: Take 2 mL (15 mcg total) by nebulization 2 (two) times daily.    carvedilol 3.125 MG Oral Tab 180 tablet 3     Sig: Take 1 tablet (3.125 mg total) by mouth 2 (two) times daily with meals.    cyanocobalamin 1000 MCG/ML Injection Solution 12 mL 0     Sig: Inject 1 mL (1,000 mcg total) into the muscle every 30 (thirty) days.    ferrous sulfate 325 (65 FE) MG Oral Tab  tablet 3     Sig: Take 1 tablet (325 mg total) by mouth 2 (two) times daily with meals.    fluticasone-umeclidin-vilant (TRELEGY ELLIPTA) 200-62.5-25 MCG/ACT Inhalation Aerosol Powder, Breath Activated 3 each 3     Sig: Inhale 1 puff into the lungs daily.    levothyroxine 50 MCG Oral Tab 90 tablet 3     Sig: Take 1 tablet (50 mcg total) by mouth before breakfast.    mesalamine 1.2 g Oral Tab  tablet 3     Sig: Take 2 tablets (2.4 g total) by mouth daily.    Omeprazole 40 MG Oral Capsule Delayed Release 90 capsule 3     Sig: Take 1 capsule (40 mg total) by mouth daily.    tamsulosin 0.4 MG Oral Cap 90 capsule 3     Sig: Take 1 capsule (0.4 mg total) by mouth nightly.       Health Maintenance:  Health Maintenance Due   Topic Date Due    Annual Physical  Never done    Zoster Vaccines (2 of 3) 07/04/2017    COVID-19 Vaccine (3 - 2024-25 season) 09/01/2024    Influenza Vaccine (1) 10/01/2024       Patient/Caregiver Education: Patient/Caregiver Education: There are no barriers to learning. Medical education done.   Outcome: Patient verbalizes understanding. Patient is notified to call with any questions, complications, allergies, or worsening or changing  symptoms.  Patient is to call with any side effects or complications from the treatments as a result of today.     Problem List:  Patient Active Problem List   Diagnosis    Abdominal pain    Back pain with radiation    Lumbar spinal stenosis    Lumbosacral radiculopathy    Nontraumatic subdural hygroma    Acute encephalopathy    Hyponatremia    Hyperglycemia    Gastrointestinal hemorrhage    Gastrointestinal hemorrhage, unspecified gastrointestinal hemorrhage type    Upper GI bleeding    Asthma-COPD overlap syndrome (HCC)    Essential hypertension, benign    Diarrhea, unspecified type    Crohn's disease of large intestine without complication (HCC)    Stage 3 chronic kidney disease (HCC)    PE (pulmonary thromboembolism) (HCC)          [1] No Known Allergies

## 2025-01-27 ENCOUNTER — HOSPITAL ENCOUNTER (INPATIENT)
Facility: HOSPITAL | Age: OVER 89
LOS: 8 days | Discharge: NURSING, REHAB OR LONG-TERM CARE | End: 2025-02-06
Attending: EMERGENCY MEDICINE | Admitting: INTERNAL MEDICINE
Payer: MEDICARE

## 2025-01-27 ENCOUNTER — APPOINTMENT (OUTPATIENT)
Dept: GENERAL RADIOLOGY | Facility: HOSPITAL | Age: OVER 89
End: 2025-01-27
Payer: MEDICARE

## 2025-01-27 DIAGNOSIS — J96.00 ACUTE RESPIRATORY FAILURE, UNSPECIFIED WHETHER WITH HYPOXIA OR HYPERCAPNIA (HCC): ICD-10-CM

## 2025-01-27 DIAGNOSIS — J44.1 COPD EXACERBATION (HCC): ICD-10-CM

## 2025-01-27 DIAGNOSIS — J98.01 ACUTE BRONCHOSPASM: Primary | ICD-10-CM

## 2025-01-27 DIAGNOSIS — J10.1 INFLUENZA A: ICD-10-CM

## 2025-01-27 DIAGNOSIS — I10 ESSENTIAL HYPERTENSION, BENIGN: Chronic | ICD-10-CM

## 2025-01-27 PROBLEM — J11.1 INFLUENZA: Status: ACTIVE | Noted: 2025-01-01

## 2025-01-27 PROBLEM — J11.1 INFLUENZA: Status: ACTIVE | Noted: 2025-01-27

## 2025-01-27 LAB
ALBUMIN SERPL-MCNC: 3.5 G/DL (ref 3.2–4.8)
ALBUMIN/GLOB SERPL: 1.8 {RATIO} (ref 1–2)
ALP LIVER SERPL-CCNC: 97 U/L
ALT SERPL-CCNC: 12 U/L
ANION GAP SERPL CALC-SCNC: 7 MMOL/L (ref 0–18)
AST SERPL-CCNC: 27 U/L (ref ?–34)
ATRIAL RATE: 69 BPM
BASOPHILS # BLD AUTO: 0.01 X10(3) UL (ref 0–0.2)
BASOPHILS NFR BLD AUTO: 0.2 %
BILIRUB SERPL-MCNC: 0.4 MG/DL (ref 0.2–0.9)
BUN BLD-MCNC: 23 MG/DL (ref 9–23)
CALCIUM BLD-MCNC: 8.8 MG/DL (ref 8.7–10.6)
CHLORIDE SERPL-SCNC: 104 MMOL/L (ref 98–112)
CO2 SERPL-SCNC: 26 MMOL/L (ref 21–32)
CREAT BLD-MCNC: 1.33 MG/DL
EGFRCR SERPLBLD CKD-EPI 2021: 51 ML/MIN/1.73M2 (ref 60–?)
EOSINOPHIL # BLD AUTO: 0.04 X10(3) UL (ref 0–0.7)
EOSINOPHIL NFR BLD AUTO: 1 %
ERYTHROCYTE [DISTWIDTH] IN BLOOD BY AUTOMATED COUNT: 12.7 %
FLUAV + FLUBV RNA SPEC NAA+PROBE: NEGATIVE
FLUAV + FLUBV RNA SPEC NAA+PROBE: POSITIVE
GLOBULIN PLAS-MCNC: 2 G/DL (ref 2–3.5)
GLUCOSE BLD-MCNC: 99 MG/DL (ref 70–99)
HCT VFR BLD AUTO: 30.3 %
HGB BLD-MCNC: 10.6 G/DL
IMM GRANULOCYTES # BLD AUTO: 0.02 X10(3) UL (ref 0–1)
IMM GRANULOCYTES NFR BLD: 0.5 %
LYMPHOCYTES # BLD AUTO: 0.56 X10(3) UL (ref 1–4)
LYMPHOCYTES NFR BLD AUTO: 13.3 %
MCH RBC QN AUTO: 31.3 PG (ref 26–34)
MCHC RBC AUTO-ENTMCNC: 35 G/DL (ref 31–37)
MCV RBC AUTO: 89.4 FL
MONOCYTES # BLD AUTO: 0.3 X10(3) UL (ref 0.1–1)
MONOCYTES NFR BLD AUTO: 7.1 %
NEUTROPHILS # BLD AUTO: 3.27 X10 (3) UL (ref 1.5–7.7)
NEUTROPHILS # BLD AUTO: 3.27 X10(3) UL (ref 1.5–7.7)
NEUTROPHILS NFR BLD AUTO: 77.9 %
NT-PROBNP SERPL-MCNC: 1702 PG/ML (ref ?–450)
OSMOLALITY SERPL CALC.SUM OF ELEC: 288 MOSM/KG (ref 275–295)
P AXIS: 84 DEGREES
P-R INTERVAL: 144 MS
PLATELET # BLD AUTO: 146 10(3)UL (ref 150–450)
POTASSIUM SERPL-SCNC: 4.4 MMOL/L (ref 3.5–5.1)
PROT SERPL-MCNC: 5.5 G/DL (ref 5.7–8.2)
Q-T INTERVAL: 410 MS
QRS DURATION: 86 MS
QTC CALCULATION (BEZET): 439 MS
R AXIS: 37 DEGREES
RBC # BLD AUTO: 3.39 X10(6)UL
RSV RNA SPEC NAA+PROBE: NEGATIVE
SARS-COV-2 RNA RESP QL NAA+PROBE: NOT DETECTED
SODIUM SERPL-SCNC: 137 MMOL/L (ref 136–145)
T AXIS: 48 DEGREES
TROPONIN I SERPL HS-MCNC: 10 NG/L
VENTRICULAR RATE: 69 BPM
WBC # BLD AUTO: 4.2 X10(3) UL (ref 4–11)

## 2025-01-27 PROCEDURE — 71045 X-RAY EXAM CHEST 1 VIEW: CPT

## 2025-01-27 PROCEDURE — 99223 1ST HOSP IP/OBS HIGH 75: CPT | Performed by: INTERNAL MEDICINE

## 2025-01-27 RX ORDER — IPRATROPIUM BROMIDE AND ALBUTEROL SULFATE 2.5; .5 MG/3ML; MG/3ML
3 SOLUTION RESPIRATORY (INHALATION)
Status: DISCONTINUED | OUTPATIENT
Start: 2025-01-28 | End: 2025-01-30

## 2025-01-27 RX ORDER — PANTOPRAZOLE SODIUM 40 MG/1
40 TABLET, DELAYED RELEASE ORAL
Status: DISCONTINUED | OUTPATIENT
Start: 2025-01-28 | End: 2025-02-06

## 2025-01-27 RX ORDER — ONDANSETRON 2 MG/ML
4 INJECTION INTRAMUSCULAR; INTRAVENOUS EVERY 6 HOURS PRN
Status: DISCONTINUED | OUTPATIENT
Start: 2025-01-27 | End: 2025-02-06

## 2025-01-27 RX ORDER — AZITHROMYCIN 250 MG/1
250 TABLET, FILM COATED ORAL
Status: DISCONTINUED | OUTPATIENT
Start: 2025-01-27 | End: 2025-02-06

## 2025-01-27 RX ORDER — FERROUS SULFATE 325(65) MG
325 TABLET, DELAYED RELEASE (ENTERIC COATED) ORAL 2 TIMES DAILY WITH MEALS
Status: DISCONTINUED | OUTPATIENT
Start: 2025-01-27 | End: 2025-02-06

## 2025-01-27 RX ORDER — METOCLOPRAMIDE HYDROCHLORIDE 5 MG/ML
5 INJECTION INTRAMUSCULAR; INTRAVENOUS EVERY 8 HOURS PRN
Status: DISCONTINUED | OUTPATIENT
Start: 2025-01-27 | End: 2025-02-06

## 2025-01-27 RX ORDER — ACETAMINOPHEN 500 MG
500 TABLET ORAL EVERY 4 HOURS PRN
Status: DISCONTINUED | OUTPATIENT
Start: 2025-01-27 | End: 2025-02-06

## 2025-01-27 RX ORDER — IPRATROPIUM BROMIDE AND ALBUTEROL SULFATE 2.5; .5 MG/3ML; MG/3ML
3 SOLUTION RESPIRATORY (INHALATION)
Status: DISCONTINUED | OUTPATIENT
Start: 2025-01-27 | End: 2025-01-27

## 2025-01-27 RX ORDER — MESALAMINE 400 MG/1
1200 CAPSULE, DELAYED RELEASE ORAL 2 TIMES DAILY
Status: DISCONTINUED | OUTPATIENT
Start: 2025-01-27 | End: 2025-02-06

## 2025-01-27 RX ORDER — LEVOTHYROXINE SODIUM 50 UG/1
50 TABLET ORAL
Status: DISCONTINUED | OUTPATIENT
Start: 2025-01-28 | End: 2025-02-06

## 2025-01-27 RX ORDER — ALBUTEROL SULFATE 5 MG/ML
10 SOLUTION RESPIRATORY (INHALATION) ONCE
Status: COMPLETED | OUTPATIENT
Start: 2025-01-27 | End: 2025-01-27

## 2025-01-27 RX ORDER — TAMSULOSIN HYDROCHLORIDE 0.4 MG/1
0.4 CAPSULE ORAL NIGHTLY
Status: DISCONTINUED | OUTPATIENT
Start: 2025-01-27 | End: 2025-02-06

## 2025-01-27 RX ORDER — SODIUM CHLORIDE 9 MG/ML
INJECTION, SOLUTION INTRAVENOUS CONTINUOUS
Status: DISCONTINUED | OUTPATIENT
Start: 2025-01-27 | End: 2025-01-27

## 2025-01-27 RX ORDER — METHYLPREDNISOLONE SODIUM SUCCINATE 40 MG/ML
40 INJECTION INTRAMUSCULAR; INTRAVENOUS EVERY 8 HOURS
Status: DISCONTINUED | OUTPATIENT
Start: 2025-01-27 | End: 2025-01-29

## 2025-01-27 RX ORDER — METHYLPREDNISOLONE SODIUM SUCCINATE 125 MG/2ML
125 INJECTION INTRAMUSCULAR; INTRAVENOUS ONCE
Status: COMPLETED | OUTPATIENT
Start: 2025-01-27 | End: 2025-01-27

## 2025-01-27 RX ORDER — FLUTICASONE PROPIONATE AND SALMETEROL 500; 50 UG/1; UG/1
1 POWDER RESPIRATORY (INHALATION) 2 TIMES DAILY
Status: DISCONTINUED | OUTPATIENT
Start: 2025-01-27 | End: 2025-02-06

## 2025-01-27 RX ORDER — CARVEDILOL 3.12 MG/1
3.12 TABLET ORAL 2 TIMES DAILY WITH MEALS
Status: DISCONTINUED | OUTPATIENT
Start: 2025-01-27 | End: 2025-02-01

## 2025-01-27 RX ORDER — ACETAMINOPHEN/DIPHENHYDRAMINE 500MG-25MG
1 TABLET ORAL NIGHTLY
COMMUNITY

## 2025-01-27 RX ORDER — HEPARIN SODIUM 5000 [USP'U]/ML
5000 INJECTION, SOLUTION INTRAVENOUS; SUBCUTANEOUS EVERY 8 HOURS SCHEDULED
Status: DISCONTINUED | OUTPATIENT
Start: 2025-01-27 | End: 2025-02-06

## 2025-01-27 NOTE — ED INITIAL ASSESSMENT (HPI)
Pt to ER via wheelchair with son, speaks [place] son to help translate, states SOB on exertion since Thursday with cough giving nebulizer at home with no relief. Denies dizzy or lightheaded denies CP currently. Denies fever or chills. Cough worsened in past 24 hours.

## 2025-01-27 NOTE — ED PROVIDER NOTES
Patient Seen in: [facility] Emergency Department      History     Chief Complaint   Patient presents with    Shortness Of Breath     Stated Complaint: shortness of breath, hx COPD    Subjective:   89-year-old male, history of COPD, presents with shortness of breath, onset a few days ago, using his nebulizers at home with little improvement, uses rescue inhaler several times a day, some vitamin for evaluation.  This is happened before.  No fever.  No chest pain.              Objective:     Past Medical History:    Abdominal distention    Acute encephalopathy    Anemia    Arthritis    Back problem    Cataract    Chronic lung disease    Congestive heart disease (HCC)    COPD (chronic obstructive pulmonary disease) (HCC)    Crohn disease (HCC)    Crohn's disease of large intestine without complication (HCC)    Diarrhea, unspecified    Disorder of thyroid    Diverticula of small intestine    Diverticulosis of large intestine    Frequent use of laxatives    Hearing impairment    High blood pressure    High cholesterol    History of blood transfusion    Hyperthyroidism    Leg swelling    Mitral valve disorder    leaky mitral valve    Muscle weakness    Osteoarthrosis, unspecified whether generalized or localized, unspecified site    cervical and lumbar spine    Osteoporosis    Other and unspecified hyperlipidemia    Pulmonary embolism (HCC)    Pulmonary emphysema (HCC)    Renal insufficiency    Shortness of breath    Thyroid disease    Unspecified essential hypertension    Wheezing              Past Surgical History:   Procedure Laterality Date    Angiogram      2009    Cataract      Colonoscopy  06/30/2014    Procedure: COLONOSCOPY;  Surgeon: Jaz Carrasquillo DO;  Location:  ENDOSCOPY    Egd N/A 01/04/2017    Procedure: ESOPHAGOGASTRODUODENOSCOPY (EGD);  Surgeon: Gustabo Johnson MD;  Location:  MAIN OR    Egd N/A 01/18/2017    Procedure: ESOPHAGOGASTRODUODENOSCOPY (EGD);  Surgeon: Jaz Carrasquillo DO;   Location:  ENDOSCOPY    Hernia surgery      Knee replacement surgery      Other surgical history      6 yeara ago colonscopy with polpys    Sigmoidoscopy,diagnostic      Total knee replacement                  Social History     Socioeconomic History    Marital status:    Tobacco Use    Smoking status: Former     Current packs/day: 0.00     Average packs/day: 2.0 packs/day for 35.0 years (70.0 ttl pk-yrs)     Types: Cigarettes     Quit date: 1987     Years since quittin.0    Smokeless tobacco: Former    Tobacco comments:     30 years   Vaping Use    Vaping status: Never Used   Substance and Sexual Activity    Alcohol use: Not Currently    Drug use: No                  Physical Exam     ED Triage Vitals [25 1139]   /59   Pulse 66   Resp 18   Temp 98.5 °F (36.9 °C)   Temp src Temporal   SpO2 95 %   O2 Device None (Room air)       Current Vitals:   Vital Signs  BP: 145/68  Pulse: 72  Resp: 17  Temp: 98.5 °F (36.9 °C)  Temp src: Temporal  MAP (mmHg): 91    Oxygen Therapy  SpO2: 100 %  O2 Device: None (Room air)        Physical Exam  Vitals and nursing note reviewed.   HENT:      Head: Normocephalic.   Cardiovascular:      Rate and Rhythm: Normal rate.   Pulmonary:      Effort: Tachypnea present.      Breath sounds: Examination of the right-middle field reveals decreased breath sounds. Examination of the left-middle field reveals decreased breath sounds. Examination of the right-lower field reveals decreased breath sounds. Examination of the left-lower field reveals decreased breath sounds. Decreased breath sounds present.   Chest:      Chest wall: No tenderness.   Musculoskeletal:         General: Normal range of motion.      Cervical back: Normal range of motion and neck supple.      Right lower leg: No tenderness. No edema.      Left lower leg: No tenderness. No edema.   Skin:     General: Skin is warm and dry.   Neurological:      Mental Status: He is alert.             ED Course      Labs Reviewed   COMP METABOLIC PANEL (14) - Abnormal; Notable for the following components:       Result Value    Creatinine 1.33 (*)     eGFR-Cr 51 (*)     Total Protein 5.5 (*)     All other components within normal limits   CBC WITH DIFFERENTIAL WITH PLATELET - Abnormal; Notable for the following components:    RBC 3.39 (*)     HGB 10.6 (*)     HCT 30.3 (*)     .0 (*)     Lymphocyte Absolute 0.56 (*)     All other components within normal limits   PRO BETA NATRIURETIC PEPTIDE - Abnormal; Notable for the following components:    Pro-Beta Natriuretic Peptide 1,702 (*)     All other components within normal limits   SARS-COV-2/FLU A AND B/RSV BY PCR (GENEXPERT) - Abnormal; Notable for the following components:    Influenza A by PCR Positive (*)     All other components within normal limits    Narrative:     This test is intended for the qualitative detection and differentiation of SARS-CoV-2, influenza A, influenza B, and respiratory syncytial virus (RSV) viral RNA in nasopharyngeal or nares swabs from individuals suspected of respiratory viral infection consistent with COVID-19 by their healthcare provider. Signs and symptoms of respiratory viral infection due to SARS-CoV-2, influenza, and RSV can be similar.    Test performed using the Xpert Xpress SARS-CoV-2/FLU/RSV (real time RT-PCR)  assay on the [employer] instrument, [employer], [place] [address].   This test is being used under the Food and Drug Administration's Emergency Use Authorization.    The authorized Fact Sheet for Healthcare Providers for this assay is available upon request from the laboratory.   TROPONIN I HIGH SENSITIVITY - Normal   RAINBOW DRAW LAVENDER   RAINBOW DRAW LIGHT GREEN   RAINBOW DRAW BLUE     EKG    Rate, intervals and axes as noted on EKG Report.  Rate: 69  Rhythm: Sinus Rhythm  Reading: EKG sinus rhythm 69 bpm.  Normal axis.  No ST elevations.  When compared to December 2024, no significant changes are noted    Patient  placed on cardiac monitor for telemetry monitoring secondary to sob. Interpretation at bedside by me is sinus rhythm.                         MDM      XR CHEST AP PORTABLE  (CPT=71045)    Result Date: 1/27/2025  CONCLUSION:  No acute airspace disease.   LOCATION:  [facility]      Dictated by (CST): Mauro Norwood MD on 1/27/2025 at 12:26 PM     Finalized by (CST): Mauro Norwood MD on 1/27/2025 at 12:26 PM        I independent interpreted the x-ray of the chest without any obvious signs of acute infiltrate    Differential diagnosis includes, but not limited to, viral syndrome, bacterial infection, reactive airway disease, CHF, bronchospasm, PE, ACS    Son at bedside helpful to provide information on the history presenting illness    External chart review demonstrates remote outpatient visit with pulmonary Dr. Guerrero    89-year-old male presents with respiratory failure secondary to COPD exacerbation and bronchospasm likely secondary to influenza A.  Onset of symptoms couple days ago.  Diminished breath sounds diffusely upon arrival.  After hour-long continuous nebulizer treatment he is much more open now and audibly wheezing.  Very tachypneic and short of breath with minimal exertion.  Not hypoxic at this time.  Got steroids as well.  Discussed with his son.  Discussed with Dr. Warren who will see him in consultation.  Admitted to Dr. Robles, awaiting bed assignment.  Discussed with the son.  Blood pressure stable.          Admission disposition: 1/27/2025  2:00 PM           Medical Decision Making      Disposition and Plan     Clinical Impression:  1. Acute bronchospasm    2. Influenza A    3. COPD exacerbation (HCC)    4. Acute respiratory failure, unspecified whether with hypoxia or hypercapnia (HCC)         Disposition:  Admit  1/27/2025  2:00 pm    Follow-up:  No follow-up provider specified.        Medications Prescribed:  Current Discharge Medication List              Supplementary Documentation:         [facility]  Problems       Present on Admission  Date Reviewed: 1/23/2025            ICD-10-CM Noted POA    * (Principal) Acute bronchospasm J98.01 1/27/2025 Unknown

## 2025-01-27 NOTE — CONSULTS
Pulmonary H&P/Consult       NAME: Syed Cooney - ROOM: B8/B8 - MRN: [id number] - Age: 89 year old - :  1934    Date of Admission: 2025 11:43 AM  Admission Diagnosis: No admission diagnoses are documented for this encounter.  Reason for consult: COPD exacerbation    History of Present Illness: 89 y/o w/ h/o COPD on Pulmicort/Brovana presented to EDW w/ c/o SOB.  Pt had been SOB for a few days and was using nebs at home w/ little improvement.  In the ED he was given an hour long neb and steroids and noted some improvement with that.  He was also noted to be positive for Flu A.  Currently he doesn't feel much improvement.  Per his son they were taking ICS/LABA nebs for the last 9 mths but he felt that the nebs were making the pt cough more and have more trouble breathing recently so he stopped taking them about 12 days ago.  Son would like to go back to [facility].      Past Medical History:    Abdominal distention    Acute encephalopathy    Anemia    Arthritis    Back problem    Cataract    Chronic lung disease    Congestive heart disease (HCC)    COPD (chronic obstructive pulmonary disease) (HCC)    Crohn disease (HCC)    Crohn's disease of large intestine without complication (HCC)    Diarrhea, unspecified    Disorder of thyroid    Diverticula of small intestine    Diverticulosis of large intestine    Frequent use of laxatives    Hearing impairment    High blood pressure    High cholesterol    History of blood transfusion    Hyperthyroidism    Leg swelling    Mitral valve disorder    leaky mitral valve    Muscle weakness    Osteoarthrosis, unspecified whether generalized or localized, unspecified site    cervical and lumbar spine    Osteoporosis    Other and unspecified hyperlipidemia    Pulmonary embolism (HCC)    Pulmonary emphysema (HCC)    Renal insufficiency    Shortness of breath    Thyroid disease    Unspecified essential hypertension    Wheezing      Past Surgical History:   Procedure  Laterality Date    Angiogram      2009    Cataract      Colonoscopy  2014    Procedure: COLONOSCOPY;  Surgeon: Jaz Carrasquillo DO;  Location:  ENDOSCOPY    Egd N/A 2017    Procedure: ESOPHAGOGASTRODUODENOSCOPY (EGD);  Surgeon: Gustabo Johnson MD;  Location:  MAIN OR    Egd N/A 2017    Procedure: ESOPHAGOGASTRODUODENOSCOPY (EGD);  Surgeon: Jaz Carrasquillo DO;  Location:  ENDOSCOPY    Hernia surgery      Knee replacement surgery      Other surgical history      6 yeara ago colonscopy with polpys    Sigmoidoscopy,diagnostic      Total knee replacement        Prescriptions Prior to Admission[1]  Allergies[2]    Social History:  Social History     Socioeconomic History    Marital status:      Spouse name: Not on file    Number of children: Not on file    Years of education: Not on file    Highest education level: Not on file   Occupational History    Not on file   Tobacco Use    Smoking status: Former     Current packs/day: 0.00     Average packs/day: 2.0 packs/day for 35.0 years (70.0 ttl pk-yrs)     Types: Cigarettes     Quit date: 1987     Years since quittin.0    Smokeless tobacco: Former    Tobacco comments:     30 years   Vaping Use    Vaping status: Never Used   Substance and Sexual Activity    Alcohol use: Not Currently    Drug use: No    Sexual activity: Not on file   Other Topics Concern    Not on file   Social History Narrative    Not on file     Social Drivers of Health     Financial Resource Strain: Not on file   Food Insecurity: Not on file   Transportation Needs: Not on file   Physical Activity: Not on file   Stress: Not on file   Social Connections: Not on file   Housing Stability: Not on file          Family History:  Family History   Problem Relation Age of Onset    Cancer Brother         Home Medications:  Medications Taking[3]    Scheduled Medication:    Continuous Infusing Medication:   sodium chloride 125 mL/hr at 25 1323     PRN Medication:      REVIEW OF SYSTEMS:   14 point ROS conducted, negative save for above       OBJECTIVE:  Vitals:    01/27/25 1230 01/27/25 1300 01/27/25 1400 01/27/25 1430   BP: 155/68 (!) 162/72 145/68 (!) 162/79   Pulse: 72 74 72 79   Resp: 26 20 17 26   Temp:       TempSrc:       SpO2: 100% 100% 100% 100%   Weight:       Height:           Oxygen Therapy  SpO2: 100 %  O2 Device: None (Room air)                  Wt Readings from Last 3 Encounters:   01/27/25 124 lb (56.2 kg)   01/15/25 123 lb (55.8 kg)   12/08/24 124 lb (56.2 kg)         Intake/Output Summary (Last 24 hours) at 1/27/2025 1535  Last data filed at 1/27/2025 1323  Gross per 24 hour   Intake 1000 ml   Output --   Net 1000 ml       BP (!) 162/80   Pulse 60   Temp 98.5 °F (36.9 °C) (Temporal)   Resp 17   Ht 5' (1.524 m)   Wt 124 lb (56.2 kg)   SpO2 100%   BMI 24.22 kg/m²     General Appearance:    Alert, cooperative, appears stated age   Head:    Normocephalic, without obvious abnormality, atraumatic   Eyes:    PERRL, conjunctiva/corneas clear, EOM's intact, both eyes   Throat:   Lips, mucosa, and tongue normal; teeth and gums normal   Neck:   Supple, symmetrical, trachea midline, no adenopathy;        thyroid:  No enlargement/tenderness/nodules; no carotid    bruit or JVD   Back:     Symmetric, no curvature, ROM normal, no CVA tenderness   Lungs:     Diminished BS bilaterally, respirations unlabored   Chest wall:    No tenderness or deformity   Heart:    Regular rate and rhythm, S1 and S2 normal, no murmur, rub   or gallop   Abdomen:     Soft, non-tender, bowel sounds active all four quadrants,     no masses, no organomegaly   Extremities:   Extremities normal, atraumatic, no cyanosis or edema   Pulses:   2+ and symmetric all extremities   Skin:   Skin color, texture, turgor normal, no rashes or lesions   Neurologic:   CNII-XII intact. Normal strength, sensation and reflexes       throughout       Labs reviewed as noted below    Imaging: chest x-ray reviewed-  clear lung fields kenny    ASSESSMENT/PLAN:    COPD exacerbation  -due to influenza  -cont steroids  -restart LAMA/LABA/ICS, send home on Trelegy per son's request  -will start three times per week azithro as well  -BD protocol  Flu A  -symptoms started 4 days prior, hold on tamiflu  Dispo  -will follow                   Niall Vazquez  [facility]  Pulmonary and Critical Care         [1] (Not in a hospital admission)  [2]   Allergies  Allergen Reactions    Brovana [Arformoterol] FATIGUE and SHORTNESS OF BREATH    Pulmicort SHORTNESS OF BREATH   [3]   Current Facility-Administered Medications for the 1/27/25 encounter (Hospital Encounter)   Medication Dose Route Frequency Provider Last Rate Last Admin    [COMPLETED] sulfamethoxazole-trimethoprim DS (Bactrim DS) 800-160 MG per tab 1 tablet  1 tablet Oral Once Chucho Ovalle MD   1 tablet at 12/19/24 1345     Outpatient Medications Marked as Taking for the 1/27/25 encounter (Hospital Encounter)   Medication Sig Dispense Refill    diphenhydrAMINE-APAP, sleep, (TYLENOL PM EXTRA STRENGTH)  MG Oral Tab Take 1 tablet by mouth at bedtime.      albuterol 108 (90 Base) MCG/ACT Inhalation Aero Soln Inhale 2 puffs into the lungs every 6 (six) hours as needed for Wheezing. 1 each 11    carvedilol 3.125 MG Oral Tab Take 1 tablet (3.125 mg total) by mouth 2 (two) times daily with meals. 180 tablet 3    cyanocobalamin 1000 MCG/ML Injection Solution Inject 1 mL (1,000 mcg total) into the muscle every 30 (thirty) days. 12 mL 0    ferrous sulfate 325 (65 FE) MG Oral Tab EC Take 1 tablet (325 mg total) by mouth 2 (two) times daily with meals. 180 tablet 3    fluticasone-umeclidin-vilant (TRELEGY ELLIPTA) 200-62.5-25 MCG/ACT Inhalation Aerosol Powder, Breath Activated Inhale 1 puff into the lungs daily. 3 each 3    levothyroxine 50 MCG Oral Tab Take 1 tablet (50 mcg total) by mouth before breakfast. 90 tablet 3    mesalamine 1.2 g Oral Tab EC Take 2 tablets (2.4 g total)  by mouth daily. (Patient taking differently: Take 1 tablet (1.2 g total) by mouth 2 (two) times daily.) 180 tablet 3    Omeprazole 40 MG Oral Capsule Delayed Release Take 1 capsule (40 mg total) by mouth daily. 90 capsule 3    tamsulosin 0.4 MG Oral Cap Take 1 capsule (0.4 mg total) by mouth nightly. 90 capsule 3    ipratropium-albuterol 0.5-2.5 (3) MG/3ML Inhalation Solution Take 3 mL by nebulization every 4 (four) hours. (Patient taking differently: Take 3 mL by nebulization 3 (three) times daily.) 50 each 0    Cholecalciferol (VITAMIN D) 50 MCG (2000 UT) Oral Cap Take 1 capsule (2,000 Units total) by mouth daily.

## 2025-01-27 NOTE — ED QUICK NOTES
Orders for admission, patient is aware of plan and ready to go upstairs. Any questions, please call ED RN Forrest at extension [phone].     Patient Covid vaccination status: Fully vaccinated     COVID Test Ordered in ED: SARS-CoV-2/Flu A and B/RSV by PCR (GeneXpert)    COVID Suspicion at Admission: N/A    Running Infusions:    sodium chloride 125 mL/hr at 01/27/25 1323        Mental Status/LOC at time of transport: A&Ox4    Other pertinent information: [place], son states understands English  CIWA score: N/A   NIH score:  N/A

## 2025-01-27 NOTE — H&P
[facility]     Syed Cooney Patient Status:  Emergency    1934 MRN [id number]   Location [facility] EMERGENCY DEPARTMENT Attending Keal Ac, DO   Hosp Day # 0 PCP Jeffrey Gan MD     Chief Complaint: SOB, wheezing    Subjective:    History of Present Illness:     Syed Cooney is a 89 year old male with PMhx of CHF, COPD, HTN, DL, epmphysema presents with SOB and wheezing. Pt brought in by family. Pt started to have symptoms about 4 days ago with chills and has progressively gotten worse. He has noticed worsening breathing and wheezing. No known fevers. He has tried duonebs and recscue inhalers without improvement in his symptoms. He denies any CP. No N/V/D/C or abd pain. He was given hour long neb without much improvement.     History/Other:    Past Medical History:  Past Medical History:    Abdominal distention    Acute encephalopathy    Anemia    Arthritis    Back problem    Cataract    Chronic lung disease    Congestive heart disease (HCC)    COPD (chronic obstructive pulmonary disease) (HCC)    Crohn disease (HCC)    Crohn's disease of large intestine without complication (HCC)    Diarrhea, unspecified    Disorder of thyroid    Diverticula of small intestine    Diverticulosis of large intestine    Frequent use of laxatives    Hearing impairment    High blood pressure    High cholesterol    History of blood transfusion    Hyperthyroidism    Leg swelling    Mitral valve disorder    leaky mitral valve    Muscle weakness    Osteoarthrosis, unspecified whether generalized or localized, unspecified site    cervical and lumbar spine    Osteoporosis    Other and unspecified hyperlipidemia    Pulmonary embolism (HCC)    Pulmonary emphysema (HCC)    Renal insufficiency    Shortness of breath    Thyroid disease    Unspecified essential hypertension    Wheezing     Past Surgical History:   Past Surgical History:   Procedure Laterality  Date    Angiogram      2009    Cataract      Colonoscopy  06/30/2014    Procedure: COLONOSCOPY;  Surgeon: Jaz Carrasquillo DO;  Location:  ENDOSCOPY    Egd N/A 01/04/2017    Procedure: ESOPHAGOGASTRODUODENOSCOPY (EGD);  Surgeon: Gustabo Johnson MD;  Location:  MAIN OR    Egd N/A 01/18/2017    Procedure: ESOPHAGOGASTRODUODENOSCOPY (EGD);  Surgeon: Jaz Carrasquillo DO;  Location:  ENDOSCOPY    Hernia surgery      Knee replacement surgery      Other surgical history      6 yeara ago colonscopy with polpys    Sigmoidoscopy,diagnostic      Total knee replacement        Family History:   Family History   Problem Relation Age of Onset    Cancer Brother      Social History:    reports that he quit smoking about 38 years ago. His smoking use included cigarettes. He has a 70 pack-year smoking history. He has quit using smokeless tobacco. He reports that he does not currently use alcohol. He reports that he does not use drugs.     Allergies: Allergies[1]    Medications:  Medications Ordered Prior to Encounter[2]    Review of Systems:   A comprehensive review of systems was completed.    Pertinent positives and negatives noted in the HPI.    Objective:   Physical Exam:    /68   Pulse 72   Temp 98.5 °F (36.9 °C) (Temporal)   Resp 17   Ht 152.4 cm (5')   Wt 124 lb (56.2 kg)   SpO2 100%   BMI 24.22 kg/m²   General: No acute distress, Alert  Respiratory: + wheezing, decreased BS  Cardiovascular: S1, S2. Regular rate and rhythm  Abdomen: Soft, Non-tender, non-distended, positive bowel sounds  Neuro: No new focal deficits  Extremities: No edema      Results:    Labs:      Labs Last 24 Hours:    Recent Labs   Lab 01/27/25  1213   RBC 3.39*   HGB 10.6*   HCT 30.3*   MCV 89.4   MCH 31.3   MCHC 35.0   RDW 12.7   NEPRELIM 3.27   WBC 4.2   .0*       Recent Labs   Lab 01/27/25  1213   GLU 99   BUN 23   CREATSERUM 1.33*   EGFRCR 51*   CA 8.8   ALB 3.5      K 4.4      CO2 26.0   ALKPHO 97   AST 27    ALT 12   BILT 0.4   TP 5.5*       Estimated Glomerular Filtration Rate: 50.8 mL/min/1.73m2 (A) (by CKD-EPI based on SCr of 1.33 mg/dL (H)).    Lab Results   Component Value Date    INR 2.86 (H) 09/06/2023    INR 1.13 (H) 08/31/2019    INR 1.10 07/16/2019       Recent Labs   Lab 01/27/25  1213   TROPHS 10       Recent Labs   Lab 01/27/25  1213   PBNP 1,702*       No results for input(s): \"PCT\" in the last 168 hours.    Imaging: Imaging data reviewed in Epic.    Assessment & Plan:      #Acute Bronchospasm  #COPD Exacerbation  #Influenza  Continue nebs and IV steroids  O2 as needed  Pulm to eval  Hold tamiflu at this time given 4 days since symptoms onset    #Ess HTN  Continue home meds    #Dyslipidemia  Statin    #crohns Disease  Continue mesalamine    #Hypothyroid  synthroid          Plan of care discussed with patient, ED Physician     Hema Sharma MD    Supplementary Documentation:     The 21st Century Cures Act makes medical notes like these available to patients in the interest of transparency. Please be advised this is a medical document. Medical documents are intended to carry relevant information, facts as evident, and the clinical opinion of the practitioner. The medical note is intended as peer to peer communication and may appear blunt or direct. It is written in medical language and may contain abbreviations or verbiage that are unfamiliar.                                       [1] No Known Allergies  [2]   Current Facility-Administered Medications on File Prior to Encounter   Medication Dose Route Frequency Provider Last Rate Last Admin    [COMPLETED] sulfamethoxazole-trimethoprim DS (Bactrim DS) 800-160 MG per tab 1 tablet  1 tablet Oral Once Chucho Ovalle MD   1 tablet at 12/19/24 1345    [COMPLETED] predniSONE (Deltasone) tab 40 mg  40 mg Oral Once Salvador Solis MD   40 mg at 12/08/24 1624    [COMPLETED] ipratropium-albuterol (Duoneb) 0.5-2.5 (3) MG/3ML inhalation solution 3 mL  3 mL Nebulization  Once Salvador Solis MD   3 mL at 24 1623    [COMPLETED] iopamidol 76% (ISOVUE-370) injection for power injector  65 mL Intravenous ONCE PRN Salvador Solis MD   65 mL at 24 1740     Current Outpatient Medications on File Prior to Encounter   Medication Sig Dispense Refill    albuterol 108 (90 Base) MCG/ACT Inhalation Aero Soln Inhale 2 puffs into the lungs every 6 (six) hours as needed for Wheezing. 1 each 11    arformoterol 15 MCG/2ML Inhalation Nebu Soln Take 2 mL (15 mcg total) by nebulization 2 (two) times daily. 360 mL 3    carvedilol 3.125 MG Oral Tab Take 1 tablet (3.125 mg total) by mouth 2 (two) times daily with meals. 180 tablet 3    ferrous sulfate 325 (65 FE) MG Oral Tab EC Take 1 tablet (325 mg total) by mouth 2 (two) times daily with meals. 180 tablet 3    fluticasone-umeclidin-vilant (TRELEGY ELLIPTA) 200-62.5-25 MCG/ACT Inhalation Aerosol Powder, Breath Activated Inhale 1 puff into the lungs daily. 3 each 3    levothyroxine 50 MCG Oral Tab Take 1 tablet (50 mcg total) by mouth before breakfast. 90 tablet 3    mesalamine 1.2 g Oral Tab EC Take 2 tablets (2.4 g total) by mouth daily. 180 tablet 3    Omeprazole 40 MG Oral Capsule Delayed Release Take 1 capsule (40 mg total) by mouth daily. 90 capsule 3    tamsulosin 0.4 MG Oral Cap Take 1 capsule (0.4 mg total) by mouth nightly. 90 capsule 3    ipratropium-albuterol 0.5-2.5 (3) MG/3ML Inhalation Solution Take 3 mL by nebulization every 4 (four) hours. 50 each 0    Cholecalciferol (VITAMIN D) 1000 UNITS Oral Tab Take 2,000 Units by mouth daily.        cyanocobalamin 1000 MCG/ML Injection Solution Inject 1 mL (1,000 mcg total) into the muscle every 30 (thirty) days. 12 mL 0    [] predniSONE 20 MG Oral Tab Take 2 tablets (40 mg total) by mouth daily for 5 days. 10 tablet 0    budesonide 0.25 MG/2ML Inhalation Suspension Take 2 mL (0.25 mg total) by nebulization daily. (Patient not taking: Reported on 2025)

## 2025-01-28 LAB
ANION GAP SERPL CALC-SCNC: 11 MMOL/L (ref 0–18)
BASOPHILS # BLD AUTO: 0 X10(3) UL (ref 0–0.2)
BASOPHILS NFR BLD AUTO: 0 %
BUN BLD-MCNC: 22 MG/DL (ref 9–23)
CALCIUM BLD-MCNC: 8.1 MG/DL (ref 8.7–10.6)
CHLORIDE SERPL-SCNC: 108 MMOL/L (ref 98–112)
CO2 SERPL-SCNC: 21 MMOL/L (ref 21–32)
CREAT BLD-MCNC: 1.23 MG/DL
EGFRCR SERPLBLD CKD-EPI 2021: 56 ML/MIN/1.73M2 (ref 60–?)
EOSINOPHIL # BLD AUTO: 0 X10(3) UL (ref 0–0.7)
EOSINOPHIL NFR BLD AUTO: 0 %
ERYTHROCYTE [DISTWIDTH] IN BLOOD BY AUTOMATED COUNT: 12.7 %
GLUCOSE BLD-MCNC: 137 MG/DL (ref 70–99)
HCT VFR BLD AUTO: 29.5 %
HGB BLD-MCNC: 10.6 G/DL
IMM GRANULOCYTES # BLD AUTO: 0.01 X10(3) UL (ref 0–1)
IMM GRANULOCYTES NFR BLD: 0.5 %
LYMPHOCYTES # BLD AUTO: 0.5 X10(3) UL (ref 1–4)
LYMPHOCYTES NFR BLD AUTO: 23.8 %
MCH RBC QN AUTO: 31.9 PG (ref 26–34)
MCHC RBC AUTO-ENTMCNC: 35.9 G/DL (ref 31–37)
MCV RBC AUTO: 88.9 FL
MONOCYTES # BLD AUTO: 0.1 X10(3) UL (ref 0.1–1)
MONOCYTES NFR BLD AUTO: 4.8 %
NEUTROPHILS # BLD AUTO: 1.49 X10 (3) UL (ref 1.5–7.7)
NEUTROPHILS # BLD AUTO: 1.49 X10(3) UL (ref 1.5–7.7)
NEUTROPHILS NFR BLD AUTO: 70.9 %
OSMOLALITY SERPL CALC.SUM OF ELEC: 295 MOSM/KG (ref 275–295)
PLATELET # BLD AUTO: 151 10(3)UL (ref 150–450)
POTASSIUM SERPL-SCNC: 4 MMOL/L (ref 3.5–5.1)
RBC # BLD AUTO: 3.32 X10(6)UL
SODIUM SERPL-SCNC: 140 MMOL/L (ref 136–145)
WBC # BLD AUTO: 2.1 X10(3) UL (ref 4–11)

## 2025-01-28 PROCEDURE — 99232 SBSQ HOSP IP/OBS MODERATE 35: CPT | Performed by: INTERNAL MEDICINE

## 2025-01-28 RX ORDER — HYDRALAZINE HYDROCHLORIDE 20 MG/ML
10 INJECTION INTRAMUSCULAR; INTRAVENOUS EVERY 6 HOURS PRN
Status: DISCONTINUED | OUTPATIENT
Start: 2025-01-28 | End: 2025-02-06

## 2025-01-28 NOTE — PROGRESS NOTES
Pulmonary Progress Note        NAME: Syed Cooney - ROOM: [facility]519-A - MRN: [id number] - Age: 89 year old - : 1934        Last 24hrs: No events overnight, not feeling much better today    OBJECTIVE:  Vitals:    25 0449 25 0728 25 0815 25 0825   BP: 155/83 152/82     BP Location: Right arm Right arm     Pulse: 70 78 77    Resp: 20 18 20    Temp: 97.4 °F (36.3 °C) 97.2 °F (36.2 °C)     TempSrc: Oral Oral     SpO2: 97% 95% 96% 96%   Weight:       Height:           Oxygen Therapy  SpO2: 96 %  O2 Device: None (Room air)  O2 Flow Rate (L/min): 0 L/min  Pulse Oximetry Type: Continuous  Oximetry Probe Site Changed: No  Pulse Ox Probe Location: Left hand                  Intake/Output Summary (Last 24 hours) at 2025 1125  Last data filed at 2025 0700  Gross per 24 hour   Intake 1120 ml   Output 200 ml   Net 920 ml       Scheduled Medication:   carvedilol  3.125 mg Oral BID with meals    ferrous sulfate  325 mg Oral BID with meals    fluticasone-salmeterol  1 puff Inhalation BID    umeclidinium bromide  1 puff Inhalation Daily    levothyroxine  50 mcg Oral Before breakfast    mesalamine DR  1,200 mg Oral BID    pantoprazole  40 mg Oral QAM AC    tamsulosin  0.4 mg Oral Nightly    heparin  5,000 Units Subcutaneous Q8H SCAR    methylPREDNISolone  40 mg Intravenous Q8H    azithromycin  250 mg Oral Once per day on     ipratropium-albuterol  3 mL Nebulization 6 times per day     Continuous Infusing Medication:    General appearance: alert, appears stated age, and cooperative  Lungs: distant BS kenny  Heart: S1, S2 normal, no murmur, click, rub or gallop, regular rate and rhythm  Extremities: extremities normal, atraumatic, no cyanosis or edema    Labs reviewed as noted below      ASSESSMENT/PLAN:    COPD exacerbation  -due to influenza  -cont IV steroids, consider taper to pred tomorrow  -cont LAMA/LABA/ICS, send home on Trelegy per son's request  -cont three  times per week danitza as well, plan to continue at home unless pt has side effects  -BD protocol  Flu A  -symptoms started 4 days prior, hold on tamiflu  Dispo  -will follow      Niall Vazquez  [facility]  Pulmonary and Critical Care

## 2025-01-28 NOTE — PLAN OF CARE
Patient AAOx3-4. Forgetful. Hard of hearing, b/l hearing aids. Glasses. Room air. Scheduled nebs. Tele NSR. Voids. Denies pain. Up stand by/walker. Regular diet. IV solumedrol. Patient rounded on routinely. Patient and family updated on plan of care.    Attempted to take patient for walk in hallway. Patient with increased SOB. MD aware, no new orders. Placed on 2L for comfort.     Afternoon BP elevated, scheduled coreg given, MD paged.      Problem: Patient/Family Goals  Goal: Patient/Family Long Term Goal  Description: Patient's Long Term Goal: discharge home when medically stable    Interventions:  Telemetry monitoring  O2 protocol,   Fall precaution, bed alarm on, non-skid socks on,  call light and bedside table within reach.  Monitor labs, vitals, intake and output.  Electrolyte protocol.  Heparin and SCD for VTE   Nebs q 4 hrs.  Maintain on droplet precaution.   - See additional Care Plan goals for specific interventions  Outcome: Progressing  Goal: Patient/Family Short Term Goal  Description: Patient's Short Term Goal: Breathing is stable tonight    Interventions:   Telemetry monitoring  O2 protocol,   Fall precaution, bed alarm on, non-skid socks on,  call light and bedside table within reach.  Monitor labs, vitals, intake and output.  Electrolyte protocol.  Heparin and SCD for VTE   Nebs q 4 hrs.  Maintain on droplet precaution.     - See additional Care Plan goals for specific interventions  Outcome: Progressing     Problem: CARDIOVASCULAR - ADULT  Goal: Absence of cardiac arrhythmias or at baseline  Description: INTERVENTIONS:  - Continuous cardiac monitoring, monitor vital signs, obtain 12 lead EKG if indicated  - Evaluate effectiveness of antiarrhythmic and heart rate control medications as ordered  - Initiate emergency measures for life threatening arrhythmias  - Monitor electrolytes and administer replacement therapy as ordered  Outcome: Progressing     Problem: RESPIRATORY - ADULT  Goal:  Achieves optimal ventilation and oxygenation  Description: INTERVENTIONS:  - Assess for changes in respiratory status  - Assess for changes in mentation and behavior  - Position to facilitate oxygenation and minimize respiratory effort  - Oxygen supplementation based on oxygen saturation or ABGs  - Provide Smoking Cessation handout, if applicable  - Encourage broncho-pulmonary hygiene including cough, deep breathe, Incentive Spirometry  - Assess the need for suctioning and perform as needed  - Assess and instruct to report SOB or any respiratory difficulty  - Respiratory Therapy support as indicated  - Manage/alleviate anxiety  - Monitor for signs/symptoms of CO2 retention  Outcome: Progressing

## 2025-01-28 NOTE — CM/SW NOTE
Anticipated therapy need: Home with Home Healthcare    SW requested department  ([facility]) to initiate [facility] referral for HHC.     SW/CM to remain available for support and/or discharge planning.    JOSE A Greco  Discharge Planner

## 2025-01-28 NOTE — PROGRESS NOTES
[facility]   part of [facility]     Hospitalist Progress Note     Syed Cooney Patient Status:  Observation    1934 MRN [id number]   Location [facility] 5NW-A Attending Hema Sharma MD   Hosp Day # 0 PCP Jeffrey Gan MD     Chief Complaint:   Chief Complaint   Patient presents with    Shortness Of Breath       Subjective:     Still sob and congested    Objective:    Review of Systems:   6  point ROS completed and was negative, except for pertinent positive and negatives stated in subjective.    Vital signs:  Temp:  [97 °F (36.1 °C)-98.5 °F (36.9 °C)] 97.2 °F (36.2 °C)  Pulse:  [60-87] 77  Resp:  [17-26] 20  BP: (126-189)/(59-90) 152/82  SpO2:  [95 %-100 %] 96 %    Physical Exam:    General: No acute distress.   Respiratory:  course breath sounds  Cardiovascular: S1, S2. Regular rate and rhythm. No murmurs.  Abdomen: Soft, nontender, nondistended.    Extremities: No edema.    Diagnostic Data:    Labs:  Recent Labs   Lab 25  1213 25  0640   WBC 4.2 2.1*   HGB 10.6* 10.6*   MCV 89.4 88.9   .0* 151.0       Recent Labs   Lab 25  1213 25  0640   GLU 99 137*   BUN 23 22   CREATSERUM 1.33* 1.23   CA 8.8 8.1*   ALB 3.5  --     140   K 4.4 4.0    108   CO2 26.0 21.0   ALKPHO 97  --    AST 27  --    ALT 12  --    BILT 0.4  --    TP 5.5*  --        Estimated Creatinine Clearance: 28.2 mL/min (based on SCr of 1.23 mg/dL).    No results for input(s): \"PTP\", \"INR\" in the last 168 hours.         COVID-19 Lab Results    COVID-19  Lab Results   Component Value Date    COVID19 Not Detected 2025    COVID19 Not Detected 09/10/2024    COVID19 Not Detected 2023       Pro-Calcitonin  No results for input(s): \"PCT\" in the last 168 hours.    Cardiac  Recent Labs   Lab 25  1213   PBNP 1,702*       Creatinine Kinase  No results for input(s): \"CK\" in the last 168 hours.    Inflammatory Markers  No results for input(s): \"CRP\", \"REMY\",  \"LDH\", \"DDIMER\" in the last 168 hours.    Recent Labs   Lab 01/27/25  1213   TROPHS 10       Imaging: Imaging data reviewed in Epic.    Medications:    carvedilol  3.125 mg Oral BID with meals    ferrous sulfate  325 mg Oral BID with meals    fluticasone-salmeterol  1 puff Inhalation BID    umeclidinium bromide  1 puff Inhalation Daily    levothyroxine  50 mcg Oral Before breakfast    mesalamine DR  1,200 mg Oral BID    pantoprazole  40 mg Oral QAM AC    tamsulosin  0.4 mg Oral Nightly    heparin  5,000 Units Subcutaneous Q8H SCAR    methylPREDNISolone  40 mg Intravenous Q8H    azithromycin  250 mg Oral Once per day on Monday Wednesday Friday    ipratropium-albuterol  3 mL Nebulization 6 times per day       Assessment & Plan:      #acute COPD Exacerbation secondary to influenza infection-nebs/steroids-Hold tamiflu at this time given 4 days since symptoms onset; pulm following  -3x/week azithromycin  -LAMA/LABA/ICS for now; trelegy on discharge per pulm  -s/p dose of lasix; added mucinex    #Ess HTN-Continue home meds     #Dyslipidemia-Statin     #crohns Disease-Continue mesalamine     #Hypothyroid-synthroid    #right foot swelling; mild; monitor      Plan of care discussed with patient and RN    Trever Nielsen MD    Supplementary Documentation:     Quality:  DVT Prophylaxis: heparin  CODE status: see chart  Thomas: no  Central line: no      Estimated date of discharge: tbd  At this point Mr. Cooney is expected to be discharge to: tbd             **Certification      PHYSICIAN Certification of Need for Inpatient Hospitalization - Initial Certification    Patient will require inpatient services that will reasonably be expected to span two midnight's based on the clinical documentation in H+P.   Based on patients current state of illness, I anticipate that, after discharge, patient will require TBD.

## 2025-01-28 NOTE — PROGRESS NOTES
[facility]   part of [facility]     Hospitalist Progress Note     Syed Cooney Patient Status:  Observation    1934 MRN [id number]   Location [facility] 5NW-A Attending Hema Sharma MD   Hosp Day # 0 PCP Jeffrey Gan MD     Chief Complaint: SOB    Subjective:     Patient without acute events overnight. On RA. Still with cough and SOB with exertion.     Objective:    Review of Systems:   A comprehensive review of systems was completed; pertinent positive and negatives stated in subjective.    Vital signs:  Temp:  [97 °F (36.1 °C)-98.5 °F (36.9 °C)] 97.2 °F (36.2 °C)  Pulse:  [60-87] 77  Resp:  [17-26] 20  BP: (126-189)/(59-90) 152/82  SpO2:  [95 %-100 %] 96 %    Physical Exam:    General: No acute distress  Respiratory: decreased BS, coarse BS  Cardiovascular: S1, S2, regular rate and rhythm  Abdomen: Soft, Non-tender, non-distended, positive bowel sounds  Neuro: No new focal deficits.   Extremities: No edema      Diagnostic Data:    Labs:  Recent Labs   Lab 25  1213 25  0640   WBC 4.2 2.1*   HGB 10.6* 10.6*   MCV 89.4 88.9   .0* 151.0       Recent Labs   Lab 25  1213 25  0640   GLU 99 137*   BUN 23 22   CREATSERUM 1.33* 1.23   CA 8.8 8.1*   ALB 3.5  --     140   K 4.4 4.0    108   CO2 26.0 21.0   ALKPHO 97  --    AST 27  --    ALT 12  --    BILT 0.4  --    TP 5.5*  --        Estimated Glomerular Filtration Rate: 55.8 mL/min/1.73m2 (by CKD-EPI based on SCr of 1.23 mg/dL).    Recent Labs   Lab 25  1213   TROPHS 10       No results for input(s): \"PTP\", \"INR\" in the last 168 hours.               Microbiology    No results found for this visit on 25.      Imaging: Reviewed in Epic.    Medications:    carvedilol  3.125 mg Oral BID with meals    ferrous sulfate  325 mg Oral BID with meals    fluticasone-salmeterol  1 puff Inhalation BID    umeclidinium bromide  1 puff Inhalation Daily    levothyroxine  50 mcg Oral  Before breakfast    mesalamine DR  1,200 mg Oral BID    pantoprazole  40 mg Oral QAM AC    tamsulosin  0.4 mg Oral Nightly    heparin  5,000 Units Subcutaneous Q8H SCAR    methylPREDNISolone  40 mg Intravenous Q8H    azithromycin  250 mg Oral Once per day on Monday Wednesday Friday    ipratropium-albuterol  3 mL Nebulization 6 times per day       Assessment & Plan:      #Acute Bronchospasm  #COPD Exacerbation  #Influenza A  Continue nebs and IV steroids  Azithro empiric  O2 as needed, now on RA  Pulm to eval  Hold tamiflu at this time given 4 days since symptoms onset     #Ess HTN  Continue home meds     #Dyslipidemia  Statin     #crohns Disease  Continue mesalamine     #Hypothyroid  synthroid      Hema Sharma MD    Supplementary Documentation:     Quality:  DVT Mechanical Prophylaxis:   SCDs,    DVT Pharmacologic Prophylaxis   Medication    heparin (Porcine) 5000 UNIT/ML injection 5,000 Units                Code Status: Full Code  Thomas: No urinary catheter in place  Thomas Duration (in days):   Central line:    YAS:     Discharge is dependent on: progress  At this point Mr. Cooney is expected to be discharge to: home    The 21st Century Cures Act makes medical notes like these available to patients in the interest of transparency. Please be advised this is a medical document. Medical documents are intended to carry relevant information, facts as evident, and the clinical opinion of the practitioner. The medical note is intended as peer to peer communication and may appear blunt or direct. It is written in medical language and may contain abbreviations or verbiage that are unfamiliar.              **Certification      PHYSICIAN Certification of Need for Inpatient Hospitalization - Initial Certification    Patient will require inpatient services that will reasonably be expected to span two midnight's based on the clinical documentation in H+P.   Based on patients current state of illness, I anticipate that,  after discharge, patient will require TBD.

## 2025-01-28 NOTE — OCCUPATIONAL THERAPY NOTE
OCCUPATIONAL THERAPY EVALUATION - INPATIENT     Room Number: 519/519-A  Evaluation Date: 1/28/2025  Type of Evaluation: Initial  Presenting Problem: Acute bronchospasm, SOB with exertion, cough    Physician Order: IP Consult to Occupational Therapy  Reason for Therapy: ADL/IADL Dysfunction and Discharge Planning    OCCUPATIONAL THERAPY ASSESSMENT   Patient is currently functioning near baseline with toileting, lower body dressing, transfers, static sitting balance, dynamic sitting balance, static standing balance, dynamic standing balance, maintaining seated position, functional standing tolerance, energy conservation strategies, and aerobic capacity. Prior to admission, patient's baseline is modIND with ADLs, recently has been requiring increased assistance from son and/or caregiver for ADLs.  Patient is requiring moderate assistance as a result of the following impairments: decreased functional strength, decreased functional reach, and decreased endurance. Occupational Therapy will continue to follow for duration of hospitalization.    Patient will benefit from continued skilled OT Services at discharge to promote prior level of function and safety with additional support and return home with home health OT    History Related to Current Admission: Patient is a 89 year old male admitted on 1/27/2025 with Presenting Problem: Acute bronchospasm, SOB with exertion, cough. Co-Morbidities : CHF, COPD, HTN, DL, emphysema    WEIGHT BEARING RESTRICTION       Recommendations for nursing staff:   Transfers: up x 1 assist, CGA, RW  Toileting location: Toilet    EVALUATION SESSION:  Patient Start of Session: seated upright in chair    FUNCTIONAL TRANSFER ASSESSMENT  Sit to Stand: Chair  Chair: Contact Guard Assist  Toilet Transfer: Contact Guard Assist    BED MOBILITY  Supine to Sit : Not tested  Sit to Supine (OT): Not Tested    BALANCE ASSESSMENT  Static Sitting: Supervision  Static Standing: Contact Guard Assist  Dynamic  Sitting: Supervision  Dynamic Standing: Contact Guard Assist    FUNCTIONAL ADL ASSESSMENT  LB Dressing Standing: Moderate Assist (doffed brief standing at toilet with SBA, donned brief with modA for posterior adjustment)  Toileting Seated: Stand-by Assist    ACTIVITY TOLERANCE: Pt participated in dynamic standing activity to increase tolerance for standing ADLs; pt tolerated < 3 min dynamic standing activity, pt reports feeling SOB, requesting to go and sit back in room.                         O2 SATURATIONS  Oxygen Therapy  SPO2% on Room Air at Rest: 92    COGNITION  Overall Cognitive Status:  WFL - within functional limits    Upper Extremity   ROM: within functional limits  Strength: within functional limits    EDUCATION PROVIDED  Patient Education : Role of Occupational Therapy; Plan of Care; Functional Transfer Techniques; Fall Prevention; Posture/Positioning; Energy Conservation; Proper Body Mechanics  Patient's Response to Education: Verbalized Understanding; Returned Demonstration; Requires Further Education    Equipment used: RW  Demonstrates functional use     Therapist comments: Pt pleasant and cooperative throughout session, pt insistent on calling son at beginning of session for PLOF information. Pt completed dynamic standing activity to increase tolerance for standing ADLs, however pt tolerated ~ 3 min before requesting to go back to room as pt experiencing SOB; O2 levels applied at end of session, appeared WNL. Pt also completed toileting task, observed to doff brief without difficulty; after standing from toilet, pt required assist for donning brief. Pt safely seated in chair at end of session.    Patient End of Session: Up in chair;Needs met;Call light within reach;RN aware of session/findings;All patient questions and concerns addressed;Hospital anti-slip socks;Alarm set    OCCUPATIONAL PROFILE    HOME SITUATION  Type of Home: [facility]  Home Layout: Two level;Bed/bath upstairs  Lives With:  Son    Toilet and Equipment: Standard height toilet  Shower/Tub and Equipment: Tub-shower combo;Shower chair  Other Equipment:  (RW, cane)          Drives: No  Patient Regularly Uses: Hearing aides    Prior Level of Function: Per pt/pt son report, pt lives at home with son, son assists with ADLs as needed, pt also has a caregiver who assists with ADLs. Per pt son, pt has been requiring increased assist with ADLs. Pt typically uses a RW or cane for mobility, has been using RW more recently.    SUBJECTIVE   \"I am not feeling any better\" re: breathing    PAIN ASSESSMENT  Ratin          OBJECTIVE  Precautions:  needed  Fall Risk: High fall risk    ASSESSMENTS    AM-PAC ‘6-Clicks’ Inpatient Daily Activity Short Form  -   Putting on and taking off regular lower body clothing?: A Little  -   Bathing (including washing, rinsing, drying)?: A Little  -   Toileting, which includes using toilet, bedpan or urinal? : None  -   Putting on and taking off regular upper body clothing?: None  -   Taking care of personal grooming such as brushing teeth?: None  -   Eating meals?: None    AM-PAC Score:  Score: 22  Approx Degree of Impairment: 25.8%  Standardized Score (AM-PAC Scale): 47.1    ADDITIONAL TESTS     NEUROLOGICAL FINDINGS      COGNITION ASSESSMENTS     PLAN  OT Device Recommendations:  (RW)  OT Treatment Plan: Balance activities;Energy conservation/work simplification techniques;ADL training;IADL training;Functional transfer training;UE strengthening/ROM;Endurance training;Patient/Family education;Patient/Family training;Equipment eval/education;Compensatory technique education;Continued evaluation  Rehab Potential : Good  Frequency: 3-5x/week  Number of Visits to Meet Established Goals: 3    ADL Goals   Patient will perform upper body dressing:  with supervision  Patient will perform lower body dressing:  with supervision  Patient will perform toileting: with supervision    Functional Transfer Goals  Patient  will transfer from supine to sit:  with supervision  Patient will transfer from sit to stand:  with supervision  Patient will transfer to toilet:  with supervision    UE Exercise Program Goal  Patient will be supervision with bilateral AROM HEP (home exercise program).    Additional Goals  Pt will tolerate standing > 3 min to increase tolerance for standing ADLs.    Patient Evaluation Complexity Level:   Occupational Profile/Medical History LOW - Brief history including review of medical or therapy records    Specific performance deficits impacting engagement in ADL/IADL LOW  1 - 3 performance deficits    Client Assessment/Performance Deficits MODERATE - Comorbidities and min to mod modifications of tasks    Clinical Decision Making LOW - Analysis of occupational profile, problem-focused assessments, limited treatment options    Overall Complexity LOW     OT Session Time: 25 minutes  Self-Care Home Management: 15 minutes

## 2025-01-28 NOTE — PLAN OF CARE
Received patient awake and alert x 4. Son at bedside.  Normal sinus rhythm on Telemetry monitoring  O2 protocol, , Room air, sats 95%, lungs wheezing, coarse.   Fall precaution, bed alarm on, non-skid socks on,  call light and bedside table within reach.  Monitor labs, vitals, intake and output.  Electrolyte protocol.  Heparin and SCD for VTE   Nebs q 4 hrs.  Maintain on droplet precaution.     Problem: Patient/Family Goals  Goal: Patient/Family Long Term Goal  Description: Patient's Long Term Goal: discharge home when medically stable    Interventions:  Telemetry monitoring  O2 protocol,   Fall precaution, bed alarm on, non-skid socks on,  call light and bedside table within reach.  Monitor labs, vitals, intake and output.  Electrolyte protocol.  Heparin and SCD for VTE   Nebs q 4 hrs.  Maintain on droplet precaution.   - See additional Care Plan goals for specific interventions  Outcome: Progressing  Goal: Patient/Family Short Term Goal  Description: Patient's Short Term Goal: Breathing is stable tonight    Interventions:   Telemetry monitoring  O2 protocol,   Fall precaution, bed alarm on, non-skid socks on,  call light and bedside table within reach.  Monitor labs, vitals, intake and output.  Electrolyte protocol.  Heparin and SCD for VTE   Nebs q 4 hrs.  Maintain on droplet precaution.     - See additional Care Plan goals for specific interventions  Outcome: Progressing     Problem: CARDIOVASCULAR - ADULT  Goal: Absence of cardiac arrhythmias or at baseline  Description: INTERVENTIONS:  - Continuous cardiac monitoring, monitor vital signs, obtain 12 lead EKG if indicated  - Evaluate effectiveness of antiarrhythmic and heart rate control medications as ordered  - Initiate emergency measures for life threatening arrhythmias  - Monitor electrolytes and administer replacement therapy as ordered  Outcome: Progressing     Problem: RESPIRATORY - ADULT  Goal: Achieves optimal ventilation and  oxygenation  Description: INTERVENTIONS:  - Assess for changes in respiratory status  - Assess for changes in mentation and behavior  - Position to facilitate oxygenation and minimize respiratory effort  - Oxygen supplementation based on oxygen saturation or ABGs  - Provide Smoking Cessation handout, if applicable  - Encourage broncho-pulmonary hygiene including cough, deep breathe, Incentive Spirometry  - Assess the need for suctioning and perform as needed  - Assess and instruct to report SOB or any respiratory difficulty  - Respiratory Therapy support as indicated  - Manage/alleviate anxiety  - Monitor for signs/symptoms of CO2 retention  Outcome: Progressing

## 2025-01-28 NOTE — PHYSICAL THERAPY NOTE
PHYSICAL THERAPY EVALUATION - INPATIENT     Room Number: 519/519-A  Evaluation Date: 1/28/2025  Type of Evaluation: Initial  Physician Order: PT Eval and Treat    Presenting Problem: influenza, COPD  Co-Morbidities : CHF, COPD, emphysema, CHF, HTN  Reason for Therapy: Mobility Dysfunction and Discharge Planning    PHYSICAL THERAPY ASSESSMENT   Patient is a 89 year old male admitted 1/27/2025 for dyspnea. Pt diagnosed with influenza and COPD exacerbation.  Prior to admission, patient's baseline is supervision.  Patient is currently functioning near baseline with bed mobility, transfers, gait, and stair negotiation.  Patient is requiring contact guard assist as a result of the following impairments: decreased endurance/aerobic capacity, impaired standing balance, and decreased muscular endurance.  Physical Therapy will continue to follow for duration of hospitalization.    Patient will benefit from continued skilled PT Services at discharge to promote prior level of function.  Anticipate patient will return home with home health PT.    PLAN DURING HOSPITALIZATION  Nursing Mobility Recommendation : 1 Assist     PT Treatment Plan: Bed mobility;Endurance;Energy conservation;Patient education;Gait training;Balance training;Transfer training;Stair training  Rehab Potential : Good  Frequency (Obs): 3-5x/week     CURRENT GOALS    Goal #1 Patient is able to demonstrate supine - sit EOB @ level: supervision     Goal #2 Patient is able to demonstrate transfers EOB to/from BSC at assistance level: supervision     Goal #3 Patient is able to ambulate 150 feet with assist device: walker - rolling at assistance level: supervision     Goal #4 Pt will ascend/descend 1 flight of stairs with supervision   Goal #5    Goal #6    Goal Comments: Goals established on 1/28/2025          HOME SITUATION  [place]  Home Layout: Two level;Bed/bath upstairs                     Lives With: Son    Drives: No   Patient Regularly Uses:  Hearing aides      Prior Level of [place]: Pt lives with son in [address] [place] [address]. Pt has assist with ADL and mobility as needed. Pt has a part time CG daily- hours vary based on when family is there. Pt ambulatory with RW. Pt attends Senior Classes a few days per week.     SUBJECTIVE  \"I'm tired. I need to sit down.\"     OBJECTIVE  Precautions:  needed;Hard of hearing  Fall Risk: High fall risk    WEIGHT BEARING RESTRICTION     PAIN ASSESSMENT  Ratin          COGNITION  Memory:  decreased short term memory  Safety Judgement:  decreased awareness of need for assistance and decreased awareness of need for safety  Awareness of Errors:  assistance required to identify errors made and assistance required to correct errors made    RANGE OF MOTION AND STRENGTH ASSESSMENT  Upper extremity ROM and strength are within functional limits     Lower extremity ROM is within functional limits     Lower extremity strength is within functional limits     BALANCE  Static Sitting: Good  Dynamic Sitting: Good  Static Standing: Fair -  Dynamic Standing: Poor +    ADDITIONAL TESTS                                    ACTIVITY TOLERANCE                         O2 WALK       NEUROLOGICAL FINDINGS                        AM-PAC '6-Clicks' INPATIENT SHORT FORM - BASIC MOBILITY  How much difficulty does the patient currently have...  Patient Difficulty: Turning over in bed (including adjusting bedclothes, sheets and blankets)?: A Little   Patient Difficulty: Sitting down on and standing up from a chair with arms (e.g., wheelchair, bedside commode, etc.): A Little   Patient Difficulty: Moving from lying on back to sitting on the side of the bed?: A Little   How much help from another person does the patient currently need...   Help from Another: Moving to and from a bed to a chair (including a wheelchair)?: A Little   Help from Another: Need to walk in hospital room?: A Little   Help from Another: Climbing 3-5 steps with a  railing?: A Little     AM-PAC Score:  Raw Score: 18   Approx Degree of Impairment: 46.58%   Standardized Score (AM-PAC Scale): 43.63   CMS Modifier (G-Code): CK    FUNCTIONAL ABILITY STATUS  Gait Assessment   Functional Mobility/Gait Assessment  Gait Assistance: Contact guard assist  Distance (ft): 25  Assistive Device: Rolling walker    Skilled Therapy Provided   CGA for sit-stand from chair.   Ambulatory with RW and CGA for safety.   Ambulates with slow charmaine and excessive kyphosis.   Pt fatigues easily. Reports dyspnea.   Performed toilet transfer with CGA for safety.   OT assisted with donning brief.   Pt returned to sitting up in bedside chair.     Bed Mobility:  Rolling: NT  Supine to sit: NT   Sit to supine: NT     Transfer Mobility:  Sit to stand: CGA   Stand to sit: CGA  Gait = CGA    Therapist's Comments: Reviewed activity recommendations with Pt. Son called prior to session to confirm PLOF.     Exercise/Education Provided:  Bed mobility  Energy conservation  Functional activity tolerated  Gait training  Posture  Strengthening  Transfer training    Patient End of Session: Up in chair;Needs met;Call light within reach;RN aware of session/findings;All patient questions and concerns addressed;Hospital anti-slip socks      Patient Evaluation Complexity Level:  History Moderate - 1 or 2 personal factors and/or co-morbidities   Examination of body systems Low -  addressing 1-2 elements   Clinical Presentation Low- Stable   Clinical Decision Making Low Complexity       PT Session Time: 30 minutes  Gait Training: 10 minutes  Therapeutic Activity:  minutes  Neuromuscular Re-education:  minutes  Therapeutic Exercise:  minutes

## 2025-01-28 NOTE — PROGRESS NOTES
NURSING ADMISSION NOTE      Patient admitted via Cart  Oriented to room.  Safety precautions initiated.  Bed in low position.  Call light in reach.  Pt on RA.   On  and tele.  Vitals stable.  Up x1 with walker.  Safety precautions in place.   Pt and family updated on plan of care.  No further needs at this time.

## 2025-01-28 NOTE — CM/SW NOTE
Department  notified of request for HHC, aidin referrals started. Assigned CM/SW to follow up with pt/family on further discharge planning.       Christen Weinstein, DSC

## 2025-01-29 LAB
ARTERIAL PATENCY WRIST A: POSITIVE
BASE EXCESS BLDA CALC-SCNC: 0.8 MMOL/L (ref ?–2)
BODY TEMPERATURE: 98.6 F
CA-I BLD-SCNC: 1.26 MMOL/L (ref 0.95–1.32)
COHGB MFR BLD: 0.8 % SAT (ref 0–3)
HCO3 BLDA-SCNC: 25.5 MEQ/L (ref 21–27)
HGB BLD-MCNC: 11.7 G/DL
L/M: 2 L/MIN
LACTATE BLD-SCNC: 0.8 MMOL/L (ref 0.5–2)
METHGB MFR BLD: 0 % SAT (ref 0.4–1.5)
OXYHGB MFR BLDA: 95.6 % (ref 92–100)
PCO2 BLDA: 33 MM HG (ref 35–45)
PH BLDA: 7.47 [PH] (ref 7.35–7.45)
PO2 BLDA: 75 MM HG (ref 80–100)
POTASSIUM BLD-SCNC: 3.9 MMOL/L (ref 3.6–5.1)
SODIUM BLD-SCNC: 133 MMOL/L (ref 135–145)

## 2025-01-29 PROCEDURE — 99232 SBSQ HOSP IP/OBS MODERATE 35: CPT | Performed by: HOSPITALIST

## 2025-01-29 RX ORDER — GUAIFENESIN 600 MG/1
600 TABLET, EXTENDED RELEASE ORAL 2 TIMES DAILY
Status: DISCONTINUED | OUTPATIENT
Start: 2025-01-29 | End: 2025-01-30

## 2025-01-29 RX ORDER — FUROSEMIDE 10 MG/ML
20 INJECTION INTRAMUSCULAR; INTRAVENOUS ONCE
Status: COMPLETED | OUTPATIENT
Start: 2025-01-29 | End: 2025-01-29

## 2025-01-29 RX ORDER — MORPHINE SULFATE 2 MG/ML
2 INJECTION, SOLUTION INTRAMUSCULAR; INTRAVENOUS ONCE
Status: DISCONTINUED | OUTPATIENT
Start: 2025-01-29 | End: 2025-01-29

## 2025-01-29 RX ORDER — OSELTAMIVIR PHOSPHATE 30 MG/1
30 CAPSULE ORAL DAILY
Status: COMPLETED | OUTPATIENT
Start: 2025-01-29 | End: 2025-02-02

## 2025-01-29 RX ORDER — ALBUTEROL SULFATE 90 UG/1
2 INHALANT RESPIRATORY (INHALATION) EVERY 4 HOURS PRN
Status: DISCONTINUED | OUTPATIENT
Start: 2025-01-29 | End: 2025-01-30

## 2025-01-29 RX ORDER — METHYLPREDNISOLONE SODIUM SUCCINATE 125 MG/2ML
80 INJECTION INTRAMUSCULAR; INTRAVENOUS EVERY 8 HOURS
Status: COMPLETED | OUTPATIENT
Start: 2025-01-29 | End: 2025-01-31

## 2025-01-29 RX ORDER — ALBUTEROL SULFATE 90 UG/1
INHALANT RESPIRATORY (INHALATION)
Status: COMPLETED
Start: 2025-01-29 | End: 2025-01-29

## 2025-01-29 NOTE — PLAN OF CARE
Aox4, vss, afebrile 2L , tachypneic, YOUSIF. MD notified. RT at bedside. Abgs. IV lasix x1, scheduled nebs. Tele nsr, occ pvc heparin subcut. Continent BB. SB assist with walker. Safety precautions in place. Updated pt on poc for noc. Will follow.       Problem: Patient/Family Goals  Goal: Patient/Family Long Term Goal  Description: Patient's Long Term Goal: discharge home when medically stable    Interventions:  Telemetry monitoring  O2 protocol,   Fall precaution, bed alarm on, non-skid socks on,  call light and bedside table within reach.  Monitor labs, vitals, intake and output.  Electrolyte protocol.  Heparin and SCD for VTE   Nebs q 4 hrs.  Maintain on droplet precaution.   - See additional Care Plan goals for specific interventions  Outcome: Progressing  Goal: Patient/Family Short Term Goal  Description: Patient's Short Term Goal: Breathing is stable tonight    Interventions:   Telemetry monitoring  O2 protocol,   Fall precaution, bed alarm on, non-skid socks on,  call light and bedside table within reach.  Monitor labs, vitals, intake and output.  Electrolyte protocol.  Heparin and SCD for VTE   Nebs q 4 hrs.  Maintain on droplet precaution.     - See additional Care Plan goals for specific interventions  Outcome: Progressing     Problem: CARDIOVASCULAR - ADULT  Goal: Absence of cardiac arrhythmias or at baseline  Description: INTERVENTIONS:  - Continuous cardiac monitoring, monitor vital signs, obtain 12 lead EKG if indicated  - Evaluate effectiveness of antiarrhythmic and heart rate control medications as ordered  - Initiate emergency measures for life threatening arrhythmias  - Monitor electrolytes and administer replacement therapy as ordered  Outcome: Progressing     Problem: RESPIRATORY - ADULT  Goal: Achieves optimal ventilation and oxygenation  Description: INTERVENTIONS:  - Assess for changes in respiratory status  - Assess for changes in mentation and behavior  - Position to facilitate  oxygenation and minimize respiratory effort  - Oxygen supplementation based on oxygen saturation or ABGs  - Provide Smoking Cessation handout, if applicable  - Encourage broncho-pulmonary hygiene including cough, deep breathe, Incentive Spirometry  - Assess the need for suctioning and perform as needed  - Assess and instruct to report SOB or any respiratory difficulty  - Respiratory Therapy support as indicated  - Manage/alleviate anxiety  - Monitor for signs/symptoms of CO2 retention  Outcome: Progressing

## 2025-01-29 NOTE — CM/SW NOTE
01/29/25 1100   CM/SW Referral Data   Referral Source Social Work (self-referral)   Reason for Referral Discharge planning   Informant EMR;Clinical Staff Member;Son;Patient   Medical Hx   Does patient have an established PCP? Yes   Patient Info   Patient's Home Environment House   Patient lives with Son   Patient Status Prior to Admission   [facility] Department on Aging   Discharge Needs   Anticipated D/C needs Home health care;To be determined   Choice of Post-Acute Provider   Informed patient of right to choose their preferred provider Yes   List of appropriate post-acute services provided to patient/family with quality data Yes   Information given to Patient;Daughter     HOME SITUATION  Type of Home: [facility]  Home Layout: Two level;Bed/bath upstairs  Lives With: Son     Toilet and Equipment: Standard height toilet  Shower/Tub and Equipment: Tub-shower combo;Shower chair  Other Equipment:  (RW, cane)     Drives: No  Patient Regularly Uses: Hearing aides     Prior Level of Function: Per pt/pt son report, pt lives at home with son, son assists with ADLs as needed, pt also has a caregiver who assists with ADLs. Per pt son, pt has been requiring increased assist with ADLs. Pt typically uses a RW or cane for mobility, has been using RW more recently.  (Per PT evaluation)      Patient is a 89 y/o male who admitted with Acute bronchospasm, Influenza A, COPD exacerbation, Acute respiratory failure.   Anticipated therapy need: Home with Home Healthcare    Met with patient, who was [place], and daughter at bedside, they deferred SW to contact son (Jose) to discuss discharge planning. Patient technically lives alone but both of his sons will be staying with patient while he is recovering. [facility], they come almost every day he states. He requested assistance with increasing hours, encouraged him to  contact their . Discussed HH, informed him that HH list was left at bedside. He will review and let SW know choice.     SW/CM to remain available for support and/or discharge planning.    OJSE A Greco  Discharge Planner

## 2025-01-29 NOTE — CONSULTS
Pulmonary Progress Note     Assessment / Plan:  COPD exacerbation  -due to influenza  -IV steroids; dose increased  -restart LAMA/LABA/ICS, send home on Trelegy per son's request  -will start three times per week azithro as well  -BD protocol followed by flutter valve  Flu A  -start Tamiflu  Dispo  -will follow    Discussed with multiple family members at bedside      Subjective:  Worsening dyspnea.     Objective:  Vitals:    01/29/25 0041 01/29/25 0434 01/29/25 0821 01/29/25 1138   BP: (!) 173/67 144/90  159/75   BP Location:  Right arm  Left arm   Pulse: 85 87 103 61   Resp:  26  18   Temp:  97.6 °F (36.4 °C)     TempSrc:  Oral  Axillary   SpO2: 96% 96% 96% 99%   Weight:       Height:         Physical Exam:  General: no apparent distress, conversant  Skin: no rash, ulcers or subcutaneous nodules  Eyes: anicteric sclerae, moist conjunctivae  Head, ears, nose, throat: atraumatic, oropharynx clear with moist mucous membranes  Neck: trachea midline with no thyromegaly  Heart: regular rate and rhythm, no murmurs / rubs / gallops  Lungs: bibasilar rhonchi  Extremities: no edema or cyanosis  Psych: interactive, answering questions appropriately, appropriate affect    Medications:  Reviewed in EMR    Lab Data:  Reviewed in EMR    Imaging:  I independently visualized all relevant chest imaging in PACS and agree with radiology interpretation except where noted.

## 2025-01-29 NOTE — PLAN OF CARE
Patient AAOx3-4 can be forgetful. Upper and lower dentures. Glasses. B/l hearing aids. Room air/2L for comfort. Still with difficulty breathing. Mds aware. Sats stable. Voids. Up stand by with walker. Regular diet. IV solumedrol. PO zithro. Scheduled nebs. Patient rounded on routinely. Patient and family updated on plan of care.    Patient family c/o right foot swelling- MD paged. Mucinex ordered. Awaiting pulm to see patient.       Problem: Patient/Family Goals  Goal: Patient/Family Long Term Goal  Description: Patient's Long Term Goal: discharge home when medically stable    Interventions:  Telemetry monitoring  O2 protocol,   Fall precaution, bed alarm on, non-skid socks on,  call light and bedside table within reach.  Monitor labs, vitals, intake and output.  Electrolyte protocol.  Heparin and SCD for VTE   Nebs q 4 hrs.  Maintain on droplet precaution.   - See additional Care Plan goals for specific interventions  Outcome: Progressing  Goal: Patient/Family Short Term Goal  Description: Patient's Short Term Goal: Breathing is stable tonight    Interventions:   Telemetry monitoring  O2 protocol,   Fall precaution, bed alarm on, non-skid socks on,  call light and bedside table within reach.  Monitor labs, vitals, intake and output.  Electrolyte protocol.  Heparin and SCD for VTE   Nebs q 4 hrs.  Maintain on droplet precaution.     - See additional Care Plan goals for specific interventions  Outcome: Progressing     Problem: CARDIOVASCULAR - ADULT  Goal: Absence of cardiac arrhythmias or at baseline  Description: INTERVENTIONS:  - Continuous cardiac monitoring, monitor vital signs, obtain 12 lead EKG if indicated  - Evaluate effectiveness of antiarrhythmic and heart rate control medications as ordered  - Initiate emergency measures for life threatening arrhythmias  - Monitor electrolytes and administer replacement therapy as ordered  Outcome: Progressing     Problem: RESPIRATORY - ADULT  Goal: Achieves  optimal ventilation and oxygenation  Description: INTERVENTIONS:  - Assess for changes in respiratory status  - Assess for changes in mentation and behavior  - Position to facilitate oxygenation and minimize respiratory effort  - Oxygen supplementation based on oxygen saturation or ABGs  - Provide Smoking Cessation handout, if applicable  - Encourage broncho-pulmonary hygiene including cough, deep breathe, Incentive Spirometry  - Assess the need for suctioning and perform as needed  - Assess and instruct to report SOB or any respiratory difficulty  - Respiratory Therapy support as indicated  - Manage/alleviate anxiety  - Monitor for signs/symptoms of CO2 retention  Outcome: Progressing

## 2025-01-30 ENCOUNTER — APPOINTMENT (OUTPATIENT)
Dept: CT IMAGING | Facility: HOSPITAL | Age: OVER 89
End: 2025-01-30
Attending: INTERNAL MEDICINE
Payer: MEDICARE

## 2025-01-30 ENCOUNTER — MED REC SCAN ONLY (OUTPATIENT)
Dept: FAMILY MEDICINE CLINIC | Facility: CLINIC | Age: OVER 89
End: 2025-01-30

## 2025-01-30 PROCEDURE — 99232 SBSQ HOSP IP/OBS MODERATE 35: CPT | Performed by: HOSPITALIST

## 2025-01-30 PROCEDURE — 71250 CT THORAX DX C-: CPT | Performed by: INTERNAL MEDICINE

## 2025-01-30 RX ORDER — IPRATROPIUM BROMIDE AND ALBUTEROL SULFATE 2.5; .5 MG/3ML; MG/3ML
3 SOLUTION RESPIRATORY (INHALATION)
Status: DISCONTINUED | OUTPATIENT
Start: 2025-01-30 | End: 2025-01-31

## 2025-01-30 RX ORDER — ALBUTEROL SULFATE 90 UG/1
2 INHALANT RESPIRATORY (INHALATION) EVERY 4 HOURS PRN
Status: DISCONTINUED | OUTPATIENT
Start: 2025-01-30 | End: 2025-02-06

## 2025-01-30 RX ORDER — CODEINE PHOSPHATE AND GUAIFENESIN 10; 100 MG/5ML; MG/5ML
5 SOLUTION ORAL EVERY 4 HOURS PRN
Status: DISCONTINUED | OUTPATIENT
Start: 2025-01-30 | End: 2025-01-30

## 2025-01-30 NOTE — PROGRESS NOTES
01/30/25 0803   Respiratory Score   $ RT Standby Charge (per 15 min) 1   Consciousness 0   Level of activity 1   Respiratory pattern 0   Breath sounds 2   Cough 0   Respiratory history documented in chart 3   Surgery history this admission 0   Chest xray 0   ABG / Pulse ox 0   Asthma patients peak flow 0   Heart Rate 0   Trauma 0   Hemoglobin 2   Adult Respiratory Score Calculation 8     Per respiratory protocol pt currently on Q4 Duoneb and has been on for 3 days. Pt bilaterally diminished with post exp wheezing on R side. Pt on home O2 baseline of 2L strong non productive coarse cough. Pt able to use flutter. Tx's changed to Q6w/a Duo. Monitoring ongoing.

## 2025-01-30 NOTE — PAYOR COMM NOTE
--------------  CONTINUED STAY REVIEW---CLINICALS FOR 1/28, 1/29, 1/30       Payor: [facility]  Subscriber #:  [id number]  Authorization Number: [id number]    Admit date: 1/29/25  Admit time:  2:36 PM    1/28             Date of Service: 1/28/2025  9:46 AM     Signed                                 Chief Complaint: SOB     Subjective:      Patient without acute events overnight. On RA. Still with cough and SOB with exertion.      Objective:    Review of Systems:   A comprehensive review of systems was completed; pertinent positive and negatives stated in subjective.     Vital signs:  Temp:  [97 °F (36.1 °C)-98.5 °F (36.9 °C)] 97.2 °F (36.2 °C)  Pulse:  [60-87] 77  Resp:  [17-26] 20  BP: (126-189)/(59-90) 152/82  SpO2:  [95 %-100 %] 96 %     Physical Exam:    General: No acute distress  Respiratory: decreased BS, coarse BS  Cardiovascular: S1, S2, regular rate and rhythm  Abdomen: Soft, Non-tender, non-distended, positive bowel sounds  Neuro: No new focal deficits.   Extremities: No edema        Diagnostic Data:    Labs:       Recent Labs   Lab 01/27/25  1213 01/28/25  0640   WBC 4.2 2.1*   HGB 10.6* 10.6*   MCV 89.4 88.9   .0* 151.0              Recent Labs   Lab 01/27/25  1213 01/28/25  0640   GLU 99 137*   BUN 23 22   CREATSERUM 1.33* 1.23   CA 8.8 8.1*   ALB 3.5  --     140   K 4.4 4.0    108   CO2 26.0 21.0   ALKPHO 97  --    AST 27  --    ALT 12  --    BILT 0.4  --    TP 5.5*  --                   Recent Labs   Lab 01/27/25  1213   TROPHS 10         Imaging: Reviewed in Epic.     Medications:    carvedilol  3.125 mg Oral BID with meals    ferrous sulfate  325 mg Oral BID with meals    fluticasone-salmeterol  1 puff Inhalation BID    umeclidinium bromide  1 puff Inhalation Daily    levothyroxine  50 mcg Oral Before breakfast    mesalamine DR  1,200 mg Oral BID    pantoprazole  40 mg Oral QAM AC    tamsulosin  0.4 mg Oral Nightly    heparin  5,000 Units Subcutaneous Q8H SCAR     methylPREDNISolone  40 mg Intravenous Q8H    azithromycin  250 mg Oral Once per day on Monday Wednesday Friday    ipratropium-albuterol  3 mL Nebulization 6 times per day         Assessment & Plan:       #Acute Bronchospasm  #COPD Exacerbation  #Influenza A  Continue nebs and IV steroids  Azithro empiric  O2 as needed, now on RA  Pulm to eval  Hold tamiflu at this time given 4 days since symptoms onset     #Ess HTN  Continue home meds     #Dyslipidemia  Statin     #crohns Disease  Continue mesalamine     #Hypothyroid  synthroid        Hema Sharma MD           **Certification        PHYSICIAN Certification of Need for Inpatient Hospitalization - Initial Certification     Patient will require inpatient services that will reasonably be expected to span two midnight's based on the clinical documentation in H+P.   Based on patients current state of illness, I anticipate that, after discharge, patient will require TBD.                         1/29             Date of Service: 1/29/2025  9:45 AM              Chief Complaint:       Chief Complaint   Patient presents with    Shortness Of Breath         Subjective:      Still sob and congested     Objective:    Review of Systems:   6  point ROS completed and was negative, except for pertinent positive and negatives stated in subjective.     Vital signs:  Temp:  [97 °F (36.1 °C)-98.5 °F (36.9 °C)] 97.2 °F (36.2 °C)  Pulse:  [60-87] 77  Resp:  [17-26] 20  BP: (126-189)/(59-90) 152/82  SpO2:  [95 %-100 %] 96 %     Physical Exam:    General: No acute distress.   Respiratory:  course breath sounds  Cardiovascular: S1, S2. Regular rate and rhythm. No murmurs.  Abdomen: Soft, nontender, nondistended.    Extremities: No edema.     Diagnostic Data:    Labs:       Recent Labs   Lab 01/27/25  1213 01/28/25  0640   WBC 4.2 2.1*   HGB 10.6* 10.6*   MCV 89.4 88.9   .0* 151.0              Recent Labs   Lab 01/27/25  1213 01/28/25  0640   GLU 99 137*   BUN 23 22   CREATSERUM  1.33* 1.23   CA 8.8 8.1*   ALB 3.5  --     140   K 4.4 4.0    108   CO2 26.0 21.0   ALKPHO 97  --    AST 27  --    ALT 12  --    BILT 0.4  --    TP 5.5*  --                Cardiac      Recent Labs   Lab 01/27/25  1213   PBNP 1,702*        Recent Labs   Lab 01/27/25  1213   TROPHS 10         Imaging: Imaging data reviewed in Epic.     Medications:    carvedilol  3.125 mg Oral BID with meals    ferrous sulfate  325 mg Oral BID with meals    fluticasone-salmeterol  1 puff Inhalation BID    umeclidinium bromide  1 puff Inhalation Daily    levothyroxine  50 mcg Oral Before breakfast    mesalamine DR  1,200 mg Oral BID    pantoprazole  40 mg Oral QAM AC    tamsulosin  0.4 mg Oral Nightly    heparin  5,000 Units Subcutaneous Q8H SCAR    methylPREDNISolone  40 mg Intravenous Q8H    azithromycin  250 mg Oral Once per day on Monday Wednesday Friday    ipratropium-albuterol  3 mL Nebulization 6 times per day         Assessment & Plan:       #acute COPD Exacerbation secondary to influenza infection-nebs/steroids-Hold tamiflu at this time given 4 days since symptoms onset; pulm following  -3x/week azithromycin  -LAMA/LABA/ICS for now; trelegy on discharge per pulm  -s/p dose of lasix; added mucinex     #Ess HTN-Continue home meds     #Dyslipidemia-Statin     #crohns Disease-Continue mesalamine     #Hypothyroid-synthroid     #right foot swelling; mild; monitor        Plan of care discussed with patient and RN     Trever Nielsen MD           **Certification        PHYSICIAN Certification of Need for Inpatient Hospitalization - Initial Certification     Patient will require inpatient services that will reasonably be expected to span two midnight's based on the clinical documentation in H+P.   Based on patients current state of illness, I anticipate that, after discharge, patient will require TBD.           Revision History       1/30          Date of Service: 1/30/2025  8:50 AM     Signed               Chief Complaint:        Chief Complaint   Patient presents with    Shortness Of Breath         Subjective:      Still sob and congested but feels a little better today     Objective:    Review of Systems:   6  point ROS completed and was negative, except for pertinent positive and negatives stated in subjective.     Vital signs:  Temp:  [97.1 °F (36.2 °C)-98.2 °F (36.8 °C)] 97.1 °F (36.2 °C)  Pulse:  [61-91] 91  Resp:  [18-20] 20  BP: (123-163)/(67-99) 140/67  SpO2:  [97 %-100 %] 99 %     Physical Exam:    General: No acute distress.   Respiratory:  course breath sounds  Cardiovascular: S1, S2. Regular rate and rhythm. No murmurs.  Abdomen: Soft, nontender, nondistended.    Extremities: No edema.     Diagnostic Data:    Labs:       Recent Labs   Lab 01/27/25  1213 01/28/25  0640   WBC 4.2 2.1*   HGB 10.6* 10.6*   MCV 89.4 88.9   .0* 151.0              Recent Labs   Lab 01/27/25  1213 01/28/25  0640   GLU 99 137*   BUN 23 22   CREATSERUM 1.33* 1.23   CA 8.8 8.1*   ALB 3.5  --     140   K 4.4 4.0    108   CO2 26.0 21.0   ALKPHO 97  --    AST 27  --    ALT 12  --    BILT 0.4  --    TP 5.5*  --              COVID-19 Lab Results     COVID-19        Lab Results   Component Value Date     COVID19 Not Detected 01/27/2025     COVID19 Not Detected 09/10/2024     COVID19 Not Detected 09/06/2023          Cardiac      Recent Labs   Lab 01/27/25  1213   PBNP 1,702*          Recent Labs   Lab 01/27/25  1213   TROPHS 10         Imaging: Imaging data reviewed in Epic.     Medications:    ipratropium-albuterol  3 mL Nebulization Q6H WA    methylPREDNISolone  80 mg Intravenous Q8H    oseltamivir  30 mg Oral Daily    carvedilol  3.125 mg Oral BID with meals    ferrous sulfate  325 mg Oral BID with meals    fluticasone-salmeterol  1 puff Inhalation BID    umeclidinium bromide  1 puff Inhalation Daily    levothyroxine  50 mcg Oral Before breakfast    mesalamine DR  1,200 mg Oral BID    pantoprazole  40 mg Oral QAM AC    tamsulosin  0.4  mg Oral Nightly    heparin  5,000 Units Subcutaneous Q8H SCAR    azithromycin  250 mg Oral Once per day on Monday Wednesday Friday         Assessment & Plan:       #acute COPD Exacerbation secondary to influenza infection-nebs/steroids/tamiflu  -3x/week azithromycin  -LAMA/LABA/ICS for now; trelegy on discharge per pulm  -s/p dose of lasix; added mucinex; watch volume status closely     #Ess HTN-Continue home meds     #Dyslipidemia-Statin     #crohns Disease-Continue mesalamine     #Hypothyroid-synthroid     #right foot swelling; mild; monitor     #leukpenia-repeat cbc in am     Plan of care discussed with patient and RN     Trever Nielsen MD         **Certification        PHYSICIAN Certification of Need for Inpatient Hospitalization - Initial Certification     Patient will require inpatient services that will reasonably be expected to span two midnight's based on the clinical documentation in H+P.   Based on patients current state of illness, I anticipate that, after discharge, patient will require TBD.                           MEDICATIONS ADMINISTERED IN LAST 1 DAY:  albuterol (Ventolin HFA) 108 (90 Base) MCG/ACT inhaler       Date Action Dose Route User    1/29/2025 2052 Given (none) (none) Antonieta Oliva, RCP          carvedilol (Coreg) tab 3.125 mg       Date Action Dose Route User    1/30/2025 0831 Given 3.125 mg Oral Melissa Gomez RN    1/29/2025 1827 Given 3.125 mg Oral Karin Yo RN          ferrous sulfate DR tab 325 mg       Date Action Dose Route User    1/30/2025 0831 Given 325 mg Oral Melissa Gomez RN    1/29/2025 1827 Given 325 mg Oral Karin Yo RN          fluticasone-salmeterol (Advair Diskus) 500-50 MCG/ACT inhaler 1 puff       Date Action Dose Route User    1/30/2025 0832 Given 1 puff Inhalation Melissa Gomez RN    1/29/2025 2100 Given 1 puff Inhalation Molly Desai RN          guaiFENesin ER (Mucinex) 12 hr tab 600 mg       Date Action Dose Route User    1/29/2025 2016 Given 600 mg Oral  Molly Desai RN          heparin (Porcine) 5000 UNIT/ML injection 5,000 Units       Date Action Dose Route User    1/30/2025 1309 Given 5,000 Units Subcutaneous (Right Lower Abdomen) Melissa Gomez RN    1/30/2025 0513 Given 5,000 Units Subcutaneous (Left Lower Abdomen) Molly Desai RN    1/29/2025 2015 Given 5,000 Units Subcutaneous (Left Lower Abdomen) Molly Desai RN          hydrALAzine (Apresoline) 20 mg/mL injection 10 mg       Date Action Dose Route User    1/29/2025 2022 Given 10 mg Intravenous Molly Desai RN          ipratropium-albuterol (Duoneb) 0.5-2.5 (3) MG/3ML inhalation solution 3 mL       Date Action Dose Route User    1/30/2025 0803 Given 3 mL Nebulization Rosy Galan, [facility]    1/30/2025 0420 Given 3 mL Nebulization Agustin Cowan, [facility]    1/29/2025 1950 Given 3 mL Nebulization Antonieta Oliva, [facility]    1/29/2025 1544 Given 3 mL Nebulization Antonieta Oliva, [facility]          ipratropium-albuterol (Duoneb) 0.5-2.5 (3) MG/3ML inhalation solution 3 mL       Date Action Dose Route User    1/30/2025 1314 Given 3 mL Nebulization Rosy Galan, [facility]          levothyroxine (Synthroid) tab 50 mcg       Date Action Dose Route User    1/30/2025 0512 Given 50 mcg Oral Molly Desai RN          mesalamine DR (Delzicol) cap 1,200 mg       Date Action Dose Route User    1/30/2025 0832 Given 1,200 mg Oral Melissa Gomez RN    1/29/2025 2015 Given 1,200 mg Oral Molly Desai RN          methylPREDNISolone sodium succinate (Solu-MEDROL) injection 80 mg       Date Action Dose Route User    1/30/2025 1309 Given 80 mg Intravenous Melissa Gomez RN    1/30/2025 0512 Given 80 mg Intravenous Molly Desai RN    1/29/2025 2015 Given 80 mg Intravenous Molly Desai RN          oseltamivir (Tamiflu) cap 30 mg       Date Action Dose Route User    1/30/2025 0831 Given 30 mg Oral Melissa Gomez, RN    1/29/2025 2016 Given 30 mg Oral Molly Desai, RN          pantoprazole (Protonix)   tab 40 mg       Date Action Dose Route User    1/30/2025 0512 Given 40 mg Oral Molly Desai, [facility]          tamsulosin (Flomax) cap 0.4 mg       Date Action Dose Route User    1/29/2025 2015 Given 0.4 mg Oral Molly Desai RN          umeclidinium bromide (Incruse Ellipta) 62.5 MCG/ACT inhaler 1 puff       Date Action Dose Route User    1/30/2025 0837 Given 1 puff Inhalation Melissa Gomez RN            Vitals (last day)       Date/Time Temp Pulse Resp BP SpO2 Weight O2 Device O2 Flow Rate (L/min) [facility]    01/30/25 1130 97.3 °F (36.3 °C) 80 18 152/70 97 % -- -- -- IS    01/30/25 1100 -- -- -- -- -- -- None (Room air) -- DP    01/30/25 0803 -- 91 20 -- 99 % -- Nasal cannula 2 L/min EO    01/30/25 0740 97.1 °F (36.2 °C) 84 19 140/67 98 % -- Nasal cannula 2 L/min IS    01/30/25 0516 -- 82 -- -- 99 % -- -- --     01/30/25 0516 98.1 °F (36.7 °C) -- 18 135/73 -- -- Nasal cannula 2 L/min AC    01/30/25 0420 -- -- -- -- -- -- Nasal cannula 2 L/min CW    01/29/25 2351 98.2 °F (36.8 °C) 85 20 142/67 97 % -- Nasal cannula 2 L/min     01/29/25 2100 -- 72 -- 123/85 100 % -- -- --     01/29/25 2037 97.8 °F (36.6 °C) -- 18 -- -- -- Nasal cannula 2 L/min AC    01/29/25 1950 -- -- -- -- -- -- Nasal cannula 2 L/min ER    01/29/25 1827 -- 81 -- 163/91 98 % -- -- --     01/29/25 1610 97.5 °F (36.4 °C) -- -- -- -- -- -- --     01/29/25 1610 -- 82 -- 152/99 100 % -- Nasal cannula 2 L/min     01/29/25 1544 -- -- -- -- -- -- Nasal cannula 2 L/min     01/29/25 1138 -- 61 18 159/75 99 % -- Nasal cannula 2 L/min     01/29/25 1017 -- -- -- -- -- -- Nasal cannula 2 L/min     01/29/25 0821 -- 103 -- -- 96 % -- -- --     01/29/25 0434 97.6 °F (36.4 °C) 87 26 144/90 96 % -- Nasal cannula 2 L/min     01/29/25 0041 -- 85 -- 173/67 96 % -- -- -- VH

## 2025-01-30 NOTE — PLAN OF CARE
Patient is A&Ox3; glasses, [place] (Bi hearing aids), upper and lower dentures at bedside. SR on tele. Regular diet. SBA w/walker. Weaned down to RA, patient tolerating well. IV steroids. Heparin subcutaneous. Tamiflu. Patient updated on POC. No further needs at this time.       Problem: Patient/Family Goals  Goal: Patient/Family Long Term Goal  Description: Patient's Long Term Goal: discharge home when medically stable    Interventions:  Telemetry monitoring  O2 protocol,   Fall precaution, bed alarm on, non-skid socks on,  call light and bedside table within reach.  Monitor labs, vitals, intake and output.  Electrolyte protocol.  Heparin and SCD for VTE   Nebs q 4 hrs.  Maintain on droplet precaution.   - See additional Care Plan goals for specific interventions  Outcome: Progressing  Goal: Patient/Family Short Term Goal  Description: Patient's Short Term Goal: Breathing is stable tonight    Interventions:   Telemetry monitoring  O2 protocol,   Fall precaution, bed alarm on, non-skid socks on,  call light and bedside table within reach.  Monitor labs, vitals, intake and output.  Electrolyte protocol.  Heparin and SCD for VTE   Nebs q 4 hrs.  Maintain on droplet precaution.     - See additional Care Plan goals for specific interventions  Outcome: Progressing     Problem: CARDIOVASCULAR - ADULT  Goal: Absence of cardiac arrhythmias or at baseline  Description: INTERVENTIONS:  - Continuous cardiac monitoring, monitor vital signs, obtain 12 lead EKG if indicated  - Evaluate effectiveness of antiarrhythmic and heart rate control medications as ordered  - Initiate emergency measures for life threatening arrhythmias  - Monitor electrolytes and administer replacement therapy as ordered  Outcome: Progressing     Problem: RESPIRATORY - ADULT  Goal: Achieves optimal ventilation and oxygenation  Description: INTERVENTIONS:  - Assess for changes in respiratory status  - Assess for changes in mentation and behavior  -  Position to facilitate oxygenation and minimize respiratory effort  - Oxygen supplementation based on oxygen saturation or ABGs  - Provide Smoking Cessation handout, if applicable  - Encourage broncho-pulmonary hygiene including cough, deep breathe, Incentive Spirometry  - Assess the need for suctioning and perform as needed  - Assess and instruct to report SOB or any respiratory difficulty  - Respiratory Therapy support as indicated  - Manage/alleviate anxiety  - Monitor for signs/symptoms of CO2 retention  Outcome: Progressing

## 2025-01-30 NOTE — PHYSICAL THERAPY NOTE
PHYSICAL THERAPY TREATMENT NOTE - INPATIENT    Room Number: 519/519-A     Session: 1     Number of Visits to Meet Established Goals: 5    Presenting Problem: influenza, COPD  Co-Morbidities : CHF, COPD, emphysema, CHF, HTN    PHYSICAL THERAPY ASSESSMENT   Patient demonstrates fair progress this session, goals  updated to reflect patient performance.      Patient is requiring stand-by assist as a result of the following impairments: decreased functional strength, decreased endurance/aerobic capacity, medical status, and increased O2 needs from baseline.     Patient continues to function below baseline with gait, standing prolonged periods, and performing household tasks.  Next session anticipate patient to progress gait.  Physical Therapy will continue to follow patient for duration of hospitalization.    Patient continues to benefit from continued skilled PT services: at discharge to promote prior level of function and safety with additional support and return home with home health PT.    PLAN DURING HOSPITALIZATION  Nursing Mobility Recommendation : 1 Assist     PT Treatment Plan: Bed mobility;Endurance;Energy conservation;Patient education;Gait training;Balance training;Transfer training;Stair training  Frequency (Obs): 3-5x/week     CURRENT GOALS     Goal #1 Patient is able to demonstrate supine - sit EOB @ level: supervision     Goal #2 Patient is able to demonstrate transfers EOB to/from C at assistance level: supervision  met     Goal #3 Patient is able to ambulate 150 feet with assist device: walker - rolling at assistance level: supervision     Goal #4 Pt will ascend/descend 1 flight of stairs with supervision   Goal #5    Goal #6    Goal Comments: Goals established on 2025 all goals ongoing    SUBJECTIVE  \" My breathing is heavy.\"     OBJECTIVE  Precautions:  needed;Hard of hearing    WEIGHT BEARING RESTRICTION     PAIN ASSESSMENT   Ratin          BALANCE                                                                                                                        Static Sitting: Good  Dynamic Sitting: Good           Static Standing: Fair -  Dynamic Standing: Poor +    ACTIVITY TOLERANCE               ROOM AIR at rest  During gait 1 L to room air  Spo2 96 to 98%          O2 WALK       AM-PAC '6-Clicks' INPATIENT SHORT FORM - BASIC MOBILITY  How much difficulty does the patient currently have...  Patient Difficulty: Turning over in bed (including adjusting bedclothes, sheets and blankets)?: A Little   Patient Difficulty: Sitting down on and standing up from a chair with arms (e.g., wheelchair, bedside commode, etc.): A Little   Patient Difficulty: Moving from lying on back to sitting on the side of the bed?: A Little   How much help from another person does the patient currently need...   Help from Another: Moving to and from a bed to a chair (including a wheelchair)?: A Little   Help from Another: Need to walk in hospital room?: A Little   Help from Another: Climbing 3-5 steps with a railing?: A Little     AM-PAC Score:  Raw Score: 18   Approx Degree of Impairment: 46.58%   Standardized Score (AM-PAC Scale): 43.63   CMS Modifier (G-Code): CK    FUNCTIONAL ABILITY STATUS  Gait Assessment   Functional Mobility/Gait Assessment  Gait Assistance: Supervision  Distance (ft): 25x2 with seated rest and 15 feet  Assistive Device: Rolling walker  Pattern: Within Functional Limits    Skilled Therapy Provided    Bed Mobility:  Rolling: NT   Supine<>Sit: NT   Sit<>Supine: NT     Transfer Mobility:  Sit<>Stand: SBA   Stand<>Sit: SBA   Gait: RW and SBA.     Therapist's Comments: Patient needs frequent rest breaks due to sob.       THERAPEUTIC EXERCISES  Lower Extremity Alternating marching  Ankle pumps  LAQ     Upper Extremity PNF 02, Scapular Retraction, and Shoulder flex/ext     Position Sitting     Repetitions   12   Sets   1     Patient End of Session: Up in chair;Needs met;Call light  within reach;RN aware of session/findings;All patient questions and concerns addressed;Hospital anti-slip socks    PT Session Time: 30 minutes  Gait Trainin minutes  Therapeutic Activity: 10 minutes

## 2025-01-30 NOTE — PROGRESS NOTES
AO x3. Forgetful. 2L at rest. RA is BL. Nebs. Tele NSR. Hydralazine PRN. Heparin subcutaneous. Urinal. No pain reported. Up x1 walker. Solumedrol. Tamiflu. Regular diet. IV SL. Pt resting in bed with call light in reach. No further needs.    Pt reporting increased SOB overnight. SaO2 100% on 2L. RT called to bedside. Albuterol PRN given with some relief. Son at bedside.

## 2025-01-30 NOTE — PROGRESS NOTES
Pulmonary Progress Note        NAME: Syed Cooney - ROOM: [facility]519-A - MRN: [id number] - Age: 89 year old - : 1934        Last 24hrs: Family very concerned about breathing yesterday.  Notes that it was very labored and sounded congested    OBJECTIVE:  Vitals:    25 2351 25 0516 25 0740 25 0803   BP: 142/67 135/73 140/67    BP Location: Right arm Right arm Right arm    Pulse: 85 82 84 91   Resp:  18 19 20   Temp: 98.2 °F (36.8 °C) 98.1 °F (36.7 °C) 97.1 °F (36.2 °C)    TempSrc: Oral Oral Axillary    SpO2: 97% 99% 98% 99%   Weight:       Height:           Oxygen Therapy  SpO2: 99 %  O2 Device: None (Room air)  O2 Flow Rate (L/min): 2 L/min  Pulse Oximetry Type: Continuous  Oximetry Probe Site Changed: No  Pulse Ox Probe Location: Right hand                  Intake/Output Summary (Last 24 hours) at 2025 1159  Last data filed at 2025 0851  Gross per 24 hour   Intake --   Output 250 ml   Net -250 ml       Scheduled Medication:   ipratropium-albuterol  3 mL Nebulization Q6H WA    methylPREDNISolone  80 mg Intravenous Q8H    oseltamivir  30 mg Oral Daily    carvedilol  3.125 mg Oral BID with meals    ferrous sulfate  325 mg Oral BID with meals    fluticasone-salmeterol  1 puff Inhalation BID    umeclidinium bromide  1 puff Inhalation Daily    levothyroxine  50 mcg Oral Before breakfast    mesalamine DR  1,200 mg Oral BID    pantoprazole  40 mg Oral QAM AC    tamsulosin  0.4 mg Oral Nightly    heparin  5,000 Units Subcutaneous Q8H SCAR    azithromycin  250 mg Oral Once per day on      Continuous Infusing Medication:    General appearance: alert, appears stated age, and cooperative  Lungs:  few scattered wheezes kenny  Heart: S1, S2 normal, no murmur, click, rub or gallop, regular rate and rhythm  Extremities: extremities normal, atraumatic, no cyanosis or edema    Labs reviewed as noted below        ASSESSMENT/PLAN:    COPD exacerbation  -due to  influenza  -IV steroids;maintain for now  -restart LAMA/LABA/ICS, send home on Trelegy per son's request  -cont three times per week azithro   -BD protocol followed by flutter valve  Flu A  -Tamiflu  Dyspnea  -suspect this is due to the above  -given persistent symptoms will check CT chest  Dispo  -will follow      Niall Vazquez  [facility]  Pulmonary and Critical Care

## 2025-01-30 NOTE — PROGRESS NOTES
[facility]   part of [facility]     Hospitalist Progress Note     Syed Cooney Patient Status:  Observation    1934 MRN [id number]   Location [facility] 5NW-A Attending Hema Sharma MD   Hosp Day # 1 PCP Jeffrey Gan MD     Chief Complaint:   Chief Complaint   Patient presents with    Shortness Of Breath       Subjective:     Still sob and congested but feels a little better today    Objective:    Review of Systems:   6  point ROS completed and was negative, except for pertinent positive and negatives stated in subjective.    Vital signs:  Temp:  [97.1 °F (36.2 °C)-98.2 °F (36.8 °C)] 97.1 °F (36.2 °C)  Pulse:  [61-91] 91  Resp:  [18-20] 20  BP: (123-163)/(67-99) 140/67  SpO2:  [97 %-100 %] 99 %    Physical Exam:    General: No acute distress.   Respiratory:  course breath sounds  Cardiovascular: S1, S2. Regular rate and rhythm. No murmurs.  Abdomen: Soft, nontender, nondistended.    Extremities: No edema.    Diagnostic Data:    Labs:  Recent Labs   Lab 25  1213 25  0640   WBC 4.2 2.1*   HGB 10.6* 10.6*   MCV 89.4 88.9   .0* 151.0       Recent Labs   Lab 25  1213 25  0640   GLU 99 137*   BUN 23 22   CREATSERUM 1.33* 1.23   CA 8.8 8.1*   ALB 3.5  --     140   K 4.4 4.0    108   CO2 26.0 21.0   ALKPHO 97  --    AST 27  --    ALT 12  --    BILT 0.4  --    TP 5.5*  --        Estimated Creatinine Clearance: 28.2 mL/min (based on SCr of 1.23 mg/dL).    No results for input(s): \"PTP\", \"INR\" in the last 168 hours.         COVID-19 Lab Results    COVID-19  Lab Results   Component Value Date    COVID19 Not Detected 2025    COVID19 Not Detected 09/10/2024    COVID19 Not Detected 2023       Pro-Calcitonin  No results for input(s): \"PCT\" in the last 168 hours.    Cardiac  Recent Labs   Lab 25  1213   PBNP 1,702*       Creatinine Kinase  No results for input(s): \"CK\" in the last 168 hours.    Inflammatory Markers  No  results for input(s): \"CRP\", \"REMY\", \"LDH\", \"DDIMER\" in the last 168 hours.    Recent Labs   Lab 01/27/25  1213   TROPHS 10       Imaging: Imaging data reviewed in Epic.    Medications:    ipratropium-albuterol  3 mL Nebulization Q6H WA    methylPREDNISolone  80 mg Intravenous Q8H    oseltamivir  30 mg Oral Daily    carvedilol  3.125 mg Oral BID with meals    ferrous sulfate  325 mg Oral BID with meals    fluticasone-salmeterol  1 puff Inhalation BID    umeclidinium bromide  1 puff Inhalation Daily    levothyroxine  50 mcg Oral Before breakfast    mesalamine DR  1,200 mg Oral BID    pantoprazole  40 mg Oral QAM AC    tamsulosin  0.4 mg Oral Nightly    heparin  5,000 Units Subcutaneous Q8H SCAR    azithromycin  250 mg Oral Once per day on Monday Wednesday Friday       Assessment & Plan:      #acute COPD Exacerbation secondary to influenza infection-nebs/steroids/tamiflu  -3x/week azithromycin  -LAMA/LABA/ICS for now; trelegy on discharge per pulm  -s/p dose of lasix; added mucinex; watch volume status closely    #Ess HTN-Continue home meds     #Dyslipidemia-Statin     #crohns Disease-Continue mesalamine     #Hypothyroid-synthroid    #right foot swelling; mild; monitor    #leukpenia-repeat cbc in am    Plan of care discussed with patient and RN    Trever Nielsen MD    Supplementary Documentation:     Quality:  DVT Prophylaxis: heparin  CODE status: see chart  Thomas: no  Central line: no      Estimated date of discharge: tbd  At this point Mr. Cooney is expected to be discharge to: tbd             **Certification      PHYSICIAN Certification of Need for Inpatient Hospitalization - Initial Certification    Patient will require inpatient services that will reasonably be expected to span two midnight's based on the clinical documentation in H+P.   Based on patients current state of illness, I anticipate that, after discharge, patient will require TBD.

## 2025-01-30 NOTE — PAYOR COMM NOTE
--------------  ADMISSION REVIEW     Payor: [facility]  Subscriber #:  [id number]  Authorization Number: [id number]    Admit date: 1/29/25  Admit time:  2:36 PM       REVIEW DOCUMENTATION:     ED Provider Notes        ED Provider Notes signed by Kael Ac DO at 1/27/2025  2:32 PM            Patient Seen in: [facility] Emergency Department      History     Chief Complaint   Patient presents with    Shortness Of Breath     Stated Complaint: shortness of breath, hx COPD    Subjective:   89-year-old male, history of COPD, presents with shortness of breath, onset a few days ago, using his nebulizers at home with little improvement, uses rescue inhaler several times a day, some vitamin for evaluation.  This is happened before.  No fever.  No chest pain.              Objective:     Past Medical History:    Abdominal distention    Acute encephalopathy    Anemia    Arthritis    Back problem    Cataract    Chronic lung disease    Congestive heart disease (HCC)    COPD (chronic obstructive pulmonary disease) (HCC)    Crohn disease (HCC)    Crohn's disease of large intestine without complication (HCC)    Diarrhea, unspecified    Disorder of thyroid    Diverticula of small intestine    Diverticulosis of large intestine    Frequent use of laxatives    Hearing impairment    High blood pressure    High cholesterol    History of blood transfusion    Hyperthyroidism    Leg swelling    Mitral valve disorder    leaky mitral valve    Muscle weakness    Osteoarthrosis, unspecified whether generalized or localized, unspecified site    cervical and lumbar spine    Osteoporosis    Other and unspecified hyperlipidemia    Pulmonary embolism (HCC)    Pulmonary emphysema (HCC)    Renal insufficiency    Shortness of breath    Thyroid disease    Unspecified essential hypertension    Wheezing     Physical Exam     ED Triage Vitals [01/27/25 1139]   /59   Pulse 66   Resp 18   Temp 98.5 °F (36.9 °C)   Temp src  Temporal   SpO2 95 %   O2 Device None (Room air)       Current Vitals:   Vital Signs  BP: 145/68  Pulse: 72  Resp: 17  Temp: 98.5 °F (36.9 °C)  Temp src: Temporal  MAP (mmHg): 91    Oxygen Therapy  SpO2: 100 %  O2 Device: None (Room air)        Physical Exam  Vitals and nursing note reviewed.   HENT:      Head: Normocephalic.   Cardiovascular:      Rate and Rhythm: Normal rate.   Pulmonary:      Effort: Tachypnea present.      Breath sounds: Examination of the right-middle field reveals decreased breath sounds. Examination of the left-middle field reveals decreased breath sounds. Examination of the right-lower field reveals decreased breath sounds. Examination of the left-lower field reveals decreased breath sounds. Decreased breath sounds present.   Chest:      Chest wall: No tenderness.   Musculoskeletal:         General: Normal range of motion.      Cervical back: Normal range of motion and neck supple.      Right lower leg: No tenderness. No edema.      Left lower leg: No tenderness. No edema.   Skin:     General: Skin is warm and dry.   Neurological:      Mental Status: He is alert.             ED Course     Labs Reviewed   COMP METABOLIC PANEL (14) - Abnormal; Notable for the following components:       Result Value    Creatinine 1.33 (*)     eGFR-Cr 51 (*)     Total Protein 5.5 (*)     All other components within normal limits   CBC WITH DIFFERENTIAL WITH PLATELET - Abnormal; Notable for the following components:    RBC 3.39 (*)     HGB 10.6 (*)     HCT 30.3 (*)     .0 (*)     Lymphocyte Absolute 0.56 (*)     All other components within normal limits   PRO BETA NATRIURETIC PEPTIDE - Abnormal; Notable for the following components:    Pro-Beta Natriuretic Peptide 1,702 (*)     All other components within normal limits   SARS-COV-2/FLU A AND B/RSV BY PCR (GENEXPERT) - Abnormal; Notable for the following components:    Influenza A by PCR Positive (*)     All other components within normal limits     Narrative:     This test is intended for the qualitative detection and differentiation of SARS-CoV-2, influenza A, influenza B, and respiratory syncytial virus (RSV) viral RNA in nasopharyngeal or nares swabs from individuals suspected of respiratory viral infection consistent with COVID-19 by their healthcare provider. Signs and symptoms of respiratory viral infection due to SARS-CoV-2, influenza, and RSV can be similar.    Test performed using the Xpert Xpress SARS-CoV-2/FLU/RSV (real time RT-PCR)  assay on the [employer] instrument, [address], [place] [address].   This test is being used under the Food and Drug Administration's Emergency Use Authorization.    The authorized Fact Sheet for Healthcare Providers for this assay is available upon request from the laboratory.   TROPONIN I HIGH SENSITIVITY - Normal   RAINBOW DRAW LAVENDER   RAINBOW DRAW LIGHT GREEN   RAINBOW DRAW BLUE     EKG    Rate, intervals and axes as noted on EKG Report.  Rate: 69  Rhythm: Sinus Rhythm  Reading: EKG sinus rhythm 69 bpm.  Normal axis.  No ST elevations.  When compared to December 2024, no significant changes are noted    Patient placed on cardiac monitor for telemetry monitoring secondary to sob. Interpretation at bedside by me is sinus rhythm.          MDM      XR CHEST AP PORTABLE  (CPT=71045)    Result Date: 1/27/2025  CONCLUSION:  No acute airspace disease.   LOCATION:  [facility]      Dictated by (CST): Mauro Norwood MD on 1/27/2025 at 12:26 PM     Finalized by (CST): Mauro Norwood MD on 1/27/2025 at 12:26 PM        I independent interpreted the x-ray of the chest without any obvious signs of acute infiltrate    Differential diagnosis includes, but not limited to, viral syndrome, bacterial infection, reactive airway disease, CHF, bronchospasm, PE, ACS    Son at bedside helpful to provide information on the history presenting illness    External chart review demonstrates remote outpatient visit with pulmonary   Cesar    89-year-old male presents with respiratory failure secondary to COPD exacerbation and bronchospasm likely secondary to influenza A.  Onset of symptoms couple days ago.  Diminished breath sounds diffusely upon arrival.  After hour-long continuous nebulizer treatment he is much more open now and audibly wheezing.  Very tachypneic and short of breath with minimal exertion.  Not hypoxic at this time.  Got steroids as well.  Discussed with his son.  Discussed with Dr. Warren who will see him in consultation.  Admitted to Dr. Robles, awaiting bed assignment.  Discussed with the son.  Blood pressure stable.          Admission disposition: 1/27/2025  2:00 PM           Medical Decision Making      Disposition and Plan     Clinical Impression:  1. Acute bronchospasm    2. Influenza A    3. COPD exacerbation (HCC)    4. Acute respiratory failure, unspecified whether with hypoxia or hypercapnia (HCC)         Disposition:  Admit  1/27/2025  2:00 pm           Hospital Problems       Present on Admission  Date Reviewed: 1/23/2025            ICD-10-CM Noted POA    * (Principal) Acute bronchospasm J98.01 1/27/2025 Unknown         Signed by Kael Ac DO on 1/27/2025  2:32 PM         History and Physical    H&P signed by Hema Sharma MD at 1/27/2025  2:33 PM            Chief Complaint: SOB, wheezing     Subjective:    History of Present Illness:      Syed Cooney is a 89 year old male with PMhx of CHF, COPD, HTN, DL, epmphysema presents with SOB and wheezing. Pt brought in by family. Pt started to have symptoms about 4 days ago with chills and has progressively gotten worse. He has noticed worsening breathing and wheezing. No known fevers. He has tried duonebs and recscue inhalers without improvement in his symptoms. He denies any CP. No N/V/D/C or abd pain. He was given hour long neb without much improvement.      Assessment & Plan:       #Acute Bronchospasm  #COPD Exacerbation  #Influenza  Continue  nebs and IV steroids  O2 as needed  Pulm to eval  Hold tamiflu at this time given 4 days since symptoms onset     #Ess HTN  Continue home meds     #Dyslipidemia  Statin     #crohns Disease  Continue mesalamine     #Hypothyroid  synthroid              Plan of care discussed with patient, ED Physician      Hema Sharma MD            Consult  Consults signed by Simon Correa MD at 1/29/2025  6:34 PM    Author: Simon Correa MD Service: Pulmonology Author Type: Physician   Filed: 1/29/2025  6:34 PM Date of Service: 1/29/2025  3:52 PM Status: Signed   : Simon Correa MD (Physician)   Consult Orders   1. Consult to Pulmonology [[id number]] ordered by Hema Sharma MD at 01/27/25 1433       Pulmonary Progress Note      Assessment / Plan:  COPD exacerbation  -due to influenza  -IV steroids; dose increased  -restart LAMA/LABA/ICS, send home on Trelegy per son's request  -will start three times per week azithro as well  -BD protocol followed by tico valve  Flu A  -start Tamiflu  Dispo  -will follow     Discussed with multiple family members at bedside        Subjective:  Worsening dyspnea.               MEDICATIONS ADMINISTERED IN LAST 1 DAY:  albuterol (Ventolin HFA) 108 (90 Base) MCG/ACT inhaler       Date Action Dose Route User    1/29/2025 2052 Given (none) (none) Antonieta Oliva, RCJASON          carvedilol (Coreg) tab 3.125 mg       Date Action Dose Route User    1/30/2025 0831 Given 3.125 mg Oral Melissa Gomez RN    1/29/2025 1827 Given 3.125 mg Oral Karin Yo RN          ferrous sulfate DR tab 325 mg       Date Action Dose Route User    1/30/2025 0831 Given 325 mg Oral Melissa Gomez RN    1/29/2025 1827 Given 325 mg Oral Karin Yo RN          fluticasone-salmeterol (Advair Diskus) 500-50 MCG/ACT inhaler 1 puff       Date Action Dose Route User    1/30/2025 0832 Given 1 puff Inhalation Melissa Gomez RN    1/29/2025 2100 Given 1 puff Inhalation Molly Desai RN          guaiFENesin ER  (Mucinex) 12 hr tab 600 mg       Date Action Dose Route User    1/29/2025 2016 Given 600 mg Oral Molly Desai RN          heparin (Porcine) 5000 UNIT/ML injection 5,000 Units       Date Action Dose Route User    1/30/2025 1309 Given 5,000 Units Subcutaneous (Right Lower Abdomen) Melissa Gomez RN    1/30/2025 0513 Given 5,000 Units Subcutaneous (Left Lower Abdomen) Molly Desai RN    1/29/2025 2015 Given 5,000 Units Subcutaneous (Left Lower Abdomen) Molly Desai RN          hydrALAzine (Apresoline) 20 mg/mL injection 10 mg       Date Action Dose Route User    1/29/2025 2022 Given 10 mg Intravenous Molly Desai RN          ipratropium-albuterol (Duoneb) 0.5-2.5 (3) MG/3ML inhalation solution 3 mL       Date Action Dose Route User    1/30/2025 0803 Given 3 mL Nebulization Rosy Galan, [facility]    1/30/2025 0420 Given 3 mL Nebulization Agustin Cowan, [facility]    1/29/2025 1950 Given 3 mL Nebulization Antonieta Oliva, [facility]    1/29/2025 1544 Given 3 mL Nebulization Antonieta Oliva, [facility]          ipratropium-albuterol (Duoneb) 0.5-2.5 (3) MG/3ML inhalation solution 3 mL       Date Action Dose Route User    1/30/2025 1314 Given 3 mL Nebulization Rosy Galan, [facility]          levothyroxine (Synthroid) tab 50 mcg       Date Action Dose Route User    1/30/2025 0512 Given 50 mcg Oral Molly Desai RN          mesalamine DR (Delzicol) cap 1,200 mg       Date Action Dose Route User    1/30/2025 0832 Given 1,200 mg Oral Melissa Gomez RN    1/29/2025 2015 Given 1,200 mg Oral Molly Desai RN          methylPREDNISolone sodium succinate (Solu-MEDROL) injection 80 mg       Date Action Dose Route User    1/30/2025 1309 Given 80 mg Intravenous Melissa Gomez RN    1/30/2025 0512 Given 80 mg Intravenous Molly Desai RN    1/29/2025 2015 Given 80 mg Intravenous Molly Desai RN          oseltamivir (Tamiflu) cap 30 mg       Date Action Dose Route User    1/30/2025 0831 Given 30 mg Oral Jason  PORTILLO Torres    1/29/2025 2016 Given 30 mg Oral Molly Desai RN          pantoprazole (Protonix) DR tab 40 mg       Date Action Dose Route User    1/30/2025 0512 Given 40 mg Oral Molly Desai RN          tamsulosin (Flomax) cap 0.4 mg       Date Action Dose Route User    1/29/2025 2015 Given 0.4 mg Oral Molly Desai RN          umeclidinium bromide (Incruse Ellipta) 62.5 MCG/ACT inhaler 1 puff       Date Action Dose Route User    1/30/2025 0837 Given 1 puff Inhalation Melissa Gomez RN            Vitals (last day)       Date/Time Temp Pulse Resp BP SpO2 Weight O2 Device O2 Flow Rate (L/min) [facility]    01/30/25 1130 97.3 °F (36.3 °C) 80 18 152/70 97 % -- -- -- IS    01/30/25 1100 -- -- -- -- -- -- None (Room air) -- DP    01/30/25 0803 -- 91 20 -- 99 % -- Nasal cannula 2 L/min EO    01/30/25 0740 97.1 °F (36.2 °C) 84 19 140/67 98 % -- Nasal cannula 2 L/min IS    01/30/25 0516 -- 82 -- -- 99 % -- -- --     01/30/25 0516 98.1 °F (36.7 °C) -- 18 135/73 -- -- Nasal cannula 2 L/min AC    01/30/25 0420 -- -- -- -- -- -- Nasal cannula 2 L/min CW    01/29/25 2351 98.2 °F (36.8 °C) 85 20 142/67 97 % -- Nasal cannula 2 L/min BH    01/29/25 2100 -- 72 -- 123/85 100 % -- -- --     01/29/25 2037 97.8 °F (36.6 °C) -- 18 -- -- -- Nasal cannula 2 L/min AC    01/29/25 1950 -- -- -- -- -- -- Nasal cannula 2 L/min ER    01/29/25 1827 -- 81 -- 163/91 98 % -- -- --     01/29/25 1610 97.5 °F (36.4 °C) -- -- -- -- -- -- --     01/29/25 1610 -- 82 -- 152/99 100 % -- Nasal cannula 2 L/min     01/29/25 1544 -- -- -- -- -- -- Nasal cannula 2 L/min     01/29/25 1138 -- 61 18 159/75 99 % -- Nasal cannula 2 L/min     01/29/25 1017 -- -- -- -- -- -- Nasal cannula 2 L/min     01/29/25 0821 -- 103 -- -- 96 % -- -- --     01/29/25 0434 97.6 °F (36.4 °C) 87 26 144/90 96 % -- Nasal cannula 2 L/min     01/29/25 0041 -- 85 -- 173/67 96 % -- -- -- VH

## 2025-01-31 LAB
BASOPHILS # BLD AUTO: 0.01 X10(3) UL (ref 0–0.2)
BASOPHILS NFR BLD AUTO: 0.1 %
CREAT BLD-MCNC: 1.54 MG/DL
EGFRCR SERPLBLD CKD-EPI 2021: 43 ML/MIN/1.73M2 (ref 60–?)
EOSINOPHIL # BLD AUTO: 0 X10(3) UL (ref 0–0.7)
EOSINOPHIL NFR BLD AUTO: 0 %
ERYTHROCYTE [DISTWIDTH] IN BLOOD BY AUTOMATED COUNT: 12.9 %
HCT VFR BLD AUTO: 28.9 %
HGB BLD-MCNC: 10.2 G/DL
IMM GRANULOCYTES # BLD AUTO: 0.03 X10(3) UL (ref 0–1)
IMM GRANULOCYTES NFR BLD: 0.3 %
LYMPHOCYTES # BLD AUTO: 0.38 X10(3) UL (ref 1–4)
LYMPHOCYTES NFR BLD AUTO: 4.3 %
MCH RBC QN AUTO: 31.4 PG (ref 26–34)
MCHC RBC AUTO-ENTMCNC: 35.3 G/DL (ref 31–37)
MCV RBC AUTO: 88.9 FL
MONOCYTES # BLD AUTO: 0.28 X10(3) UL (ref 0.1–1)
MONOCYTES NFR BLD AUTO: 3.2 %
NEUTROPHILS # BLD AUTO: 8.05 X10 (3) UL (ref 1.5–7.7)
NEUTROPHILS # BLD AUTO: 8.05 X10(3) UL (ref 1.5–7.7)
NEUTROPHILS NFR BLD AUTO: 92.1 %
PLATELET # BLD AUTO: 180 10(3)UL (ref 150–450)
RBC # BLD AUTO: 3.25 X10(6)UL
WBC # BLD AUTO: 8.8 X10(3) UL (ref 4–11)

## 2025-01-31 PROCEDURE — 99232 SBSQ HOSP IP/OBS MODERATE 35: CPT | Performed by: HOSPITALIST

## 2025-01-31 RX ORDER — PREDNISONE 20 MG/1
40 TABLET ORAL
Status: DISCONTINUED | OUTPATIENT
Start: 2025-02-01 | End: 2025-02-06

## 2025-01-31 RX ORDER — IPRATROPIUM BROMIDE AND ALBUTEROL SULFATE 2.5; .5 MG/3ML; MG/3ML
3 SOLUTION RESPIRATORY (INHALATION)
Status: DISCONTINUED | OUTPATIENT
Start: 2025-01-31 | End: 2025-02-01

## 2025-01-31 NOTE — PROGRESS NOTES
AO x3. Forgetful. 2L at rest. RA is BL. Nebs. Tele NSR. Heparin subcutaneous. Urinal. No pain reported. Up x1 walker. Solumedrol. Tamiflu. Unasyn. Regular diet. IV SL. Pt resting in bed with call light in reach. No further needs.

## 2025-01-31 NOTE — PAYOR COMM NOTE
--------------PT WAS OBSERVATION STATUS FROM  1-27-25 TO 1-29-25    INPATIENT ADMIT 1-29-25       CONTINUED STAY REVIEW    Payor: [facility]  Subscriber #:  [id number]  Authorization Number: [id number]    Admit date: 1/29/25  Admit time:  2:36 PM    REVIEW DOCUMENTATION:      1-29-25     Subjective:  Worsening dyspnea.       Pt reporting increased SOB overnight.     PULMONOLOGY     Assessment / Plan:  COPD exacerbation  -due to influenza  -IV steroids; dose increased  -restart LAMA/LABA/ICS, send home on Trelegy per son's request  -will start three times per week azithro as well  -BD protocol followed by flutter valve  Flu A  -start Tamiflu    Physical Exam:  General: no apparent distress, conversant  Skin: no rash, ulcers or subcutaneous nodules  Eyes: anicteric sclerae, moist conjunctivae  Head, ears, nose, throat: atraumatic, oropharynx clear with moist mucous membranes  Neck: trachea midline with no thyromegaly  Heart: regular rate and rhythm, no murmurs / rubs / gallops  Lungs: bibasilar rhonchi  Extremities: no edema or cyanosis  Psych: interactive, answering questions appropriately, appropriate affect        HOSPITALIST      Assessment & Plan:  #acute COPD Exacerbation secondary to influenza infection-nebs/steroids-Hold tamiflu at this time given 4 days since symptoms onset; pulm following  -3x/week azithromycin  -LAMA/LABA/ICS for now; trelegy on discharge per pulm  -s/p dose of lasix; added mucinex     #Ess HTN-Continue home meds     #Dyslipidemia-Statin     #crohns Disease-Continue mesalamine     #Hypothyroid-synthroid     #right foot swelling; mild; monitor     furosemide (Lasix) 10 mg/mL injection 20 mg  Dose: 20 mg  Freq: Once Route: IV  Start: 01/29/25 0030 End: 01/29/25 0033  methylPREDNISolone sodium succinate (Solu-MEDROL) injection 80 mg  Dose: 80 mg  Freq: Every 8 hours Route: IV  Start: 01/29/25 2000   Admin Instructions:   Reconstitute with 2ml sterile water or saline              1/29/25 2100 -- 72 -- 123/85 100 % -- -- --    01/29/25 2037 97.8 °F (36.6 °C) -- 18 -- -- -- Nasal cannula 2 L/min AC   01/29/25 1950 -- -- -- -- -- -- Nasal cannula 2 L/min ER   01/29/25 1827 -- 81 -- 163/91 Abnormal  98 % -- -- --    01/29/25 1610 97.5 °F (36.4 °C) -- -- -- -- -- -- --    01/29/25 1610 -- 82 -- 152/99 Abnormal  100 % -- Nasal cannula 2 L/min    01/29/25 1544 -- -- -- -- -- -- Nasal cannula 2 L/min ER   01/29/25 1138 -- 61 18 159/75 99 % -- Nasal cannula 2 L/min    01/29/25 1017 -- -- -- -- -- -- Nasal cannula 2 L/min AM   01/29/25 0821 -- 103 -- -- 96 % -- -- --    01/29/25 0434 97.6 °F (36.4 °C) 87 26 144/90 96 % -- Nasal cannula 2 L/min    01/29/25 0041 -- 85 -- 173/67 Abnormal  96 % -- -- -- VH           MEDICATIONS ADMINISTERED IN LAST 1 DAY:  ampicillin-sulbactam (Unasyn) 3 g in sodium chloride 0.9% 100mL IVPB-ADD       Date Action Dose Route User    1/31/2025 0522 New Bag 3 g Intravenous Molly Desai RN    1/30/2025 1739 New Bag 3 g Intravenous Melissa Gomez, PORTILLO          azithromycin (Zithromax) tab 250 mg       Date Action Dose Route User    1/31/2025 0843 Given 250 mg Oral Melissa Gomez RN          carvedilol (Coreg) tab 3.125 mg       Date Action Dose Route User    1/31/2025 0842 Given 3.125 mg Oral Melissa Gomez RN    1/30/2025 1739 Given 3.125 mg Oral Melissa Gomez RN          ferrous sulfate DR tab 325 mg       Date Action Dose Route User    1/31/2025 0843 Given 325 mg Oral Melissa Gomez RN    1/30/2025 1739 Given 325 mg Oral Melissa Gomez RN          fluticasone-salmeterol (Advair Diskus) 500-50 MCG/ACT inhaler 1 puff       Date Action Dose Route User    1/31/2025 0843 Given 1 puff Inhalation Melissa Gomez RN    1/30/2025 2100 Given 1 puff Inhalation Molly Desai RN          guaiFENesin (Robitussin) 100 MG/5 ML oral liquid 200 mg       Date Action Dose Route User    1/30/2025 2037 Given 200 mg Oral Molly Desai RN          heparin (Porcine)  5000 UNIT/ML injection 5,000 Units       Date Action Dose Route User    1/31/2025 0523 Given 5,000 Units Subcutaneous (Left Lower Abdomen) Molly Desai RN    1/30/2025 2036 Given 5,000 Units Subcutaneous (Left Lower Abdomen) Molly Desai RN    1/30/2025 1309 Given 5,000 Units Subcutaneous (Right Lower Abdomen) Melissa Gomez RN          ipratropium-albuterol (Duoneb) 0.5-2.5 (3) MG/3ML inhalation solution 3 mL       Date Action Dose Route User    1/31/2025 0719 Given 3 mL Nebulization Maryjane Woody, JASON    1/30/2025 2026 Given 3 mL Nebulization Pauline Pelletier [facility]    1/30/2025 1314 Given 3 mL Nebulization Rosy Galan, [facility]          levothyroxine (Synthroid) tab 50 mcg       Date Action Dose Route User    1/31/2025 0523 Given 50 mcg Oral Molly Desai RN          melatonin tab 3 mg       Date Action Dose Route User    1/30/2025 2147 Given 3 mg Oral Molly Desai RN          mesalamine DR (Delzicol) cap 1,200 mg       Date Action Dose Route User    1/31/2025 0843 Given 1,200 mg Oral Melissa Gomez RN    1/30/2025 2037 Given 1,200 mg Oral Molly Desai RN          methylPREDNISolone sodium succinate (Solu-MEDROL) injection 80 mg       Date Action Dose Route User    1/31/2025 0522 Given 80 mg Intravenous Molly Desai RN    1/30/2025 2036 Given 80 mg Intravenous Molly Desai RN    1/30/2025 1309 Given 80 mg Intravenous Melissa Gomez RN          oseltamivir (Tamiflu) cap 30 mg       Date Action Dose Route User    1/31/2025 0843 Given 30 mg Oral Melissa Gomez RN          pantoprazole (Protonix) DR tab 40 mg       Date Action Dose Route User    1/31/2025 0523 Given 40 mg Oral Molly Desai RN          tamsulosin (Flomax) cap 0.4 mg       Date Action Dose Route User    1/30/2025 2037 Given 0.4 mg Oral Molly Desai RN          umeclidinium bromide (Incruse Ellipta) 62.5 MCG/ACT inhaler 1 puff       Date Action Dose Route User    1/31/2025 0843 Given 1 puff Inhalation Jason  PORTILLO Torres            Vitals (last day)       Date/Time Temp Pulse Resp BP SpO2 Weight O2 Device O2 Flow Rate (L/min) [facility]    01/31/25 0807 97.4 °F (36.3 °C) 87 21 157/84 99 % -- Nasal cannula 2 L/min DONAVAN    01/31/25 0719 -- 85 19 -- 99 % -- Nasal cannula 2 L/min AF    01/31/25 0430 97.5 °F (36.4 °C) 80 20 154/68 95 % -- Nasal cannula -- DARCY    01/31/25 0008 97.1 °F (36.2 °C) 75 18 124/65 93 % -- Nasal cannula -- DARCY    01/30/25 2051 97.2 °F (36.2 °C) 79 18 105/84 95 % -- None (Room air) -- DARCY    01/30/25 1550 97.1 °F (36.2 °C) 80 18 171/71 100 % -- Nasal cannula 2 L/min IS    01/30/25 1400 -- -- -- -- -- -- Nasal cannula 2 L/min DP    01/30/25 1130 97.3 °F (36.3 °C) 80 18 152/70 97 % -- -- -- IS    01/30/25 1100 -- -- -- -- -- -- None (Room air) -- DP    01/30/25 0803 -- 91 20 -- 99 % -- Nasal cannula 2 L/min EO    01/30/25 0740 97.1 °F (36.2 °C) 84 19 140/67 98 % -- Nasal cannula 2 L/min IS    01/30/25 0516 -- 82 -- -- 99 % -- -- -- BH    01/30/25 0516 98.1 °F (36.7 °C) -- 18 135/73 -- -- Nasal cannula 2 L/min AC    01/30/25 0420 -- -- -- -- -- -- Nasal cannula 2 L/min CW

## 2025-01-31 NOTE — PROGRESS NOTES
[facility]   part of [facility]     Hospitalist Progress Note     Syed Cooney Patient Status:  Observation    1934 MRN [id number]   Location [facility] 5NW-A Attending Hema Sharma MD   Hosp Day # 2 PCP Jeffrey Gan MD     Chief Complaint:   Chief Complaint   Patient presents with    Shortness Of Breath       Subjective:     Still sob and congested but feels a little better today    Objective:    Review of Systems:   6  point ROS completed and was negative, except for pertinent positive and negatives stated in subjective.    Vital signs:  Temp:  [97.1 °F (36.2 °C)-97.5 °F (36.4 °C)] 97.4 °F (36.3 °C)  Pulse:  [75-87] 87  Resp:  [18-21] 21  BP: (105-171)/(65-84) 157/84  SpO2:  [93 %-100 %] 99 %    Physical Exam:    General: No acute distress.   Respiratory:  course breath sounds  Cardiovascular: S1, S2. Regular rate and rhythm. No murmurs.  Abdomen: Soft, nontender, nondistended.    Extremities: No edema.    Diagnostic Data:    Labs:  Recent Labs   Lab 25  1213 25  0640 25  0649   WBC 4.2 2.1* 8.8   HGB 10.6* 10.6* 10.2*   MCV 89.4 88.9 88.9   .0* 151.0 180.0       Recent Labs   Lab 25  1213 25  0640   GLU 99 137*   BUN 23 22   CREATSERUM 1.33* 1.23   CA 8.8 8.1*   ALB 3.5  --     140   K 4.4 4.0    108   CO2 26.0 21.0   ALKPHO 97  --    AST 27  --    ALT 12  --    BILT 0.4  --    TP 5.5*  --        Estimated Creatinine Clearance: 28.2 mL/min (based on SCr of 1.23 mg/dL).    No results for input(s): \"PTP\", \"INR\" in the last 168 hours.         COVID-19 Lab Results    COVID-19  Lab Results   Component Value Date    COVID19 Not Detected 2025    COVID19 Not Detected 09/10/2024    COVID19 Not Detected 2023       Pro-Calcitonin  No results for input(s): \"PCT\" in the last 168 hours.    Cardiac  Recent Labs   Lab 25  1213   PBNP 1,702*       Creatinine Kinase  No results for input(s): \"CK\" in the last 168  hours.    Inflammatory Markers  No results for input(s): \"CRP\", \"REMY\", \"LDH\", \"DDIMER\" in the last 168 hours.    Recent Labs   Lab 01/27/25  1213   TROPHS 10       Imaging: Imaging data reviewed in Epic.    Medications:    ipratropium-albuterol  3 mL Nebulization Q6H WA    ampicillin-sulbactam  3 g Intravenous q12h    methylPREDNISolone  80 mg Intravenous Q8H    oseltamivir  30 mg Oral Daily    carvedilol  3.125 mg Oral BID with meals    ferrous sulfate  325 mg Oral BID with meals    fluticasone-salmeterol  1 puff Inhalation BID    umeclidinium bromide  1 puff Inhalation Daily    levothyroxine  50 mcg Oral Before breakfast    mesalamine DR  1,200 mg Oral BID    pantoprazole  40 mg Oral QAM AC    tamsulosin  0.4 mg Oral Nightly    heparin  5,000 Units Subcutaneous Q8H SCAR    azithromycin  250 mg Oral Once per day on Monday Wednesday Friday       Assessment & Plan:      #acute COPD Exacerbation secondary to influenza infection and possible superimposed gram negative pneumonia-nebs/steroids/tamiflu/antibiotics  -3x/week azithromycin  -LAMA/LABA/ICS for now; trelegy on discharge per pulm  -s/p dose of lasix; added mucinex; watch volume status closely  -check echo to assess EF and triage chance of cardiac contribution to symptoms; will d/w family when I have these results    #Ess HTN-Continue home meds     #Dyslipidemia-Statin     #crohns Disease-Continue mesalamine     #Hypothyroid-synthroid    #right foot swelling; mild; monitor    #leukpenia-resolved    Plan of care discussed with patient and RN    Trever Nielsen MD    Supplementary Documentation:     Quality:  DVT Prophylaxis: heparin  CODE status: see chart  Thomas: no  Central line: no      Estimated date of discharge: tbd  At this point Mr. Cooney is expected to be discharge to: tbd             **Certification      PHYSICIAN Certification of Need for Inpatient Hospitalization - Initial Certification    Patient will require inpatient services that will reasonably  be expected to span two midnight's based on the clinical documentation in H+P.   Based on patients current state of illness, I anticipate that, after discharge, patient will require TBD.

## 2025-01-31 NOTE — PROGRESS NOTES
Pulmonary Progress Note        NAME: Syed Cooney - ROOM: [facility]519-A - MRN: [id number] - Age: 89 year old - : 1934        Last 24hrs: CT done yesterday raised concern for possible PNA, family states that whenever pt lays flat he gets SOB    OBJECTIVE:  Vitals:    25 0008 25 0430 25 0719 25 0807   BP: 124/65 154/68  157/84   BP Location: Right arm Right arm  Right arm   Pulse: 75 80 85 87   Resp: 18 20 19 21   Temp: 97.1 °F (36.2 °C) 97.5 °F (36.4 °C)  97.4 °F (36.3 °C)   TempSrc: Axillary Oral  Oral   SpO2: 93% 95% 99% 99%   Weight:       Height:           Oxygen Therapy  SpO2: 99 %  O2 Device: Nasal cannula  O2 Flow Rate (L/min): 2 L/min  Pulse Oximetry Type: Continuous  Oximetry Probe Site Changed: No  Pulse Ox Probe Location: Right hand                No intake or output data in the 24 hours ending 25 1143      Scheduled Medication:   ipratropium-albuterol  3 mL Nebulization Q6H WA    ampicillin-sulbactam  3 g Intravenous q12h    methylPREDNISolone  80 mg Intravenous Q8H    oseltamivir  30 mg Oral Daily    carvedilol  3.125 mg Oral BID with meals    ferrous sulfate  325 mg Oral BID with meals    fluticasone-salmeterol  1 puff Inhalation BID    umeclidinium bromide  1 puff Inhalation Daily    levothyroxine  50 mcg Oral Before breakfast    mesalamine DR  1,200 mg Oral BID    pantoprazole  40 mg Oral QAM AC    tamsulosin  0.4 mg Oral Nightly    heparin  5,000 Units Subcutaneous Q8H SCAR    azithromycin  250 mg Oral Once per day on      Continuous Infusing Medication:    General appearance: alert, appears stated age, and cooperative  Lungs:  few scattered wheezes kenny  Heart: S1, S2 normal, no murmur, click, rub or gallop, regular rate and rhythm  Extremities: extremities normal, atraumatic, no cyanosis or edema    Labs reviewed as noted below        ASSESSMENT/PLAN:    COPD exacerbation  -due to influenza  -IV steroids; taper to pred  tomorrow  -cont LAMA/LABA/ICS, send home on Trelegy per son's request  -cont three times per week azithro   -BD protocol followed by flutter valve  Flu A  -Tamiflu (1/29- )  Dyspnea w/ laying flat  -discussed with family that CPAP was not indicated  -discussed w/ primary will check echo  Possible PNA  -noted on CT chest  -started on unasyn (1/31- )  Dispo  -will follow      Niall aVzquez  [facility]  Pulmonary and Critical Care

## 2025-01-31 NOTE — PLAN OF CARE
Patient is A&O3, [place]; bi hearing aids, glasses, upper & lower dentures at bedside. SR on tele. SBA w/walker. Regular diet. 2L NC for comfort, baseline is RA. Weaning off O2 as tolerated. IV steroids. IV antibiotics (Unasyn). Tamiflu. Heparin subcutaneous. Duo Nebs Q6h. Patient updated on POC. No further needs at this time.       Problem: Patient/Family Goals  Goal: Patient/Family Long Term Goal  Description: Patient's Long Term Goal: discharge home when medically stable    Interventions:  Telemetry monitoring  O2 protocol,   Fall precaution, bed alarm on, non-skid socks on,  call light and bedside table within reach.  Monitor labs, vitals, intake and output.  Electrolyte protocol.  Heparin and SCD for VTE   Nebs q 4 hrs.  Maintain on droplet precaution.   - See additional Care Plan goals for specific interventions  Outcome: Progressing  Goal: Patient/Family Short Term Goal  Description: Patient's Short Term Goal: Breathing is stable tonight    Interventions:   Telemetry monitoring  O2 protocol,   Fall precaution, bed alarm on, non-skid socks on,  call light and bedside table within reach.  Monitor labs, vitals, intake and output.  Electrolyte protocol.  Heparin and SCD for VTE   Nebs q 4 hrs.  Maintain on droplet precaution.     - See additional Care Plan goals for specific interventions  Outcome: Progressing     Problem: CARDIOVASCULAR - ADULT  Goal: Absence of cardiac arrhythmias or at baseline  Description: INTERVENTIONS:  - Continuous cardiac monitoring, monitor vital signs, obtain 12 lead EKG if indicated  - Evaluate effectiveness of antiarrhythmic and heart rate control medications as ordered  - Initiate emergency measures for life threatening arrhythmias  - Monitor electrolytes and administer replacement therapy as ordered  Outcome: Progressing     Problem: RESPIRATORY - ADULT  Goal: Achieves optimal ventilation and oxygenation  Description: INTERVENTIONS:  - Assess for changes in respiratory status  -  Assess for changes in mentation and behavior  - Position to facilitate oxygenation and minimize respiratory effort  - Oxygen supplementation based on oxygen saturation or ABGs  - Provide Smoking Cessation handout, if applicable  - Encourage broncho-pulmonary hygiene including cough, deep breathe, Incentive Spirometry  - Assess the need for suctioning and perform as needed  - Assess and instruct to report SOB or any respiratory difficulty  - Respiratory Therapy support as indicated  - Manage/alleviate anxiety  - Monitor for signs/symptoms of CO2 retention  Outcome: Progressing

## 2025-02-01 ENCOUNTER — APPOINTMENT (OUTPATIENT)
Dept: CV DIAGNOSTICS | Facility: HOSPITAL | Age: OVER 89
End: 2025-02-01
Attending: HOSPITALIST
Payer: MEDICARE

## 2025-02-01 LAB
CREAT BLD-MCNC: 1.48 MG/DL
EGFRCR SERPLBLD CKD-EPI 2021: 45 ML/MIN/1.73M2 (ref 60–?)

## 2025-02-01 PROCEDURE — 93306 TTE W/DOPPLER COMPLETE: CPT | Performed by: HOSPITALIST

## 2025-02-01 PROCEDURE — 99232 SBSQ HOSP IP/OBS MODERATE 35: CPT | Performed by: HOSPITALIST

## 2025-02-01 RX ORDER — IPRATROPIUM BROMIDE AND ALBUTEROL SULFATE 2.5; .5 MG/3ML; MG/3ML
3 SOLUTION RESPIRATORY (INHALATION)
Status: DISCONTINUED | OUTPATIENT
Start: 2025-02-01 | End: 2025-02-01

## 2025-02-01 RX ORDER — IPRATROPIUM BROMIDE AND ALBUTEROL SULFATE 2.5; .5 MG/3ML; MG/3ML
3 SOLUTION RESPIRATORY (INHALATION) EVERY 4 HOURS PRN
Status: DISCONTINUED | OUTPATIENT
Start: 2025-02-01 | End: 2025-02-01

## 2025-02-01 RX ORDER — FUROSEMIDE 10 MG/ML
40 INJECTION INTRAMUSCULAR; INTRAVENOUS ONCE
Status: COMPLETED | OUTPATIENT
Start: 2025-02-01 | End: 2025-02-01

## 2025-02-01 RX ORDER — IPRATROPIUM BROMIDE AND ALBUTEROL SULFATE 2.5; .5 MG/3ML; MG/3ML
3 SOLUTION RESPIRATORY (INHALATION)
Status: DISCONTINUED | OUTPATIENT
Start: 2025-02-01 | End: 2025-02-02

## 2025-02-01 RX ORDER — CARVEDILOL 6.25 MG/1
6.25 TABLET ORAL 2 TIMES DAILY WITH MEALS
Status: DISCONTINUED | OUTPATIENT
Start: 2025-02-02 | End: 2025-02-06

## 2025-02-01 NOTE — PROGRESS NOTES
Received pt A&Ox3-4.  on 3L. NEBS. NSR on tele. heparin for VTE prophylaxis. Solumedrol. PRN hydralazine. IV ABX. Meds per MAR. Albuterol. Tolerating diet. Voids via Urinal. Up standby walker. Tylenol Pain. Plan of care continues, no further needs at this time.    Awaiting echo today.

## 2025-02-01 NOTE — PROGRESS NOTES
02/01/25 0545   Respiratory Assessment   Assessment Type Assess only   Respiratory Pattern Dyspnea with exertion   Chest Assessment Chest expansion symmetrical   Cough Congested;Moist   R Breath Sounds Expiratory wheezes   L Breath Sounds Diminished   Additional Assessments   Pulse 81   Resp 20   Position Semi-Galarza's   $ RT Standby Charge (per 15 min) 1   $ RT Assessment Yes     Received call from RN to assess PT for SOB at 0530. Pt given PRN Albuterol MDI at midnight for SOB, and again at 0330 for SOB. Pt breathing comfortably at assessment. Assessed as above. Unable to give PRN neb tx at this time since Albuterol was given only 2 hours ago. Pt not currently in respiratory distress. Scheduled Duonebs changed to Q4H around the clock to better suit Pt's needs. Will wean per BD protocol as [place]. Plan discussed with Pt and with RN at bedside.

## 2025-02-01 NOTE — PLAN OF CARE
Patient AAOx3. Glasses, b/l hearing aids. Upper and lower dentures. 2/3L NC. Scheduled nebs. PRN albuterol. Tele NSR. PRN hydralazine. Voids in urinal, occasional I/c. Up stand by with walker. Regular diet. Meds crushed/applesauce if able, otherwise whole with water. Echo pending. Patient rounded on routinely. Patient and family updated on plan of care.    1235: Attempted to call echo for update on time, no answer    1212: attempted to call echo again, no answer    Per family request, MD paged regarding request to change echo to STAT. Order updated per MD.     New consult paged to cards, acknowledged.      Problem: Patient/Family Goals  Goal: Patient/Family Long Term Goal  Description: Patient's Long Term Goal: discharge home when medically stable    Interventions:  Telemetry monitoring  O2 protocol,   Fall precaution, bed alarm on, non-skid socks on,  call light and bedside table within reach.  Monitor labs, vitals, intake and output.  Electrolyte protocol.  Heparin and SCD for VTE   Nebs q 4 hrs.  Maintain on droplet precaution.   - See additional Care Plan goals for specific interventions  Outcome: Progressing  Goal: Patient/Family Short Term Goal  Description: Patient's Short Term Goal: Breathing is stable tonight  1/ 31 NOC: remain comfortable    Interventions:  Albuterol  PRN NEBS   Telemetry monitoring  O2 protocol,   Fall precaution, bed alarm on, non-skid socks on,  call light and bedside table within reach.  Monitor labs, vitals, intake and output.  Electrolyte protocol.  Heparin and SCD for VTE   Nebs q 4 hrs.  Maintain on droplet precaution.     - See additional Care Plan goals for specific interventions  Outcome: Progressing     Problem: CARDIOVASCULAR - ADULT  Goal: Absence of cardiac arrhythmias or at baseline  Description: INTERVENTIONS:  - Continuous cardiac monitoring, monitor vital signs, obtain 12 lead EKG if indicated  - Evaluate effectiveness of antiarrhythmic and heart rate control  medications as ordered  - Initiate emergency measures for life threatening arrhythmias  - Monitor electrolytes and administer replacement therapy as ordered  Outcome: Progressing     Problem: RESPIRATORY - ADULT  Goal: Achieves optimal ventilation and oxygenation  Description: INTERVENTIONS:  - Assess for changes in respiratory status  - Assess for changes in mentation and behavior  - Position to facilitate oxygenation and minimize respiratory effort  - Oxygen supplementation based on oxygen saturation or ABGs  - Provide Smoking Cessation handout, if applicable  - Encourage broncho-pulmonary hygiene including cough, deep breathe, Incentive Spirometry  - Assess the need for suctioning and perform as needed  - Assess and instruct to report SOB or any respiratory difficulty  - Respiratory Therapy support as indicated  - Manage/alleviate anxiety  - Monitor for signs/symptoms of CO2 retention  Outcome: Progressing

## 2025-02-01 NOTE — PLAN OF CARE
Problem: Patient/Family Goals  Goal: Patient/Family Long Term Goal  Description: Patient's Long Term Goal: discharge home when medically stable    Interventions:  Telemetry monitoring  O2 protocol,   Fall precaution, bed alarm on, non-skid socks on,  call light and bedside table within reach.  Monitor labs, vitals, intake and output.  Electrolyte protocol.  Heparin and SCD for VTE   Nebs q 4 hrs.  Maintain on droplet precaution.   - See additional Care Plan goals for specific interventions  Outcome: Progressing  Goal: Patient/Family Short Term Goal  Description: Patient's Short Term Goal: Breathing is stable tonight  1/ 31 NOC: remain comfortable    Interventions:  Albuterol  PRN NEBS   Telemetry monitoring  O2 protocol,   Fall precaution, bed alarm on, non-skid socks on,  call light and bedside table within reach.  Monitor labs, vitals, intake and output.  Electrolyte protocol.  Heparin and SCD for VTE   Nebs q 4 hrs.  Maintain on droplet precaution.     - See additional Care Plan goals for specific interventions  Outcome: Progressing     Problem: CARDIOVASCULAR - ADULT  Goal: Absence of cardiac arrhythmias or at baseline  Description: INTERVENTIONS:  - Continuous cardiac monitoring, monitor vital signs, obtain 12 lead EKG if indicated  - Evaluate effectiveness of antiarrhythmic and heart rate control medications as ordered  - Initiate emergency measures for life threatening arrhythmias  - Monitor electrolytes and administer replacement therapy as ordered  Outcome: Progressing     Problem: RESPIRATORY - ADULT  Goal: Achieves optimal ventilation and oxygenation  Description: INTERVENTIONS:  - Assess for changes in respiratory status  - Assess for changes in mentation and behavior  - Position to facilitate oxygenation and minimize respiratory effort  - Oxygen supplementation based on oxygen saturation or ABGs  - Provide Smoking Cessation handout, if applicable  - Encourage broncho-pulmonary hygiene including  cough, deep breathe, Incentive Spirometry  - Assess the need for suctioning and perform as needed  - Assess and instruct to report SOB or any respiratory difficulty  - Respiratory Therapy support as indicated  - Manage/alleviate anxiety  - Monitor for signs/symptoms of CO2 retention  Outcome: Progressing

## 2025-02-01 NOTE — PROGRESS NOTES
[facility]   part of [facility]     Hospitalist Progress Note     Syed Cooney Patient Status:  Observation    1934 MRN [id number]   Location [facility] 5NW-A Attending Hema Sharma MD   Hosp Day # 3 PCP Jeffrey Gan MD     Chief Complaint:   Chief Complaint   Patient presents with    Shortness Of Breath       Subjective:     Improvign slowly but still feels weak and dyspneic;  intermittently has episodes where he feels like he's drowning    Objective:    Review of Systems:   6  point ROS completed and was negative, except for pertinent positive and negatives stated in subjective.    Vital signs:  Temp:  [97.2 °F (36.2 °C)-98 °F (36.7 °C)] 97.5 °F (36.4 °C)  Pulse:  [66-97] 96  Resp:  [20-24] 22  BP: (150-178)/() 150/110  SpO2:  [96 %-99 %] 96 %    Physical Exam:    General: No acute distress.   Respiratory:  course breath sounds  Cardiovascular: S1, S2. Regular rate and rhythm. No murmurs.  Abdomen: Soft, nontender, nondistended.    Extremities: No edema.    Diagnostic Data:    Labs:  Recent Labs   Lab 25  1213 25  0640 25  0649   WBC 4.2 2.1* 8.8   HGB 10.6* 10.6* 10.2*   MCV 89.4 88.9 88.9   .0* 151.0 180.0       Recent Labs   Lab 25  1213 25  0640 25  1440 25  0815   GLU 99 137*  --   --    BUN 23 22  --   --    CREATSERUM 1.33* 1.23 1.54* 1.48*   CA 8.8 8.1*  --   --    ALB 3.5  --   --   --     140  --   --    K 4.4 4.0  --   --     108  --   --    CO2 26.0 21.0  --   --    ALKPHO 97  --   --   --    AST 27  --   --   --    ALT 12  --   --   --    BILT 0.4  --   --   --    TP 5.5*  --   --   --        Estimated Creatinine Clearance: 23.5 mL/min (A) (based on SCr of 1.48 mg/dL (H)).    No results for input(s): \"PTP\", \"INR\" in the last 168 hours.         COVID-19 Lab Results    COVID-19  Lab Results   Component Value Date    COVID19 Not Detected 2025    COVID19 Not Detected 09/10/2024     COVID19 Not Detected 09/06/2023       Pro-Calcitonin  No results for input(s): \"PCT\" in the last 168 hours.    Cardiac  Recent Labs   Lab 01/27/25  1213   PBNP 1,702*       Creatinine Kinase  No results for input(s): \"CK\" in the last 168 hours.    Inflammatory Markers  No results for input(s): \"CRP\", \"REMY\", \"LDH\", \"DDIMER\" in the last 168 hours.    Recent Labs   Lab 01/27/25  1213   TROPHS 10       Imaging: Imaging data reviewed in Epic.    Medications:    ipratropium-albuterol  3 mL Nebulization 6 times per day    predniSONE  40 mg Oral Daily with breakfast    ampicillin-sulbactam  3 g Intravenous q12h    oseltamivir  30 mg Oral Daily    carvedilol  3.125 mg Oral BID with meals    ferrous sulfate  325 mg Oral BID with meals    fluticasone-salmeterol  1 puff Inhalation BID    umeclidinium bromide  1 puff Inhalation Daily    levothyroxine  50 mcg Oral Before breakfast    mesalamine DR  1,200 mg Oral BID    pantoprazole  40 mg Oral QAM AC    tamsulosin  0.4 mg Oral Nightly    heparin  5,000 Units Subcutaneous Q8H SCAR    azithromycin  250 mg Oral Once per day on Monday Wednesday Friday       Assessment & Plan:      #acute COPD Exacerbation secondary to influenza infection and possible superimposed gram negative pneumonia-nebs/steroids/tamiflu/antibiotics  -3x/week azithromycin  -LAMA/LABA/ICS for now; trelegy on discharge per pulm  -s/p dose of lasix; added mucinex; watch volume status closely  -check echo to assess EF and triage chance of cardiac contribution to symptoms; will d/w family when I have these results    #Ess HTN-Continue home meds     #Dyslipidemia-Statin     #crohns Disease-Continue mesalamine     #Hypothyroid-synthroid    #right foot swelling; mild; monitor    #leukpenia-resolved    Plan of care discussed with patient and RN    Trever Nielsen MD    Supplementary Documentation:     Quality:  DVT Prophylaxis: heparin  CODE status: see chart  Thomas: no  Central line: no      Estimated date of discharge:  tbd  At this point Mr. Cooney is expected to be discharge to: tbd             **Certification      PHYSICIAN Certification of Need for Inpatient Hospitalization - Initial Certification    Patient will require inpatient services that will reasonably be expected to span two midnight's based on the clinical documentation in H+P.   Based on patients current state of illness, I anticipate that, after discharge, patient will require TBD.

## 2025-02-01 NOTE — PROGRESS NOTES
Syed Cooney Patient Status:  Inpatient    1934 MRN [id number]   [facility] 5NW-A Attending Trever Whitley MD   Hosp Day # 3 PCP Jeffrey aGn MD        SUBJECTIVE:no new events   OBJECTIVE:  Patient Vitals for the past 24 hrs:   BP Temp Temp src Pulse Resp SpO2   25 0827 -- -- -- 96 -- 96 %   25 0825 (!) 150/110 97.5 °F (36.4 °C) Oral 97 22 96 %   25 0545 -- -- -- 81 20 --   25 0539 (!) 178/74 -- -- -- 24 99 %   25 0006 156/65 -- -- 76 22 99 %   25 2100 (!) 172/70 97.5 °F (36.4 °C) Oral 66 20 --   25 1945 -- -- -- -- -- 96 %   25 1516 (!) 178/75 97.2 °F (36.2 °C) Oral 80 20 97 %   25 1130 150/74 98 °F (36.7 °C) Oral 77 21 99 %     O2 requirement: 2L     No intake/output data recorded.  I/O this shift:  In: -   Out: 100 [Urine:100]    Medications:  Reviewed in EMR.     Physical Exam:   General: no distress   Lungs: exp wheeze, moderate air mvt   Neck:  exp wheeze   Heart: Regular rate and rhythm, normal S1S2, no murmur.   Extremity: no edema, no cyanosis         Lab Results   Component Value Date    CREATSERUM 1.48 2025          Imaging: Reviewed.     ASSESSMENT/PLAN:        Acute Hypoxic respiratory Failure:  secondary to influenza, pneumonia, AECOPD  -wean O2  -bronchial hygiene  COPD exacerbation:  due to influenza  -prednisone taper  -cont LAMA/LABA/ICS, send home on Trelegy per son's request  -cont three times per week azithro   -BD protocol followed by flutter valve  Wheezing:  secondary to above, suspect upper airway cause  -as above  -ENT eval if persists  Flu A  -Tamiflu (- )  Orthopnea:  secondary to above, consider CHF  -echo pending  Possible PNA  -noted on CT chest  -cont unasyn (- )  Dispo  -will follow    Discussed with patient in detail, all questions answered.     Trever Ludwig MD  2025  9:56 AM

## 2025-02-02 PROBLEM — I34.0 MODERATE MITRAL REGURGITATION: Status: ACTIVE | Noted: 2025-02-02

## 2025-02-02 PROBLEM — I34.0 MODERATE MITRAL REGURGITATION: Status: ACTIVE | Noted: 2025-01-01

## 2025-02-02 LAB
ANION GAP SERPL CALC-SCNC: 12 MMOL/L (ref 0–18)
BUN BLD-MCNC: 43 MG/DL (ref 9–23)
CALCIUM BLD-MCNC: 8.4 MG/DL (ref 8.7–10.6)
CHLORIDE SERPL-SCNC: 106 MMOL/L (ref 98–112)
CO2 SERPL-SCNC: 24 MMOL/L (ref 21–32)
CREAT BLD-MCNC: 1.54 MG/DL
EGFRCR SERPLBLD CKD-EPI 2021: 43 ML/MIN/1.73M2 (ref 60–?)
GLUCOSE BLD-MCNC: 107 MG/DL (ref 70–99)
OSMOLALITY SERPL CALC.SUM OF ELEC: 305 MOSM/KG (ref 275–295)
POTASSIUM SERPL-SCNC: 3.5 MMOL/L (ref 3.5–5.1)
SODIUM SERPL-SCNC: 142 MMOL/L (ref 136–145)

## 2025-02-02 PROCEDURE — 99232 SBSQ HOSP IP/OBS MODERATE 35: CPT | Performed by: HOSPITALIST

## 2025-02-02 RX ORDER — IPRATROPIUM BROMIDE AND ALBUTEROL SULFATE 2.5; .5 MG/3ML; MG/3ML
3 SOLUTION RESPIRATORY (INHALATION)
Status: DISCONTINUED | OUTPATIENT
Start: 2025-02-02 | End: 2025-02-04

## 2025-02-02 NOTE — PROGRESS NOTES
Received pt A&Ox3-4 confused at times. NEBS.  on 3L. NSR on tele. IV ABX. PO Melatonin. Heparin for VTE prophylaxis. Tolerating diet. Voids via urinal. Up standby walker. Denies Pain. Plan of care continues, no further needs at this time.

## 2025-02-02 NOTE — PROGRESS NOTES
[facility]   part of [facility]     Hospitalist Progress Note     Syed Cooney Patient Status:  Observation    1934 MRN [id number]   Location [facility] 5NW-A Attending Hema Sharma MD   Hosp Day # 4 PCP Jeffrey Gan MD     Chief Complaint:   Chief Complaint   Patient presents with    Shortness Of Breath       Subjective:     Improvign slowly     Objective:    Review of Systems:   6  point ROS completed and was negative, except for pertinent positive and negatives stated in subjective.    Vital signs:  Temp:  [96.5 °F (35.8 °C)-97 °F (36.1 °C)] 97 °F (36.1 °C)  Pulse:  [75-88] 85  Resp:  [19-24] 24  BP: (134-185)/(69-87) 137/83  SpO2:  [94 %-98 %] 98 %    Physical Exam:    General: No acute distress.   Respiratory:  course breath sounds  Cardiovascular: S1, S2. Regular rate and rhythm. No murmurs.  Abdomen: Soft, nontender, nondistended.    Extremities: No edema.    Diagnostic Data:    Labs:  Recent Labs   Lab 25  1213 25  0640 25  0649   WBC 4.2 2.1* 8.8   HGB 10.6* 10.6* 10.2*   MCV 89.4 88.9 88.9   .0* 151.0 180.0       Recent Labs   Lab 25  1213 25  0640 25  1440 25  0815 25  0658   GLU 99 137*  --   --  107*   BUN 23 22  --   --  43*   CREATSERUM 1.33* 1.23 1.54* 1.48* 1.54*   CA 8.8 8.1*  --   --  8.4*   ALB 3.5  --   --   --   --     140  --   --  142   K 4.4 4.0  --   --  3.5    108  --   --  106   CO2 26.0 21.0  --   --  24.0   ALKPHO 97  --   --   --   --    AST 27  --   --   --   --    ALT 12  --   --   --   --    BILT 0.4  --   --   --   --    TP 5.5*  --   --   --   --        Estimated Creatinine Clearance: 22.5 mL/min (A) (based on SCr of 1.54 mg/dL (H)).    No results for input(s): \"PTP\", \"INR\" in the last 168 hours.         COVID-19 Lab Results    COVID-19  Lab Results   Component Value Date    COVID19 Not Detected 2025    COVID19 Not Detected 09/10/2024    COVID19 Not Detected  09/06/2023       Pro-Calcitonin  No results for input(s): \"PCT\" in the last 168 hours.    Cardiac  Recent Labs   Lab 01/27/25  1213   PBNP 1,702*       Creatinine Kinase  No results for input(s): \"CK\" in the last 168 hours.    Inflammatory Markers  No results for input(s): \"CRP\", \"REMY\", \"LDH\", \"DDIMER\" in the last 168 hours.    Recent Labs   Lab 01/27/25  1213   TROPHS 10       Imaging: Imaging data reviewed in Epic.    Medications:    ipratropium-albuterol  3 mL Nebulization 6 times per day    carvedilol  6.25 mg Oral BID with meals    predniSONE  40 mg Oral Daily with breakfast    ampicillin-sulbactam  3 g Intravenous q12h    ferrous sulfate  325 mg Oral BID with meals    fluticasone-salmeterol  1 puff Inhalation BID    umeclidinium bromide  1 puff Inhalation Daily    levothyroxine  50 mcg Oral Before breakfast    mesalamine DR  1,200 mg Oral BID    pantoprazole  40 mg Oral QAM AC    tamsulosin  0.4 mg Oral Nightly    heparin  5,000 Units Subcutaneous Q8H SCAR    azithromycin  250 mg Oral Once per day on Monday Wednesday Friday       Assessment & Plan:      #acute COPD Exacerbation secondary to influenza infection and possible superimposed gram negative pneumonia-nebs/steroids/tamiflu/antibiotics; check procal in AM  -3x/week azithromycin  -LAMA/LABA/ICS for now; trelegy on discharge per pulm  -s/p dose of lasix; added mucinex; watch volume status closely    #moderate mitral regurg; await cards recs and will d/w family later    #Ess HTN-Continue home meds     #Dyslipidemia-Statin     #crohns Disease-Continue mesalamine     #Hypothyroid-synthroid    #right foot swelling; mild; monitor    #leukpenia-resolved    Plan of care discussed with patient and RN    Trever Nielsen MD    Supplementary Documentation:     Quality:  DVT Prophylaxis: heparin  CODE status: see chart  Thomas: no  Central line: no      Estimated date of discharge: hopefully tomorrow  At this point Mr. Cooney is expected to be discharge to: [facility]              **Certification      PHYSICIAN Certification of Need for Inpatient Hospitalization - Initial Certification    Patient will require inpatient services that will reasonably be expected to span two midnight's based on the clinical documentation in H+P.   Based on patients current state of illness, I anticipate that, after discharge, patient will require TBD.

## 2025-02-02 NOTE — PLAN OF CARE
Problem: Patient/Family Goals  Goal: Patient/Family Long Term Goal  Description: Patient's Long Term Goal: discharge home when medically stable    Interventions:  Telemetry monitoring  O2 protocol,   Fall precaution, bed alarm on, non-skid socks on,  call light and bedside table within reach.  Monitor labs, vitals, intake and output.  Electrolyte protocol.  Heparin and SCD for VTE   Nebs q 4 hrs.  Maintain on droplet precaution.   - See additional Care Plan goals for specific interventions  Outcome: Progressing  Goal: Patient/Family Short Term Goal  Description: Patient's Short Term Goal: Breathing is stable tonight  1/ 31 NOC: remain comfortable  2/1 NOC: sleep    Interventions:  Cluster care  Albuterol  PRN NEBS   Telemetry monitoring  O2 protocol,   Fall precaution, bed alarm on, non-skid socks on,  call light and bedside table within reach.  Monitor labs, vitals, intake and output.  Electrolyte protocol.  Heparin and SCD for VTE   Nebs q 4 hrs.  Maintain on droplet precaution.     - See additional Care Plan goals for specific interventions  Outcome: Progressing     Problem: CARDIOVASCULAR - ADULT  Goal: Absence of cardiac arrhythmias or at baseline  Description: INTERVENTIONS:  - Continuous cardiac monitoring, monitor vital signs, obtain 12 lead EKG if indicated  - Evaluate effectiveness of antiarrhythmic and heart rate control medications as ordered  - Initiate emergency measures for life threatening arrhythmias  - Monitor electrolytes and administer replacement therapy as ordered  Outcome: Progressing     Problem: RESPIRATORY - ADULT  Goal: Achieves optimal ventilation and oxygenation  Description: INTERVENTIONS:  - Assess for changes in respiratory status  - Assess for changes in mentation and behavior  - Position to facilitate oxygenation and minimize respiratory effort  - Oxygen supplementation based on oxygen saturation or ABGs  - Provide Smoking Cessation handout, if applicable  - Encourage  broncho-pulmonary hygiene including cough, deep breathe, Incentive Spirometry  - Assess the need for suctioning and perform as needed  - Assess and instruct to report SOB or any respiratory difficulty  - Respiratory Therapy support as indicated  - Manage/alleviate anxiety  - Monitor for signs/symptoms of CO2 retention  Outcome: Progressing

## 2025-02-02 NOTE — PLAN OF CARE
Patient AAOx3. Glasses, b/l HA, upper and lower dentures. RA/2L NC. Tele NSR. Voids, I/c at times. Up stand by with walker. PO steroids. Scheduled nebs. Regular diet. IV abx. Patient rounded on routinely. Patient and family updated on plan of care.    PRN hydralazine given for BP.    Received call from tele for 4 seconds of Vtach. Cards paged including BP and pt c/o some dizziness. Hospitalist also messaged to notify including the dizziness, vtach, and pt family report of one \"breathing episode\" earlier. Pt breathing comfortably on room air now.       Problem: Patient/Family Goals  Goal: Patient/Family Long Term Goal  Description: Patient's Long Term Goal: discharge home when medically stable    Interventions:  Telemetry monitoring  O2 protocol,   Fall precaution, bed alarm on, non-skid socks on,  call light and bedside table within reach.  Monitor labs, vitals, intake and output.  Electrolyte protocol.  Heparin and SCD for VTE   Nebs q 4 hrs.  Maintain on droplet precaution.   - See additional Care Plan goals for specific interventions  Outcome: Progressing  Goal: Patient/Family Short Term Goal  Description: Patient's Short Term Goal: Breathing is stable tonight  1/ 31 NOC: remain comfortable  2/1 NOC: sleep    Interventions:  Cluster care  Albuterol  PRN NEBS   Telemetry monitoring  O2 protocol,   Fall precaution, bed alarm on, non-skid socks on,  call light and bedside table within reach.  Monitor labs, vitals, intake and output.  Electrolyte protocol.  Heparin and SCD for VTE   Nebs q 4 hrs.  Maintain on droplet precaution.     - See additional Care Plan goals for specific interventions  Outcome: Progressing     Problem: CARDIOVASCULAR - ADULT  Goal: Absence of cardiac arrhythmias or at baseline  Description: INTERVENTIONS:  - Continuous cardiac monitoring, monitor vital signs, obtain 12 lead EKG if indicated  - Evaluate effectiveness of antiarrhythmic and heart rate control medications as ordered  -  Initiate emergency measures for life threatening arrhythmias  - Monitor electrolytes and administer replacement therapy as ordered  Outcome: Progressing     Problem: RESPIRATORY - ADULT  Goal: Achieves optimal ventilation and oxygenation  Description: INTERVENTIONS:  - Assess for changes in respiratory status  - Assess for changes in mentation and behavior  - Position to facilitate oxygenation and minimize respiratory effort  - Oxygen supplementation based on oxygen saturation or ABGs  - Provide Smoking Cessation handout, if applicable  - Encourage broncho-pulmonary hygiene including cough, deep breathe, Incentive Spirometry  - Assess the need for suctioning and perform as needed  - Assess and instruct to report SOB or any respiratory difficulty  - Respiratory Therapy support as indicated  - Manage/alleviate anxiety  - Monitor for signs/symptoms of CO2 retention  Outcome: Progressing

## 2025-02-02 NOTE — PROGRESS NOTES
Pulmonary Progress Note     Assessment / Plan:  Acute hypoxic respiratory failure - secondary to influenza, pneumonia, AECOPD  - wean O2  - bronchial hygiene  COPD exacerbation - due to influenza  - prednisone taper  - cont LAMA/LABA/ICS, send home on Trelegy per son's request  - cont three times per week azithro   - BD protocol followed by flutter valve  Wheezing - secondary to above, suspect upper airway cause  - as above  - ENT eval if persists  Flu A  - Tamiflu (1/29-2/2)  Orthopnea - secondary to above. TTE with normal EF.  - monitor  Possible PNA  - noted on CT chest  - cont Unasyn (1/31- )  Dispo  - will follow      Subjective:  Intermittent confusion. Ongoing cough.     Objective:  Vitals:    02/01/25 2329 02/02/25 0424 02/02/25 0700 02/02/25 0730   BP: 143/70 137/83     BP Location: Left arm Left arm     Pulse: 75 85  85   Resp: 19 20  24   Temp: 97 °F (36.1 °C) 97 °F (36.1 °C)     TempSrc: Temporal Temporal     SpO2: 96% 94% 98% 98%   Weight:       Height:         Physical Exam:  General: no apparent distress, conversant  Skin: no rash, ulcers or subcutaneous nodules  Eyes: anicteric sclerae, moist conjunctivae  Head, ears, nose, throat: atraumatic, oropharynx clear with moist mucous membranes  Neck: trachea midline with no thyromegaly  Heart: regular rate and rhythm, no murmurs / rubs / gallops  Lungs: clear bilaterally, normal respiratory effort, no accessory muscle use  Extremities: no edema or cyanosis  Psych: interactive, answering questions appropriately, appropriate affect    Medications:  Reviewed in EMR    Lab Data:  Reviewed in EMR    Imaging:  I independently visualized all relevant chest imaging in PACS and agree with radiology interpretation except where noted.

## 2025-02-02 NOTE — CONSULTS
[facility] - CARDIOLOGY CONSULT NOTE    Syed Cooney Patient Status:  Inpatient    1934 MRN [id number]   Location [facility] 5NW-A Attending Trever Whitley MD   Hosp Day # 4 PCP Jeffrey Gan MD     Date of Admission:  2025  Date of Consult:  2025  I was asked by Trever Whitley MD to provide recommendations for evaluation and management of cardiac issues.    Reason for Consultation:     Moderate AR and MR    History of Present Illness:   Patient is a 89 year old male with hx of HTN, HLD, chorns disease, hypothyroid who presented with SOB found to have Flu. Breathing still short. Getting supportive care. Echo shows moderate AR and MR, slightly worse than prior echo.       Results:     Lab Results   Component Value Date    WBC 8.8 2025    HGB 10.2 (L) 2025    HCT 28.9 (L) 2025    .0 2025    CREATSERUM 1.54 (H) 2025    BUN 43 (H) 2025     2025    K 3.5 2025     2025    CO2 24.0 2025     (H) 2025    CA 8.4 (L) 2025    ALB 3.5 2025    ALKPHO 97 2025    BILT 0.4 2025    TP 5.5 (L) 2025    AST 27 2025    ALT 12 2025    PTT 41.3 (H) 2023    INR 2.86 (H) 2023    T4F 1.0 2024    TSH 4.054 2024    LIP 51 2024    PSA 2.06 2021    DDIMER 0.64 09/10/2024    ESRML 9 2021    CRP 9.20 (H) 2021    MG 2.2 06/15/2018    PHOS 4.1 2017    TROP <0.045 2020    B12 277 2024       No results found.        Past Medical History  Past Medical History:    Abdominal distention    Acute encephalopathy    Anemia    Arthritis    Back problem    Cataract    Chronic lung disease    Congestive heart disease (HCC)    COPD (chronic obstructive pulmonary disease) (HCC)    Crohn disease (HCC)    Crohn's disease of large intestine without complication (HCC)    Diarrhea, unspecified    Disorder of thyroid     Diverticula of small intestine    Diverticulosis of large intestine    Frequent use of laxatives    Hearing impairment    High blood pressure    High cholesterol    History of blood transfusion    Hyperthyroidism    Leg swelling    Mitral valve disorder    leaky mitral valve    Muscle weakness    Osteoarthrosis, unspecified whether generalized or localized, unspecified site    cervical and lumbar spine    Osteoporosis    Other and unspecified hyperlipidemia    Pulmonary embolism (HCC)    Pulmonary emphysema (HCC)    Renal insufficiency    Shortness of breath    Thyroid disease    Unspecified essential hypertension    Wheezing       Past Surgical History  Past Surgical History:   Procedure Laterality Date    Angiogram      2009    Cataract      Colonoscopy  06/30/2014    Procedure: COLONOSCOPY;  Surgeon: Jaz Carrasquillo DO;  Location:  ENDOSCOPY    Egd N/A 01/04/2017    Procedure: ESOPHAGOGASTRODUODENOSCOPY (EGD);  Surgeon: Gustabo Johnson MD;  Location:  MAIN OR    Egd N/A 01/18/2017    Procedure: ESOPHAGOGASTRODUODENOSCOPY (EGD);  Surgeon: Jaz Carrasquillo DO;  Location:  ENDOSCOPY    Hernia surgery      Knee replacement surgery      Other surgical history      6 yeara ago colonscopy with polpys    Sigmoidoscopy,diagnostic      Total knee replacement         Family History  Family History   Problem Relation Age of Onset    Cancer Brother        Social History  Pediatric History   Patient Parents    Not on file     Other Topics Concern    Not on file   Social History Narrative    Not on file           Current Medications:  Current Facility-Administered Medications   Medication Dose Route Frequency    ipratropium-albuterol (Duoneb) 0.5-2.5 (3) MG/3ML inhalation solution 3 mL  3 mL Nebulization 6 times per day    carvedilol (Coreg) tab 6.25 mg  6.25 mg Oral BID with meals    predniSONE (Deltasone) tab 40 mg  40 mg Oral Daily with breakfast    guaiFENesin (Robitussin) 100 MG/5 ML oral liquid 200 mg  200 mg  Oral Q4H PRN    albuterol (Ventolin HFA) 108 (90 Base) MCG/ACT inhaler 2 puff  2 puff Inhalation Q4H PRN    ampicillin-sulbactam (Unasyn) 3 g in sodium chloride 0.9% 100mL IVPB-ADD  3 g Intravenous q12h    melatonin tab 3 mg  3 mg Oral Nightly PRN    hydrALAzine (Apresoline) 20 mg/mL injection 10 mg  10 mg Intravenous Q6H PRN    ferrous sulfate DR tab 325 mg  325 mg Oral BID with meals    fluticasone-salmeterol (Advair Diskus) 500-50 MCG/ACT inhaler 1 puff  1 puff Inhalation BID    umeclidinium bromide (Incruse Ellipta) 62.5 MCG/ACT inhaler 1 puff  1 puff Inhalation Daily    levothyroxine (Synthroid) tab 50 mcg  50 mcg Oral Before breakfast    mesalamine DR (Delzicol) cap 1,200 mg  1,200 mg Oral BID    pantoprazole (Protonix) DR tab 40 mg  40 mg Oral QAM AC    tamsulosin (Flomax) cap 0.4 mg  0.4 mg Oral Nightly    heparin (Porcine) 5000 UNIT/ML injection 5,000 Units  5,000 Units Subcutaneous Q8H SCAR    acetaminophen (Tylenol Extra Strength) tab 500 mg  500 mg Oral Q4H PRN    ondansetron (Zofran) 4 MG/2ML injection 4 mg  4 mg Intravenous Q6H PRN    metoclopramide (Reglan) 5 mg/mL injection 5 mg  5 mg Intravenous Q8H PRN    azithromycin (Zithromax) tab 250 mg  250 mg Oral Once per day on Monday Wednesday Friday     Medications Prior to Admission   Medication Sig    diphenhydrAMINE-APAP, sleep, (TYLENOL PM EXTRA STRENGTH)  MG Oral Tab Take 1 tablet by mouth at bedtime.    albuterol 108 (90 Base) MCG/ACT Inhalation Aero Soln Inhale 2 puffs into the lungs every 6 (six) hours as needed for Wheezing.    carvedilol 3.125 MG Oral Tab Take 1 tablet (3.125 mg total) by mouth 2 (two) times daily with meals.    cyanocobalamin 1000 MCG/ML Injection Solution Inject 1 mL (1,000 mcg total) into the muscle every 30 (thirty) days.    ferrous sulfate 325 (65 FE) MG Oral Tab EC Take 1 tablet (325 mg total) by mouth 2 (two) times daily with meals.    fluticasone-umeclidin-vilant (TRELEGY ELLIPTA) 200-62.5-25 MCG/ACT Inhalation  Aerosol Powder, Breath Activated Inhale 1 puff into the lungs daily.    levothyroxine 50 MCG Oral Tab Take 1 tablet (50 mcg total) by mouth before breakfast.    mesalamine 1.2 g Oral Tab EC Take 2 tablets (2.4 g total) by mouth daily. (Patient taking differently: Take 1 tablet (1.2 g total) by mouth 2 (two) times daily.)    Omeprazole 40 MG Oral Capsule Delayed Release Take 1 capsule (40 mg total) by mouth daily.    tamsulosin 0.4 MG Oral Cap Take 1 capsule (0.4 mg total) by mouth nightly.    ipratropium-albuterol 0.5-2.5 (3) MG/3ML Inhalation Solution Take 3 mL by nebulization every 4 (four) hours. (Patient taking differently: Take 3 mL by nebulization 3 (three) times daily.)    Cholecalciferol (VITAMIN D) 50 MCG (2000 UT) Oral Cap Take 1 capsule (2,000 Units total) by mouth daily.       Allergies  Allergies[1]    Review of Systems:   10 pt ROS performed, separate from HPI  Review of Systems:  GENERAL: no fevers, chills, sweats  HEENT: no visual or hearing changes  SKIN: denies any unusual skin lesions or rashes  RESPIRATORY: + SOB  CARDIOVASCULAR: no active chest pain, no claudication  GI: denies abdominal pain and denies heartburn  : no dysuria or hematuria  NEURO: denies headaches, focal weaknesses or paresthesias  All other systems were reviewed are negative  Physical Exam:   Blood pressure 137/83, pulse 85, temperature 97 °F (36.1 °C), temperature source Temporal, resp. rate 24, height 60\", weight 124 lb (56.2 kg), SpO2 98%.    Scheduled Meds:    ipratropium-albuterol  3 mL Nebulization 6 times per day    carvedilol  6.25 mg Oral BID with meals    predniSONE  40 mg Oral Daily with breakfast    ampicillin-sulbactam  3 g Intravenous q12h    ferrous sulfate  325 mg Oral BID with meals    fluticasone-salmeterol  1 puff Inhalation BID    umeclidinium bromide  1 puff Inhalation Daily    levothyroxine  50 mcg Oral Before breakfast    mesalamine DR  1,200 mg Oral BID    pantoprazole  40 mg Oral QAM AC    tamsulosin   0.4 mg Oral Nightly    heparin  5,000 Units Subcutaneous Q8H SCAR    azithromycin  250 mg Oral Once per day on Monday Wednesday Friday         Physical Exam:    General: Alert and oriented. No apparent distress. No respiratory or constitutional distress.  HEENT: Normocephalic, anicteric sclera, neck supple  Neck: No JVD, carotids 2+, supple  Cardiac: Regular rate. 2/6 systolic murmur  Lungs: Course breath sounds, scattered wheeze.  Abdomen: Soft, non-tender. BS-present.  Extremities: Without clubbing, cyanosis.  Peripheral pulsespresent.  Neurologic: Alert and oriented, normal affect. Motor ok.  Skin: Warm and dry.     Imaging: I independently visualized all relevant chest imaging in PACS, agree with radiology interpretation except where noted.  Thank you for allowing me to participate in the care of your patient.    Labs:  HEM:  Recent Labs   Lab 01/27/25  1213 01/28/25  0640 01/31/25  0649   WBC 4.2 2.1* 8.8   HGB 10.6* 10.6* 10.2*   .0* 151.0 180.0       Chem:  Recent Labs   Lab 01/27/25  1213 01/28/25  0640 01/31/25  1440 02/01/25  0815 02/02/25  0658    140  --   --  142   K 4.4 4.0  --   --  3.5    108  --   --  106   CO2 26.0 21.0  --   --  24.0   BUN 23 22  --   --  43*   CREATSERUM 1.33* 1.23 1.54* 1.48* 1.54*   CA 8.8 8.1*  --   --  8.4*   GLU 99 137*  --   --  107*       Recent Labs   Lab 01/27/25  1213   ALT 12   AST 27   ALB 3.5       No results for input(s): \"TROP\", \"CK\" in the last 168 hours.    No results for input(s): \"PTP\", \"INR\" in the last 168 hours.    Impression:   Influenza/COPD- Tamiflu and inhalers per primary   Moderate MR  Moderate AR  HTN- coreg    Recommendations:  Appears euvolemic   MR and AR can be followed as an outpatient     C5    Salvador Fisher MD  [facility]  2/2/2025         [1] No Known Allergies

## 2025-02-03 ENCOUNTER — APPOINTMENT (OUTPATIENT)
Dept: GENERAL RADIOLOGY | Facility: HOSPITAL | Age: OVER 89
End: 2025-02-03
Attending: INTERNAL MEDICINE
Payer: MEDICARE

## 2025-02-03 LAB — PROCALCITONIN SERPL-MCNC: 0.1 NG/ML (ref ?–0.05)

## 2025-02-03 PROCEDURE — 71045 X-RAY EXAM CHEST 1 VIEW: CPT | Performed by: INTERNAL MEDICINE

## 2025-02-03 PROCEDURE — 99232 SBSQ HOSP IP/OBS MODERATE 35: CPT | Performed by: HOSPITALIST

## 2025-02-03 RX ORDER — DOXEPIN HYDROCHLORIDE 50 MG/1
1 CAPSULE ORAL DAILY
Status: DISCONTINUED | OUTPATIENT
Start: 2025-02-04 | End: 2025-02-06

## 2025-02-03 NOTE — PROGRESS NOTES
[facility]   part of [facility]     Hospitalist Progress Note     Syed Cooney Patient Status:  Observation    1934 MRN [id number]   Location [facility] 5NW-A Attending Hema Sharma MD   Hosp Day # 5 PCP Jeffrey Gan MD     Chief Complaint:   Chief Complaint   Patient presents with    Shortness Of Breath       Subjective:     Improvign; no dyspnea overnight.    Objective:    Review of Systems:   6  point ROS completed and was negative, except for pertinent positive and negatives stated in subjective.    Vital signs:  Temp:  [97.8 °F (36.6 °C)-98.8 °F (37.1 °C)] 98 °F (36.7 °C)  Pulse:  [77-87] 87  Resp:  [17-20] 19  BP: (149-188)/(52-92) 174/82  SpO2:  [89 %-97 %] 93 %    Physical Exam:    General: No acute distress.   Respiratory:  course breath sounds  Cardiovascular: S1, S2. Regular rate and rhythm. No murmurs.  Abdomen: Soft, nontender, nondistended.    Extremities: No edema.    Diagnostic Data:    Labs:  Recent Labs   Lab 25  1213 25  0640 25  0649   WBC 4.2 2.1* 8.8   HGB 10.6* 10.6* 10.2*   MCV 89.4 88.9 88.9   .0* 151.0 180.0       Recent Labs   Lab 25  1213 25  0640 25  1440 25  0815 25  0658   GLU 99 137*  --   --  107*   BUN 23 22  --   --  43*   CREATSERUM 1.33* 1.23 1.54* 1.48* 1.54*   CA 8.8 8.1*  --   --  8.4*   ALB 3.5  --   --   --   --     140  --   --  142   K 4.4 4.0  --   --  3.5    108  --   --  106   CO2 26.0 21.0  --   --  24.0   ALKPHO 97  --   --   --   --    AST 27  --   --   --   --    ALT 12  --   --   --   --    BILT 0.4  --   --   --   --    TP 5.5*  --   --   --   --        Estimated Creatinine Clearance: 22.5 mL/min (A) (based on SCr of 1.54 mg/dL (H)).    No results for input(s): \"PTP\", \"INR\" in the last 168 hours.         COVID-19 Lab Results    COVID-19  Lab Results   Component Value Date    COVID19 Not Detected 2025    COVID19 Not Detected 09/10/2024    COVID19  Not Detected 09/06/2023       Pro-Calcitonin  Recent Labs   Lab 02/03/25  0730   PCT 0.10*       Cardiac  Recent Labs   Lab 01/27/25  1213   PBNP 1,702*       Creatinine Kinase  No results for input(s): \"CK\" in the last 168 hours.    Inflammatory Markers  No results for input(s): \"CRP\", \"REMY\", \"LDH\", \"DDIMER\" in the last 168 hours.    Recent Labs   Lab 01/27/25  1213   TROPHS 10       Imaging: Imaging data reviewed in Epic.    Medications:    ipratropium-albuterol  3 mL Nebulization Q4H WA (5 times daily)    carvedilol  6.25 mg Oral BID with meals    predniSONE  40 mg Oral Daily with breakfast    ampicillin-sulbactam  3 g Intravenous q12h    ferrous sulfate  325 mg Oral BID with meals    fluticasone-salmeterol  1 puff Inhalation BID    umeclidinium bromide  1 puff Inhalation Daily    levothyroxine  50 mcg Oral Before breakfast    mesalamine DR  1,200 mg Oral BID    pantoprazole  40 mg Oral QAM AC    tamsulosin  0.4 mg Oral Nightly    heparin  5,000 Units Subcutaneous Q8H SCAR    azithromycin  250 mg Oral Once per day on Monday Wednesday Friday       Assessment & Plan:      #acute COPD Exacerbation secondary to influenza infection; unclear if had possible superimposed gram negative pneumonia-nebs/steroids; tamiflu finished; I think abx can be safely stopped after 2 more doses of unasyn.  Pulm following  -3x/week azithromycin  -LAMA/LABA/ICS for now; trelegy on discharge per pulm  -s/p dose of lasix; added mucinex; watch volume status closely    #moderate mitral and aortic regurg; per cards; appears euvolemic;  OP f/u    #Ess HTN-Continue home meds     #Dyslipidemia-Statin     #crohns Disease-Continue mesalamine     #Hypothyroid-synthroid    #right foot swelling; mild; monitor    #leukpenia-resolved    Plan of care discussed with patient and RN    Trever Nielsen MD    Supplementary Documentation:     Quality:  DVT Prophylaxis: heparin  CODE status: see chart  Thomas: no  Central line: no      Estimated date of  discharge: hopefully 2/4  At this point Mr. Cooney is expected to be discharge to: home per family wishes tentatively             **Certification      PHYSICIAN Certification of Need for Inpatient Hospitalization - Initial Certification    Patient will require inpatient services that will reasonably be expected to span two midnight's based on the clinical documentation in H+P.   Based on patients current state of illness, I anticipate that, after discharge, patient will require TBD.

## 2025-02-03 NOTE — OCCUPATIONAL THERAPY NOTE
OCCUPATIONAL THERAPY TREATMENT NOTE - INPATIENT     Room Number: 519/519-A  Session: 1   Number of Visits to Meet Established Goals: 3    Presenting Problem: SOB, acute bronchospasm, influenza A    ASSESSMENT   Patient demonstrates limited progress this session, goals remain in progress.    Patient continues to function below baseline with toileting, upper body dressing, lower body dressing, energy conservation strategies, and aerobic capacity.   Contributing factors to remaining limitations include decreased endurance and decreased muscular endurance.  Next session anticipate patient to progress toileting, upper body dressing, lower body dressing, and energy conservation strategies.  Occupational Therapy will continue to follow patient for duration of hospitalization.    Patient continues to benefit from continued skilled OT services: at discharge to promote prior level of function and safety with additional support and return home with home health OT.     History:  Patient is a 89 year old male admitted on 1/27/2025 with Presenting Problem: Acute bronchospasm, SOB with exertion, cough. Co-Morbidities : CHF, COPD, HTN, DL, emphysema     WEIGHT BEARING RESTRICTION  None       Recommendations for nursing staff:   Transfers: up x 1 assist, CGA, RW   Toileting location: Toilet    TREATMENT SESSION:  Patient Start of Session: In chair coming from bathroom with nursing staff.     FUNCTIONAL TRANSFER ASSESSMENT  Sit to Stand: Chair  Chair: Not Tested  Toilet Transfer: Not Tested    BED MOBILITY  Rolling: Not Tested  Supine to Sit : Not tested  Sit to Supine (OT): Not Tested  Scooting: not    BALANCE ASSESSMENT  Static Sitting: Not Tested  Static Standing: Not Tested    FUNCTIONAL ADL ASSESSMENT  Eating: Not Tested  LB Dressing Standing: Not Tested  Toileting Seated: Not Tested    ACTIVITY TOLERANCE: Pt was stating that he was short of breath. Daughter said that he would not be able to participate in exercises. Pt remained  in chair for the session.                          O2 SATURATIONS       EDUCATION PROVIDED  Patient Education : Role of Occupational Therapy; Plan of Care; Energy Conservation  Patient's Response to Education: Requires Further Education  Family/Caregiver Education : Role of Occupational Therapy; Energy Conservation; Proper Body Mechanics  Family/Caregiver's Response to Education: Verbalized Understanding    Equipment used:      Exercises:    Exercises Repetitions Comments   Scapular elevation     Scapular retraction  Demonstration    Shoulder rolls  Demonstration   Shoulder flexion     Shoulder abduction     Shoulder internal/external rotation     Forward punch     Elbow flexion     Elbow extension     Forearm pronation/supination     Wrist flexion/extension     Gross grasp/fist pumps     Ankle pumps     Knee extension     Marching       UPPER EXTREMITY  ROM: within functional limits   Strength: is within functional limits     Therapist comments: Pt was seated in chair following using the bathroom with the nurse. The pt had difficulty hearing and understanding therapist. Pt's daughter entered the room and was able to translate. Pt c/o shortness of breath and being cold. Pt's daughter stated that pt would not be able to participate in therapeutic exercises or be able to leave the chair. Education on HEP and demonstration of scapular retraction and shoulder roll exercises was given to the pt and family member. Pt's daughter educated on the plan of care and addition of more HEP handouts next therapy session for therapeutic exercises for respiratory function.   Patient End of Session: Up in chair;Needs met;Call light within reach;Family present;Alarm set;Hospital anti-slip socks;RN aware of session/findings    SUBJECTIVE  \"Can you come back alter when he is feeling better\" pt's daughter     PAIN ASSESSMENT  Rating: Unable to rate  Location: Chest  Management Techniques: Relaxation;Body mechanics      OBJECTIVE  Precautions: Hard of hearing    AM-PAC ‘6-Clicks’ Inpatient Daily Activity Short Form  -   Putting on and taking off regular lower body clothing?: A Little  -   Bathing (including washing, rinsing, drying)?: A Little  -   Toileting, which includes using toilet, bedpan or urinal? : A Little  -   Putting on and taking off regular upper body clothing?: A Little  -   Taking care of personal grooming such as brushing teeth?: None  -   Eating meals?: None    AM-PAC Score:  Score: 20  Approx Degree of Impairment: 38.32%  Standardized Score (AM-PAC Scale): 42.03    PLAN  OT Device Recommendations:  (RW)  OT Treatment Plan: Energy conservation/work simplification techniques;UE strengthening/ROM;Patient/Family education  Rehab Potential : Fair  Frequency: 3-5x/week    OT Goals:   Ongoing 2/3   ADL Goals   Patient will perform upper body dressing:  with supervision  Patient will perform lower body dressing:  with supervision  Patient will perform toileting: with supervision     Functional Transfer Goals  Patient will transfer from supine to sit:  with supervision  Patient will transfer from sit to stand:  with supervision  Patient will transfer to toilet:  with supervision     UE Exercise Program Goal  Patient will be supervision with bilateral AROM HEP (home exercise program).     Additional Goals  Pt will tolerate standing > 3 min to increase tolerance for standing ADLs.    OT Session Time: 15 minutes  Therapeutic Exercise: 10 minutes

## 2025-02-03 NOTE — PLAN OF CARE
Problem: Patient/Family Goals  Goal: Patient/Family Long Term Goal  Description: Patient's Long Term Goal: discharge home when medically stable    Interventions:  Telemetry monitoring  O2 protocol,   Fall precaution, bed alarm on, non-skid socks on,  call light and bedside table within reach.  Monitor labs, vitals, intake and output.  Electrolyte protocol.  Heparin and SCD for VTE   Nebs q 4 hrs.  Maintain on droplet precaution.   - See additional Care Plan goals for specific interventions  Outcome: Progressing  Goal: Patient/Family Short Term Goal  Description: Patient's Short Term Goal: Breathing is stable tonight  1/ 31 NOC: remain comfortable  2/1 NOC: sleep  2/2NOC: sleep and remain stable and decrease shortness of breath     Interventions:  Cluster care  Albuterol  PRN NEBS   Telemetry monitoring  O2 protocol,   Fall precaution, bed alarm on, non-skid socks on,  call light and bedside table within reach.  Monitor labs, vitals, intake and output.  Electrolyte protocol.  Heparin and SCD for VTE   Nebs q 4 hrs.  Maintain on droplet precaution.     - See additional Care Plan goals for specific interventions  Outcome: Progressing     Problem: CARDIOVASCULAR - ADULT  Goal: Absence of cardiac arrhythmias or at baseline  Description: INTERVENTIONS:  - Continuous cardiac monitoring, monitor vital signs, obtain 12 lead EKG if indicated  - Evaluate effectiveness of antiarrhythmic and heart rate control medications as ordered  - Initiate emergency measures for life threatening arrhythmias  - Monitor electrolytes and administer replacement therapy as ordered  Outcome: Progressing     Problem: RESPIRATORY - ADULT  Goal: Achieves optimal ventilation and oxygenation  Description: INTERVENTIONS:  - Assess for changes in respiratory status  - Assess for changes in mentation and behavior  - Position to facilitate oxygenation and minimize respiratory effort  - Oxygen supplementation based on oxygen saturation or ABGs  -  Provide Smoking Cessation handout, if applicable  - Encourage broncho-pulmonary hygiene including cough, deep breathe, Incentive Spirometry  - Assess the need for suctioning and perform as needed  - Assess and instruct to report SOB or any respiratory difficulty  - Respiratory Therapy support as indicated  - Manage/alleviate anxiety  - Monitor for signs/symptoms of CO2 retention  Outcome: Progressing

## 2025-02-03 NOTE — DIETARY NOTE
[facility]   part of [facility]  NUTRITION ASSESSMENT    Pt does not meet malnutrition criteria at this time.    NUTRITION INTERVENTION:    Meal and Snacks - Continue Regular Diet as tolerated; monitor patient po intake. Encourage adequate po of appropriate diet.  Medical Food Supplements - RD added [employer] 1.0 vanilla daily. Rationale/use for oral supplements discussed.  Vitamin and Mineral Supplements - Recommend adding Multivitamin with minerals    PATIENT STATUS: 89 year old male admitted on 1/27 presents with COPD exacerbation and influenza A. Pt screened d/t LOS. Visited pt at bedside with family member present. Family member called pt's son as he knows pt best. Son reports pt with decreased appetite over the past 2 days with last real meal yesterday morning. Had small amount for bfast today and just had a cookie but otherwise nothing else today. Denies GI symptoms at this time with last BM 2/2. No chewing or swallowing difficulties and NKFA. Son reports pt's wt has been stable PTA ~125 lbs. Pt was drinking Boost daily at home and son agreeable to vanilla [employer] daily. All questions answered at this time.    PMH: CHF, COPD, HTN, DL, emphysema     ANTHROPOMETRICS:  Ht: 152.4 cm (5')  Wt: 56.2 kg (124 lb).   BMI: Body mass index is 24.22 kg/m².  IBW: 48.2 kg    WEIGHT HISTORY:   Patient Weight(s) for the past 336 hrs:   Weight   01/27/25 1633 56.2 kg (124 lb)   01/27/25 1139 56.2 kg (124 lb)       Wt Readings from Last 10 Encounters:   01/27/25 56.2 kg (124 lb)   01/15/25 55.8 kg (123 lb)   12/08/24 56.2 kg (124 lb)   09/10/24 56.7 kg (125 lb)   06/04/24 59 kg (130 lb)   12/12/23 61.7 kg (136 lb)   09/06/23 63.5 kg (140 lb)   12/11/21 65.8 kg (145 lb)   12/02/20 65.8 kg (145 lb)   09/11/19 67 kg (147 lb 12.8 oz)        NUTRITION:  Diet:       Procedures    Regular/General diet Is Patient on Accuchecks? No      Food Allergies: No  Cultural/Ethnic/Synagogue Preferences Addressed: Yes    Percent  Meals Eaten (last 3 days)       None            GI SYSTEM REVIEW: WNL; last BM 2/2  Skin/Wounds: WNL    NUTRITION RELATED PHYSICAL FINDINGS:     1. Body Fat/Muscle Mass: no wasting noted / well nourished     2. Fluid Accumulation: none per RN documentation    NUTRITION PRESCRIPTION:  56.2 kg Actual Body Weight  Calories: 9084-3712 calories/day (25-30 kcal/kg)  Protein: 56-84 grams protein/day (1.0-1.5 gm/kg)  Fluid: ~1 ml/kcal or per MD discretion    NUTRITION DIAGNOSIS/PROBLEM:  Inadequate oral intake related to insufficient appetite resulting in inadequate nutrition intake as evidenced by documented/reported insufficient oral intake    MONITOR AND EVALUATE/NUTRITION GOALS:  PO intake of 75% of meals TID - New  PO intake of 75% of oral nutrition supplement/s - New  Weight stable within 1 to 2 lbs during admission - New      MEDICATIONS:  Abx, ferrous sulfate 325 mg BID, synthroid, protonix, prednisone, flomax    LABS:  Reviewed     Pt is at Moderate nutrition risk    Pat Beatty, RD, LDN, CNSC  Clinical Dietitian  Spectra: [phone]

## 2025-02-03 NOTE — PHYSICAL THERAPY NOTE
PHYSICAL THERAPY TREATMENT NOTE - INPATIENT    Room Number: 519/519-A     Session: 2     Number of Visits to Meet Established Goals: 5    Presenting Problem: influenza, COPD  Co-Morbidities : COPD, HTN, DL    PHYSICAL THERAPY ASSESSMENT   Patient demonstrates good  progress this session, goals  updated to reflect patient performance.      Patient is requiring supervision as a result of the following impairments: decreased endurance/aerobic capacity.     Patient continues to function near baseline with bed mobility, transfers, and gait.  Next session anticipate patient to progress bed mobility, transfers, and gait.  Physical Therapy will continue to follow patient for duration of hospitalization.    Patient continues to benefit from continued skilled PT services: at discharge to promote prior level of function and safety with additional support and return home with home health PT.    PLAN DURING HOSPITALIZATION  Nursing Mobility Recommendation : 1 Assist     PT Treatment Plan: Bed mobility;Endurance;Energy conservation;Patient education;Gait training;Balance training;Transfer training;Stair training  Frequency (Obs): 3-5x/week     CURRENT GOALS      Goal #1 Patient is able to demonstrate supine - sit EOB @ level: supervision      Goal #2 Patient is able to demonstrate transfers EOB to/from C at assistance level: supervision  met      Goal #3 Patient is able to ambulate 150 feet with assist device: walker - rolling at assistance level: supervision   PROGRESSING   Goal #4 Pt will ascend/descend 1 flight of stairs with supervision  ONGOING   Goal #5     Goal #6     Goal Comments: Goals established on 2025      SUBJECTIVE  \"I am so cold!\"     OBJECTIVE  Precautions: Hard of hearing    WEIGHT BEARING RESTRICTION     PAIN ASSESSMENT   Ratin          BALANCE                                                                                                                       Static Sitting: Good  Dynamic Sitting:  Good           Static Standing: Fair -  Dynamic Standing: Poor +    ACTIVITY TOLERANCE                         O2 WALK       AM-PAC '6-Clicks' INPATIENT SHORT FORM - BASIC MOBILITY  How much difficulty does the patient currently have...  Patient Difficulty: Turning over in bed (including adjusting bedclothes, sheets and blankets)?: A Little   Patient Difficulty: Sitting down on and standing up from a chair with arms (e.g., wheelchair, bedside commode, etc.): A Little   Patient Difficulty: Moving from lying on back to sitting on the side of the bed?: A Little   How much help from another person does the patient currently need...   Help from Another: Moving to and from a bed to a chair (including a wheelchair)?: A Little   Help from Another: Need to walk in hospital room?: A Little   Help from Another: Climbing 3-5 steps with a railing?: A Little     AM-PAC Score:  Raw Score: 18   Approx Degree of Impairment: 46.58%   Standardized Score (AM-PAC Scale): 43.63   CMS Modifier (G-Code): CK    FUNCTIONAL ABILITY STATUS  Gait Assessment   Functional Mobility/Gait Assessment  Gait Assistance: Supervision  Distance (ft): 10ftx1;  50ftx1  Assistive Device: Rolling walker  Pattern: Shuffle    Skilled Therapy Provided    Bed Mobility:  Rolling: ind   Supine<>Sit: ind   Sit<>Supine: NT     Transfer Mobility:  Sit<>Stand: supervision from bed, chair, and toilet   Stand<>Sit: supervision   Gait: Ambulation as above with seated rest breaks.      Therapist's Comments: New briefs obtained- requires assist to don at this time.  Pt demos toilet transfer with supervision.  RN aware of pt's request for inhaler after ambulation with O2  sats at 96% on RA.          Patient End of Session: Up in chair;Needs met;Call light within reach;RN aware of session/findings;All patient questions and concerns addressed;Hospital anti-slip socks    PT Session Time: 23 minutes  Gait Training: 10 minutes  Therapeutic Activity: 13 minutes

## 2025-02-03 NOTE — PROGRESS NOTES
Problem: + FLU, possible PNA    Data: Pt is A&Ox3. . RA. On tele. Tolerating diet. Voids. Gets heparin Subcut. Meds crushed in apple sauce by Son's request. Up SBA R.W.     Intervention:IV abx's, PO abx's, Inhalers, nebs       Edu: VSS so far. Call light within reach and bed alarm on.       MD paged regarding pt's sons concern regarding dizziness and fatigue, Pt's son concerned he is dehdrated after getting lasix the day prior. Dr. Barillas made aware per MD no need for IV vitals stable at this time.

## 2025-02-03 NOTE — SLP NOTE
ADULT SWALLOWING EVALUATION    ASSESSMENT    ASSESSMENT/OVERALL IMPRESSION:  Patient is 89 year old male with influenza and complaint of swallowing difficulty \"drowning sensation\". PMHx of CHF, COPD, HTN, DL, emphysema with SOB and wheezing. Patient had previously been on regular solids and thin liquids.   Oral motor mechanism exam revealed functional oral range, rate, and strength of oral musculature, intact dentition, and clear vocal quality. Strong cough on command.    Assessed patient with thin liquids via spoon, cup, and straw, puree, and solids.   Oral phase revealed functional oral acceptance, retrieval and mastication of solids with timely and complete clearing of the oral cavity.   Pharyngeal phase revealed an apparent timely initiation of the pharyngeal phase with functional hyolaryngeal elevation on palpation. No coughing or throat clearing. Patient presents with functional oral phase and apparent functional pharygneal phase of swallow.   Recommend regular solids and thin liquids.   Will follow up to ensure safety with diet and educate pt on compensatory strategies/swallow precautions. Video swallow study to be completed if CXR declines, increase in clinical signs of aspiration, and/or MD desires.       Following exam, education provided to patient and family members regarding results, diet recommendations, aspiration precautions and plan with understanding verbalized. Collaborated with RN on results and recommendations with RN in agreement.  Will continue to follow as per plan of care.        RECOMMENDATIONS   Diet Recommendations - Solids: Regular  Diet Recommendations - Liquids: Thin Liquids                           Aspiration Precautions: Upright position;Slow rate;Small bites  Medication Administration Recommendations: No restrictions  Treatment Plan/Recommendations: Aspiration precautions    HISTORY   MEDICAL HISTORY  Reason for Referral: R/O aspiration    Problem List  Principal Problem:    Acute  bronchospasm  Active Problems:    Pneumonia due to infectious organism    Influenza    Influenza A    COPD exacerbation (HCC)    Acute respiratory failure, unspecified whether with hypoxia or hypercapnia (HCC)    Moderate mitral regurgitation      Past Medical History  Past Medical History:    Abdominal distention    Acute encephalopathy    Anemia    Arthritis    Back problem    Cataract    Chronic lung disease    Congestive heart disease (HCC)    COPD (chronic obstructive pulmonary disease) (HCC)    Crohn disease (HCC)    Crohn's disease of large intestine without complication (HCC)    Diarrhea, unspecified    Disorder of thyroid    Diverticula of small intestine    Diverticulosis of large intestine    Frequent use of laxatives    Hearing impairment    High blood pressure    High cholesterol    History of blood transfusion    Hyperthyroidism    Leg swelling    Mitral valve disorder    leaky mitral valve    Muscle weakness    Osteoarthrosis, unspecified whether generalized or localized, unspecified site    cervical and lumbar spine    Osteoporosis    Other and unspecified hyperlipidemia    Pulmonary embolism (HCC)    Pulmonary emphysema (HCC)    Renal insufficiency    Shortness of breath    Thyroid disease    Unspecified essential hypertension    Wheezing       Prior Living Situation: Home with support  Diet Prior to Admission: Regular;Thin liquids  Precautions: Aspiration    Patient/Family Goals: To eat and drink    SWALLOWING HISTORY  Current Diet Consistency: Regular;Thin liquids  Dysphagia History: Seen in May, 2017 for VFSS with following results:   Results revealed mild oropharyngeal dysphagia c/b premature spillage over BOT, mildly reduced BOT retraction, delayed pharyngeal response, mildly reduced hyolaryngeal excursion with incomplete epiglottic inversion for liquids.  Trace transient laryngeal penetration thin liquids via successive large swallows from cup.  No aspiration.  Penetration eliminated with  small single sips thin.  Mild vallecular residue after the swallow, cleared via dry repeat swallow.  No penetration noted with puree, nectar or cracker as epiglottic inversion appeared improved due to denser texture.  Of note, pt was coughing before and after the study.  Imaging Results:   CXR:  CONCLUSION:  No acute radiographic findings.       SUBJECTIVE       OBJECTIVE   ORAL MOTOR EXAMINATION  Dentition: Natural;Functional  Symmetry: Within Functional Limits  Strength: Overall reduced  Tone: Overall reduced  Range of Motion: Overall reduced  Rate of Motion: Reduced    Voice Quality: Weak  Respiratory Status: Supplemental O2;Nasal cannula (2L)  Consistencies Trialed: Thin liquids;Puree;Hard solid  Method of Presentation: Self presentation;Spoon;Cup;Straw;Consecutive swallows  Patient Positioned: Upright;Midline;Bedside chair    Oral Phase of Swallow: Within Functional Limits                      Pharyngeal Phase of Swallow: Within Functional Limits           (Please note: Silent aspiration cannot be evaluated clinically. Videofluoroscopic Swallow Study is required to rule-out silent aspiration.)    Esophageal Phase of Swallow: No complaints consistent with possible esophageal involvement  Comments:               GOALS  Goal #1 The patient will tolerate regular  consistency and thin liquids without overt signs or symptoms of aspiration with 95 % accuracy over 1-2 session(s).  In Progress   Goal #2 The patient/family/caregiver will demonstrate understanding and implementation of aspiration precautions and swallow strategies independently over 1-2 session(s).    In Progress     FOLLOW UP  Treatment Plan/Recommendations: Aspiration precautions  Duration: 1 week  Follow Up Needed (Documentation Required): Yes  SLP Follow-up Date: 02/04/25    Thank you for your referral.   If you have any questions, please contact SALOMON Thomason

## 2025-02-03 NOTE — PROGRESS NOTES
[facility] Patient Status:  Inpatient    1934 MRN [id number]   Location [facility] 5NW-A Attending Trever Whitley MD   Hosp Day # 5 PCP Jeffrey Gan MD     SUBJECTIVE: pt c/o sensation of drowning.     OBJECTIVE:  /55 (BP Location: Left arm)   Pulse 85   Temp 97.6 °F (36.4 °C) (Oral)   Resp 20   Ht 152.4 cm (5')   Wt 124 lb (56.2 kg)   SpO2 99%   BMI 24.22 kg/m²   O2 requirement: on room air, 95%     I/O last 3 completed shifts:  In: -   Out: 375 [Urine:375]  No intake/output data recorded.     Current Medications:   Current Facility-Administered Medications:     ampicillin-sulbactam (Unasyn) 3 g in sodium chloride 0.9% 100mL IVPB-ADD, 3 g, Intravenous, q12h    ipratropium-albuterol (Duoneb) 0.5-2.5 (3) MG/3ML inhalation solution 3 mL, 3 mL, Nebulization, Q4H WA (5 times daily)    carvedilol (Coreg) tab 6.25 mg, 6.25 mg, Oral, BID with meals    predniSONE (Deltasone) tab 40 mg, 40 mg, Oral, Daily with breakfast    guaiFENesin (Robitussin) 100 MG/5 ML oral liquid 200 mg, 200 mg, Oral, Q4H PRN    albuterol (Ventolin HFA) 108 (90 Base) MCG/ACT inhaler 2 puff, 2 puff, Inhalation, Q4H PRN    melatonin tab 3 mg, 3 mg, Oral, Nightly PRN    hydrALAzine (Apresoline) 20 mg/mL injection 10 mg, 10 mg, Intravenous, Q6H PRN    ferrous sulfate DR tab 325 mg, 325 mg, Oral, BID with meals    fluticasone-salmeterol (Advair Diskus) 500-50 MCG/ACT inhaler 1 puff, 1 puff, Inhalation, BID    umeclidinium bromide (Incruse Ellipta) 62.5 MCG/ACT inhaler 1 puff, 1 puff, Inhalation, Daily    levothyroxine (Synthroid) tab 50 mcg, 50 mcg, Oral, Before breakfast    mesalamine DR (Delzicol) cap 1,200 mg, 1,200 mg, Oral, BID    pantoprazole (Protonix) DR tab 40 mg, 40 mg, Oral, QAM AC    tamsulosin (Flomax) cap 0.4 mg, 0.4 mg, Oral, Nightly    heparin (Porcine) 5000 UNIT/ML injection 5,000 Units, 5,000 Units, Subcutaneous, Q8H SCAR    acetaminophen (Tylenol Extra Strength) tab 500  mg, 500 mg, Oral, Q4H PRN    ondansetron (Zofran) 4 MG/2ML injection 4 mg, 4 mg, Intravenous, Q6H PRN    metoclopramide (Reglan) 5 mg/mL injection 5 mg, 5 mg, Intravenous, Q8H PRN    azithromycin (Zithromax) tab 250 mg, 250 mg, Oral, Once per day on Monday Wednesday Friday     General appearance: alert, appears stated age, and cooperative  Lungs: rhonchi bilaterally and some upper airway transmitted sounds  Heart: regular rate and rhythm  Abdomen: soft, non-tender; bowel sounds normal; no masses,  no organomegaly  Extremities: extremities normal, atraumatic, no cyanosis or edema           Lab Results   Component Value Date    INR 2.86 (H) 09/06/2023    INR 1.13 (H) 08/31/2019    INR 1.10 07/16/2019          Imaging: reviewed. Per EMR     Assessment / Plan:  Acute hypoxic respiratory failure - secondary to influenza, pneumonia, AECOPD  - wean O2- down to room air  - bronchial hygiene  - check CXR bc of \"drowning sensation\"  COPD exacerbation - due to influenza  - prednisone taper  - cont LAMA/LABA/ICS, send home on Trelegy per son's request  - cont three times per week azithro   - BD protocol followed by flutter valve  Dysphagia vs VCD  - speech evaluation  - suction prn  Influenza A  - Tamiflu completed  Possible PNA  - noted on CT chest  - cont Unasyn (1/31- )  Dispo  - will follow  - d/w son at length    Zack Jose MD  2/3/2025  1:45 PM

## 2025-02-04 PROBLEM — E87.6 HYPOKALEMIA: Status: ACTIVE | Noted: 2025-02-04

## 2025-02-04 PROBLEM — R06.00 DYSPNEA: Status: ACTIVE | Noted: 2017-01-04

## 2025-02-04 PROBLEM — E87.6 HYPOKALEMIA: Status: ACTIVE | Noted: 2025-01-01

## 2025-02-04 LAB
ALBUMIN SERPL-MCNC: 3.1 G/DL (ref 3.2–4.8)
ALBUMIN/GLOB SERPL: 1.6 {RATIO} (ref 1–2)
ALP LIVER SERPL-CCNC: 67 U/L
ALT SERPL-CCNC: 27 U/L
ANION GAP SERPL CALC-SCNC: 11 MMOL/L (ref 0–18)
AST SERPL-CCNC: 22 U/L (ref ?–34)
BILIRUB SERPL-MCNC: 0.7 MG/DL (ref 0.2–0.9)
BUN BLD-MCNC: 40 MG/DL (ref 9–23)
CALCIUM BLD-MCNC: 8.7 MG/DL (ref 8.7–10.6)
CHLORIDE SERPL-SCNC: 110 MMOL/L (ref 98–112)
CO2 SERPL-SCNC: 25 MMOL/L (ref 21–32)
CREAT BLD-MCNC: 1.38 MG/DL
EGFRCR SERPLBLD CKD-EPI 2021: 49 ML/MIN/1.73M2 (ref 60–?)
GLOBULIN PLAS-MCNC: 1.9 G/DL (ref 2–3.5)
GLUCOSE BLD-MCNC: 111 MG/DL (ref 70–99)
OSMOLALITY SERPL CALC.SUM OF ELEC: 312 MOSM/KG (ref 275–295)
POTASSIUM SERPL-SCNC: 3.3 MMOL/L (ref 3.5–5.1)
POTASSIUM SERPL-SCNC: 4.9 MMOL/L (ref 3.5–5.1)
PROT SERPL-MCNC: 5 G/DL (ref 5.7–8.2)
SODIUM SERPL-SCNC: 146 MMOL/L (ref 136–145)

## 2025-02-04 PROCEDURE — 99232 SBSQ HOSP IP/OBS MODERATE 35: CPT | Performed by: HOSPITALIST

## 2025-02-04 RX ORDER — POTASSIUM CHLORIDE 1500 MG/1
40 TABLET, EXTENDED RELEASE ORAL EVERY 4 HOURS
Status: COMPLETED | OUTPATIENT
Start: 2025-02-04 | End: 2025-02-04

## 2025-02-04 RX ORDER — IPRATROPIUM BROMIDE AND ALBUTEROL SULFATE 2.5; .5 MG/3ML; MG/3ML
3 SOLUTION RESPIRATORY (INHALATION)
Status: DISCONTINUED | OUTPATIENT
Start: 2025-02-04 | End: 2025-02-05

## 2025-02-04 NOTE — PROGRESS NOTES
Problem: + FLU, possible PNA     Data: Pt is A&Ox3. . RA. On tele. Tolerating diet. Voids. Gets heparin Subcut. Meds crushed in apple sauce by Son's request. Up SBA R.W. PRN hydralazine given for elevated BP     Intervention:IV abx's, PO abx's, Inhalers, nebs         Edu: VSS so far. Call light within reach and bed alarm on.

## 2025-02-04 NOTE — CM/SW NOTE
SONIYA referral sent via [facility] to [facility]. PASRR completed and uploaded.     Alexandra Ward, MSN RN, CM

## 2025-02-04 NOTE — CM/SW NOTE
Received call from patient's DIL requesting SONIYA. Reviewed PT note from yesterday and ability to ambulate with supervision and RW. Discussed anticipated need for HH rather than SONIYA. Offered to send referral for SNF under his Medicaid benefit however she was addiment that he needs SONIYA, disagreed with PT note, and requested PT/OT evaluate him again. Explained PT/OT does not come every day and will see when they are able to see him again. Again offered to send referral for SNF under Medicaid benefits. She states she wants to go see facilities to find out if they can accommodate a vegetarian diet, see if they're clean, and what therapy they can provide him. She was eventually agreeable with SW sending referral to [facility].     Will need to send referral to [facility] and submit PASRR.     SW/CM to remain available for support and/or discharge planning.    JOSE A Greco  Discharge Planner

## 2025-02-04 NOTE — PROGRESS NOTES
[facility]   part of [facility]     Hospitalist Progress Note     Syed Cooney Patient Status:  Observation    1934 MRN [id number]   Location [facility] 5NW-A Attending Hema Sharma MD   Hosp Day # 6 PCP Jeffrey Gan MD     Chief Complaint:   Chief Complaint   Patient presents with    Shortness Of Breath       Subjective:     Some abdominal discomfort because abdominal binder position    Objective:    Review of Systems:   5 point ROS completed and was negative, except for pertinent positive and negatives stated in subjective.    Vital signs:  Temp:  [97.4 °F (36.3 °C)-98 °F (36.7 °C)] 97.9 °F (36.6 °C)  Pulse:  [72-87] 76  Resp:  [18-26] 20  BP: (145-183)/(55-81) 183/71  SpO2:  [92 %-99 %] 92 %    Physical Exam:    General: No acute distress.   Respiratory:  course breath sounds  Cardiovascular: S1, S2. Regular rate and rhythm. No murmurs.  Abdomen: Soft, nontender, nondistended.    Extremities: No edema.    Diagnostic Data:    Labs:  Recent Labs   Lab 25  0649   WBC 8.8   HGB 10.2*   MCV 88.9   .0       Recent Labs   Lab 25  0815 25  0658 25  0757   GLU  --  107* 111*   BUN  --  43* 40*   CREATSERUM 1.48* 1.54* 1.38*   CA  --  8.4* 8.7   ALB  --   --  3.1*   NA  --  142 146*   K  --  3.5 3.3*   CL  --  106 110   CO2  --  24.0 25.0   ALKPHO  --   --  67   AST  --   --  22   ALT  --   --  27   BILT  --   --  0.7   TP  --   --  5.0*       Estimated Creatinine Clearance: 25.2 mL/min (A) (based on SCr of 1.38 mg/dL (H)).    No results for input(s): \"PTP\", \"INR\" in the last 168 hours.         COVID-19 Lab Results    COVID-19  Lab Results   Component Value Date    COVID19 Not Detected 2025    COVID19 Not Detected 09/10/2024    COVID19 Not Detected 2023       Pro-Calcitonin  Recent Labs   Lab 25  0730   PCT 0.10*       Cardiac  No results for input(s): \"TROP\", \"PBNP\" in the last 168 hours.      Creatinine Kinase  No results for  input(s): \"CK\" in the last 168 hours.    Inflammatory Markers  No results for input(s): \"CRP\", \"REMY\", \"LDH\", \"DDIMER\" in the last 168 hours.    No results for input(s): \"TROP\", \"TROPHS\", \"CK\" in the last 168 hours.      Imaging: Imaging data reviewed in Epic.    Medications:    multivitamin  1 tablet Oral Daily    ipratropium-albuterol  3 mL Nebulization Q4H WA (5 times daily)    carvedilol  6.25 mg Oral BID with meals    predniSONE  40 mg Oral Daily with breakfast    ferrous sulfate  325 mg Oral BID with meals    fluticasone-salmeterol  1 puff Inhalation BID    umeclidinium bromide  1 puff Inhalation Daily    levothyroxine  50 mcg Oral Before breakfast    mesalamine DR  1,200 mg Oral BID    pantoprazole  40 mg Oral QAM AC    tamsulosin  0.4 mg Oral Nightly    heparin  5,000 Units Subcutaneous Q8H SCAR    azithromycin  250 mg Oral Once per day on Monday Wednesday Friday       Assessment & Plan:      #acute COPD Exacerbation secondary to influenza infection; unclear if had possible superimposed gram negative pneumonia  -nebs/steroid, consider tapering soon  - tamiflu finished  -finished unasyn.  Pulm following  -3x/week azithromycin  -LAMA/LABA/ICS for now; trelegy on discharge?  -s/p dose of lasix; added mucinex; watch volume status closely    #hypernatremia-encourage free water intake    #moderate mitral and aortic regurg; per cards; appears euvolemic;  OP f/u    #Ess HTN-Continue home meds     #Dyslipidemia-Statin     #crohns Disease-Continue mesalamine     #Hypothyroid-synthroid    #right foot swelling; mild; monitor    #leukpenia-resolved    #hypokalemia-replace per protocol    Unfortunately not ready for discharge today    Prognosis seemingly poor, as I'm not sure how much better he will be able to get.  Will d/w family later today    Plan of care discussed with patient and RN    Trever Nielsen MD    Supplementary Documentation:     Quality:  DVT Prophylaxis: heparin  CODE status: see chart  Thomas: no  Central  line: no      Estimated date of discharge:tbd  At this point Mr. Cooney is expected to be discharge to: home per family wishes tentatively.             **Certification      PHYSICIAN Certification of Need for Inpatient Hospitalization - Initial Certification    Patient will require inpatient services that will reasonably be expected to span two midnight's based on the clinical documentation in H+P.   Based on patients current state of illness, I anticipate that, after discharge, patient will require TBD.

## 2025-02-04 NOTE — PROGRESS NOTES
[facility]   part of [facility]     Hospitalist Progress Note     Syed Cooney Patient Status:  Inpatient    1934 MRN [id number]   Location [facility] 5NW-A Attending Trever Whitley MD   Hosp Day # 7 PCP Jeffrey Gan MD     Chief Complaint: Hypoxia     Subjective:   Son provides history on behalf of patient. Dyspneic with exertion. Cough. Weakness is the biggest complaint. Has been in bed for a week.     Current medications:   amLODIPine  5 mg Oral Daily    sodium chloride  3 mL Nebulization 2 times daily    guaiFENesin  400 mg Oral 4 times per day    ipratropium-albuterol  3 mL Nebulization QID    multivitamin  1 tablet Oral Daily    carvedilol  6.25 mg Oral BID with meals    predniSONE  40 mg Oral Daily with breakfast    ferrous sulfate  325 mg Oral BID with meals    fluticasone-salmeterol  1 puff Inhalation BID    umeclidinium bromide  1 puff Inhalation Daily    levothyroxine  50 mcg Oral Before breakfast    mesalamine DR  1,200 mg Oral BID    pantoprazole  40 mg Oral QAM AC    tamsulosin  0.4 mg Oral Nightly    heparin  5,000 Units Subcutaneous Q8H SCAR    azithromycin  250 mg Oral Once per day on        Objective:    Review of Systems:   Limited d/t patient factors    Vital signs:  Temp:  [97.3 °F (36.3 °C)-97.8 °F (36.6 °C)] 97.8 °F (36.6 °C)  Pulse:  [79-90] 88  Resp:  [18-20] 20  BP: (160-187)/(64-95) 169/95  SpO2:  [90 %-100 %] 100 %  No data found.  Physical Exam:    General: No acute distress.   Respiratory: Scattered rhonchi L>R  Cardiovascular: S1, S2.   Abdomen: Soft, nontender, nondistended.  Positive bowel sounds.   Extremities: No edema.  Neuro: Alert and awake    Diagnostic Data:    Labs:  Recent Labs   Lab 25  0649 25  0717   WBC 8.8 9.6   HGB 10.2* 10.2*   MCV 88.9 90.8   .0 213.0       Recent Labs   Lab 25  0658 25  0757 25  1758 25  0717   * 111*  --  102*   BUN 43* 40*  --  39*    CREATSERUM 1.54* 1.38*  --  1.59*   CA 8.4* 8.7  --  9.1   ALB  --  3.1*  --   --     146*  --  146*   K 3.5 3.3* 4.9 4.1    110  --  112   CO2 24.0 25.0  --  26.0   ALKPHO  --  67  --   --    AST  --  22  --   --    ALT  --  27  --   --    BILT  --  0.7  --   --    TP  --  5.0*  --   --        Estimated Creatinine Clearance: 21.8 mL/min (A) (based on SCr of 1.59 mg/dL (H)).    No results for input(s): \"PTP\", \"INR\" in the last 168 hours.         COVID-19 Lab Results    COVID-19  Lab Results   Component Value Date    COVID19 Not Detected 01/27/2025    COVID19 Not Detected 09/10/2024    COVID19 Not Detected 09/06/2023       Pro-Calcitonin  Recent Labs   Lab 02/03/25  0730   PCT 0.10*       Cardiac  No results for input(s): \"TROP\", \"PBNP\" in the last 168 hours.    Creatinine Kinase  No results for input(s): \"CK\" in the last 168 hours.    Inflammatory Markers  No results for input(s): \"CRP\", \"REMY\", \"LDH\", \"DDIMER\" in the last 168 hours.    No results for input(s): \"TROP\", \"TROPHS\", \"CK\" in the last 168 hours.    Imaging: Imaging data reviewed in Epic.    Medications:    amLODIPine  5 mg Oral Daily    sodium chloride  3 mL Nebulization 2 times daily    guaiFENesin  400 mg Oral 4 times per day    ipratropium-albuterol  3 mL Nebulization QID    multivitamin  1 tablet Oral Daily    carvedilol  6.25 mg Oral BID with meals    predniSONE  40 mg Oral Daily with breakfast    ferrous sulfate  325 mg Oral BID with meals    fluticasone-salmeterol  1 puff Inhalation BID    umeclidinium bromide  1 puff Inhalation Daily    levothyroxine  50 mcg Oral Before breakfast    mesalamine DR  1,200 mg Oral BID    pantoprazole  40 mg Oral QAM AC    tamsulosin  0.4 mg Oral Nightly    heparin  5,000 Units Subcutaneous Q8H SCAR    azithromycin  250 mg Oral Once per day on Monday Wednesday Friday       Assessment & Plan:      #Acute hypoxic respiratory failure d/t COPD exacerbation d/t influenza, possible superimposed Gram negative  pneumonia  -Tamiflu and abx competed  -Zithro three times per week  -Prednisone  -BD  -Add Mucinex  -Add saline nebs  -Oxygen, currently room air   -Add chest PT   -Increase activity, OOB to chair   -Incentive spirometry - ordered  -Pulmonary following      #Hypernatremia   #Chronic kidney disease   -Monitor UOP, creatinine and electrolytes    #Moderate mitral and aortic regurgitation     #Essential hypertension, uncontrolled   -Coreg - increased this admission   -Has been on Norvasc 5mg in the past, added and increased to 10/d  -Monitor hemodynamics    #Dyslipidemia     #Crohn's Disease  -Mesalamine    #Hypothyroidism  -Synthroid     #GERD  -PPI    #BPH  -Flomax    #History of VTE, DOAC DC'd 2023    DVT Px: Heparin     SONIYA once arranged. Today?    Plan of care discussed with patient, family and SW.    Memo Moon MD        Supplementary Documentation:   DVT Mechanical Prophylaxis:   SCDs,    DVT Pharmacologic Prophylaxis   Medication    heparin (Porcine) 5000 UNIT/ML injection 5,000 Units                Code Status: Full Code  Thomas: No urinary catheter in place  Thomas Duration (in days):   Central line:    YAS:

## 2025-02-04 NOTE — SLP NOTE
SPEECH DAILY NOTE - INPATIENT    ASSESSMENT & PLAN   ASSESSMENT  Pt seen for dysphagia tx to assess tolerance with recommended diet, ensure proper utilization of aspiration precautions and provide pt/family education.  Patient seen with regular solids and thin liquids via straw. Patient reports not having appetite to take po however did accept limited amounts of recommended diet of regular solids and thin liquids. Patient utilized slow rate, small bites and seated upright. No overt clinical s/s of aspiration noted. Patient with safe and efficient po intake for regular solids and thin liquids. No further skilled dysphagia intervention warranted as patient demonstrates tolerance of least restrictive po diet. Reviewed recommendations and plan with daughter who expressed understanding.     Diet Recommendations - Solids: Regular  Diet Recommendations - Liquids: Thin Liquids       Aspiration Precautions: Upright position;Slow rate;Small bites  Medication Administration Recommendations: No restrictions    Patient Experiencing Pain: No                Treatment Plan  Treatment Plan/Recommendations: Aspiration precautions    Interdisciplinary Communication: Discussed with RN  Disussed with other staff          GOALS  Goal #1 The patient will tolerate regular  consistency and thin liquids without overt signs or symptoms of aspiration with 95 % accuracy over 1-2 session(s).  Met   Goal #2 The patient/family/caregiver will demonstrate understanding and implementation of aspiration precautions and swallow strategies independently over 1-2 session(s).     Met         FOLLOW UP  Follow Up Needed (Documentation Required): No  SLP Follow-up Date: 02/04/25  Duration: 1 week    Session: 2    If you have any questions, please contact SALOMON Thomason

## 2025-02-04 NOTE — PROGRESS NOTES
[facility] Patient Status:  Inpatient    1934 MRN [id number]   Location [facility] 5NW-A Attending Trever Whitley MD   Hosp Day # 6 PCP Jeffrey Gan MD     SUBJECTIVE: no new events. Still c/o dyspnea, abd discomfort     OBJECTIVE:  /63   Pulse 83   Temp 97.9 °F (36.6 °C) (Oral)   Resp 20   Ht 152.4 cm (5')   Wt 124 lb (56.2 kg)   SpO2 95%   BMI 24.22 kg/m²   O2 requirement: on room air     I/O last 3 completed shifts:  In: -   Out: 250 [Urine:250]  No intake/output data recorded.     Current Medications:   Current Facility-Administered Medications:     potassium chloride (Klor-Con M20) tab 40 mEq, 40 mEq, Oral, Q4H    multivitamin (Tab-A-Melinda/Beta Carotene) tab 1 tablet, 1 tablet, Oral, Daily    ipratropium-albuterol (Duoneb) 0.5-2.5 (3) MG/3ML inhalation solution 3 mL, 3 mL, Nebulization, Q4H WA (5 times daily)    carvedilol (Coreg) tab 6.25 mg, 6.25 mg, Oral, BID with meals    predniSONE (Deltasone) tab 40 mg, 40 mg, Oral, Daily with breakfast    guaiFENesin (Robitussin) 100 MG/5 ML oral liquid 200 mg, 200 mg, Oral, Q4H PRN    albuterol (Ventolin HFA) 108 (90 Base) MCG/ACT inhaler 2 puff, 2 puff, Inhalation, Q4H PRN    melatonin tab 3 mg, 3 mg, Oral, Nightly PRN    hydrALAzine (Apresoline) 20 mg/mL injection 10 mg, 10 mg, Intravenous, Q6H PRN    ferrous sulfate DR tab 325 mg, 325 mg, Oral, BID with meals    fluticasone-salmeterol (Advair Diskus) 500-50 MCG/ACT inhaler 1 puff, 1 puff, Inhalation, BID    umeclidinium bromide (Incruse Ellipta) 62.5 MCG/ACT inhaler 1 puff, 1 puff, Inhalation, Daily    levothyroxine (Synthroid) tab 50 mcg, 50 mcg, Oral, Before breakfast    mesalamine DR (Delzicol) cap 1,200 mg, 1,200 mg, Oral, BID    pantoprazole (Protonix) DR tab 40 mg, 40 mg, Oral, QAM AC    tamsulosin (Flomax) cap 0.4 mg, 0.4 mg, Oral, Nightly    heparin (Porcine) 5000 UNIT/ML injection 5,000 Units, 5,000 Units, Subcutaneous, Q8H SCAR     acetaminophen (Tylenol Extra Strength) tab 500 mg, 500 mg, Oral, Q4H PRN    ondansetron (Zofran) 4 MG/2ML injection 4 mg, 4 mg, Intravenous, Q6H PRN    metoclopramide (Reglan) 5 mg/mL injection 5 mg, 5 mg, Intravenous, Q8H PRN    azithromycin (Zithromax) tab 250 mg, 250 mg, Oral, Once per day on Monday Wednesday Friday     General appearance: alert, appears stated age, and cooperative  Lungs:  mild exp wheezing  Heart: S1, S2 normal, no murmur, click, rub or gallop, regular rate and rhythm  Abdomen: soft, non-tender; bowel sounds normal; no masses,  no organomegaly  Extremities: extremities normal, atraumatic, no cyanosis or edema        Lab Results   Component Value Date     02/04/2025    K 3.3 02/04/2025     02/04/2025    CO2 25.0 02/04/2025    BUN 40 02/04/2025    CREATSERUM 1.38 02/04/2025     02/04/2025    CA 8.7 02/04/2025    ALKPHO 67 02/04/2025    ALT 27 02/04/2025    AST 22 02/04/2025    BILT 0.7 02/04/2025    ALB 3.1 02/04/2025    TP 5.0 02/04/2025     Lab Results   Component Value Date    INR 2.86 (H) 09/06/2023    INR 1.13 (H) 08/31/2019    INR 1.10 07/16/2019      PFT Feb 2023 - FVC 1.62 L (71%); FEV1 0.93 L (53%); FEV1 ratio 0.57 ; TLC 83 % ; DLCO 60 % (DLCO / )               - Airflow obstruction - moderate with moderately reduced DLCO               -no change after BD    Imaging: CXR: clear     Assessment / Plan:  Acute hypoxic respiratory failure - secondary to influenza, pneumonia, AECOPD  - weaned O2 down to room air  - bronchial hygiene  - CXR normal  COPD exacerbation - due to influenza; PFTs at baseline showed mod to severe COPD  - prednisone taper  - cont LAMA/LABA/ICS, send home on Trelegy per son's request  - cont three times per week azithro   - BD protocol followed by flutter valve  Dysphagia vs VCD  - speech evaluation normal  - suction prn  Influenza A  - Tamiflu completed  Possible PNA- unlikely with normal PCT and normal CXR  - was on Unasyn (1/31- )- stop abx,  CXR is clear  Dispo  - dc planning  - d/w son at length- consider [facility] given significant weakness  - can f/u with Dr. Henson in 1-2 weeks    Zack Jose MD  2/4/2025  10:47 AM

## 2025-02-05 LAB
ANION GAP SERPL CALC-SCNC: 8 MMOL/L (ref 0–18)
BASOPHILS # BLD AUTO: 0.01 X10(3) UL (ref 0–0.2)
BASOPHILS NFR BLD AUTO: 0.1 %
BUN BLD-MCNC: 39 MG/DL (ref 9–23)
CALCIUM BLD-MCNC: 9.1 MG/DL (ref 8.7–10.6)
CHLORIDE SERPL-SCNC: 112 MMOL/L (ref 98–112)
CO2 SERPL-SCNC: 26 MMOL/L (ref 21–32)
CREAT BLD-MCNC: 1.59 MG/DL
EGFRCR SERPLBLD CKD-EPI 2021: 41 ML/MIN/1.73M2 (ref 60–?)
EOSINOPHIL # BLD AUTO: 0.01 X10(3) UL (ref 0–0.7)
EOSINOPHIL NFR BLD AUTO: 0.1 %
ERYTHROCYTE [DISTWIDTH] IN BLOOD BY AUTOMATED COUNT: 13.5 %
GLUCOSE BLD-MCNC: 102 MG/DL (ref 70–99)
HCT VFR BLD AUTO: 29.7 %
HGB BLD-MCNC: 10.2 G/DL
IMM GRANULOCYTES # BLD AUTO: 0.06 X10(3) UL (ref 0–1)
IMM GRANULOCYTES NFR BLD: 0.6 %
LYMPHOCYTES # BLD AUTO: 0.61 X10(3) UL (ref 1–4)
LYMPHOCYTES NFR BLD AUTO: 6.4 %
MCH RBC QN AUTO: 31.2 PG (ref 26–34)
MCHC RBC AUTO-ENTMCNC: 34.3 G/DL (ref 31–37)
MCV RBC AUTO: 90.8 FL
MONOCYTES # BLD AUTO: 0.73 X10(3) UL (ref 0.1–1)
MONOCYTES NFR BLD AUTO: 7.6 %
NEUTROPHILS # BLD AUTO: 8.16 X10 (3) UL (ref 1.5–7.7)
NEUTROPHILS # BLD AUTO: 8.16 X10(3) UL (ref 1.5–7.7)
NEUTROPHILS NFR BLD AUTO: 85.2 %
OSMOLALITY SERPL CALC.SUM OF ELEC: 312 MOSM/KG (ref 275–295)
PLATELET # BLD AUTO: 213 10(3)UL (ref 150–450)
POTASSIUM SERPL-SCNC: 4.1 MMOL/L (ref 3.5–5.1)
RBC # BLD AUTO: 3.27 X10(6)UL
SODIUM SERPL-SCNC: 146 MMOL/L (ref 136–145)
WBC # BLD AUTO: 9.6 X10(3) UL (ref 4–11)

## 2025-02-05 PROCEDURE — 99233 SBSQ HOSP IP/OBS HIGH 50: CPT | Performed by: HOSPITALIST

## 2025-02-05 RX ORDER — PREDNISONE 10 MG/1
TABLET ORAL
Qty: 50 TABLET | Refills: 0 | Status: SHIPPED | OUTPATIENT
Start: 2025-02-06 | End: 2025-02-26

## 2025-02-05 RX ORDER — SODIUM CHLORIDE FOR INHALATION 3 %
3 VIAL, NEBULIZER (ML) INHALATION
Status: DISCONTINUED | OUTPATIENT
Start: 2025-02-05 | End: 2025-02-05

## 2025-02-05 RX ORDER — AMLODIPINE BESYLATE 5 MG/1
5 TABLET ORAL ONCE
Status: COMPLETED | OUTPATIENT
Start: 2025-02-05 | End: 2025-02-05

## 2025-02-05 RX ORDER — GUAIFENESIN 400 MG/1
400 TABLET ORAL EVERY 6 HOURS SCHEDULED
Status: DISCONTINUED | OUTPATIENT
Start: 2025-02-05 | End: 2025-02-06

## 2025-02-05 RX ORDER — AZITHROMYCIN 250 MG/1
250 TABLET, FILM COATED ORAL
Qty: 30 TABLET | Refills: 3 | Status: SHIPPED | OUTPATIENT
Start: 2025-02-05

## 2025-02-05 RX ORDER — GUAIFENESIN 600 MG/1
600 TABLET, EXTENDED RELEASE ORAL 2 TIMES DAILY
Status: DISCONTINUED | OUTPATIENT
Start: 2025-02-05 | End: 2025-02-05

## 2025-02-05 RX ORDER — AMLODIPINE BESYLATE 5 MG/1
10 TABLET ORAL DAILY
Status: DISCONTINUED | OUTPATIENT
Start: 2025-02-06 | End: 2025-02-06

## 2025-02-05 RX ORDER — AMLODIPINE BESYLATE 5 MG/1
5 TABLET ORAL DAILY
Status: DISCONTINUED | OUTPATIENT
Start: 2025-02-05 | End: 2025-02-05

## 2025-02-05 NOTE — OCCUPATIONAL THERAPY NOTE
OCCUPATIONAL THERAPY TREATMENT NOTE - INPATIENT     Room Number: 519/519-A  Session: 2   Number of Visits to Meet Established Goals: 3    Presenting Problem: SOB, acute bronchospasm, influenza A    HOME SITUATION  Type of Home: [facility]  Home Layout: Two level;Bed/bath upstairs  Lives With: Augie     Toilet and Equipment: Standard height toilet  Shower/Tub and Equipment: Tub-shower combo;Shower chair  Other Equipment:  (RW, cane)     Drives: No  Patient Regularly Uses: Hearing aides     Prior Level of Function: Per pt/pt son report, pt lives at home with son, son assists with ADLs as needed, pt also has a caregiver who assists with ADLs. Per pt son, pt has been requiring increased assist with ADLs. Pt typically uses a RW or cane for mobility, has been using RW more recently.    ASSESSMENT   Patient demonstrates fair progress this session, goals remain in progress.    Patient continues to function below baseline with toileting, lower body dressing, bed mobility, transfers, static sitting balance, dynamic sitting balance, static standing balance, dynamic standing balance, maintaining seated position, functional standing tolerance, energy conservation strategies, and aerobic capacity.   Contributing factors to remaining limitations include decreased functional strength, decreased endurance, and decreased compliance/participation.  Next session anticipate patient to progress bed mobility and transfers.  Occupational Therapy will continue to follow patient for duration of hospitalization.    Patient continues to benefit from continued skilled OT services: at discharge to promote prior level of function and safety with additional support and return home with home health OT.     History:   Patient is a 89 year old male admitted on 1/27/2025 with Presenting Problem: Acute bronchospasm, SOB with exertion, cough. Co-Morbidities : CHF, COPD, HTN, DL, emphysema     WEIGHT BEARING RESTRICTION       Recommendations for nursing  staff:   Transfers: up x 1 assist, CGA-SBA, RW  Toileting location: Toilet    TREATMENT SESSION:  Patient Start of Session: ambulating back into room with PT    FUNCTIONAL TRANSFER ASSESSMENT  Sit to Stand: Chair  Chair: Not Tested  Toilet Transfer: Contact Guard Assist    BED MOBILITY  Rolling: Not Tested  Supine to Sit : Not tested  Sit to Supine (OT): Minimal Assist (Akila for guidance of L leg onto bed)  Scooting: not tested    BALANCE ASSESSMENT  Static Sitting: Supervision  Static Standing: Stand-by Assist  Dynamic Sitting: Supervision  Dynamic Standing: Contact Guard Assist    FUNCTIONAL ADL ASSESSMENT  Eating: Not Tested  UB Dressing Seated: Maximum Assist (donned hat semi supine in bed)  LB Dressing Standing: Minimal Assist (Akila for brief in rear)  Toileting Seated: Stand-by Assist    ACTIVITY TOLERANCE: Pt tolerated ~5 min seated on toilet during toileting task with supervision. Pt ambulated back to bed after toileting task, reported feeling SOB, observed to pause for ~2 min prior to completing bed mobility.  O2 readings seen > 90% at end of session.                         O2 SATURATIONS  Oxygen Therapy  At rest oxygen flow (liters per minute): 2 (2LO2 applied at end of session)    EDUCATION PROVIDED  Patient Education : Role of Occupational Therapy; Plan of Care; Functional Transfer Techniques; Fall Prevention; Posture/Positioning; Energy Conservation; Proper Body Mechanics  Patient's Response to Education: Requires Further Education; Verbalized Understanding  Family/Caregiver Education : -- (same)  Family/Caregiver's Response to Education: Verbalized Understanding    Equipment used: RW  Demonstrates functional use     Exercises:    Exercises Repetitions Comments   Scapular elevation     Scapular retraction     Shoulder rolls     Shoulder flexion     Shoulder abduction     Shoulder internal/external rotation     Forward punch     Elbow flexion     Elbow extension     Forearm pronation/supination      Wrist flexion/extension     Gross grasp/fist pumps     Ankle pumps     Knee extension     Marching       UPPER EXTREMITY  ROM: within functional limits   Strength: is within functional limits     Therapist comments: RN cleared pt for session, pt received just finishing session with PT. Pt son present in room, assisted with translation throughout session. Pt ambulated to bathroom with SBA; completed LB dressing of doffing brief at toilet with SBA; pt completed toileting task ~5 min, observed to demonstrate good understanding of energy conservation techniques when sitting on toilet. When donning brief, pt reported that he couldn't, however with verbal encouragement and gestures, pt observed to don brief one side at a time. Pt returned to sitting at EOB, pt reported feeling SOB, O2 readings inaccurate, RN notified for new O2 reader. Pt observed to pause and wait prior to getting into bed, pt observed to lift hand as a stopping gesture when this writer prompted to move back into bed. Pt also stated that he cannot get himself back into bed, but when provided with verbal encouragement and gestures, pt able to complete bed mobility, however required Akila to guide L leg back onto bed. Once semi supine in bed, pt stated he was cold, pt provided with hat, declining hat and tossed hat when prompted to put on IND. This writer helped pt put on hat. Pt on 2LO2 at end of session.    Patient End of Session: In bed;Needs met;Call light within reach;RN aware of session/findings;All patient questions and concerns addressed;Hospital anti-slip socks;Family present    SUBJECTIVE  \"I cannot\"    PAIN ASSESSMENT  Rating: Unable to rate  Location: Chest area, breathing  Management Techniques: Relaxation;Breathing techniques     OBJECTIVE  Precautions: Hard of hearing    AM-PAC ‘6-Clicks’ Inpatient Daily Activity Short Form  -   Putting on and taking off regular lower body clothing?: A Little  -   Bathing (including washing, rinsing,  drying)?: A Little  -   Toileting, which includes using toilet, bedpan or urinal? : A Little  -   Putting on and taking off regular upper body clothing?: A Little  -   Taking care of personal grooming such as brushing teeth?: None  -   Eating meals?: None    AM-PAC Score:  Score: 20  Approx Degree of Impairment: 38.32%  Standardized Score (AM-PAC Scale): 42.03    PLAN  OT Device Recommendations:  (RW)  OT Treatment Plan: Energy conservation/work simplification techniques;UE strengthening/ROM;Patient/Family education  Rehab Potential : Fair  Frequency: 3-5x/week    OT Goals: ADL Goals  all goals ongoing as of 2/5/25  Patient will perform upper body dressing:  with supervision  Patient will perform lower body dressing:  with supervision  Patient will perform toileting: with supervision     Functional Transfer Goals  Patient will transfer from supine to sit:  with supervision  Patient will transfer from sit to stand:  with supervision  Patient will transfer to toilet:  with supervision     UE Exercise Program Goal  Patient will be supervision with bilateral AROM HEP (home exercise program).     Additional Goals  Pt will tolerate standing > 3 min to increase tolerance for standing ADLs.    OT Session Time: 23 minutes  Self-Care Home Management: 23 minutes

## 2025-02-05 NOTE — PLAN OF CARE
A&Ox3-4.  VSS on RA.  Hydralazine given for elevated BP.  PT ambulated patient w/ walker and minimal assist per their note.  Meds give per the MAR.  LSW working w/ son to determine placement upon discharge per their note.  Possible discharge to SNF OR home w/ [facility].  Patient does NOT qualify for [facility] placement.  Patient and family updated on POC; no further needs at this time.      Problem: Patient/Family Goals  Goal: Patient/Family Long Term Goal  Description: Patient's Long Term Goal: discharge home when medically stable    Interventions:  Telemetry monitoring  O2 protocol,   Fall precaution, bed alarm on, non-skid socks on,  call light and bedside table within reach.  Monitor labs, vitals, intake and output.  Electrolyte protocol.  Heparin and SCD for VTE   Nebs q 4 hrs.  Maintain on droplet precaution.   - See additional Care Plan goals for specific interventions  Outcome: Progressing  Goal: Patient/Family Short Term Goal  Description: Patient's Short Term Goal: Breathing is stable tonight  1/ 31 NOC: remain comfortable  2/1 NOC: sleep  2/2NOC: sleep and remain stable and decrease shortness of breath     Interventions:  Cluster care  Albuterol  PRN NEBS   Telemetry monitoring  O2 protocol,   Fall precaution, bed alarm on, non-skid socks on,  call light and bedside table within reach.  Monitor labs, vitals, intake and output.  Electrolyte protocol.  Heparin and SCD for VTE   Nebs q 4 hrs.  Maintain on droplet precaution.     - See additional Care Plan goals for specific interventions  Outcome: Progressing     Problem: CARDIOVASCULAR - ADULT  Goal: Absence of cardiac arrhythmias or at baseline  Description: INTERVENTIONS:  - Continuous cardiac monitoring, monitor vital signs, obtain 12 lead EKG if indicated  - Evaluate effectiveness of antiarrhythmic and heart rate control medications as ordered  - Initiate emergency measures for life threatening arrhythmias  - Monitor electrolytes and administer replacement  therapy as ordered  Outcome: Progressing     Problem: RESPIRATORY - ADULT  Goal: Achieves optimal ventilation and oxygenation  Description: INTERVENTIONS:  - Assess for changes in respiratory status  - Assess for changes in mentation and behavior  - Position to facilitate oxygenation and minimize respiratory effort  - Oxygen supplementation based on oxygen saturation or ABGs  - Provide Smoking Cessation handout, if applicable  - Encourage broncho-pulmonary hygiene including cough, deep breathe, Incentive Spirometry  - Assess the need for suctioning and perform as needed  - Assess and instruct to report SOB or any respiratory difficulty  - Respiratory Therapy support as indicated  - Manage/alleviate anxiety  - Monitor for signs/symptoms of CO2 retention  Outcome: Progressing

## 2025-02-05 NOTE — PHYSICAL THERAPY NOTE
PHYSICAL THERAPY TREATMENT NOTE - INPATIENT    Room Number: 519/519-A     Session: 3     Number of Visits to Meet Established Goals: 5    Presenting Problem: influenza, COPD  Co-Morbidities : COPD, HTN, DL    PHYSICAL THERAPY ASSESSMENT   Patient demonstrates excellent progress this session, goals  updated to reflect patient performance.      Patient is requiring supervision as a result of the following impairments: decreased endurance/aerobic capacity.     Patient continues to function near baseline with gait.  Next session anticipate patient to progress gait and stair negotiation.  Physical Therapy will continue to follow patient for duration of hospitalization.    Patient continues to benefit from continued skilled PT services: at discharge to promote prior level of function and safety with additional support and return home with home health PT.    PLAN DURING HOSPITALIZATION  Nursing Mobility Recommendation : 1 Assist     PT Treatment Plan: Bed mobility;Endurance;Energy conservation;Patient education;Gait training;Balance training;Transfer training;Stair training  Frequency (Obs): 3-5x/week     URRENT GOALS      Goal #1 Patient is able to demonstrate supine - sit EOB @ level: supervision   GOAL MET   Goal #2 Patient is able to demonstrate transfers EOB to/from C at assistance level: supervision  GOAL MET      Goal #3 Patient is able to ambulate 150 feet with assist device: walker - rolling at assistance level: supervision   PROGRESSING   Goal #4 Pt will ascend/descend 1 flight of stairs with supervision  ONGOING   Goal #5     Goal #6     Goal Comments: Goals established on 2025    SUBJECTIVE  \"I can't wear that.\"  Re: mask    OBJECTIVE  Precautions: Hard of hearing    WEIGHT BEARING RESTRICTION     PAIN ASSESSMENT   Ratin          BALANCE                                                                                                                       Static Sitting: Good  Dynamic Sitting:  Good           Static Standing: Fair -  Dynamic Standing: Fair -    ACTIVITY TOLERANCE                         O2 WALK       AM-PAC '6-Clicks' INPATIENT SHORT FORM - BASIC MOBILITY  How much difficulty does the patient currently have...  Patient Difficulty: Turning over in bed (including adjusting bedclothes, sheets and blankets)?: None   Patient Difficulty: Sitting down on and standing up from a chair with arms (e.g., wheelchair, bedside commode, etc.): A Little   Patient Difficulty: Moving from lying on back to sitting on the side of the bed?: None   How much help from another person does the patient currently need...   Help from Another: Moving to and from a bed to a chair (including a wheelchair)?: A Little   Help from Another: Need to walk in hospital room?: A Little   Help from Another: Climbing 3-5 steps with a railing?: A Little     AM-PAC Score:  Raw Score: 20   Approx Degree of Impairment: 35.83%   Standardized Score (AM-PAC Scale): 47.67   CMS Modifier (G-Code): CJ    FUNCTIONAL ABILITY STATUS  Gait Assessment   Functional Mobility/Gait Assessment  Gait Assistance: Supervision  Distance (ft): 50ftx1  Assistive Device: Rolling walker  Pattern: Shuffle    Skilled Therapy Provided    Bed Mobility:  Rolling: ind   Supine<>Sit: ind   Sit<>Supine: NT     Transfer Mobility:  Sit<>Stand: mod ind   Stand<>Sit: mod ind   Gait: ambulation as above.      Therapist's Comments: Pt declines wearing droplet mask for ambulation in hallway, Laps in and out of room completed.  Pt left with OT at end of session.  Plan to bring curb step next session if pt remains in house as unable to take to stairs without mask being donned.      Patient End of Session: With  staff;Needs met;Call light within reach;RN aware of session/findings;All patient questions and concerns addressed;Discussed recommendations with /    PT Session Time: 10 minutes  Gait Training: 10 minutes

## 2025-02-05 NOTE — CM/SW NOTE
[facility] reviewed does not recommend [facility].    PT/OT worked with patient this morning. Anticipated therapy need: Home with Home Healthcare. Patient was able to ambulate in the room (declined to wear a mask to go further distance outside of the room) with supervision/no physical assistance. PT/OT shared that a son was at bedside to witness session.     Spoke with son (Jose), at length, to discuss discharge planning. Jose shared that he had a conversation with hospitalist and pulm yesterday and he states they recommended patient go to [facility]. Explained to Jose that there is certain criteria to determine if a patient is eligible to utilize Medicare benefit for [facility]. Patient has [employer] (dual Medicare and Medicaid plan). Explained that patient does not meet criteria for [facility] due to his physical abilities as shown in the PT/OT notes. Jose requests the notes be changed to reflect a skillable need, explained that the PT/OT notes reflect his current abilities and cannot be changed as that would be fraud. He brought this up a few times. SW repeatedly made clear the two discharge options available at this time: 1. Short term SNF placement under his Medicaid benefit and see if facility could offer some therapy OR 2. Home with increased family support, [facility], hiring additional help if desired, and/or home healthcare. He insists patient is unable to return home as he does not feel it is a safe plan. He agreed to sending referrals for SNF under his Medicaid benefit. Informed him of referral process and will update him with accepting options. [facility] as they do not see skillable need or have medicaid availability. Will follow up with him regarding options available.    GREGORIO/HYACINTH to remain available for support and/or discharge planning.    Addendum 1:30pm: Spoke with Jose again regarding discharge plan. Emailed him list of accepting SNF under Medicaid options  from [email or web address]. He asked about submitting to patient's [facility] Medicare plan for [facility]. Emphasized that this was discussed earlier today and patient does not have skillable need therefore would not be appropriate to submit for insurance auth. Discussed transportation options such as ambulance if medically necessary vs [facility] (Medicaid would cover). He prefers a discharge around 11:30am. Acknowledge his preference and explained cannot guarantee specific discharge time as discharge depends on when the patient is medically stable to discharge.     Await SNF choice and patient to be medically cleared for discharge.    3:50pm: Spoke with Jose again, he states they chose [facility]. If patient is cleared for discharge tomorrow, he would prefer a 11:30am discharge to ensure he has a smooth transition to the facility. Informed him SW would update him with timing.         JOSE A Greco  Discharge Planner

## 2025-02-05 NOTE — CONGREGATE LIVING REVIEW
Congregate Living Authorization    [facility] has reviewed this case and the committee DOES NOT RECOMMEND discharge to a skilled nursing facility under skilled care.    The CLRC recommends:  Home with home health and any other appropriate caregiver assistance or medical equipment as needed vs respite in SNF.     Does not appear to have skilled services for [facility], but additional home support appears to be needed.    For questions regarding CLRC approval process, please contact the CM assigned to the case.  For questions regarding RN discharge workflow, please contact the unit Clinical Leader.

## 2025-02-05 NOTE — PROGRESS NOTES
[facility] Pulmonary, Critical Care and Sleep    [facility] Patient Status:  Inpatient    1934 MRN [id number]   Location [facility] 5NW-A Attending Memo Moon MD   Hosp Day # 7 PCP Jeffrey Gan MD     Date of Admission: 2025 11:43 AM    Admission Diagnosis: Acute bronchospasm [J98.01]  Influenza A [J10.1]  COPD exacerbation (HCC) [J44.1]  Acute respiratory failure, unspecified whether with hypoxia or hypercapnia (HCC) [J96.00]    S: Notes coughing and dyspnea with nebulizer and worsening. Requested change to MDI. Still weak but terry.     Scheduled Medications:     sodium chloride  3 mL Nebulization 2 times daily    guaiFENesin  400 mg Oral 4 times per day    [START ON 2025] amLODIPine  10 mg Oral Daily    ipratropium-albuterol  3 mL Nebulization QID    multivitamin  1 tablet Oral Daily    carvedilol  6.25 mg Oral BID with meals    predniSONE  40 mg Oral Daily with breakfast    ferrous sulfate  325 mg Oral BID with meals    fluticasone-salmeterol  1 puff Inhalation BID    umeclidinium bromide  1 puff Inhalation Daily    levothyroxine  50 mcg Oral Before breakfast    mesalamine DR  1,200 mg Oral BID    pantoprazole  40 mg Oral QAM AC    tamsulosin  0.4 mg Oral Nightly    heparin  5,000 Units Subcutaneous Q8H SCAR    azithromycin  250 mg Oral Once per day on        Infusing Medications:      PRN Medications:    guaiFENesin    albuterol    melatonin    hydrALAzine    acetaminophen    ondansetron    metoclopramide    OBJECTIVE:  /70 (BP Location: Right arm)   Pulse 82   Temp 97.3 °F (36.3 °C) (Oral)   Resp 20   Ht 152.4 cm (5')   Wt 124 lb (56.2 kg)   SpO2 98%   BMI 24.22 kg/m²    Temp (24hrs), Av.5 °F (36.4 °C), Min:97.3 °F (36.3 °C), Max:97.8 °F (36.6 °C)         Wt Readings from Last 3 Encounters:   25 124 lb (56.2 kg)   01/15/25 123 lb (55.8 kg)   24 124 lb (56.2 kg)       Intake/Output Summary (Last 24 hours) at  2/5/2025 1604  Last data filed at 2/5/2025 0435  Gross per 24 hour   Intake --   Output 200 ml   Net -200 ml     O2: RA  General: NAD.   Neuro: Alert, no focal deficits.    HEENT: PERRL  Neck : No LAD  CV: RRR, nl S1, S2, no S4, S3 or murmur.   Lungs: Coarse bilaterally.   Abd: Nontender, non distended.   Ext: No edema.   Skin: No rashes.     Recent Labs   Lab 01/31/25  0649 02/05/25  0717   WBC 8.8 9.6   HGB 10.2* 10.2*   HCT 28.9* 29.7*   .0 213.0     Recent Labs   Lab 02/02/25  0658 02/04/25  0757 02/04/25  1758 02/05/25  0717   * 111*  --  102*   BUN 43* 40*  --  39*   CREATSERUM 1.54* 1.38*  --  1.59*   CA 8.4* 8.7  --  9.1    146*  --  146*   K 3.5 3.3* 4.9 4.1    110  --  112   CO2 24.0 25.0  --  26.0   AST  --  22  --   --    ALT  --  27  --   --    ALB  --  3.1*  --   --      No results for input(s): \"INR\", \"PTT\" in the last 168 hours.  No results for input(s): \"ABGPHT\", \"DJXUMK5I\", \"TGKES1T\", \"ABGHCO3\", \"SITE\", \"DEV\", \"THGB\" in the last 168 hours.    COVID-19 Lab Results    COVID-19  Lab Results   Component Value Date    COVID19 Not Detected 01/27/2025    COVID19 Not Detected 09/10/2024    COVID19 Not Detected 09/06/2023       Pro-Calcitonin  Recent Labs   Lab 02/03/25  0730   PCT 0.10*       Cardiac  No results for input(s): \"TROP\", \"PBNP\" in the last 168 hours.    Creatinine Kinase  No results for input(s): \"CK\" in the last 168 hours.    Inflammatory Markers  No results for input(s): \"CRP\", \"REMY\", \"LDH\", \"DDIMER\" in the last 168 hours.    Imaging:   CXR 2/3:    Chest images personally reviewed.   Assessment/Plan   Acute hypoxic respiratory failure - secondary to influenza, pneumonia, AECOPD  - weaned O2 down to room air  - bronchial hygiene  - CXR normal  COPD exacerbation - due to influenza; PFTs at baseline showed mod to severe COPD  - prednisone taper  - cont LAMA/LABA/ICS, send home on Trelegy per son's request  - cont three times per week azithro   - BD protocol followed by  flutter valve. Use MDI only.   Dysphagia vs VCD  - speech evaluation normal  - suction prn  Influenza A  - Tamiflu completed  Possible PNA- unlikely with normal PCT and normal CXR  - was on Unasyn (1/31- 2/4)- stop abx, CXR is clear  Dispo  - dc planning to SNF tomorrow. OK for d/c from pulm standpoint.   - d/w son at length- consider SONIYA given significant weakness  - can f/u with Dr. Henson in 1-2 weeks    D/w'd pt and family, questions answered.     My best regards,         Lul Diaz MD  [facility] Pulmonary, Critical Care and Sleep Medicine

## 2025-02-05 NOTE — PROGRESS NOTES
AO x3. RA. Nebs. Tele NSR. Heparin subcutaneous. Incontinent at times. Urinal. No pain reported. Up x1 walker. Azithro PO. Prednisone. IVSL. Regular diet. Pt family requesting SONIYA for discharge. Pt resting in bed with call light in reach. No further needs.

## 2025-02-06 VITALS
BODY MASS INDEX: 24.35 KG/M2 | TEMPERATURE: 97 F | HEIGHT: 60 IN | SYSTOLIC BLOOD PRESSURE: 171 MMHG | WEIGHT: 124 LBS | RESPIRATION RATE: 20 BRPM | HEART RATE: 85 BPM | DIASTOLIC BLOOD PRESSURE: 75 MMHG | OXYGEN SATURATION: 95 %

## 2025-02-06 PROCEDURE — 99239 HOSP IP/OBS DSCHRG MGMT >30: CPT | Performed by: HOSPITALIST

## 2025-02-06 RX ORDER — GUAIFENESIN 400 MG/1
400 TABLET ORAL EVERY 6 HOURS SCHEDULED
Qty: 28 TABLET | Refills: 0 | Status: SHIPPED | OUTPATIENT
Start: 2025-02-06 | End: 2025-02-13

## 2025-02-06 RX ORDER — IPRATROPIUM BROMIDE AND ALBUTEROL SULFATE 2.5; .5 MG/3ML; MG/3ML
3 SOLUTION RESPIRATORY (INHALATION) 3 TIMES DAILY
Status: SHIPPED | COMMUNITY
Start: 2025-02-06

## 2025-02-06 RX ORDER — AMLODIPINE BESYLATE 10 MG/1
10 TABLET ORAL DAILY
Qty: 90 TABLET | Refills: 0 | Status: SHIPPED | OUTPATIENT
Start: 2025-02-06

## 2025-02-06 RX ORDER — DOXEPIN HYDROCHLORIDE 50 MG/1
1 CAPSULE ORAL DAILY
Qty: 90 TABLET | Refills: 0 | Status: SHIPPED | OUTPATIENT
Start: 2025-02-06

## 2025-02-06 RX ORDER — CARVEDILOL 3.12 MG/1
6.25 TABLET ORAL 2 TIMES DAILY WITH MEALS
Status: SHIPPED | COMMUNITY
Start: 2025-02-06

## 2025-02-06 NOTE — PLAN OF CARE
NURSING DISCHARGE NOTE    Discharged Nursing home via Ambulance.  Accompanied by Support staff  Belongings Taken by patient/family.    PIV & tele removed.  Report given to receiving nurse at [facility].

## 2025-02-06 NOTE — PROGRESS NOTES
[facility]   part of [facility]     Hospitalist Progress Note     Syed Cooney Patient Status:  Inpatient    1934 MRN [id number]   Location [facility] 5NW-A Attending Trever Whitley MD   Hosp Day # 8 PCP Jeffrey Gan MD     Chief Complaint: Hypoxia     Subjective:   Patient denies new complaints. Up at edge of bed eating breakfast. On room air.  No nausea or vomiting.  Breathing comfortably.    Current medications:   guaiFENesin  400 mg Oral 4 times per day    amLODIPine  10 mg Oral Daily    multivitamin  1 tablet Oral Daily    carvedilol  6.25 mg Oral BID with meals    predniSONE  40 mg Oral Daily with breakfast    ferrous sulfate  325 mg Oral BID with meals    fluticasone-salmeterol  1 puff Inhalation BID    umeclidinium bromide  1 puff Inhalation Daily    levothyroxine  50 mcg Oral Before breakfast    mesalamine DR  1,200 mg Oral BID    pantoprazole  40 mg Oral QAM AC    tamsulosin  0.4 mg Oral Nightly    heparin  5,000 Units Subcutaneous Q8H SCAR    azithromycin  250 mg Oral Once per day on        Objective:    Review of Systems:   10 system review completed.    Vital signs:  Temp:  [97.2 °F (36.2 °C)-97.7 °F (36.5 °C)] 97.4 °F (36.3 °C)  Pulse:  [78-86] 85  Resp:  [18-22] 20  BP: (132-176)/(65-97) 171/75  SpO2:  [95 %-99 %] 95 %  No data found.  Physical Exam:    General: No acute distress.   Respiratory: Diminished, coarse upper airway  Cardiovascular: S1, S2.   Abdomen: Soft, nontender, nondistended.  Positive bowel sounds.   Extremities: No edema.  Neuro: Alert and awake    Diagnostic Data:    Labs:  Recent Labs   Lab 25  0649 25  0717   WBC 8.8 9.6   HGB 10.2* 10.2*   MCV 88.9 90.8   .0 213.0       Recent Labs   Lab 25  0658 25  0757 25  1758 25  0717   * 111*  --  102*   BUN 43* 40*  --  39*   CREATSERUM 1.54* 1.38*  --  1.59*   CA 8.4* 8.7  --  9.1   ALB  --  3.1*  --   --     146*  --   146*   K 3.5 3.3* 4.9 4.1    110  --  112   CO2 24.0 25.0  --  26.0   ALKPHO  --  67  --   --    AST  --  22  --   --    ALT  --  27  --   --    BILT  --  0.7  --   --    TP  --  5.0*  --   --        Estimated Creatinine Clearance: 21.8 mL/min (A) (based on SCr of 1.59 mg/dL (H)).    No results for input(s): \"PTP\", \"INR\" in the last 168 hours.         COVID-19 Lab Results    COVID-19  Lab Results   Component Value Date    COVID19 Not Detected 01/27/2025    COVID19 Not Detected 09/10/2024    COVID19 Not Detected 09/06/2023       Pro-Calcitonin  Recent Labs   Lab 02/03/25  0730   PCT 0.10*       Cardiac  No results for input(s): \"TROP\", \"PBNP\" in the last 168 hours.    Creatinine Kinase  No results for input(s): \"CK\" in the last 168 hours.    Inflammatory Markers  No results for input(s): \"CRP\", \"REMY\", \"LDH\", \"DDIMER\" in the last 168 hours.    No results for input(s): \"TROP\", \"TROPHS\", \"CK\" in the last 168 hours.    Imaging: Imaging data reviewed in Epic.    Medications:    guaiFENesin  400 mg Oral 4 times per day    amLODIPine  10 mg Oral Daily    multivitamin  1 tablet Oral Daily    carvedilol  6.25 mg Oral BID with meals    predniSONE  40 mg Oral Daily with breakfast    ferrous sulfate  325 mg Oral BID with meals    fluticasone-salmeterol  1 puff Inhalation BID    umeclidinium bromide  1 puff Inhalation Daily    levothyroxine  50 mcg Oral Before breakfast    mesalamine DR  1,200 mg Oral BID    pantoprazole  40 mg Oral QAM AC    tamsulosin  0.4 mg Oral Nightly    heparin  5,000 Units Subcutaneous Q8H SCAR    azithromycin  250 mg Oral Once per day on Monday Wednesday Friday       Assessment & Plan:      #Acute hypoxic respiratory failure d/t COPD exacerbation d/t influenza, possible superimposed Gram negative pneumonia  -Tamiflu and abx competed  -Zithro three times per week  -Prednisone  -BD  -Mucinex  -Chest PT   -Flutter valve - reviewed and completed with patient  -Increase activity, OOB to chair    -Incentive spirometry   -Pulmonary following      #Hypernatremia   #Chronic kidney disease   -Monitor UOP, creatinine and electrolytes    #Moderate mitral and aortic regurgitation     #Essential hypertension, uncontrolled   -Coreg - increased this admission   -Norvasc added this admission  -Monitor hemodynamics    #Dyslipidemia     #Crohn's Disease  -Mesalamine    #Hypothyroidism  -Synthroid     #GERD  -PPI    #BPH  -Flomax    #History of VTE, DOAC DC'd 2023    DVT Px: Heparin     SONIYA today.    Plan of care discussed with patient.     Memo Moon MD        Supplementary Documentation:   DVT Mechanical Prophylaxis:   SCDs,    DVT Pharmacologic Prophylaxis   Medication    heparin (Porcine) 5000 UNIT/ML injection 5,000 Units                Code Status: Full Code  Thomas: No urinary catheter in place  Thomas Duration (in days):   Central line:    YAS:

## 2025-02-06 NOTE — DISCHARGE SUMMARY
[place] HOSPITALIST  DISCHARGE SUMMARY     Syed Cooney Patient Status:  Inpatient    1934 MRN [id number]   Location [facility] 5NW-A Attending Memo Moon MD   Hosp Day # 8 PCP Jeffrey Gan MD     Date of Admission: 2025  Date of Discharge:   2025    Discharge Disposition: SNF    Discharge Diagnosis:  #Acute hypoxic respiratory failure d/t COPD exacerbation d/t influenza, possible superimposed Gram negative pneumonia  #Hypernatremia   #Chronic kidney disease   #Moderate mitral and aortic regurgitation   #Essential hypertension, uncontrolled   #Dyslipidemia   #Crohn's Disease  #Hypothyroidism  #GERD  #BPH  #History of VTE, DOAC DC'd     History of Present Illness: Syed Cooney is a 89 year old male with PMhx of CHF, COPD, HTN, DL, epmphysema presents with SOB and wheezing. Pt brought in by family. Pt started to have symptoms about 4 days ago with chills and has progressively gotten worse. He has noticed worsening breathing and wheezing. No known fevers. He has tried duonebs and recscue inhalers without improvement in his symptoms. He denies any CP. No N/V/D/C or abd pain. He was given hour long neb without much improvement.     Brief Synopsis: Patient presented to the ER with shortness of breath. He was admitted for acute hypoxic respiratory failure d/t COPD exacerbation d/t influenza, possible superimposed Gram negative pneumonia with pulmonary on consult. Patient treated with Tamiflu, abx, steroids, BD and oxygen. He has made clinical improvement and has weaned oxygen need back to room air. Patient doing well. His SBP was elevated therefore Coreg increased and Norvasc added. Plan for SNF on regimen outlined below.     Lace+ Score: 77  59-90 High Risk  29-58 Medium Risk  0-28   Low Risk       TCM Follow-Up Recommendation:  LACE > 58: High Risk of readmission after discharge from the hospital.        Discharge Medication List:     Discharge Medications         START taking these medications        Instructions Prescription details   amLODIPine 10 MG Tabs  Commonly known as: Norvasc      Take 1 tablet (10 mg total) by mouth daily.   Quantity: 90 tablet  Refills: 0     azithromycin 250 MG Tabs  Commonly known as: Zithromax      Take 1 tablet (250 mg total) by mouth Every Monday, Wednesday, and Friday.   Quantity: 30 tablet  Refills: 3     guaiFENesin 400 MG Tabs  Commonly known as: Mucinex      Take 1 tablet (400 mg total) by mouth every 6 (six) hours for 7 days.   Stop taking on: February 13, 2025  Quantity: 28 tablet  Refills: 0     multivitamin Tabs      Take 1 tablet by mouth daily.   Quantity: 90 tablet  Refills: 0     predniSONE 10 MG Tabs  Commonly known as: Deltasone  Start taking on: February 6, 2025      Take 4 tablets (40 mg total) by mouth daily with breakfast for 5 days, THEN 3 tablets (30 mg total) daily with breakfast for 5 days, THEN 2 tablets (20 mg total) daily with breakfast for 5 days, THEN 1 tablet (10 mg total) daily with breakfast for 5 days.   Stop taking on: February 26, 2025  Quantity: 50 tablet  Refills: 0            CHANGE how you take these medications        Instructions Prescription details   carvedilol 3.125 MG Tabs  Commonly known as: Coreg  What changed: how much to take      Take 2 tablets (6.25 mg total) by mouth 2 (two) times daily with meals.   Refills: 0            CONTINUE taking these medications        Instructions Prescription details   albuterol 108 (90 Base) MCG/ACT Aers  Commonly known as: Ventolin HFA      Inhale 2 puffs into the lungs every 6 (six) hours as needed for Wheezing.   Quantity: 1 each  Refills: 11     cyanocobalamin 1000 MCG/ML Soln  Commonly known as: Vitamin B12      Inject 1 mL (1,000 mcg total) into the muscle every 30 (thirty) days.   Quantity: 12 mL  Refills: 0     ferrous sulfate 325 (65 FE) MG Tbec      Take 1 tablet (325 mg total) by mouth 2 (two) times daily with meals.   Quantity: 180  tablet  Refills: 3     fluticasone-umeclidin-vilant 200-62.5-25 MCG/ACT Aepb  Commonly known as: Trelegy Ellipta      Inhale 1 puff into the lungs daily.   Quantity: 3 each  Refills: 3     ipratropium-albuterol 0.5-2.5 (3) MG/3ML Soln  Commonly known as: Duoneb      Take 3 mL by nebulization 3 (three) times daily.   Refills: 0     levothyroxine 50 MCG Tabs  Commonly known as: Synthroid      Take 1 tablet (50 mcg total) by mouth before breakfast.   Quantity: 90 tablet  Refills: 3     mesalamine 1.2 g Tbec  Commonly known as: LIALDA      Take 2 tablets (2.4 g total) by mouth daily.   Quantity: 180 tablet  Refills: 3     Omeprazole 40 MG Cpdr      Take 1 capsule (40 mg total) by mouth daily.   Quantity: 90 capsule  Refills: 3     tamsulosin 0.4 MG Caps  Commonly known as: Flomax      Take 1 capsule (0.4 mg total) by mouth nightly.   Quantity: 90 capsule  Refills: 3     Tylenol PM Extra Strength 500-25 MG Tabs  Generic drug: diphenhydrAMINE-APAP (sleep)      Take 1 tablet by mouth at bedtime.   Refills: 0     Vitamin D 50 MCG (2000 UT) Caps      Take 1 capsule (2,000 Units total) by mouth daily.   Refills: 0               Where to Get Your Medications        These medications were sent to [id number] - [place], [place] - [address] [phone], [phone]  [address], [facility] [address]      Phone: [phone]   amLODIPine 10 MG Tabs  azithromycin 250 MG Tabs  guaiFENesin 400 MG Tabs  multivitamin Tabs  predniSONE 10 MG Tabs         [place]    Follow-up appointment:   Salvador Fisher MD  [address]  [facility] [address]  [facility] [address] [phone]    Follow up  our office will call you for follow up    Josep Henson MD  [address]  [address]  [facility] [address] [phone]    Follow up in 1 week(s)  Follow up    Appointments for Next 30 Days 2/8/2025 - 3/10/2025      None                 -----------------------------------------------------------------------------------------------  PATIENT DISCHARGE INSTRUCTIONS: See electronic chart    Memo Moon MD    Total time spent on discharge plannin minutes     The  [employer] C[employer] Act makes medical notes like these available to patients in the interest of transparency. Please be advised this is a medical document. Medical documents are intended to carry relevant information, facts as evident, and the clinical opinion of the practitioner. The medical note is intended as peer to peer communication and may appear blunt or direct. It is written in medical language and may contain abbreviations or verbiage that are unfamiliar.

## 2025-02-06 NOTE — CM/SW NOTE
02/06/25 0833   Discharge disposition   Expected discharge disposition subacute   Post Acute Care Provider   ([facility])   Discharge transportation [facility]     Informed by RN that patient is medically cleared for discharge. Spoke with Kalyani from [facility] who confirmed bed available today. Spoke with [facility] courtney and scheduled  for 11:30am today. PCS form completed and available for RN to print. RN updating son Jose. Discussed discharge for today with him yesterday and he requested 1130am discharge to [facility]. SW will remain available.    [place] [address] nurse for report.  Please don’t call report until 11   [phone]    [facility]  [phone] or [id number]      JOSE A Solis  Discharge Planner  [phone]

## 2025-02-06 NOTE — PROGRESS NOTES
AO x3. RA. Nebs. Tele NSR. Heparin subcutaneous. Incontinent at times. Urinal. No pain reported. Up x1 walker. Azithro PO. Prednisone. IVSL. Regular diet. Plan to DC in AM. Pt resting in bed with call light in reach. No further needs.

## 2025-02-07 NOTE — PAYOR COMM NOTE
--------------  DISCHARGE REVIEW    Payor: [facility]  Subscriber #:  [id number]  Authorization Number: [id number]    Admit date: 1/29/25  Admit time:   2:36 PM  Discharge Date: 2/6/2025 12:00 PM     Admitting Physician: Hema Sharma MD  Attending Physician:  No att. providers found  Primary Care Physician: Jeffrey Gan MD

## 2025-02-07 NOTE — PROGRESS NOTES
Physician Clarification    Additional information related to the patient's condition    CKD Stage 3     This note is part of the patient's medical record.

## 2025-02-26 ENCOUNTER — APPOINTMENT (OUTPATIENT)
Dept: LAB | Age: OVER 89
End: 2025-02-26
Attending: INTERNAL MEDICINE

## 2025-02-26 ENCOUNTER — APPOINTMENT (OUTPATIENT)
Dept: HEMATOLOGY/ONCOLOGY | Age: OVER 89
End: 2025-02-26
Attending: INTERNAL MEDICINE

## 2025-05-05 ENCOUNTER — OFFICE VISIT (OUTPATIENT)
Dept: FAMILY MEDICINE CLINIC | Facility: CLINIC | Age: OVER 89
End: 2025-05-05
Payer: MEDICARE

## 2025-05-05 VITALS
OXYGEN SATURATION: 97 % | RESPIRATION RATE: 16 BRPM | HEIGHT: 60 IN | BODY MASS INDEX: 24.54 KG/M2 | WEIGHT: 125 LBS | SYSTOLIC BLOOD PRESSURE: 114 MMHG | DIASTOLIC BLOOD PRESSURE: 60 MMHG | HEART RATE: 75 BPM

## 2025-05-05 DIAGNOSIS — J44.89 ASTHMA-COPD OVERLAP SYNDROME (HCC): Primary | Chronic | ICD-10-CM

## 2025-05-05 DIAGNOSIS — E03.9 ACQUIRED HYPOTHYROIDISM: ICD-10-CM

## 2025-05-05 DIAGNOSIS — K21.9 GASTROESOPHAGEAL REFLUX DISEASE WITHOUT ESOPHAGITIS: ICD-10-CM

## 2025-05-05 DIAGNOSIS — E53.8 VITAMIN B12 DEFICIENCY: ICD-10-CM

## 2025-05-05 DIAGNOSIS — D50.9 IRON DEFICIENCY ANEMIA, UNSPECIFIED IRON DEFICIENCY ANEMIA TYPE: ICD-10-CM

## 2025-05-05 DIAGNOSIS — N40.0 BENIGN PROSTATIC HYPERPLASIA, UNSPECIFIED WHETHER LOWER URINARY TRACT SYMPTOMS PRESENT: ICD-10-CM

## 2025-05-05 DIAGNOSIS — E55.9 VITAMIN D DEFICIENCY: ICD-10-CM

## 2025-05-05 DIAGNOSIS — R54 SENILE DEBILITY: ICD-10-CM

## 2025-05-05 DIAGNOSIS — I10 ESSENTIAL HYPERTENSION, BENIGN: Chronic | ICD-10-CM

## 2025-05-05 DIAGNOSIS — K50.10 CROHN'S DISEASE OF LARGE INTESTINE WITHOUT COMPLICATION (HCC): ICD-10-CM

## 2025-05-05 PROCEDURE — G2211 COMPLEX E/M VISIT ADD ON: HCPCS | Performed by: FAMILY MEDICINE

## 2025-05-05 PROCEDURE — 99214 OFFICE O/P EST MOD 30 MIN: CPT | Performed by: FAMILY MEDICINE

## 2025-05-05 RX ORDER — TAMSULOSIN HYDROCHLORIDE 0.4 MG/1
0.4 CAPSULE ORAL NIGHTLY
Qty: 90 CAPSULE | Refills: 3 | Status: SHIPPED | OUTPATIENT
Start: 2025-05-05

## 2025-05-05 RX ORDER — IPRATROPIUM BROMIDE AND ALBUTEROL SULFATE 2.5; .5 MG/3ML; MG/3ML
3 SOLUTION RESPIRATORY (INHALATION) EVERY 4 HOURS PRN
Qty: 540 ML | Refills: 3 | Status: SHIPPED | OUTPATIENT
Start: 2025-05-05

## 2025-05-05 RX ORDER — MULTIVIT-MIN/IRON/FOLIC ACID/K 18-600-40
2000 CAPSULE ORAL DAILY
Qty: 90 CAPSULE | Refills: 3 | Status: SHIPPED | OUTPATIENT
Start: 2025-05-05

## 2025-05-05 RX ORDER — MESALAMINE 1.2 G/1
2.4 TABLET, DELAYED RELEASE ORAL DAILY
Qty: 180 TABLET | Refills: 3 | Status: SHIPPED | OUTPATIENT
Start: 2025-05-05

## 2025-05-05 RX ORDER — LEVOTHYROXINE SODIUM 50 UG/1
50 TABLET ORAL
Qty: 90 TABLET | Refills: 3 | Status: SHIPPED | OUTPATIENT
Start: 2025-05-05

## 2025-05-05 RX ORDER — AZITHROMYCIN 250 MG/1
250 TABLET, FILM COATED ORAL
Qty: 30 TABLET | Refills: 3 | Status: CANCELLED | OUTPATIENT
Start: 2025-05-05

## 2025-05-05 RX ORDER — LOSARTAN POTASSIUM 25 MG/1
25 TABLET ORAL DAILY
Qty: 90 TABLET | Refills: 3 | Status: SHIPPED | OUTPATIENT
Start: 2025-05-05

## 2025-05-05 RX ORDER — FERROUS SULFATE 325(65) MG
325 TABLET, DELAYED RELEASE (ENTERIC COATED) ORAL 2 TIMES DAILY WITH MEALS
Qty: 180 TABLET | Refills: 3 | Status: SHIPPED | OUTPATIENT
Start: 2025-05-05

## 2025-05-05 RX ORDER — OMEPRAZOLE 40 MG/1
40 CAPSULE, DELAYED RELEASE ORAL DAILY
Qty: 90 CAPSULE | Refills: 3 | Status: SHIPPED | OUTPATIENT
Start: 2025-05-05

## 2025-05-05 RX ORDER — CYANOCOBALAMIN 1000 UG/ML
1000 INJECTION, SOLUTION INTRAMUSCULAR; SUBCUTANEOUS
Qty: 12 ML | Refills: 0 | Status: SHIPPED | OUTPATIENT
Start: 2025-05-05

## 2025-05-05 RX ORDER — LOSARTAN POTASSIUM 25 MG/1
TABLET ORAL
COMMUNITY
Start: 2025-04-14 | End: 2025-05-05

## 2025-05-05 RX ORDER — ALBUTEROL SULFATE 90 UG/1
2 INHALANT RESPIRATORY (INHALATION) EVERY 6 HOURS PRN
Qty: 3 EACH | Refills: 3 | Status: SHIPPED | OUTPATIENT
Start: 2025-05-05

## 2025-05-05 RX ORDER — CARVEDILOL 3.12 MG/1
6.25 TABLET ORAL 2 TIMES DAILY WITH MEALS
Qty: 360 TABLET | Refills: 0 | Status: SHIPPED | OUTPATIENT
Start: 2025-05-05

## 2025-05-05 NOTE — PROGRESS NOTES
The following individual(s) verbally consented to be recorded using ambient AI listening technology and understand that they can each withdraw their consent to this listening technology at any point by asking the clinician to turn off or pause the recording:    Patient name: Syed Cooney  Additional names:

## 2025-05-05 NOTE — PROGRESS NOTES
[facility] Family Medicine Office Note  Chief Complaint:   Chief Complaint   Patient presents with    Follow - Up     Nursing Home        HPI:   This is a 89 year old male coming in for hospital/SNF follow up.   He was initially hospitalized from 01/27/25 to 02/06/25 for AHRF d/t COPD exacerbation d/t influenza, possible superimposed gram negative pneumonia. Treated with tamiflu abx, steroids, BD, and oxygen. Weaned to RA. He was discharged to home SNF.     ***    Past Medical History[1]  Past Surgical History[2]  Social History:  Short Social Hx on File[3]  Family History:  Family History[4]  Allergies:  Allergies[5]  Current Meds:  Current Medications[6]   Counseling given: Not Answered  Tobacco comments: 30 years       REVIEW OF SYSTEMS:   Review of Systems     EXAM:   Ht 5' (1.524 m)   BMI 24.22 kg/m²  Estimated body mass index is 24.22 kg/m² as calculated from the following:    Height as of this encounter: 5' (1.524 m).    Weight as of 1/27/25: 124 lb (56.2 kg).   Vital signs reviewed.Appears stated age, well groomed.  Physical Exam     ASSESSMENT AND PLAN:   There are no diagnoses linked to this encounter.    Meds & Refills for this Visit:  Requested Prescriptions      No prescriptions requested or ordered in this encounter       Health Maintenance:  Health Maintenance Due   Topic Date Due    Annual Physical  Never done    Zoster Vaccines (2 of 3) 07/04/2017    COVID-19 Vaccine (3 - 2024-25 season) 09/01/2024       Patient/Caregiver Education: Patient/Caregiver Education: There are no barriers to learning. Medical education done.   Outcome: Patient verbalizes understanding. Patient is notified to call with any questions, complications, allergies, or worsening or changing symptoms.  Patient is to call with any side effects or complications from the treatments as a result of today.     Problem List:  Problem List[7]       [1]   Past Medical History:   Abdominal distention    Acute encephalopathy     Anemia    Arthritis    Back problem    Cataract    Chronic lung disease    Congestive heart disease (HCC)    COPD (chronic obstructive pulmonary disease) (HCC)    Crohn disease (HCC)    Crohn's disease of large intestine without complication (HCC)    Diarrhea, unspecified    Disorder of thyroid    Diverticula of small intestine    Diverticulosis of large intestine    Frequent use of laxatives    Hearing impairment    High blood pressure    High cholesterol    History of blood transfusion    Hyperthyroidism    Leg swelling    Mitral valve disorder    leaky mitral valve    Muscle weakness    Osteoarthrosis, unspecified whether generalized or localized, unspecified site    cervical and lumbar spine    Osteoporosis    Other and unspecified hyperlipidemia    Pulmonary embolism (HCC)    Pulmonary emphysema (HCC)    Renal insufficiency    Shortness of breath    Thyroid disease    Unspecified essential hypertension    Wheezing   [2]   Past Surgical History:  Procedure Laterality Date    Angiogram          Cataract      Colonoscopy  2014    Procedure: COLONOSCOPY;  Surgeon: Jaz Carrasquillo DO;  Location:  ENDOSCOPY    Egd N/A 2017    Procedure: ESOPHAGOGASTRODUODENOSCOPY (EGD);  Surgeon: Gustabo Johnson MD;  Location:  MAIN OR    Egd N/A 2017    Procedure: ESOPHAGOGASTRODUODENOSCOPY (EGD);  Surgeon: Jaz Carrasquillo DO;  Location:  ENDOSCOPY    Hernia surgery      Knee replacement surgery      Other surgical history      6 yeara ago colonscopy with polpys    Sigmoidoscopy,diagnostic      Total knee replacement     [3]   Social History  Socioeconomic History    Marital status:    Tobacco Use    Smoking status: Former     Current packs/day: 0.00     Average packs/day: 2.0 packs/day for 35.0 years (70.0 ttl pk-yrs)     Types: Cigarettes     Quit date: 1987     Years since quittin.3    Smokeless tobacco: Former    Tobacco comments:     30 years   Vaping Use    Vaping status:  Never Used   Substance and Sexual Activity    Alcohol use: Not Currently    Drug use: No     Social Drivers of Health     Food Insecurity: No Food Insecurity (1/27/2025)    Food Insecurity     Food Insecurity: Never true   Transportation Needs: No Transportation Needs (1/27/2025)    Transportation Needs     Lack of Transportation: No   Housing Stability: Low Risk  (1/27/2025)    Housing Stability     Housing Instability: No   [4]   Family History  Problem Relation Age of Onset    Cancer Brother    [5] No Known Allergies  [6]   Current Outpatient Medications   Medication Sig Dispense Refill    carvedilol 3.125 MG Oral Tab Take 2 tablets (6.25 mg total) by mouth 2 (two) times daily with meals.      multivitamin Oral Tab Take 1 tablet by mouth daily. 90 tablet 0    ipratropium-albuterol 0.5-2.5 (3) MG/3ML Inhalation Solution Take 3 mL by nebulization 3 (three) times daily.      azithromycin 250 MG Oral Tab Take 1 tablet (250 mg total) by mouth Every Monday, Wednesday, and Friday. 30 tablet 3    diphenhydrAMINE-APAP, sleep, (TYLENOL PM EXTRA STRENGTH)  MG Oral Tab Take 1 tablet by mouth at bedtime.      albuterol 108 (90 Base) MCG/ACT Inhalation Aero Soln Inhale 2 puffs into the lungs every 6 (six) hours as needed for Wheezing. 1 each 11    cyanocobalamin 1000 MCG/ML Injection Solution Inject 1 mL (1,000 mcg total) into the muscle every 30 (thirty) days. 12 mL 0    ferrous sulfate 325 (65 FE) MG Oral Tab EC Take 1 tablet (325 mg total) by mouth 2 (two) times daily with meals. 180 tablet 3    fluticasone-umeclidin-vilant (TRELEGY ELLIPTA) 200-62.5-25 MCG/ACT Inhalation Aerosol Powder, Breath Activated Inhale 1 puff into the lungs daily. 3 each 3    levothyroxine 50 MCG Oral Tab Take 1 tablet (50 mcg total) by mouth before breakfast. 90 tablet 3    mesalamine 1.2 g Oral Tab EC Take 2 tablets (2.4 g total) by mouth daily. (Patient taking differently: Take 1 tablet (1.2 g total) by mouth 2 (two) times daily.) 180  tablet 3    Omeprazole 40 MG Oral Capsule Delayed Release Take 1 capsule (40 mg total) by mouth daily. 90 capsule 3    tamsulosin 0.4 MG Oral Cap Take 1 capsule (0.4 mg total) by mouth nightly. 90 capsule 3    Cholecalciferol (VITAMIN D) 50 MCG (2000 UT) Oral Cap Take 1 capsule (2,000 Units total) by mouth daily.     [7]   Patient Active Problem List  Diagnosis    Abdominal pain    Back pain with radiation    Pneumonia due to infectious organism    Lumbar spinal stenosis    Lumbosacral radiculopathy    Nontraumatic subdural hygroma    Acute encephalopathy    Hyponatremia    Hyperglycemia    Gastrointestinal hemorrhage    Gastrointestinal hemorrhage, unspecified gastrointestinal hemorrhage type    Dyspnea    Upper GI bleeding    Asthma-COPD overlap syndrome (HCC)    Essential hypertension, benign    Diarrhea, unspecified type    Hypernatremia    Crohn's disease of large intestine without complication (HCC)    Stage 3 chronic kidney disease (HCC)    PE (pulmonary thromboembolism) (HCC)    Acute bronchospasm    Influenza    Influenza A    COPD exacerbation (HCC)    Acute respiratory failure, unspecified whether with hypoxia or hypercapnia (HCC)    Moderate mitral regurgitation    Hypokalemia

## 2025-05-06 ENCOUNTER — MED REC SCAN ONLY (OUTPATIENT)
Dept: FAMILY MEDICINE CLINIC | Facility: CLINIC | Age: OVER 89
End: 2025-05-06

## 2025-05-12 NOTE — PROGRESS NOTES
1749 PM - Received a call from [phone] admitting, patient will be going to room 606. RN called [facility], pt to be picked up at 0900 pm. RN requested for an early pickup however that's the earliest pickup for patient.  RN to give report to [phone] No care due was identified.  [facility] Embedded Care Due Messages. Reference number: [id number].   5/12/2025 8:30:26 AM CDT

## 2025-05-27 NOTE — PROGRESS NOTES
Subjective:   Syed Cooney is a 89 year old male who presents for Follow - Up (Nursing Home discharge 4/28/25. )     History/Other:   History of Present Illness  Eros Cooney is a 89 year old male with COPD who presents for follow-up after discharge from a skilled nursing facility. He is accompanied by his son.    He was initially hospitalized from 01/27/25 to 02/06/25 for AHRF d/t COPD exacerbation d/t influenza, possible superimposed gram negative pneumonia. Treated with tamiflu abx, steroids, BD, and oxygen. Weaned to RA.     Following hospitalization, he was transferred to a skilled nursing facility for the following 3 months before returning home. His respiratory status has improved significantly, and he was weaned off supplemental oxygen with stable saturation levels between 97% and 100%.    His carvedilol dosage was increased from 3.125 mg to 6.25 mg twice daily due to elevated blood pressure. He was also started on losartan 25 mg, which was increased to 50 mg during hospitalization but has been discontinued post-discharge as his home blood pressure readings stabilized between 120 and 135 mmHg. He is currently only taking carvedilol.    He has a history of chronic kidney disease with recent blood work showing elevated BUN levels, likely due to decreased fluid intake. His creatinine levels have remained stable. Fluid intake is currently 32-40 ounces daily.    He has been taking ferrous sulfate 325 mg twice daily for anemia, with a recent hemoglobin level of 9.6 g/dL. He tolerates the iron well without significant constipation.    He experiences swelling in his right foot, which was not present before his recent hospitalization. He reports chronic coldness in his legs but denies numbness. He uses a walker and cane for mobility and exercises regularly at home, walking about a mile daily and using five-pound weights.   Chief Complaint Reviewed and Verified  Nursing Notes Reviewed and    Verified  Allergies Reviewed  Medications Reviewed         Tobacco:  He smoked tobacco in the past but quit greater than 12 months ago.  Tobacco Use[1]     Current Medications[2]    Review of Systems:  Pertinent items are noted in HPI.      Objective:   /60   Pulse 75   Resp 16   Ht 5' (1.524 m)   Wt 125 lb (56.7 kg)   SpO2 97%   BMI 24.41 kg/m²  Estimated body mass index is 24.41 kg/m² as calculated from the following:    Height as of this encounter: 5' (1.524 m).    Weight as of this encounter: 125 lb (56.7 kg).   Physical Exam    GENERAL: Alert, cooperative, well developed, no acute distress.  HEENT: Normocephalic, normal oropharynx, moist mucous membranes.  CHEST: Clear to auscultation bilaterally, no wheezes, rhonchi, or crackles.  CARDIOVASCULAR: Normal heart rate and rhythm, S1 and S2 normal without murmurs.  ABDOMEN: Soft, non-tender, non-distended, without organomegaly, normal bowel sounds.  EXTREMITIES: Mild swelling on right foot, pulses good in feet, no cyanosis. No erythema, tenderness to palpation of b/l lower extremities.   NEUROLOGICAL: Cranial nerves grossly intact, moves all extremities without gross motor or sensory deficit.      Assessment & Plan:   1. Asthma-COPD overlap syndrome (HCC) (Primary)  Recent COPD exacerbation that has since resolved. Doing well w/o acute concerns. VSS, 97% o2 sat on RA. Continue present management.     -     Albuterol Sulfate HFA; Inhale 2 puffs into the lungs every 6 (six) hours as needed for Wheezing.  Dispense: 3 each; Refill: 3  -     Ipratropium-Albuterol; Take 3 mL by nebulization every 4 (four) hours as needed (shortness of breath, wheezing).  Dispense: 540 mL; Refill: 3    2. Essential hypertension, benign  Blood pressure stabilized post-discharge with carvedilol. Losartan discontinued.  - Continue carvedilol 6.25 mg twice daily.  - Monitor blood pressure at home regularly.  - Hold off on losartan unless blood pressure exceeds 140/90 mmHg.  Contact provider before restarting.    -     Carvedilol; Take 2 tablets (6.25 mg total) by mouth 2 (two) times daily with meals.  Dispense: 360 tablet; Refill: 0  -     Losartan Potassium; Take 1 tablet (25 mg total) by mouth daily.  Dispense: 90 tablet; Refill: 3    3. Vitamin B12 deficiency  Stable, CPM   -     Cyanocobalamin; Inject 1 mL (1,000 mcg total) into the muscle every 30 (thirty) days.  Dispense: 12 mL; Refill: 0    4. Iron deficiency anemia, unspecified iron deficiency anemia type  Stable, CPM  -     Ferrous Sulfate; Take 1 tablet (325 mg total) by mouth 2 (two) times daily with meals.  Dispense: 180 tablet; Refill: 3    5. Acquired hypothyroidism  Stable, CPM  -     Levothyroxine Sodium; Take 1 tablet (50 mcg total) by mouth before breakfast.  Dispense: 90 tablet; Refill: 3    6. Crohn's disease of large intestine without complication (HCC)  Stable, CPM  -     Mesalamine; Take 2 tablets (2.4 g total) by mouth daily.  Dispense: 180 tablet; Refill: 3    7. Gastroesophageal reflux disease without esophagitis  Stable, CPM  -     Omeprazole; Take 1 capsule (40 mg total) by mouth daily.  Dispense: 90 capsule; Refill: 3    8. Benign prostatic hyperplasia, unspecified whether lower urinary tract symptoms present  Stable, CPM  -     Tamsulosin HCl; Take 1 capsule (0.4 mg total) by mouth nightly.  Dispense: 90 capsule; Refill: 3    9. Vitamin D deficiency  Stable, CPM  -     Vitamin D; Take 1 capsule (2,000 Units total) by mouth daily.  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 Active with daily walking and exercises. Cane use encouraged as needed.  - Encourage continued physical activity, including walking and exercises with light weights.  - Use a cane for mobility as needed  - Would benefit from outpatient PT - referral placed.   - Has mild pedal edema on exam - reviewed supportive care measures such as compression stockings, elevation etc.     -      Physical Therapy Referral - [facility]    Return in about 2 months (around 2025).        Jeffrey Gan MD, 2025, 11:46 AM             [1]   Social History  Tobacco Use   Smoking Status Former    Current packs/day: 0.00    Average packs/day: 2.0 packs/day for 35.0 years (70.0 ttl pk-yrs)    Types: Cigarettes    Quit date: 1987    Years since quittin.4   Smokeless Tobacco Former   Tobacco Comments    30 years   [2]   Current Outpatient Medications   Medication Sig Dispense Refill    albuterol 108 (90 Base) MCG/ACT Inhalation Aero Soln Inhale 2 puffs into the lungs every 6 (six) hours as needed for Wheezing. 3 each 3    carvedilol 3.125 MG Oral Tab Take 2 tablets (6.25 mg total) by mouth 2 (two) times daily with meals. 360 tablet 0    Cholecalciferol (VITAMIN D) 50 MCG (2000 UT) Oral Cap Take 1 capsule (2,000 Units total) by mouth daily. 90 capsule 3    cyanocobalamin 1000 MCG/ML Injection Solution Inject 1 mL (1,000 mcg total) into the muscle every 30 (thirty) days. 12 mL 0    ferrous sulfate 325 (65 FE) MG Oral Tab EC Take 1 tablet (325 mg total) by mouth 2 (two) times daily with meals. 180 tablet 3    ipratropium-albuterol 0.5-2.5 (3) MG/3ML Inhalation Solution Take 3 mL by nebulization every 4 (four) hours as needed (shortness of breath, wheezing). 540 mL 3    levothyroxine 50 MCG Oral Tab Take 1 tablet (50 mcg total) by mouth before breakfast. 90 tablet 3    losartan 25 MG Oral Tab Take 1 tablet (25 mg total) by mouth daily. 90 tablet 3    mesalamine 1.2 g Oral Tab EC Take 2 tablets (2.4 g total) by mouth daily. 180 tablet 3    Omeprazole 40 MG Oral Capsule Delayed Release Take 1 capsule (40 mg total) by mouth daily. 90 capsule 3    tamsulosin 0.4 MG Oral Cap Take 1 capsule (0.4 mg total) by mouth nightly. 90 capsule 3    multivitamin Oral Tab Take 1 tablet by mouth daily. 90 tablet 0    azithromycin 250 MG Oral Tab Take 1 tablet (250 mg total) by mouth Every Monday, Wednesday,  and Friday. 30 tablet 3    diphenhydrAMINE-APAP, sleep, (TYLENOL PM EXTRA STRENGTH)  MG Oral Tab Take 1 tablet by mouth at bedtime.      fluticasone-umeclidin-vilant (TRELEGY ELLIPTA) 200-62.5-25 MCG/ACT Inhalation Aerosol Powder, Breath Activated Inhale 1 puff into the lungs daily. (Patient not taking: Reported on 5/5/2025) 3 each 3

## 2025-05-29 NOTE — TELEPHONE ENCOUNTER
Dr. Gan -     Please review. This medication fails protocol.;    Please advise pharmacy also sent refill request for probiotic. System unable to find medication in patient's chart.     New prescription for acidophilus + Pectin pended for review if appropriate.    Notes added to pharmacy - NDC and name with CFU count.

## 2025-05-29 NOTE — TELEPHONE ENCOUNTER
Pharmacy tried sending refill request for , but system unable to find  Will add to this refill encounter.

## 2025-05-30 RX ORDER — MULTIVITAMIN WITH FOLIC ACID 400 MCG
1 TABLET ORAL DAILY
Qty: 90 TABLET | Refills: 1 | Status: SHIPPED | OUTPATIENT
Start: 2025-05-30

## 2025-05-30 RX ORDER — AZITHROMYCIN 250 MG/1
TABLET, FILM COATED ORAL
Qty: 12 TABLET | Refills: 0 | Status: SHIPPED | OUTPATIENT
Start: 2025-05-30

## 2025-05-30 RX ORDER — GARLIC EXTRACT 500 MG
1 CAPSULE ORAL DAILY
Qty: 90 CAPSULE | Refills: 1 | Status: SHIPPED | OUTPATIENT
Start: 2025-05-30

## 2025-05-30 RX ORDER — FLUTICASONE FUROATE AND VILANTEROL 200; 25 UG/1; UG/1
POWDER RESPIRATORY (INHALATION)
Qty: 180 EACH | Refills: 1 | Status: SHIPPED | OUTPATIENT
Start: 2025-05-30

## 2025-05-30 NOTE — TELEPHONE ENCOUNTER
Spoke to patient's son, power of  and he confirms Todd. He was given Trelegy in the hospital    Dr. Gan - patient's son confirms Todd

## 2025-06-05 ENCOUNTER — LAB ENCOUNTER (OUTPATIENT)
Dept: LAB | Facility: HOSPITAL | Age: OVER 89
End: 2025-06-05
Attending: FAMILY MEDICINE
Payer: MEDICARE

## 2025-06-05 ENCOUNTER — TELEPHONE (OUTPATIENT)
Dept: FAMILY MEDICINE CLINIC | Facility: CLINIC | Age: OVER 89
End: 2025-06-05

## 2025-06-05 DIAGNOSIS — D50.9 IRON DEFICIENCY ANEMIA, UNSPECIFIED IRON DEFICIENCY ANEMIA TYPE: ICD-10-CM

## 2025-06-05 DIAGNOSIS — E53.8 VITAMIN B12 DEFICIENCY: ICD-10-CM

## 2025-06-05 DIAGNOSIS — Z00.00 LABORATORY EXAM ORDERED AS PART OF ROUTINE GENERAL MEDICAL EXAMINATION: ICD-10-CM

## 2025-06-05 LAB
ALBUMIN SERPL-MCNC: 3.5 G/DL (ref 3.2–4.8)
ALBUMIN/GLOB SERPL: 1.6 {RATIO} (ref 1–2)
ALP LIVER SERPL-CCNC: 103 U/L (ref 45–117)
ALT SERPL-CCNC: 11 U/L (ref 10–49)
ANION GAP SERPL CALC-SCNC: 6 MMOL/L (ref 0–18)
AST SERPL-CCNC: 20 U/L (ref ?–34)
BASOPHILS # BLD AUTO: 0.01 X10(3) UL (ref 0–0.2)
BASOPHILS NFR BLD AUTO: 0.2 %
BILIRUB SERPL-MCNC: 0.4 MG/DL (ref 0.2–0.9)
BUN BLD-MCNC: 23 MG/DL (ref 9–23)
CALCIUM BLD-MCNC: 9 MG/DL (ref 8.7–10.6)
CHLORIDE SERPL-SCNC: 108 MMOL/L (ref 98–112)
CHOLEST SERPL-MCNC: 84 MG/DL (ref ?–200)
CO2 SERPL-SCNC: 24 MMOL/L (ref 21–32)
CREAT BLD-MCNC: 1.37 MG/DL (ref 0.7–1.3)
DEPRECATED HBV CORE AB SER IA-ACNC: 529 NG/ML (ref 50–336)
EGFRCR SERPLBLD CKD-EPI 2021: 49 ML/MIN/1.73M2 (ref 60–?)
EOSINOPHIL # BLD AUTO: 0.16 X10(3) UL (ref 0–0.7)
EOSINOPHIL NFR BLD AUTO: 2.4 %
ERYTHROCYTE [DISTWIDTH] IN BLOOD BY AUTOMATED COUNT: 12.9 %
FASTING PATIENT LIPID ANSWER: YES
FASTING STATUS PATIENT QL REPORTED: YES
GLOBULIN PLAS-MCNC: 2.2 G/DL (ref 2–3.5)
GLUCOSE BLD-MCNC: 93 MG/DL (ref 70–99)
HCT VFR BLD AUTO: 31.3 % (ref 39–53)
HDLC SERPL-MCNC: 26 MG/DL (ref 40–59)
HGB BLD-MCNC: 11 G/DL (ref 13–17.5)
IMM GRANULOCYTES # BLD AUTO: 0.02 X10(3) UL (ref 0–1)
IMM GRANULOCYTES NFR BLD: 0.3 %
IRON SATN MFR SERPL: 27 % (ref 20–50)
IRON SERPL-MCNC: 58 UG/DL (ref 65–175)
LDLC SERPL CALC-MCNC: 39 MG/DL (ref ?–100)
LYMPHOCYTES # BLD AUTO: 1.88 X10(3) UL (ref 1–4)
LYMPHOCYTES NFR BLD AUTO: 28.5 %
MCH RBC QN AUTO: 31.3 PG (ref 26–34)
MCHC RBC AUTO-ENTMCNC: 35.1 G/DL (ref 31–37)
MCV RBC AUTO: 89.2 FL (ref 80–100)
MONOCYTES # BLD AUTO: 0.49 X10(3) UL (ref 0.1–1)
MONOCYTES NFR BLD AUTO: 7.4 %
NEUTROPHILS # BLD AUTO: 4.03 X10 (3) UL (ref 1.5–7.7)
NEUTROPHILS # BLD AUTO: 4.03 X10(3) UL (ref 1.5–7.7)
NEUTROPHILS NFR BLD AUTO: 61.2 %
NONHDLC SERPL-MCNC: 58 MG/DL (ref ?–130)
OSMOLALITY SERPL CALC.SUM OF ELEC: 289 MOSM/KG (ref 275–295)
PLATELET # BLD AUTO: 201 10(3)UL (ref 150–450)
POTASSIUM SERPL-SCNC: 4.5 MMOL/L (ref 3.5–5.1)
PROT SERPL-MCNC: 5.7 G/DL (ref 5.7–8.2)
RBC # BLD AUTO: 3.51 X10(6)UL (ref 3.8–5.8)
SODIUM SERPL-SCNC: 138 MMOL/L (ref 136–145)
T4 FREE SERPL-MCNC: 1.3 NG/DL (ref 0.8–1.7)
TOTAL IRON BINDING CAPACITY: 218 UG/DL (ref 250–425)
TRANSFERRIN SERPL-MCNC: 159 MG/DL (ref 215–365)
TRIGL SERPL-MCNC: 98 MG/DL (ref 30–149)
TSI SER-ACNC: 5.89 UIU/ML (ref 0.55–4.78)
VIT B12 SERPL-MCNC: >2000 PG/ML (ref 211–911)
VLDLC SERPL CALC-MCNC: 14 MG/DL (ref 0–30)
WBC # BLD AUTO: 6.6 X10(3) UL (ref 4–11)

## 2025-06-05 PROCEDURE — 85025 COMPLETE CBC W/AUTO DIFF WBC: CPT

## 2025-06-05 PROCEDURE — 83550 IRON BINDING TEST: CPT

## 2025-06-05 PROCEDURE — 84443 ASSAY THYROID STIM HORMONE: CPT

## 2025-06-05 PROCEDURE — 82607 VITAMIN B-12: CPT

## 2025-06-05 PROCEDURE — 36415 COLL VENOUS BLD VENIPUNCTURE: CPT

## 2025-06-05 PROCEDURE — 84439 ASSAY OF FREE THYROXINE: CPT

## 2025-06-05 PROCEDURE — 82728 ASSAY OF FERRITIN: CPT

## 2025-06-05 PROCEDURE — 80053 COMPREHEN METABOLIC PANEL: CPT

## 2025-06-05 PROCEDURE — 80061 LIPID PANEL: CPT

## 2025-06-05 PROCEDURE — 83540 ASSAY OF IRON: CPT

## 2025-06-05 NOTE — TELEPHONE ENCOUNTER
Patient's POA called (Jose) ok per REN. They are at lab now and are unable to get orders done as labs are dated 7/15/25. Per Dr Gan, ok to change lab orders dated in July for today. Orders updated. Advised Jose that there are other labs due in Jan, those were not updated, only ones that were due next month. He confirms understanding.

## 2025-06-06 ENCOUNTER — PATIENT MESSAGE (OUTPATIENT)
Dept: FAMILY MEDICINE CLINIC | Facility: CLINIC | Age: OVER 89
End: 2025-06-06

## 2025-06-06 DIAGNOSIS — E03.9 ACQUIRED HYPOTHYROIDISM: ICD-10-CM

## 2025-06-09 NOTE — TELEPHONE ENCOUNTER
Yes lasix would be okay. I see that cardiology prescribed this to him today. Can start and see how he does with this. Recheck labs as discussed with cardiologist.     Reviewed labs, overall appear stable.   Hypothyroidism - TSH slightly elevated, will adjust up to levothyroxine 75mcg daily. Recheck TSH in 8wks

## 2025-06-10 RX ORDER — LEVOTHYROXINE SODIUM 75 UG/1
75 TABLET ORAL
Qty: 90 TABLET | Refills: 0 | Status: SHIPPED | OUTPATIENT
Start: 2025-06-10

## 2025-06-11 NOTE — TELEPHONE ENCOUNTER
Called and spoke with pt's andrew FAULKNER (Ok per HIPAA), f/u on mychart message- informed recommendations as per PCP. No further questions or concerns. POA verbalized understanding and agreed with POC.

## 2025-06-26 LAB
AMB EXT CALCIUM: 8.1
AMB EXT CARBON DIOXIDE: 26
AMB EXT CHLORIDE: 107
AMB EXT CREATININE: 1.5 MG/DL
AMB EXT EGFR-AA: 53.1
AMB EXT GLUCOSE: 106 MG/DL
AMB EXT POSTASSIUM: 4.8 MMOL/L
AMB EXT SODIUM: 136 MMOL/L

## 2025-06-30 RX ORDER — AZITHROMYCIN 250 MG/1
TABLET, FILM COATED ORAL
Qty: 12 TABLET | Refills: 1 | OUTPATIENT
Start: 2025-06-30

## 2025-07-06 ENCOUNTER — ANESTHESIA EVENT (OUTPATIENT)
Dept: SURGERY | Facility: HOSPITAL | Age: OVER 89
End: 2025-07-06
Payer: MEDICARE

## 2025-07-06 ENCOUNTER — ANESTHESIA (OUTPATIENT)
Dept: SURGERY | Facility: HOSPITAL | Age: OVER 89
End: 2025-07-06
Payer: MEDICARE

## 2025-07-06 PROBLEM — N17.9 ACUTE KIDNEY INJURY: Status: ACTIVE | Noted: 2025-01-01

## 2025-07-06 PROBLEM — Q43.3: Status: ACTIVE | Noted: 2025-07-06

## 2025-07-06 PROBLEM — Q43.3: Status: ACTIVE | Noted: 2025-01-01

## 2025-07-06 PROBLEM — N17.9 ACUTE KIDNEY INJURY: Status: ACTIVE | Noted: 2025-07-06

## 2025-07-06 PROBLEM — E87.5 HYPERKALEMIA: Status: ACTIVE | Noted: 2025-01-01

## 2025-07-06 PROBLEM — E87.5 HYPERKALEMIA: Status: ACTIVE | Noted: 2025-07-06

## 2025-07-06 RX ADMIN — EPHEDRINE SULFATE 10 MG: 50 INJECTION INTRAVENOUS at 23:59:00

## 2025-07-06 RX ADMIN — ROCURONIUM BROMIDE 30 MG: 10 INJECTION, SOLUTION INTRAVENOUS at 23:55:00

## 2025-07-06 RX ADMIN — SODIUM CHLORIDE, SODIUM LACTATE, POTASSIUM CHLORIDE, CALCIUM CHLORIDE: 600; 310; 30; 20 INJECTION, SOLUTION INTRAVENOUS at 23:43:00

## 2025-07-06 RX ADMIN — ETOMIDATE 10 MG: 2 INJECTION INTRAVENOUS at 23:49:00

## 2025-07-07 PROBLEM — J96.01 ACUTE RESPIRATORY FAILURE WITH HYPOXIA (HCC): Status: ACTIVE | Noted: 2025-01-01

## 2025-07-07 PROBLEM — J96.01 ACUTE RESPIRATORY FAILURE WITH HYPOXIA (HCC): Status: ACTIVE | Noted: 2025-07-07

## 2025-07-07 RX ORDER — SODIUM CHLORIDE, SODIUM LACTATE, POTASSIUM CHLORIDE, CALCIUM CHLORIDE 600; 310; 30; 20 MG/100ML; MG/100ML; MG/100ML; MG/100ML
INJECTION, SOLUTION INTRAVENOUS CONTINUOUS PRN
Status: DISCONTINUED | OUTPATIENT
Start: 2025-07-06 | End: 2025-07-07 | Stop reason: SURG

## 2025-07-07 RX ORDER — ROCURONIUM BROMIDE 10 MG/ML
INJECTION, SOLUTION INTRAVENOUS AS NEEDED
Status: DISCONTINUED | OUTPATIENT
Start: 2025-07-06 | End: 2025-07-07 | Stop reason: SURG

## 2025-07-07 RX ORDER — DEXAMETHASONE SODIUM PHOSPHATE 4 MG/ML
VIAL (ML) INJECTION AS NEEDED
Status: DISCONTINUED | OUTPATIENT
Start: 2025-07-07 | End: 2025-07-07 | Stop reason: SURG

## 2025-07-07 RX ORDER — EPHEDRINE SULFATE 50 MG/ML
INJECTION INTRAVENOUS AS NEEDED
Status: DISCONTINUED | OUTPATIENT
Start: 2025-07-06 | End: 2025-07-07 | Stop reason: SURG

## 2025-07-07 RX ORDER — ETOMIDATE 2 MG/ML
INJECTION INTRAVENOUS AS NEEDED
Status: DISCONTINUED | OUTPATIENT
Start: 2025-07-06 | End: 2025-07-07 | Stop reason: SURG

## 2025-07-07 RX ORDER — METRONIDAZOLE 500 MG/100ML
INJECTION, SOLUTION INTRAVENOUS AS NEEDED
Status: DISCONTINUED | OUTPATIENT
Start: 2025-07-07 | End: 2025-07-07 | Stop reason: SURG

## 2025-07-07 RX ORDER — CEFAZOLIN SODIUM 1 G/3ML
INJECTION, POWDER, FOR SOLUTION INTRAMUSCULAR; INTRAVENOUS AS NEEDED
Status: DISCONTINUED | OUTPATIENT
Start: 2025-07-07 | End: 2025-07-07 | Stop reason: SURG

## 2025-07-07 RX ORDER — ONDANSETRON 2 MG/ML
INJECTION INTRAMUSCULAR; INTRAVENOUS AS NEEDED
Status: DISCONTINUED | OUTPATIENT
Start: 2025-07-07 | End: 2025-07-07 | Stop reason: SURG

## 2025-07-07 RX ORDER — PHENYLEPHRINE HCL 10 MG/ML
VIAL (ML) INJECTION AS NEEDED
Status: DISCONTINUED | OUTPATIENT
Start: 2025-07-07 | End: 2025-07-07 | Stop reason: SURG

## 2025-07-07 RX ADMIN — ONDANSETRON 4 MG: 2 INJECTION INTRAMUSCULAR; INTRAVENOUS at 01:12:00

## 2025-07-07 RX ADMIN — ROCURONIUM BROMIDE 10 MG: 10 INJECTION, SOLUTION INTRAVENOUS at 02:16:00

## 2025-07-07 RX ADMIN — METRONIDAZOLE 500 MG: 500 INJECTION, SOLUTION INTRAVENOUS at 00:22:00

## 2025-07-07 RX ADMIN — SODIUM CHLORIDE, SODIUM LACTATE, POTASSIUM CHLORIDE, CALCIUM CHLORIDE: 600; 310; 30; 20 INJECTION, SOLUTION INTRAVENOUS at 01:48:00

## 2025-07-07 RX ADMIN — PHENYLEPHRINE HCL 100 MCG: 10 MG/ML VIAL (ML) INJECTION at 00:46:00

## 2025-07-07 RX ADMIN — PHENYLEPHRINE HCL 50 MCG: 10 MG/ML VIAL (ML) INJECTION at 01:14:00

## 2025-07-07 RX ADMIN — DEXAMETHASONE SODIUM PHOSPHATE 4 MG: 4 MG/ML VIAL (ML) INJECTION at 00:47:00

## 2025-07-07 RX ADMIN — PHENYLEPHRINE HCL 50 MCG: 10 MG/ML VIAL (ML) INJECTION at 01:01:00

## 2025-07-07 RX ADMIN — CEFAZOLIN SODIUM 2 G: 1 INJECTION, POWDER, FOR SOLUTION INTRAMUSCULAR; INTRAVENOUS at 00:20:00

## 2025-07-07 RX ADMIN — EPHEDRINE SULFATE 10 MG: 50 INJECTION INTRAVENOUS at 00:05:00

## 2025-07-07 RX ADMIN — EPHEDRINE SULFATE 10 MG: 50 INJECTION INTRAVENOUS at 01:18:00

## 2025-07-07 RX ADMIN — EPHEDRINE SULFATE 10 MG: 50 INJECTION INTRAVENOUS at 00:10:00

## 2025-07-07 NOTE — ANESTHESIA PROCEDURE NOTES
Airway  Date/Time: 7/6/2025 11:51 PM  Reason: emergent    Airway not difficult    General Information and Staff   Patient location during procedure: OR  Anesthesiologist: Madonna Jennings MD  Performed: anesthesiologist   Performed by: Madonna Jennings MD  Authorized by: Madonna Jennings MD        Consent for Airway (if performed for an anesthetic, see related documentation for consents)   Patient identity confirmed: verbally with patient  Consent: Verbal consent obtained. Written consent obtained  Risks and benefits: risks, benefits and alternatives were discussed  Consent given by: patient    Indications and Patient Condition  Indications for airway management: anesthesia  Sedation level: deep      Preoxygenated: yesPatient position: sniffing    Mask difficulty assessment: 0 - not attempted  No planned trial extubation    Final Airway Details    Final airway type: endotracheal airway    Successful airway: ETT  Cuffed: yes   Successful intubation technique: direct laryngoscopy  Facilitating devices/methods: cricoid pressure  Endotracheal tube insertion site: oral  Blade: Tamiko  Blade size: #3  ETT size (mm): 7.5    Cormack-Lehane Classification: grade I - full view of glottis  Placement verified by: capnometry   Cuff volume (mL): 8  Measured from: lips  ETT to lips (cm): 20  Number of attempts at approach: 1  Number of other approaches attempted: 0    Additional Comments  Noted that NGT placed preoperatively was placed into trachea. NGT removed at time of intubation.     +smooth rapid sequence intubation, no trauma. +ETCO2, BBS

## 2025-07-07 NOTE — PROGRESS NOTES
[facility]   part of [facility]     Hospitalist Progress Note     Syed Cooney Patient Status:  Inpatient    1934 MRN [id number]   Location [facility] 4SW-A Attending Xu Stark MD   Hosp Day # 0 PCP Jeffrey Gan MD     Chief Complaint: Abdominal pain and distention    Subjective:     Patient awake and following commands. Has mild abdominal pain. Understands plan for possible extubation later today.    Objective:    Review of Systems:   A comprehensive review of systems was completed; pertinent positive and negatives stated in subjective.    Vital signs:  Temp:  [96.5 °F (35.8 °C)-97.7 °F (36.5 °C)] 97.4 °F (36.3 °C)  Pulse:  [] 73  Resp:  [14-33] 16  BP: (103-187)/() 152/55  SpO2:  [94 %-100 %] 100 %  AO: (106-188)/(39-77) 110/41  FiO2 (%):  [40 %-50 %] 40 %    Physical Exam:    General: No acute distress, intubated but awake and following commands, NG tube in place  Respiratory: No wheezes, no rhonchi, ET tube in place  Cardiovascular: S1, S2, regular rate and rhythm  Abdomen: Soft, Non-tender, non-distended, positive bowel sounds  Extremities: LLE chronically more swollen than RLE    Diagnostic Data:    Labs:  Recent Labs   Lab 25   WBC 12.2* 11.8*   HGB 12.3* 9.5*   MCV 91.1 90.1   .0 188.0     Recent Labs   Lab 25   * 192*   BUN 27* 31*   CREATSERUM 2.13* 1.89*   CA 9.4 8.1*   ALB 4.0 2.8*   * 137   K 5.2* 3.8    104   CO2 24.0 23.0   ALKPHO 131* 94   AST 27 17   ALT 14 9*   BILT 0.4 0.4   TP 6.8 4.9*     Estimated Glomerular Filtration Rate: 33 mL/min/1.73m2 (A) (result from lab).    No results for input(s): \"TROP\", \"TROPHS\", \"CK\" in the last 168 hours.    No results for input(s): \"PTP\", \"INR\" in the last 168 hours.     Microbiology  No results found for this visit on 25.    Imaging: Reviewed in Epic.    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  -Surgery following   -NPO  -NG tube in place  -Empiric zosyn  -Pain control, PRN anti-emetics   -IV zosyn   -Supportive care     #Post operative mechanical ventilation  -Vent management per crit care   -Hopefully extubate later today     #ANTHONY on CKD 3  -Monitor      #Normocytic anemia   #Component of post-op blood loss, expected  -Trend CBC      #COPD   -Resume home inhalers after extubation  -Resume Azithromycin MWF    #Chronic HFpEF  #Moderate mitral and aortic regurgitation   -Currently on IV lasix    #Hypertension  -Resume coreg once BP tolerates  -Hold losartan given ANTHONY    #Crohn's disease  -Hold mesalamine    #GERD  -Currently on IV PPI    #BPH  -Flomax when able    #Hypothyroidism  -Levothyroxine when able    #HLD  #Prior VTE previously on DOAC   #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 Cooney is expected to be discharge to: TBD pending PT/OT eval    The 21st Century Cures Act makes medical notes like these available to patients in the interest of transparency. Please be advised this is a medical document. Medical documents are intended to carry relevant information, facts as evident, and the clinical opinion of the practitioner. The medical note is intended as peer to peer communication and may appear blunt or direct. It is written in medical language and may contain abbreviations or verbiage that are unfamiliar.             [1]    senna  10 mL Per NG Tube BID    docusate  100 mg Per NG Tube BID    chlorhexidine gluconate  15 mL Mouth/Throat BID@0800,2000    mineral oil-white petrolatum  1  Application Both Eyes Nightly    pantoprazole  40 mg Intravenous QAM AC    piperacillin-tazobactam  3.375 g Intravenous Q12H    furosemide  40 mg Intravenous BID (Diuretic)    ipratropium-albuterol  3 mL Nebulization QID

## 2025-07-07 NOTE — OPERATIVE REPORT
[facility]   part of [facility]    Operative Note         [facility] Location: OR   [facility] [id number] MRN [id number]   Admission Date 7/6/2025 Operation Date 7/6/2025   Attending Physician Xu Stark MD       Patient Name: [facility]     Preoperative Diagnosis: Volvulus (HCC) [K56.2]     Postoperative Diagnosis: Recurrent ventral incisional hernia, midgut volvulus, chylous ascites    Procedure(s):  EXPLORATORY LAPAROTOMY, DETORSION OF     Primary Surgeon: Xu Stark MD     Surgical Assistant.: Juan Matamoros     Anesthesia: General     Specimen:   ID Type Source Tests Collected by Time Destination   1 : PARTIAL MESH EXPLANT Tissue Abdomen SURGICAL PATHOLOGY TISSUE Xu Stark MD 7/7/2025  1:20 AM         Estimated Blood Loss: No data recorded     Complications: none      Indications for procedure: Acute onset of abdominal distension and discomfort, imaging evidence of midgut volvulus    Surgical Findings: 720 degree clockwise rotation of the midgut around the mesenteric root,   jejunal diverticulae, recurrent ventral incisional hernia with mesh dehiscence  from native fascia superiorly and on right, defect size: 4x5cm M2-M3 zone defect within surgical field    Operative Summary:      Patient was taken to the operating room placed table.  General anesthesia was induced.  The abdomen was prepped and draped in standard fashion.    Initial abdominal entry was planned and in the midline along the patient's prior scar.  On the border of the right rectus, there is an area of dehiscence of the mesh superiorly, care was taken to start inferior to this so that dissection continued directly down to the mesh itself.  A plane was continued superficial to the mesh, in either direction, the mesh was grasped with clamps elevated and incised, a pseudo capsule had been formed as patient's mesh appeared to be a bilayer PTFE coated mesh.      Upon entry into the abdomen, chylous ascites were  identified and drained spontaneously, a pool suction tip was introduced in order to evacuate this fluid.  Several pockets were identified throughout the course of dissection that were serially drained.  The total volume of chylous ascites evacuated was 1.5 L.    This was able to be swept free from the viscera, at its edges, where Ethibond suture had been used, there was adhesions that were sharply transected using Metzenbaum scissors as well as electrocautery.  Once the borders of the mesh were free, there remained some adhesions to the anterior abdominal wall laterally the, this was primarily omental in nature, these were sharply lysed using electrocautery.  2 rents in the omentum were identified, which could serve as potential internal hernia spaces, these were fenestrated using LigaSure device.    Once the bowel was fully free, it was eviscerated, and noted to be torsed on its mesentery axis.  The tip of the appendix was noted to be emanating from the central portion, and could not be traced back to the cecum which was distorted within the spiraled mesentery.  Attempts at manual detorsion initially did not yield much success, until the entirety of the small bowel was wrapped in a sterile towel and then torsed en bloc, following to counterclockwise full rotations, the viscera appeared in normal anatomic position.  The bowel was then run in its entirety in a retrograde fashion, to ensure there is no other sites of pathology.  2 prior small bowel anastomosis were encountered, jejunal diverticuli were identified, none of these were inflamed or had evidence of perforation.  Along the root of the mesentery there were isolated areas of ecchymosis, and petechiae.    The bowel was reintroduced in the abdominal cavity taking care to accordion into place in the inferior abdomen starting in a retrograde tantrum.  Replacement in the visceral cavity.  Remaining omentum was placed overlying this.  There was a narrow strip of  mesh adherent to the right rectus.  The left-sided remaining mesh and the right rectus were placed on traction using Kocher clamps, these reapproximated, therefore in lieu of closing mesh to mesh, left-sided mesh was placed into close to right sided native abdominal wall.  The permanent strip of mesh on the patient's right rectus was sharply excised using electrocautery and sent off the field the specimen.  A 0 Prolene suture was then used in a running fashion in order to reapproximate the superior free edge of the mesh to fascia and also to reapproximate the lateral free edge of mesh to the medial border of the right rectus, while taking care to gardenia the cut edge of the mesh to avoid intra-abdominal adhesion formation.    The skin was closed with skin staples, followed by placement of an Aquacel dressing.    At the completion of the case, the patient was transferred to the ICU.    I personally spoke to the patient's family in the surgical waiting area and updated them on intraoperative findings and plans.      Implants: * No implants in log *     Drains: None     Condition: Guarded       Xu Stark MD

## 2025-07-07 NOTE — CONSULTS
ICU  Critical Care H&P    NAME: Syed Cooney - ROOM:  PRE HOLD/EH PRE HOLD * - MRN: [id number] - Age: 89 year old - :1934    History Of Present Illness:  Syed Cooney is a 89 year old male with PMHx significant for HFpEF, COPD, hypertension, emphysema, dyslipidemia, Crohn's disease, recurrent ventral incisional hernia s/p mesh placement, partial colectomy/partial small bowel resection admitted to the ICU on  s/p exploratory laparotomy with detorsion. The patient presented to the ED earlier this evening with worsening abdominal distention, dyspnea, and hypotension. Of note, the patient dose of a history of multiple abdominal surgeries/problems including recurrent ventral hernia s/p failed mesh placement x2 as well partial small bowel resection and colectomy in the past. Prior to arrival, the patient was having worsening abdominal distention and his son was taking his blood pressure at home which was reading with SBP in the 80s. These symptoms developed approximately four hours prior to ED arrival. No fevers or chills. No nausea or vomiting however, can not tolerate PO intake due to his abdominal distention. Initially in the ED, he was nontachycardic normotensive however, tachypnic on room air. Lab workup revealed a leukocytosis and an ANTHONY. Due to his abdominal distention and pain, CT abd pelvis was performed which revealed concern for a midgut volvulus as well as mesenteric edema/ascites. General surgery was consulted which saw the patient in the ED. A long discussion was had with the family and patient and decision was made to take the patient to the operating room for an emergent exploratory laparotomy. Report received from surgeon, no intra-op complications successful detorsion of the volvulus    PMH:  Past Medical History[1]    Social Hx:  Short Social Hx on File[2]    Family Hx:  Family History[3]      Review of Systems:   A comprehensive 10 point review of systems was  completed.  Pertinent positives and negatives noted in the HPI.    Current Hospital Medications[4]    OBJECTIVE  Vitals:  /70   Pulse 110   Temp 96.5 °F (35.8 °C) (Temporal)   Resp 19   SpO2 97%                Physical Exam:    General Appearance: NAD, intubated/sedated  Neck: trachea midline, atraumatic  Lungs: clear throughout  Heart: regular rate, rhythm   Abdomen: midline surgical incision c/d/i   Extremities: Extremities normal, atraumatic  Pulses: 2+ and symmetric all extremities  Skin: Skin color, texture, turgor normal for ethnicity, no rashes or lesions, warm and dry      Data this admission:  CT ABDOMEN+PELVIS(CPT=74176)  Result Date: 7/6/2025  CONCLUSION: There is swirling of the central mesenteric root concerning for a midgut volvulus. The stomach is distended with fluid and debris. There is swirling of central loops of bowel around the central mesenteric root. There is severe mesenteric edema throughout  the abdomen and pelvis. There is a mild to moderate amount ascites throughout the abdomen/pelvis. Clinical correlation with serum lactate and physical exam is recommended to exclude bowel ischemia. Surgical consultation is recommended. Extensive postoperative changes at the anterior abdominal wall. There is a ventral hernia at the superior abdominal wall containing a portion of the transverse colon. Findings discussed with Dr. Bustillo at 11:10 p.m. on 7/6/2025. Electronically Verified and Signed by Attending Radiologist: LeRoy Stromberg MD 7/6/2025 11:18 PM Workstation: [id number]      Labs:  Glucose 177  Potassium 5.2  Serum co2 24  Cr 2.13  WBC 12.2  Hgb 12.3  Plts 259    Assessment/Plan:  Midgut volvulus with severe mesenteric edema s/p exploratory laparotomy with detorsion  Leukocytosis  Post-operative mechanical ventilation  Acute kidney injury  COPD  CKD  Moderate mitral and aortic regurgitation  Hx of HFpEF  Chron's disease  Recurrent ventral hernia s/p failed mesh x2  Hx of  diverticulitis s/p colon/small bowel resection  Hypothyroidism  GERD  BPH  Hx of VTE, DOAC d/c'd in 2023    Neuro: back to the ICU intubated will continue propofol/fentanyl for now, will SAT/SBT later this morning.     Pulm: post operative mechanical ventilation continue volume control for now wean fio2 for goal spo2 90% and above will most likely SAT/SBT later this morning. Will resume PTA inhalers    CV: stable hemodynamics post op here in the ICU, will hold off on home antihypertensives for now will treat hypertension with parenteral anti-hypertensives if needed. Does have a hx of HFpEF most recent echocardiogram with normal LV/RV function, no diastolic dysfunction    GI: abdominal binder in place, no drains. Serial abdominal exams, NGT to LIS remain NPO for now general surgery following    /Renal: checking stat labs did have an ANTHONY in ED, minimal urine output in the OR will keep LR at 100 for now strict I/o's while here in the ICU    Endo: resume PTA levothyroxine when taking PO    Heme/Onc: minimal EBL in OR hgb 12.3 in the ED checking stat labs    ID: did receive intra-op surgical prophylaxis cefazolin will hold off on additional antibiotics for now trend fever curve wbc count    MSK/other: no other active ICU issues at this time    Analgesia/sedation: propofol/fentanyl  DVT: scds  GI:PPI  Lines:PIV, arterial line, ETT, finch, NGT  Diet: NPO  Glucose:accuchecks every 6 hours  PT/OT/ST: once appropriate  Code status: FULL  Dispo:ICU      Upon my evaluation, this patient had a high probability of imminent or life-threatening deterioration due to critical findings of imaging, which required my direct attention, intervention, and personal management.    I have personally provided 35 minutes of critical care time, exclusive of time spent on separately billable procedures.  Time includes review of all pertinent laboratory/radiology results, discussion with consultants, and monitoring for potential decompensation.   Performed interventions included fluids, oxygen, and managing mechanical ventilation.      LIAN Willson  Critical Care  DOS: 2025         [1]   Past Medical History:   Abdominal distention    Acute encephalopathy    Anemia    Arthritis    Back problem    Cataract    Chronic lung disease    Congestive heart disease (HCC)    COPD (chronic obstructive pulmonary disease) (HCC)    Crohn disease (HCC)    Crohn's disease of large intestine without complication (HCC)    Diarrhea, unspecified    Disorder of thyroid    Diverticula of small intestine    Diverticulosis of large intestine    Frequent use of laxatives    Hearing impairment    High blood pressure    High cholesterol    History of blood transfusion    Hyperthyroidism    Leg swelling    Mitral valve disorder    leaky mitral valve    Muscle weakness    Osteoarthrosis, unspecified whether generalized or localized, unspecified site    cervical and lumbar spine    Osteoporosis    Other and unspecified hyperlipidemia    Pulmonary embolism (HCC)    Pulmonary emphysema (HCC)    Renal insufficiency    Shortness of breath    Thyroid disease    Unspecified essential hypertension    Wheezing   [2]   Social History  Socioeconomic History    Marital status:    Tobacco Use    Smoking status: Former     Current packs/day: 0.00     Average packs/day: 2.0 packs/day for 35.0 years (70.0 ttl pk-yrs)     Types: Cigarettes     Quit date: 1987     Years since quittin.5    Smokeless tobacco: Former    Tobacco comments:     30 years   Vaping Use    Vaping status: Never Used   Substance and Sexual Activity    Alcohol use: Not Currently    Drug use: No     Social Drivers of Health     Food Insecurity: Patient Declined (2025)    NCSS - Food Insecurity     Worried About Running Out of Food in the Last Year: Patient declined     Ran Out of Food in the Last Year: Patient declined   Transportation Needs: No Transportation Needs (2025)    NCSS - Transportation      Lack of Transportation: No   Housing Stability: Not At Risk (5/5/2025)    NCSS - Housing/Utilities     Has Housing: Yes     Worried About Losing Housing: No     Unable to Get Utilities: No   [3]   Family History  Problem Relation Age of Onset    Cancer Brother    [4]   Current Facility-Administered Medications   Medication Dose Route Frequency    albuterol (Ventolin) (5 MG/ML) 0.5% nebulizer solution 10 mg  10 mg Nebulization Continuous    ipratropium (Atrovent) 0.02 % nebulizer solution 1 mg  1 mg Nebulization Continuous

## 2025-07-07 NOTE — ANESTHESIA PREPROCEDURE EVALUATION
PRE-OP EVALUATION    Patient Name: Syed Cooney    Admit Diagnosis: No admission diagnoses are documented for this encounter.    Pre-op Diagnosis: Volvulus (HCC) [K56.2]    EXPLORATORY LAPAROTOMY POSSIBLE BOWEL RESECTION    Anesthesia Procedure: EXPLORATORY LAPAROTOMY POSSIBLE BOWEL RESECTION    Surgeons and Role:     * Xu Stark MD - Primary    Pre-op vitals reviewed.  Temp: 96.5 °F (35.8 °C)  Pulse: 115  Resp: 28  BP: 167/107  SpO2: 100 %  There is no height or weight on file to calculate BMI.    Current medications reviewed.  Hospital Medications:  Current Medications[1]    Outpatient Medications:   Prescriptions Prior to Admission[2]    Allergies: Patient has no known allergies.      Anesthesia Evaluation    Patient summary reviewed.    Anesthetic Complications  (-) history of anesthetic complications         GI/Hepatic/Renal             (+) chronic renal disease and CRI          (+) Crohn's disease         Cardiovascular                  (+) hypertension             (+) valvular problems/murmurs and AI and MR       (+) CHF                Endo/Other    Negative endo/other ROS.                              Pulmonary      (+) asthma  (+) COPD       (+) shortness of breath            Neuro/Psych                 (+) neuromuscular disease             Hx PE-off anticoagulation  Moderate AR  Moderate MR  Hx subdural hygroma/encephalopathy        Past Surgical History[3]  Social Hx on file[4]  History   Drug Use No     Available pre-op labs reviewed.  Lab Results   Component Value Date    WBC 12.2 (H) 07/06/2025    RBC 3.93 07/06/2025    HGB 12.3 (L) 07/06/2025    HCT 35.8 (L) 07/06/2025    MCV 91.1 07/06/2025    MCH 31.3 07/06/2025    MCHC 34.4 07/06/2025    RDW 13.2 07/06/2025    .0 07/06/2025     Lab Results   Component Value Date     (L) 07/06/2025    K 5.2 (H) 07/06/2025     07/06/2025    CO2 24.0 07/06/2025    BUN 27 (H) 07/06/2025    CREATSERUM 2.13 (H) 07/06/2025     (H)  07/06/2025    CA 9.4 07/06/2025            Airway      Mallampati: II  Mouth opening: >3 FB  TM distance: 4 - 6 cm  Neck ROM: full Cardiovascular      Rhythm: regular  Rate: normal     Dental             Pulmonary    Pulmonary exam normal.                 Other findings  Dentures-upper and lower            ASA: 4 and emergent  Plan: general  NPO status verified and           Plan/risks discussed with: patient, child/children and Other  Use of blood product(s) discussed with: patient, child/children and Other    Consented to blood products.          Present on Admission:  **None**             [1]    [COMPLETED] sodium chloride 0.9 % IV bolus 1,000 mL  1,000 mL Intravenous Once    albuterol (Ventolin) (5 MG/ML) 0.5% nebulizer solution 10 mg  10 mg Nebulization Continuous    ipratropium (Atrovent) 0.02 % nebulizer solution 1 mg  1 mg Nebulization Continuous    [COMPLETED] lidocaine (Urojet) 2 % urethral jelly 10 mL  10 mL Urethral Once    [COMPLETED] benzocaine (Hurricaine/Topex) 20 % mouth spray 1 spray  1 spray Mouth/Throat Once   [2] (Not in a hospital admission)  [3]   Past Surgical History:  Procedure Laterality Date    Angiogram      2009    Cataract      Colonoscopy  06/30/2014    Procedure: COLONOSCOPY;  Surgeon: Jaz Carrasquillo DO;  Location:  ENDOSCOPY    Egd N/A 01/04/2017    Procedure: ESOPHAGOGASTRODUODENOSCOPY (EGD);  Surgeon: Gustabo Johnson MD;  Location:  MAIN OR    Egd N/A 01/18/2017    Procedure: ESOPHAGOGASTRODUODENOSCOPY (EGD);  Surgeon: Jaz Carrasquillo DO;  Location:  ENDOSCOPY    Hernia surgery      Knee replacement surgery      Other surgical history      6 yeara ago colonscopy with polpys    Sigmoidoscopy,diagnostic      Total knee replacement     [4]   Social History  Socioeconomic History    Marital status:    Tobacco Use    Smoking status: Former     Current packs/day: 0.00     Average packs/day: 2.0 packs/day for 35.0 years (70.0 ttl pk-yrs)     Types: Cigarettes      Quit date: 1987     Years since quittin.5    Smokeless tobacco: Former    Tobacco comments:     30 years   Vaping Use    Vaping status: Never Used   Substance and Sexual Activity    Alcohol use: Not Currently    Drug use: No

## 2025-07-07 NOTE — ANESTHESIA POSTPROCEDURE EVALUATION
[facility] Patient Status:  Inpatient   Age/Gender 89 year old male MRN [id number]   Location [facility] 4SW-A Attending Xu Stark MD   Hosp Day # 0 PCP Jeffrey Gan MD       Anesthesia Post-op Note    EXPLORATORY LAPAROTOMY POSSIBLE BOWEL RESECTION    Procedure Summary       Date: 07/06/25 Room / Location:  MAIN OR 09 / [facility] MAIN OR    Anesthesia Start: 2343 Anesthesia Stop: 07/07/25 0241    Procedure: EXPLORATORY LAPAROTOMY POSSIBLE BOWEL RESECTION Diagnosis:       Volvulus (HCC)      (Volvulus (HCC) [K56.2])    Surgeons: Xu Stark MD Anesthesiologist: Madonna Jennings MD    Anesthesia Type: general ASA Status: 4 - Emergent            Anesthesia Type: general    Vitals Value Taken Time   /77 07/07/25 03:00   Temp 97.7 07/07/25 03:39   Pulse 71 07/07/25 03:39   Resp 16 07/07/25 03:39   SpO2 100 % 07/07/25 03:39   Vitals shown include unfiled device data.        Patient Location: ICU    Anesthesia Type: general    Airway Patency: patent and intubated    Postop Pain Control: adequate    Mental Status: Other: (Intubated, sedated, mechanical ventilation)    Nausea/Vomiting: none    Cardiopulmonary/Hydration status: stable euvolemic    Complications: no apparent anesthesia related complications    Postop vital signs: stable    Comments: Taken to ICU, intubated, sedated, on propofol gtt. Report given to ICU staff.    Dental Exam: Unchanged from Preop    Sign out given to ICU staff.

## 2025-07-07 NOTE — OCCUPATIONAL THERAPY NOTE
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   Prior to admission, patient's baseline is mod I to min A.    Patient is requiring SBA to mod A as a result of the following impairments: decreased functional strength, decreased functional reach, decreased endurance, pain, impaired   balance, impaired coordination, medical status, limited   ROM, decreased insight to deficits, and decreased safety awareness.   Occupational Therapy will continue to follow for duration of hospitalization.    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 Co-Morbidities : CHF, COPD, PE, emphysema, OA, HTN, crohn's disease, TKA    EVALUATION SESSION  FUNCTIONAL TRANSFER ASSESSMENT  Sit to Stand: Edge of Bed; Chair  Edge of Bed: Minimal Assist  Chair: Minimal Assist    BED MOBILITY  Rolling: Minimal Assist  Supine to Sit : Minimal Assist  Sit to Supine (OT): Minimal Assist    BALANCE ASSESSMENT  Static Sitting: Supervision  Static Standing: Stand-by Assist    FUNCTIONAL ADL ASSESSMENT     ACTIVITY TOLERANCE:   Pulse: 82  Heart Rate Source: Monitor     BP: 154/61  BP Location: Left arm  BP Method: Automatic  Patient Position:  Sitting    O2 SATURATIONS       COGNITION  Overall Cognitive Status:  WFL - within functional limits  Orientation Level:  oriented to place, oriented to time, and oriented to situation  Memory:  impaired working memory  Following Commands:  follows one step commands with increased time and follows one step commands with repetition      Upper extremity  BUE AROM and Strength WFL  except B shoulder flexion: 1/2 active    EDUCATION PROVIDED  Patient Education : Plan of Care; Role of Occupational Therapy; Discharge Recommendations; DME Recommendations; Functional Transfer Techniques; Fall Prevention; Surgical Precautions; Posture/Positioning; Edema Reduction; Proper Body Mechanics; Abdominal Protective Strategies  Patient's Response to Education: Verbalized Understanding; Requires Further Education      Equipment used: RW    Therapist comments: OT educated patient on safety,  sequencing, energy conservation, pain management, home modifications and adaptive equipment to increase independence with ADLs.  Patient is very [place] and requires frequent repetition.  Pt readily engaged in all tasks presented.  Patient required min A Eob to chair w/ shuffled steps and use of walker.    Patient End of Session: Up in chair, Needs met, Call light within reach, RN aware of session/findings, All patient questions and concerns addressed, Alarm set, Discussed recommendations with /    OCCUPATIONAL PROFILE    HOME SITUATION  Type of Home: [facility]  Home Layout: Multi-level  Lives With: Son, Caregiver part-time    Toilet and Equipment: Comfort height toilet  Shower/Tub and Equipment: Walk-in shower          Hand Dominance: Right  Drives: No  Patient Regularly Uses: Cane, Rolling walker    Prior Level of Function: Pt lives with son in 2 story [facility]. Pt has assist with ADL and mobility as needed. Pt has a part time CG daily- hours vary based on when family is there. Pt ambulatory with RW. Pt attends Senior Classes  a few days per week.     SUBJECTIVE   PAIN ASSESSMENT  Rating: Unable to rate  Location: abd  Management Techniques: Nurse notified, Body mechanics, Activity promotion, Repositioning    OBJECTIVE  Precautions: Bed/chair alarm, NG suction, Abdominal protective strategies  Fall Risk: High fall risk    WEIGHT BEARING RESTRICTION       ASSESSMENTS    AM-PAC '6-Clicks' Inpatient Daily Activity Short Form  -   Putting on and taking off regular lower body clothing?: A Lot  -   Bathing (including washing, rinsing, drying)?: A Lot  -   Toileting, which includes using toilet, bedpan or urinal? : A Lot  -   Putting on and taking off regular upper body clothing?: A Little  -   Taking care of personal grooming such as brushing teeth?: A Little  -   Eating meals?: A Little    AM-PAC Score:  Score: 15  Approx Degree of Impairment: 56.46%  Standardized Score (AM-PAC Scale): 34.69    PLAN  OT Device Recommendations: TBD  OT Treatment Plan: Balance activities, Energy conservation/work simplification techniques, ADL training, Functional transfer training, UE strengthening/ROM, Endurance training, Patient/Family education, Patient/Family training, Cognitive reorientation, Equipment eval/education, Compensatory technique education  Rehab Potential : Good  Frequency: 3-5x/week  Number of Visits to Meet Established Goals: 7    ADL Goals  Patient will perform toileting with supervision and AE PRN  Patient will perform LB dressing with supervision and AE PRN    Functional Transfer Goals  Patient will perform bed mobility supine to sit with supervision  Patient will perform bed mobility sit to supine with supervision  Patient will perform toilet transfer with supervision    Patient Evaluation Complexity Level:   Occupational Profile/Medical History MODERATE - Expanded review of history including review of medical or therapy record   Specific performance deficits impacting engagement in ADL/IADL MODERATE  3 - 5 performance deficits   Client  Assessment/Performance Deficits MODERATE - Comorbidities and min to mod modifications of tasks    Clinical Decision Making MODERATE - Analysis of occupational profile, detailed assessments, several treatment options    Overall Complexity MODERATE     OT Session Time: 30 minutes  Self-Care Home Management: 15 minutes

## 2025-07-07 NOTE — H&P
[facility]     Syed Cooney Patient Status:  Emergency    1934 MRN [id number]   Location [facility] PRE OP HOLDING Attending Xu Stark MD   Hosp Day # 0 PCP Jeffrey Gan MD     Chief Complaint: SOB, abdominal distention     Subjective:    History of Present Illness:     Syed Cooney is a 89 year old male with history of  COPD, CKD stage III, moderate mitral and aortic regurgitation, HTN, HLD, Hypothyroidism, GERD, BPH, prior VTE previously on DOAC, history of diverticulitis, ventral incisional hernia s/p failed repair x 2 presented with sudden onset of abdominal pain, distention, loss of appetite and inability to tolerate oral intake. Family brought him to the ED.     ED work up revealed midgut volvulus with severe mesenteric edema throughout the abdomen and pelvis. He underwent emergent ex lap and successful detorsion of the volvulus and admitted to ICU intubated.     History/Other:    Past Medical History:  Past Medical History:    Abdominal distention    Acute encephalopathy    Anemia    Arthritis    Back problem    Cataract    Chronic lung disease    Congestive heart disease (HCC)    COPD (chronic obstructive pulmonary disease) (HCC)    Crohn disease (HCC)    Crohn's disease of large intestine without complication (HCC)    Diarrhea, unspecified    Disorder of thyroid    Diverticula of small intestine    Diverticulosis of large intestine    Frequent use of laxatives    Hearing impairment    High blood pressure    High cholesterol    History of blood transfusion    Hyperthyroidism    Leg swelling    Mitral valve disorder    leaky mitral valve    Muscle weakness    Osteoarthrosis, unspecified whether generalized or localized, unspecified site    cervical and lumbar spine    Osteoporosis    Other and unspecified hyperlipidemia    Pulmonary embolism (HCC)    Pulmonary emphysema (HCC)    Renal insufficiency    Shortness of breath     Thyroid disease    Unspecified essential hypertension    Wheezing     Past Surgical History:   Past Surgical History:   Procedure Laterality Date    Angiogram      2009    Cataract      Colonoscopy  06/30/2014    Procedure: COLONOSCOPY;  Surgeon: Jaz Carrasquillo DO;  Location:  ENDOSCOPY    Egd N/A 01/04/2017    Procedure: ESOPHAGOGASTRODUODENOSCOPY (EGD);  Surgeon: Gustabo Johnson MD;  Location:  MAIN OR    Egd N/A 01/18/2017    Procedure: ESOPHAGOGASTRODUODENOSCOPY (EGD);  Surgeon: Jaz Carrasquillo DO;  Location:  ENDOSCOPY    Hernia surgery      Knee replacement surgery      Other surgical history      6 yeara ago colonscopy with polpys    Sigmoidoscopy,diagnostic      Total knee replacement        Family History:   Family History[1]  Social History:    reports that he quit smoking about 38 years ago. His smoking use included cigarettes. He has a 70 pack-year smoking history. He has quit using smokeless tobacco. He reports that he does not currently use alcohol. He reports that he does not use drugs.     Allergies: Allergies[2]    Medications:  Medications Ordered Prior to Encounter[3]    Review of Systems:   A comprehensive review of systems was completed.    Pertinent positives and negatives noted in the HPI.    Objective:   Physical Exam:    /55 (BP Location: Radial)   Pulse 76   Temp 96.7 °F (35.9 °C) (Temporal)   Resp 16   Wt 125 lb 10.6 oz (57 kg)   SpO2 100%   BMI 24.54 kg/m²   General: Intubated and sedated   Respiratory: Mechanical vent sounds  Cardiovascular: S1, S2. Regular rate and rhythm  Abdomen: Incisions c/d/I. No active bowel sounds  Neuro: Sedated  Extremities: No edema    Results:    Labs:      Labs Last 24 Hours:    Recent Labs   Lab 07/06/25  1937 07/07/25  0318   RBC 3.93 3.03*   HGB 12.3* 9.5*   HCT 35.8* 27.3*   MCV 91.1 90.1   MCH 31.3 31.4   MCHC 34.4 34.8   RDW 13.2 13.2   NEPRELIM 10.18* 10.65*   WBC 12.2* 11.8*   .0 188.0       Recent Labs   Lab  07/06/25 1937 07/07/25  0318   * 192*   BUN 27* 31*   CREATSERUM 2.13* 1.89*   EGFRCR 29* 33*   CA 9.4 8.1*   ALB 4.0 2.8*   * 137   K 5.2* 3.8    104   CO2 24.0 23.0   ALKPHO 131* 94   AST 27 17   ALT 14 9*   BILT 0.4 0.4   TP 6.8 4.9*       Estimated Glomerular Filtration Rate: 33 mL/min/1.73m2 (A) (result from lab).    Lab Results   Component Value Date    INR 2.86 (H) 09/06/2023    INR 1.13 (H) 08/31/2019    INR 1.10 07/16/2019       No results for input(s): \"TROP\", \"TROPHS\", \"CK\" in the last 168 hours.    No results for input(s): \"TROP\", \"PBNP\" in the last 168 hours.    No results for input(s): \"PCT\" in the last 168 hours.    Imaging: Imaging data reviewed in Epic.    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  -Surgery following   -Diet per surgery   -IV zosyn   -Supportive care    #Acute respiratory failure   -Critical care following  -Vent management per crit care     #SIRS suspect 2/2 volvulus  -IV zosyn     #ANTHONY on CKD -IVF and trend     #Normocytic anemia   #Component of post-op blood loss, expected  -Trend CBC     #COPD   #Chronic HFpEF  #Hypertension  #moderate mitral and aortic regurgitation   #HTN  #HLD  #Hypothyroidism   #GERD  #BPH   #Prior VTE previously on DOAC   #Ventral incisional hernia with failed repair x 2 in past     Plan of care discussed with RN    Qi Harmon MD    Supplementary Documentation:     The 21st Century Cures Act makes medical notes like these available to patients in the interest of transparency. Please be advised this is a medical document. Medical documents are intended to carry relevant information, facts as evident, and the clinical opinion of the practitioner. The medical note is intended as peer to peer communication and may appear blunt or direct. It is written in medical language and may contain abbreviations or verbiage that are unfamiliar.               **Certification      PHYSICIAN Certification  of Need for Inpatient Hospitalization - Initial Certification    Patient will require inpatient services that will reasonably be expected to span two midnight's based on the clinical documentation in H+P.   Based on patients current state of illness, I anticipate that, after discharge, patient will require TBD.                        [1]   Family History  Problem Relation Age of Onset    Cancer Brother    [2] No Known Allergies  [3]   No current facility-administered medications on file prior to encounter.     Current Outpatient Medications on File Prior to Encounter   Medication Sig Dispense Refill    furosemide 40 MG Oral Tab Take 1 tablet (40 mg total) by mouth.      levothyroxine 75 MCG Oral Tab Take 1 tablet (75 mcg total) by mouth before breakfast. 90 tablet 0    carvedilol 3.125 MG Oral Tab Take 2 tablets (6.25 mg total) by mouth 2 (two) times daily with meals. 360 tablet 0    Cholecalciferol (VITAMIN D) 50 MCG (2000 UT) Oral Cap Take 1 capsule (2,000 Units total) by mouth daily. 90 capsule 3    ferrous sulfate 325 (65 FE) MG Oral Tab EC Take 1 tablet (325 mg total) by mouth 2 (two) times daily with meals. 180 tablet 3    losartan 25 MG Oral Tab Take 1 tablet (25 mg total) by mouth daily. 90 tablet 3    mesalamine 1.2 g Oral Tab EC Take 2 tablets (2.4 g total) by mouth daily. 180 tablet 3    Omeprazole 40 MG Oral Capsule Delayed Release Take 1 capsule (40 mg total) by mouth daily. 90 capsule 3    tamsulosin 0.4 MG Oral Cap Take 1 capsule (0.4 mg total) by mouth nightly. 90 capsule 3    AZITHROMYCIN 250 MG Oral Tab TAKE 1 TABLET BY MOUTH MON/WED/FRIDAY FOR PROPHYLAXIS CHRONIC LUNG DISEASE AS DIRECTED 12 tablet 0    TAB-A-DELONTE Oral Tab TAKE ONE (1) TABLET BY MOUTH DAILY 90 tablet 1    FLUTICASONE FUROATE-VILANTEROL 200-25 MCG/ACT Inhalation Aerosol Powder, Breath Activated USE 1 INHALATION DAILY FOR SHORTNESS OF BREATH AND WHEEZING RELATED TO COPDC EXACERBATION RINSE AND SPINT AS DIRECTED 180 each 1     acidophilus-pectin Oral Cap Take 1 capsule by mouth daily. 90 capsule 1    albuterol 108 (90 Base) MCG/ACT Inhalation Aero Soln Inhale 2 puffs into the lungs every 6 (six) hours as needed for Wheezing. 3 each 3    cyanocobalamin 1000 MCG/ML Injection Solution Inject 1 mL (1,000 mcg total) into the muscle every 30 (thirty) days. 12 mL 0    ipratropium-albuterol 0.5-2.5 (3) MG/3ML Inhalation Solution Take 3 mL by nebulization every 4 (four) hours as needed (shortness of breath, wheezing). 540 mL 3    diphenhydrAMINE-APAP, sleep, (TYLENOL PM EXTRA STRENGTH)  MG Oral Tab Take 1 tablet by mouth at bedtime.

## 2025-07-07 NOTE — PLAN OF CARE
Patient received around 0700. Patient was intubated/sedated. Extubated this am. Axox4. 2L NC. NSR-ST on the monitor. NPO. Thomas remains in place. No BM this shift. Up to chair for most of the shift. Midline incision C/D/I. Daughter at bedside.

## 2025-07-07 NOTE — ANESTHESIA PROCEDURE NOTES
Peripheral IV  Date/Time: 7/7/2025 12:05 AM  Inserted by: Madonna Jennings MD    Placement  Needle size: 18 G  Laterality: right  Location: forearm  Local anesthetic: injectable  Site prep: alcohol  Attempts: 1

## 2025-07-07 NOTE — PLAN OF CARE
Spoke w/ son Jose - updated provided. Asked him to bring in hearing aids  pt very [place]   Add flutter valve   Monitor labs   Yen Dickey NP

## 2025-07-07 NOTE — ED INITIAL ASSESSMENT (HPI)
Patient arrives via EMS for SOB, hypotension, and abdominal distention. H/o COPD. SBPs in 80s at home, currently 117/90.

## 2025-07-07 NOTE — PHYSICAL THERAPY NOTE
PT orders received and chart reviewed. Per RN, pt with plans to extubate today. Will hold. Will follow and re-attempt as able and medically appropriate.

## 2025-07-07 NOTE — PROGRESS NOTES
Assessment and Objective  \"Progressing\"  INTERVENTIONS:  - Assess for changes in respiratory status  - Assess for changes in mentation and behavior  - Position to facilitate oxygenation and minimize respiratory effort  - Oxygen supplementation based on oxygen saturation or ABGs  - Provide Smoking Cessation handout, if applicable  - Encourage broncho-pulmonary hygiene including cough, deep breathe, Incentive Spirometry  - Assess the need for suctioning and perform as needed  - Assess and instruct to report SOB or any respiratory difficulty  - Respiratory Therapy support as indicated  - Manage/alleviate anxiety  - Monitor for signs/symptoms of CO2 retention    Most Recent ABG (if any available):    Recent Labs   Lab 07/07/25  0949   ABGPHT 7.41   WVKRHF4H 31*   CFRHA9M 136*   ABGHCO3 21.6   ABGBE -4.3*   TEMP 98.6   MARVA Not Applicable   SITE Arterial Line   DEV    THGB 10.0*       Ventilator/CPAP/Bipap Machine settings (if in use):   -   07/07/25 0745   Vent Information   $ RT Standby Charge (per 15 min) 1   Vent Initiation Date 07/07/25   Ventilation Day(s) 1   Interface Invasive   Vent Type    Vent plugged into main power? Yes   Vent ID [id number]   Vent Mode VC+   Settings   FiO2 (%) 40 %   Resp Rate (Set) 14   Vt (Set, mL) 400 mL   Waveform Decelerating ramp   PEEP/CPAP (cm H2O) 5 cm H20   Insp Time (sec) 0.9 sec   Insp Rise Time (%) 70 %   Trigger Sensitivity Flow (L/min) 3 L/min   Humidification Heater   H2O Bag Level 3/4 Full   Heater Temperature 98.6 °F (37 °C)   Readings   ETCO2 (mmHg) 19 mmHg   Total RR 14   Minute Ventilation (L/min) 4.5 L/min   Inspiratory Tidal Volume 400 mL   Expiratory Tidal Volume 394 mL   PIP Observed (cm H2O) 20 cm H2O   MAP (cm H2O) 8.4   I/E Ratio 1:3.8   Alarms   High RR 40   Insp Pressure High (cm H2O) 40 cm H2O   Insp Pressure Low (cm H2O) 9 cm H2O   MV High (L/min) 20 L/min   MV Low (L/min) 2.5 L/min   Apnea Interval (sec) 20 seconds   Apnea Rate 14   Apnea Volume (mL) 400  mL   Spontaneous Breathing Trial   Spontaneous Breathing Trial Complete Y   Is the FiO2 <= 0.5? (titrated for sats 92-94%) Y   Is the PEEP <= 5? Y   Is the RSBI <=104 Y   Is the patient off pressor and narcotic / sedation drips? Y   Is the patient free of ventricular arrhythmias in the past 24 hours? Y   Is the patient's cough adequate? Y   Is the patient alert (neuro), able to follow commands? Y   Daily Screen Meets All Criteria Yes   Spontaneous Parameters   Sedation holiday (date) 07/07/25   Spontaneous RR Rate 15   Spontaneous Minute Volume 2.25   Average Spontaneous Tidal Volume 261   $ Spontaneous Vital Capacity 595   Negative Inspiratory Force -15   Total RSBI 41   Weaning Trials   Patient self-extubated? No   Compassionate wean? No   Spontaneous Breathing Trial Time Initiated 0825   Spontaneous Breathing Trial Duration 1hr   Spontaneous Breathing Trial Method CPAP   Spontaneous Breathing Trial Settings PS 5/+5/40%   Pre Trial HR 71   Pre Trial RR 15   Pre Trial SpO2 99 %   Pre Trial /51   Pre Trial Vt 261   Post Trial HR 72   Post Trial RR 21   Post Trial SpO2 100 %   Post Trial /55   Post Trial Vt 341   [REMOVED] ETT   Removal Date/Time: 07/07/25 1000  Placement Date/Time: 07/07/25 0247   Airway Size: 7.5 mm  Cuffed: Cuffed  Technique: Direct laryngoscopy  Placement Verification: Capnometry  Placed By: Anesthesiologist   Secured at (cm) 23 cm   Cuff Pressure (cm H2O) 26 cm H2O   Suctioned? N   Measured From Lips   Secured Location Center   Secured by Commercial tube vega   Site Condition Dry   Req'd equipment at bedside Bag mask     Notes:   -Patient [place], able to follow commands during SBT   -Weaning parameters were WNL    -Dieresis was started prior to extubation   -Extubated w/o complication on NC   Plans:   -BD protocol   -Continue to monitor.

## 2025-07-07 NOTE — PLAN OF CARE
Received from OR at 0232, right NG at 64 cm, ETT #7.5/19-20 cm, Aquacel dressing C/D/I, [place], Ofirmev given.  Presently eyes are sometimes open spontaneously, makes and holds eye contact, nods head yes and no briskly appropriately.  Pupils 2B, irregular heart rate with frequent PVC's and PAC's, no murmur at present.  #7.5/23 ACVC+ 14 400 0.4 +5, pip 20-21, cuff 25, moderate oral secretions initially white but now clear, no teeth.  Hypoactive bowel sounds.  Thomas - oliguric, clear justin.  SCD's have been applied, A-line has become positional.  Pulses 2+ with good cap refill except for left foot which has +1 edema with pulse only via doppler (endorsed to critical care APN).  Also endorsed NG has been flushed with 30 mL water and air with no return.  I pulled ng out ~10 cm prior to xray with still no return so was readvanced to 64 cm.  Remains on propofol, Fentanyl was started for comfort, hydralazine given x1 for SBP >170 with extremely strong response noted.  LR at 100 mL/hr, Zosyn infusing and electrolyte being replaced now.  Skin intact.  No family at bedside.  See assessments and flowsheets for further data.

## 2025-07-07 NOTE — PROGRESS NOTES
[facility] Progress Note     SUBJECTIVE/Interval history:  No acute issues this am, remains intubated but arousable on vent    Medications  Reviewed personally  Scheduled Medications[1]  PRN Medications[2]    OBJECTIVE:  Vitals:    07/07/25 0900 07/07/25 1000 07/07/25 1100 07/07/25 1200   BP: 152/55 136/82 121/52 154/61   BP Location:    Left arm   Pulse: 73 84 77 82   Resp: 16 25 19 24   Temp:    97.2 °F (36.2 °C)   TempSrc:    Temporal   SpO2: 100% 99% 100% 98%   Weight:           Vital signs in last 24 hours:  Blood pressure 154/61, pulse 82, temperature 97.2 °F (36.2 °C), temperature source Temporal, resp. rate 24, weight 125 lb 10.6 oz (57 kg), SpO2 98%.     Intake/Output:  I/O last 3 completed shifts:  In: 2943.1 [I.V.:2563.1; NG/GT:30; IV PIGGYBACK:350]  Out: 265 [Urine:215; Blood:50]  Vent Mode: VC+  FiO2 (%):  [40 %-50 %] 40 %  S RR:  [14-16] 14  S VT:  [400 mL] 400 mL  PEEP/CPAP (cm H2O):  [5 cm H20] 5 cm H20  MAP (cm H2O):  [8.4-8.5] 8.4    Physical Exam:  General: Appears arousable and following commands on vent  Neurologic:No focal deficits noted.  Alert.  Oriented.  Lungs:CTAB no wheeze  Heart:RRR no m  Abdomen: soft, nondistended, no rigidity, no reaction with palpation. No hernias noted  Extremities:No overt deformities, edema    Lab Data Review:   Recent Labs   Lab 07/06/25 1937 07/07/25  0318   * 192*   BUN 27* 31*   CREATSERUM 2.13* 1.89*   CA 9.4 8.1*   * 137   K 5.2* 3.8    104   CO2 24.0 23.0     Recent Labs   Lab 07/06/25 1937 07/07/25  0318   RBC 3.93 3.03*   HGB 12.3* 9.5*   HCT 35.8* 27.3*   MCV 91.1 90.1   MCH 31.3 31.4   MCHC 34.4 34.8   RDW 13.2 13.2   NEPRELIM 10.18* 10.65*   WBC 12.2* 11.8*   .0 188.0     No results for input(s): \"BNP\" in the last 168 hours.  No results for input(s): \"TROP\", \"CK\" in the last 168 hours.  No results for input(s): \"PT\", \"INR\", \"PTT\" in the last 168 hours.  Recent Labs   Lab  07/07/25  0949   ABGPHT 7.41   RCBQXM1T 31*   UQRHR1G 136*   ABGHCO3 21.6   ABGBE -4.3*   TEMP 98.6   MARVA Not Applicable   SITE Arterial Line   DEV    THGB 10.0*       Other Labs:  Interval Culture Data:   No results found for this visit on 07/06/25.  No results for input(s): \"COLORUR\", \"CLARITY\", \"SPECGRAVITY\", \"GLUUR\", \"BILUR\", \"KETUR\", \"BLOODURINE\", \"PHURINE\", \"PROUR\", \"UROBILINOGEN\", \"NITRITE\", \"LEUUR\", \"WBCUR\", \"RBCUR\", \"BACUR\", \"EPIUR\" in the last 168 hours.    Interval Radiology:   Reviewed personally  XR CHEST AP PORTABLE  (CPT=71045)  Result Date: 7/7/2025  CONCLUSION: Endotracheal tube and enteric tube in place. Mild left basilar atelectasis. Electronically Verified and Signed by Attending Radiologist: Noe Alfred MD 7/7/2025 8:17 AM Workstation: [id number]    XR CHEST AP PORTABLE  (CPT=71045)  Result Date: 7/7/2025  CONCLUSION: Preliminary reading provided by radiologist from [facility]. Repositioning endotracheal tube, tip about 3 cm above the jessica. Nasogastric tube in the stomach. Low lung volumes. Left lower lobe atelectasis. No pneumothorax. No large effusion, but there could be a small left effusion. Cardiac and mediastinal contour unchanged. No sign of pneumothorax. Patient somewhat rotated to the left. Electronically Verified and Signed by Attending Radiologist: Declan Morin MD 7/7/2025 7:48 AM Workstation: [id number]    XR CHEST AP PORTABLE  (CPT=71045)  Result Date: 7/7/2025  CONCLUSION: The NG tube is within the right lower lobe. Removal is recommended Findings discussed with Dr. 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 The stomach is distended with fluid and debris. There is swirling of central loops of bowel around  the central mesenteric root. There is severe mesenteric edema throughout  the abdomen and pelvis. There is a mild to moderate amount ascites throughout the abdomen/pelvis. Clinical correlation with serum lactate and physical exam is recommended to exclude bowel ischemia. Surgical consultation is recommended. Extensive postoperative changes at the anterior abdominal wall. There is a ventral hernia at the superior abdominal wall containing a portion of the transverse colon. Findings discussed with Dr. Bustillo at 11:10 p.m. on 7/6/2025. 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  Acute respiratory failure, intubated 7/7 for procedure  COPD - no exacerbation, follows with [facility]  HFpEF  Acute on chronic renal failure  leukocytosis  Hx ventral hernia s/p repair (failed) and previous hx of diverticulitis s/p bowel resection  GERD  Hx of VTE - off of treatment    ASSESSMENT/PLAN  Neuro  Monitor neuro status  Wean sedation for goal RASS 0 to -2    CV  Monitor hemodynamics, maintain goal MAP >65  Keep fluid balance neutral to negative; continue on lasix    Resp  Continue mechanical ventilation, low tidal volume strategy  Wean fio2 for sats >89%  Daily assessment for SAT/SBT; proceed with trial this am.  Resume home inhalers if extubated    GI  NPO for now, NGT per surgery    Renal  Will keep lasix today, stop IVF and monitor renal function, urine output, lytes and fluid balance    Hem  Monitor for s/s bleeding    ID  Continue zosyn for now  Await culture results    Endo  No acute issues    ICU checklist  Feeding: NPO  Analgesia/ Sedation: wean sedation for goal RASS 0 to -2  Thromboembolism PPX: SCDs, anticoagulation when okay with surgery  Stress Ulcer PPX: PPI  Glucose control: monitor glucoses/ ISS  Bowel Regimen: PRN  Lines/drains/tubes: 7/7 ETT  Deescalation of antibiotics:  n/a  Therapies:PT/OT/ST when appropriate  Code status: full  Dispo: ICU     Zelalem Warren MD         [1]    piperacillin-tazobactam  3.375 g Intravenous Q12H    ipratropium-albuterol  3 mL Nebulization QID    fluticasone-salmeterol  1 puff Inhalation BID    umeclidinium bromide  1 puff Inhalation Daily    [START ON 7/8/2025] furosemide  40 mg Intravenous Daily    tamsulosin  0.4 mg Oral Nightly    [START ON 7/8/2025] levothyroxine  75 mcg Oral Daily @ 0700    [START ON 7/8/2025] pantoprazole  40 mg Oral QAM AC    Or    [START ON 7/8/2025] pantoprazole  40 mg Intravenous QAM AC   [2]   acetaminophen **OR** acetaminophen **OR** acetaminophen    polyethylene glycol (PEG 3350)    bisacodyl    hydrALAzine    HYDROcodone-acetaminophen    HYDROmorphone **OR** HYDROmorphone **OR** HYDROmorphone

## 2025-07-07 NOTE — CONSULTS
[facility] [place]   part of [facility]      Consult     Syed Cooney Patient Status:  Emergency    1934 MRN [id number]   Location [facility] ED Attending Xu Stark MD   Hosp Day # 0 PCP Jeffrey Gan MD     Referring Provider: Dr. Bustillo abdominal distention    Reason for Consultation: Abdominal distention in the setting of multiply recurrent ventral hernia    Subjective:    Syed Cooney is a 89 year old male with acute onset of abdominal distention and discomfort this evening.  He has a history of diverticulitis, with subsequent ventral incisional hernia.  His hernia was repaired twice, both repairs have failed, he has a chronically recurrent ventral incisional hernia.  Today he was in his normal state of health and then acutely developed abdominal distention, loss of appetite and inability to take even water due to discomfort.  He was brought to the ED by family due to progressive abdominal distention and the above complaints.      History/Other:      Past Medical History:  Past Medical History:    Abdominal distention    Acute encephalopathy    Anemia    Arthritis    Back problem    Cataract    Chronic lung disease    Congestive heart disease (HCC)    COPD (chronic obstructive pulmonary disease) (HCC)    Crohn disease (HCC)    Crohn's disease of large intestine without complication (HCC)    Diarrhea, unspecified    Disorder of thyroid    Diverticula of small intestine    Diverticulosis of large intestine    Frequent use of laxatives    Hearing impairment    High blood pressure    High cholesterol    History of blood transfusion    Hyperthyroidism    Leg swelling    Mitral valve disorder    leaky mitral valve    Muscle weakness    Osteoarthrosis, unspecified whether generalized or localized, unspecified site    cervical and lumbar spine    Osteoporosis    Other and unspecified hyperlipidemia    Pulmonary embolism (HCC)    Pulmonary emphysema (HCC)    Renal  insufficiency    Shortness of breath    Thyroid disease    Unspecified essential hypertension    Wheezing        Past Surgical History:   Past Surgical History:   Procedure Laterality Date    Angiogram      2009    Cataract      Colonoscopy  06/30/2014    Procedure: COLONOSCOPY;  Surgeon: Jaz Carrasquillo DO;  Location:  ENDOSCOPY    Egd N/A 01/04/2017    Procedure: ESOPHAGOGASTRODUODENOSCOPY (EGD);  Surgeon: Gustabo Johnson MD;  Location:  MAIN OR    Egd N/A 01/18/2017    Procedure: ESOPHAGOGASTRODUODENOSCOPY (EGD);  Surgeon: Jaz Carrasquillo DO;  Location:  ENDOSCOPY    Hernia surgery      Knee replacement surgery      Other surgical history      6 yeara ago colonscopy with polpys    Sigmoidoscopy,diagnostic      Total knee replacement         Social History:  reports that he quit smoking about 38 years ago. His smoking use included cigarettes. He has a 70 pack-year smoking history. He has quit using smokeless tobacco. He reports that he does not currently use alcohol. He reports that he does not use drugs.    Family History:   Family History   Problem Relation Age of Onset    Cancer Brother        Allergies: No Known Allergies    Medications:    No current facility-administered medications on file prior to encounter.     Current Outpatient Medications on File Prior to Encounter   Medication Sig Dispense Refill    furosemide 40 MG Oral Tab Take 1 tablet (40 mg total) by mouth.      levothyroxine 75 MCG Oral Tab Take 1 tablet (75 mcg total) by mouth before breakfast. 90 tablet 0    carvedilol 3.125 MG Oral Tab Take 2 tablets (6.25 mg total) by mouth 2 (two) times daily with meals. 360 tablet 0    Cholecalciferol (VITAMIN D) 50 MCG (2000 UT) Oral Cap Take 1 capsule (2,000 Units total) by mouth daily. 90 capsule 3    ferrous sulfate 325 (65 FE) MG Oral Tab EC Take 1 tablet (325 mg total) by mouth 2 (two) times daily with meals. 180 tablet 3    losartan 25 MG Oral Tab Take 1 tablet (25 mg total) by mouth  daily. 90 tablet 3    mesalamine 1.2 g Oral Tab EC Take 2 tablets (2.4 g total) by mouth daily. 180 tablet 3    Omeprazole 40 MG Oral Capsule Delayed Release Take 1 capsule (40 mg total) by mouth daily. 90 capsule 3    tamsulosin 0.4 MG Oral Cap Take 1 capsule (0.4 mg total) by mouth nightly. 90 capsule 3    AZITHROMYCIN 250 MG Oral Tab TAKE 1 TABLET BY MOUTH MON/WED/FRIDAY FOR PROPHYLAXIS CHRONIC LUNG DISEASE AS DIRECTED 12 tablet 0    TAB-A-DELONTE Oral Tab TAKE ONE (1) TABLET BY MOUTH DAILY 90 tablet 1    FLUTICASONE FUROATE-VILANTEROL 200-25 MCG/ACT Inhalation Aerosol Powder, Breath Activated USE 1 INHALATION DAILY FOR SHORTNESS OF BREATH AND WHEEZING RELATED TO COPDC EXACERBATION RINSE AND SPINT AS DIRECTED 180 each 1    acidophilus-pectin Oral Cap Take 1 capsule by mouth daily. 90 capsule 1    albuterol 108 (90 Base) MCG/ACT Inhalation Aero Soln Inhale 2 puffs into the lungs every 6 (six) hours as needed for Wheezing. 3 each 3    cyanocobalamin 1000 MCG/ML Injection Solution Inject 1 mL (1,000 mcg total) into the muscle every 30 (thirty) days. 12 mL 0    ipratropium-albuterol 0.5-2.5 (3) MG/3ML Inhalation Solution Take 3 mL by nebulization every 4 (four) hours as needed (shortness of breath, wheezing). 540 mL 3    diphenhydrAMINE-APAP, sleep, (TYLENOL PM EXTRA STRENGTH)  MG Oral Tab Take 1 tablet by mouth at bedtime.         Review of Systems:   Review of systems was completed.  Pertinent positives and negatives noted in the HPI.    Objective:   Vital Signs:  Blood pressure 113/70, pulse 110, temperature 96.5 °F (35.8 °C), temperature source Temporal, resp. rate 19, SpO2 97%. There is no height or weight on file to calculate BMI.  General: Alert, orientated x3.  Cooperative.  No apparent distress.  HEENT: NC/AT   Lungs: Even, unlabored  Cardiac: Regular rate    Abdomen: Distended, tympanitic, epigastric tenderness, no rebound or guarding Extremities:  SMAE  Skin: Warm & dry      Results:     Labs:  Laboratory data reviewed.      Selected labs - last 24 hours:  Endo  Lytes  Renal   Glu 177  Na 135 Ca 9.4  BUN 27   POC Gluc  -  K 5.2 PO4 -  Cr 2.13   A1c -  Cl 101 Mg -  eGFR 29   TSH -  CO2 24.0         LFT  CBC  Other   AST 27  WBC 12.2  PTT - Procal -   ALT 14  Hb 12.3  INR - CRP -   APk 131  Hct 35.8  Trop - D dim -   T kenny 0.4  .0  pBNP -  BNP -  - Ferritin  -   Prot 6.8    CK  - Lactate  -   Alb 4.0    LDL  - COVID  -       Imaging: CT abdomen pelvis from this evening was personally reviewed and shared with the patient and family in his ER exam room.  There is evidence of mesenteric swirling seen on today's CT along with abdominal free fluid which is new since CT scan from December 2024.      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  I discussed with the patient's family that patient will require ICU admission following surgery, he may have an open abdomen and will likely need return to the operating room for reevaluation of the bowel viability.  There is a possibility that he may have short gut syndrome, and may be TPN dependent.  We discussed that given his age he would not be a candidate for small bowel transplant evaluation.  We also discussed that there is a possibility that upon laparotomy we may discover that all of his bowel is necrotic and this is a nonsurvivable state.  At this point it is too early to make any definitive statements about clinical trajectory.  Following exploratory laparotomy, will immediate prognosis can be determined.    Plan of care discussed with patient's children.    Xu Stark MD  7/6/2025

## 2025-07-07 NOTE — ANESTHESIA PROCEDURE NOTES
Arterial Line    Date/Time: 7/6/2025 11:57 PM    Performed by: Madonna Jennings MD  Authorized by: Madonna Jennings MD    General Information and Staff    Procedure Start:  7/6/2025 11:57 PM  Procedure End:  7/7/2025 12:02 AM  Anesthesiologist:  Madonna Jennings MD  Performed By:  Anesthesiologist  Patient Location:  OR  Indication: continuous blood pressure monitoring and blood sampling needed    Site Identification: surface landmarks    Preanesthetic Checklist: 2 patient identifiers, IV checked, risks and benefits discussed, monitors and equipment checked, pre-op evaluation, timeout performed, anesthesia consent and sterile technique used    Procedure Details    Catheter Size:  20 G  Catheter Length:  1 and 3/4 inch  Catheter Type:  Arrow  Seldinger Technique?: Yes    Laterality:  Right  Site:  Radial artery  Site Prep: chlorhexidine    Line Secured:  Tape and Tegaderm    Assessment    Events: patient tolerated procedure well with no complications      Medications  7/6/2025 11:57 PM      Additional Comments

## 2025-07-07 NOTE — PROGRESS NOTES
[facility]  Progress Note    Ryley Dharmesh Cooney Patient Status:  Inpatient    1934 MRN [id number]   Location [facility] 4SW-A Attending Xu Stark MD   Hosp Day # 0 PCP Jeffrey Gan MD     Subjective:  Patient awake alert, appropriate, follows commands.    Patient expresses no significant pain at present.    Remains intubated.  NG tube with minimal output, was manipulated by bedside nurse, without significant change in drainage.  .  Objective/Physical Exam:  General: Alert, orientated x3.  Cooperative.  No apparent distress.  Vital Signs:  Blood pressure 106/47, pulse 73, temperature 96.7 °F (35.9 °C), temperature source Temporal, resp. rate 14, weight 125 lb 10.6 oz (57 kg), SpO2 100%.  Lungs: No respiratory distress.  Cardiac: Regular rate and rhythm.   Abdomen:  Soft, non-distended, appropriately tender, with no rebound or guarding.  No peritoneal signs.   Extremities:  No lower extremity edema noted.    Incision: Clean, dry, intact, no erythema      Labs:  Lab Results   Component Value Date    WBC 11.8 2025    HGB 9.5 2025    HCT 27.3 2025    .0 2025     Lab Results   Component Value Date     2025    K 3.8 2025     2025    CO2 23.0 2025    BUN 31 2025    CREATSERUM 1.89 2025     2025    CA 8.1 2025    ALKPHO 94 2025    ALT 9 2025    AST 17 2025    BILT 0.4 2025    ALB 2.8 2025    TP 4.9 2025     Lab Results   Component Value Date    INR 2.86 (H) 2023    INR 1.13 (H) 2019    INR 1.10 2019       I/O last 3 completed shifts:  In: 2927.2 [I.V.:2547.2; NG/GT:30; IV PIGGYBACK:350]  Out: 185 [Urine:185]  No intake/output data recorded.    Assessment  Patient Active Problem List   Diagnosis    Abdominal pain    Back pain with radiation    Pneumonia due to infectious organism    Lumbar spinal stenosis    Lumbosacral radiculopathy     Nontraumatic subdural hygroma    Acute encephalopathy    Hyponatremia    Hyperglycemia    Gastrointestinal hemorrhage    Gastrointestinal hemorrhage, unspecified gastrointestinal hemorrhage type    Dyspnea    Upper GI bleeding    Asthma-COPD overlap syndrome (HCC)    Essential hypertension, benign    Diarrhea, unspecified type    Hypernatremia    Crohn's disease of large intestine without complication (HCC)    Stage 3 chronic kidney disease (HCC)    PE (pulmonary thromboembolism) (HCC)    Acute bronchospasm    Influenza    Influenza A    COPD exacerbation (HCC)    Acute respiratory failure, unspecified whether with hypoxia or hypercapnia (HCC)    Moderate mitral regurgitation    Hypokalemia    Hyperkalemia    Acute kidney injury (HCC)    Midgut volvulus (HCC)           Plan:  SBT, possible extubation, per ICU  N.p.o. for now, expect a degree of postoperative ileus.  Will maintain NG tube at present, if minimal output will consider discontinuing this tomorrow, at as it will be greater than 24 hours since index operation.  Out of bed to chair, ambulate with assistance   DVT prophylaxis with heparin  GI prophylaxis PPI    Case discussed with ICU team      Xu Stark MD  7/7/2025  7:15 AM

## 2025-07-07 NOTE — RESPIRATORY THERAPY NOTE
Current Mechanical Ventilator Settings:  - Mode: VC+  - Rate: 14  - Tidal Volume(mL):400  - FiO2: 40  - PEEP 5   - inspiratory time .9    Breath Sounds: clear    ETT Size: 7.5    Measured Parameters:  Peak Inspiratory Pressure(cmH2O): 20  Plateu Pressure(cmH2O) : 18  Tidal Volume(mL): 356  Rate:14    Shift Events noted: Received pt from OR intubated and placed on above settings. Vent settings adjusted per ABG results. ETT was advanced 3 cm per MD from CXR.

## 2025-07-07 NOTE — PHYSICAL THERAPY NOTE
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  Prior to admission, patient's baseline is mod ind with RW.  Patient is currently functioning below baseline with bed mobility, transfers, gait, stair negotiation, and standing prolonged periods.  Patient is requiring minimal assist as a result of the following impairments: decreased functional strength, decreased endurance/aerobic capacity, pain, impaired   balance, decreased muscular endurance, and medical status.  Physical Therapy will continue to follow for duration of hospitalization.    Patient will benefit from continued skilled PT Services at discharge to promote prior level of function.  Anticipate patient will return home with home health PT.    PLAN DURING HOSPITALIZATION  Nursing Mobility Recommendation : 1 Assist  PT Device Recommendation: Rolling walker  PT Treatment Plan: Bed mobility, Endurance, Energy conservation, Patient education, Family education, Gait training, Strengthening, Stair training, Transfer training, Balance training  Rehab Potential : Good  Frequency (Obs): 3-5x/week     CURRENT GOALS    Goal #1 Patient is able to demonstrate supine - sit EOB @ level: supervision     Goal #2 Patient is able to demonstrate transfers Sit to/from Stand at assistance level: supervision     Goal #3 Patient is able to ambulate 150 feet with assist device: walker - rolling at assistance level: supervision     Goal #4 Patient is able to stair climb 4 stairs with supervision    Goal #5    Goal #6    Goal Comments: Goals established on 7/7/2025      PHYSICAL THERAPY MEDICAL/SOCIAL HISTORY  History related to current admission: Patient is a 89 year old male admitted on 7/6/2025 from home for abdominal pain and distention. Pt diagnosed with Midgut volvulus and is s/p emergent ex lap and successful detorsion 7/7/25. Pt was intubated for procedure and extubated 7/7/25.    HOME SITUATION  Type of Home: [facility]  Home Layout: Multi-level                     Lives With: Son, Caregiver part-time    Drives: No   Patient Regularly Uses: Cane, Rolling walker      Prior Level of [place]: Pt reports he is typically mod ind with ADLs, ambulates mod ind with RW, and stair climbs mod ind. Pt lives with his son who assists as needed, has a CG part time when the son is not home, and goes to adult day care some days.     SUBJECTIVE  \"I know 6 languages\"     OBJECTIVE  Precautions: Bed/chair alarm, NG suction, Abdominal protective strategies  Fall Risk: High fall risk    WEIGHT BEARING RESTRICTION     PAIN ASSESSMENT  Rating: Unable to rate  Location: roof of mouth, abdomen, chest, BLE  Management Techniques: Activity promotion, Breathing techniques, Relaxation, Repositioning    COGNITION  Overall Cognitive Status:  WFL - within functional limits    RANGE OF MOTION AND STRENGTH ASSESSMENT  Upper extremity ROM and strength are within functional limits     Lower extremity ROM is within functional limits     Lower extremity strength is within functional limits     BALANCE  Static Sitting: Fair +  Dynamic Sitting: Fair +  Static Standing: Poor +  Dynamic Standing: Poor +    ADDITIONAL TESTS                                    ACTIVITY TOLERANCE   Vitals monitored and stable throughout session                      O2 WALK       NEUROLOGICAL FINDINGS                        AM-PAC '6-Clicks' INPATIENT SHORT FORM - BASIC MOBILITY  How much difficulty does the patient currently have...  Patient Difficulty:  Turning over in bed (including adjusting bedclothes, sheets and blankets)?: A Little   Patient Difficulty: Sitting down on and standing up from a chair with arms (e.g., wheelchair, bedside commode, etc.): A Little   Patient Difficulty: Moving from lying on back to sitting on the side of the bed?: A Little   How much help from another person does the patient currently need...   Help from Another: Moving to and from a bed to a chair (including a wheelchair)?: A Little   Help from Another: Need to walk in hospital room?: A Lot   Help from Another: Climbing 3-5 steps with a railing?: A Lot     AM-PAC Score:  Raw Score: 16   Approx Degree of Impairment: 54.16%   Standardized Score (AM-PAC Scale): 40.78   CMS Modifier (G-Code): CK    FUNCTIONAL ABILITY STATUS  Gait Assessment   Functional Mobility/Gait Assessment  Gait Assistance: Minimum assistance  Distance (ft): 2  Assistive Device: Rolling walker  Pattern: Shuffle    Skilled Therapy Provided: Per RN okay to work with pt. Pt received in supine and was agreeable to PT session     Bed Mobility:  Rolling: min A   Supine to sit: min A    Sit to supine: NT     Transfer Mobility:  Sit to stand: min A   Stand to sit: min A  Gait = pt ambulated a few steps from bed to chair with RW and min A    Therapist's Comments: Pt educated on role of therapy, goals for session, safety, fall prevention, and activity recommendations.     Exercise/Education Provided:  Bed mobility  Body mechanics  Energy conservation  Functional activity tolerated  Gait training  Posture  Strengthening  Transfer training    Patient End of Session: Up in chair, Needs met, Call light within reach, RN aware of session/findings, All patient questions and concerns addressed, Hospital anti-slip socks, Alarm set, Family present      Patient Evaluation Complexity Level:  History Moderate - 1 or 2 personal factors and/or co-morbidities   Examination of body systems Low -  addressing 1-2 elements   Clinical  Presentation  Moderate - Evolving   Clinical Decision Making Low Complexity       PT Session Time: 25 minutes  Therapeutic Activity: 8 minutes

## 2025-07-08 NOTE — OCCUPATIONAL THERAPY NOTE
Duplicate OT orders received and acknowledged.  Patient will continue to be seen by OT services per POC.

## 2025-07-08 NOTE — PROGRESS NOTES
[facility]   part of [facility]     Hospitalist Progress Note     Syed Cooney Patient Status:  Inpatient    1934 MRN [id number]   Location [facility] 4SW-A Attending Xu Stark MD   Hosp Day # 1 PCP Jeffrey Gan MD     Chief Complaint: Abdominal pain and distention    Subjective:     Patient reports abdominal soreness which is worse with any coughing. He had some mild dyspnea earlier today with upper airway wheezing.     Objective:    Review of Systems:   A comprehensive review of systems was completed; pertinent positive and negatives stated in subjective.    Vital signs:  Temp:  [97.2 °F (36.2 °C)-99.3 °F (37.4 °C)] 99.1 °F (37.3 °C)  Pulse:  [] 115  Resp:  [18-29] 29  BP: (106-165)/(41-81) 117/56  SpO2:  [91 %-100 %] 100 %  AO: (136-145)/(53-57) 145/57  FiO2 (%):  [40 %] 40 %    Physical Exam:    General: No acute distress, awake and alert, NG tube in place  Respiratory: Upper airway wheezing, no rhonchi  Cardiovascular: S1, S2, regular rate and rhythm  Abdomen: Soft, + post op tenderness, non-distended, positive bowel sounds, binder in place  Extremities: LLE chronically more swollen than RLE    Diagnostic Data:    Labs:  Recent Labs   Lab 25  0413   WBC 12.2* 11.8* 11.6*   HGB 12.3* 9.5* 9.6*   MCV 91.1 90.1 89.9   .0 188.0 202.0     Recent Labs   Lab 25  0745   * 192* 101*   BUN 27* 31* 32*   CREATSERUM 2.13* 1.89* 2.16*   CA 9.4 8.1* 8.4*   ALB 4.0 2.8* 2.9*   * 137 138   K 5.2* 3.8 4.3  4.3    104 106   CO2 24.0 23.0 28.0   ALKPHO 131* 94 77   AST 27 17 23   ALT 14 9* <7*   BILT 0.4 0.4 0.5   TP 6.8 4.9* 5.0*     Estimated Glomerular Filtration Rate: 28 mL/min/1.73m2 (A) (result from lab).    No results for input(s): \"TROP\", \"TROPHS\", \"CK\" in the last 168 hours.    No results for input(s): \"PTP\", \"INR\" in the last 168 hours.     Microbiology  No results found  for this visit on 07/06/25.    Imaging: Reviewed in Epic.    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  -Surgery following   -NPO  -NG tube in place  -Empiric zosyn  -Pain control, PRN anti-emetics   -IV zosyn   -Supportive care     #Post operative mechanical ventilation  -Extubated 7/8  -Encourage IS for atelectasis  -Upper airway wheezing today, given racemic epi  -Duonebs    #ANTHONY on CKD 3  -Monitor, hold diuretics today     #Normocytic anemia   #Component of post-op blood loss, expected  -Trend CBC      #COPD   -Resume home inhalers after extubation  -Nebs    #Chronic HFpEF  #Moderate mitral and aortic regurgitation   -Hold lasix    #Hypertension  -Resume coreg once BP tolerates  -Hold losartan given ANTHONY    #Crohn's disease  -Hold mesalamine    #GERD  -Currently on IV PPI    #BPH  -Flomax     #Hypothyroidism  -Levothyroxine     #HLD  #Prior 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 Cooney is expected to be discharge to: TBD pending PT/OT eval    The 21st Century Cures Act makes medical notes like these available to patients in the interest of transparency. Please be advised this is a medical document. Medical documents are intended to carry relevant information, facts as evident, and the clinical opinion of the practitioner. The medical note is intended as peer to peer communication and may appear blunt or direct. It is written in medical language and may contain abbreviations or verbiage that are unfamiliar.              [1]    insulin aspart  1-5 Units Subcutaneous q6h    heparin  5,000 Units Subcutaneous Q8H SCAR    piperacillin-tazobactam  3.375 g Intravenous Q12H    ipratropium-albuterol  3 mL Nebulization QID    fluticasone-salmeterol  1 puff Inhalation BID    umeclidinium bromide  1 puff Inhalation Daily    tamsulosin  0.4 mg Oral Nightly    levothyroxine  75 mcg Oral Daily @ 0700    pantoprazole  40 mg Oral QAM AC    Or    pantoprazole  40 mg Intravenous QAM AC    sodium chloride  3 mL Nebulization TID

## 2025-07-08 NOTE — PLAN OF CARE
Alert and oriented, pupils 2B, congenial.  Room air with rhonchi and wheezes mostly from tracheal area, pulls 800 on IS and I witnessed him using flutter valve appropriately (encouraged to use each 10x hourly while awake - placed in bed so he can reach).  No murmur noted, regular heart rate with occasional PAC's, 2+ pulses except for left foot via doppler with 1+ edema..  Dentures were in but are out for the night.  Very hypoactive bowel sounds, binder in place, Aquacel dressing C/D/I with a few 1 cm spots of drainage.  He denies nausea, again note no drainage from NG 64 cm to right nare, flushed with 30 mL water and air and do not get return.  Thomas with clear justin urine.  SCD's remain intact.  Field stick pulled, database completed with son/LUCEI Garcia who clarified his father lives alone in his own home.  CHG bath rendered.  Son home for the night.  Glasses in room, hearing aides are in.  See assessments and flowsheets for further data.     0641 followed up with lab re: hemolyzed specimen - verified redraw pending.

## 2025-07-08 NOTE — PROGRESS NOTES
[facility]  Progress Note    Ryley Dharmesh Cooney Patient Status:  Inpatient    1934 MRN [id number]   Location [facility] 4SW-A Attending Xu Stark MD   Hosp Day # 1 PCP Jeffrey Gan MD     Subjective:  Overnight and morning events and notes reviewed.  Patient was extubated yesterday.  He was noted to have upper airway wheezing for which he received racemic epi nebs for.  He remains off pressors.  He reports incisional pain with coughing, otherwise denying abdominal pain.  NG remains to LIS with minimal output.  He has not passing flatus or having bowel movements yet.  Denies fevers or chills.    Objective/Physical Exam:  General: Alert, orientated x3.  Cooperative.  No apparent distress.  Vital Signs:  Blood pressure 101/51, pulse 102, temperature 99.1 °F (37.3 °C), temperature source Temporal, resp. rate 20, weight 125 lb 7.1 oz (56.9 kg), SpO2 100%.  Lungs: No respiratory distress.  Cardiac: Regular rate and rhythm.   Abdomen:  Soft, mildly distended, appropriate incisional tenderness, with no rebound or guarding.  No peritoneal signs.   Extremities:  No lower extremity edema noted.    Incision: Clean, dry, intact, no erythema Aquacel removed. - Small amount of serosanguineous drainage from inferior aspect of incision      Labs:  Lab Results   Component Value Date    WBC 11.6 2025    HGB 9.6 2025    HCT 27.7 2025    .0 2025     Lab Results   Component Value Date     2025    K 4.3 2025    K 4.3 2025     2025    CO2 28.0 2025    BUN 32 2025    CREATSERUM 2.16 2025     2025    CA 8.4 2025    ALKPHO 77 2025    ALT <7 2025    AST 23 2025    BILT 0.5 2025    ALB 2.9 2025    TP 5.0 2025     Lab Results   Component Value Date    INR 2.86 (H) 2023    INR 1.13 (H) 2019    INR 1.10 2019       I/O last 3 completed shifts:  In: 3298.4  [P.O.:60; I.V.:2628.4; NG/GT:60; IV PIGGYBACK:550]  Out: 1950 [Urine:1900; Blood:50]  I/O this shift:  In: -   Out: 100 [Urine:100]    Assessment  Problem List[1]      POD 1 exploratory laparotomy, detorsion of midgut volvulus, partial mesh explant    Plan:  Minimal NG output, though still awaiting return of bowel function.  Continue with NG to LIS.  NPO.  Will need swallow eval prior to feeding -aspiration risk  Aquacel dressing was removed.  Patient did have small amount of serosanguineous drainage from inferior aspect of incision.  Dry gauze and ABD pad placed which can be changed as needed for saturation.  Appreciate critical care recommendations regarding pulmonary status and remainder of medical care  Multimodal pain control, including IV Tylenol and Dilaudid as needed  Antiemetics as needed  Ambulate and up to chair-consult PT  DVT prophylaxis with heparin  GI prophylaxis with Protonix      Zenia HIRA Abdalla  7/8/2025  11:49 AM        [1]   Patient Active Problem List  Diagnosis    Abdominal pain    Back pain with radiation    Pneumonia due to infectious organism    Lumbar spinal stenosis    Lumbosacral radiculopathy    Nontraumatic subdural hygroma    Acute encephalopathy    Hyponatremia    Hyperglycemia    Gastrointestinal hemorrhage    Gastrointestinal hemorrhage, unspecified gastrointestinal hemorrhage type    Dyspnea    Upper GI bleeding    Asthma-COPD overlap syndrome (HCC)    Essential hypertension, benign    Diarrhea, unspecified type    Hypernatremia    Crohn's disease of large intestine without complication (HCC)    Stage 3 chronic kidney disease (HCC)    PE (pulmonary thromboembolism) (HCC)    Acute bronchospasm    Influenza    Influenza A    COPD exacerbation (HCC)    Acute respiratory failure, unspecified whether with hypoxia or hypercapnia (HCC)    Moderate mitral regurgitation    Hypokalemia    Hyperkalemia    Acute kidney injury (HCC)    Midgut volvulus (HCC)    Acute respiratory failure with  hypoxia (HCC)

## 2025-07-08 NOTE — PROGRESS NOTES
[facility] Progress Note     SUBJECTIVE/Interval history:  Extubated yesterday without issue, noted upper airway wheezing overnight and again this am. No other acute issues overnight. He does complain of abdominal pain and feeling dizzy this morning. No hypotension, remains off of vasopressors. On room air presently    Medications  Reviewed personally  Scheduled Medications[1]  PRN Medications[2]    OBJECTIVE:  Vitals:    07/08/25 0400 07/08/25 0500 07/08/25 0600 07/08/25 0700   BP: 125/51 110/59 111/49 123/52   BP Location: Left arm      Pulse: 94 93 93 89   Resp: (!) 27 18 26 21   Temp: 99.3 °F (37.4 °C)      TempSrc: Temporal      SpO2: 93% 93% 94% 94%   Weight:           Vital signs in last 24 hours:  Blood pressure 123/52, pulse 89, temperature 99.3 °F (37.4 °C), temperature source Temporal, resp. rate 21, weight 125 lb 7.1 oz (56.9 kg), SpO2 94%.     Intake/Output:  I/O last 3 completed shifts:  In: 3298.4 [P.O.:60; I.V.:2628.4; NG/GT:60; IV PIGGYBACK:550]  Out: 1950 [Urine:1900; Blood:50]  Vent Mode: VC+  FiO2 (%):  [40 %] 40 %  S RR:  [14] 14  S VT:  [400 mL] 400 mL  PEEP/CPAP (cm H2O):  [5 cm H20] 5 cm H20  MAP (cm H2O):  [8.4] 8.4    Physical Exam:  General: Appears awake, alert, slightly anxious. No distress  Neurologic:No focal deficits noted.  Alert.  Oriented.  Lungs:rhonchi and wheeze noted but appears to emanate from upper airway/ neck. No distress  Heart:RRR no m  Abdomen: soft, nondistended, mild pain in mid epigastric area. No rigidity  Extremities:No overt deformities, edema    Lab Data Review:   Recent Labs   Lab 07/06/25  1937 07/07/25  0318   * 192*   BUN 27* 31*   CREATSERUM 2.13* 1.89*   CA 9.4 8.1*   * 137   K 5.2* 3.8    104   CO2 24.0 23.0     Recent Labs   Lab 07/06/25  1937 07/07/25  0318 07/08/25  0413   RBC 3.93 3.03* 3.08*   HGB 12.3* 9.5* 9.6*   HCT 35.8* 27.3* 27.7*   MCV 91.1 90.1 89.9   MCH 31.3 31.4 31.2   MCHC 34.4  34.8 34.7   RDW 13.2 13.2 13.4   NEPRELIM 10.18* 10.65* 9.40*   WBC 12.2* 11.8* 11.6*   .0 188.0 202.0     No results for input(s): \"BNP\" in the last 168 hours.  No results for input(s): \"TROP\", \"CK\" in the last 168 hours.  No results for input(s): \"PT\", \"INR\", \"PTT\" in the last 168 hours.  Recent Labs   Lab 07/07/25  0949   ABGPHT 7.41   AQVSGK5R 31*   BISBN2H 136*   ABGHCO3 21.6   ABGBE -4.3*   TEMP 98.6   MARVA Not Applicable   SITE Arterial Line   DEV    THGB 10.0*       Other Labs:  Interval Culture Data:   No results found for this visit on 07/06/25.  No results for input(s): \"COLORUR\", \"CLARITY\", \"SPECGRAVITY\", \"GLUUR\", \"BILUR\", \"KETUR\", \"BLOODURINE\", \"PHURINE\", \"PROUR\", \"UROBILINOGEN\", \"NITRITE\", \"LEUUR\", \"WBCUR\", \"RBCUR\", \"BACUR\", \"EPIUR\" in the last 168 hours.    Interval Radiology:   Reviewed personally  XR CHEST AP PORTABLE  (CPT=71045)  Result Date: 7/7/2025  CONCLUSION: Endotracheal tube and enteric tube in place. Mild left basilar atelectasis. Electronically Verified and Signed by Attending Radiologist: Noe Alfred MD 7/7/2025 8:17 AM Workstation: [id number]    XR CHEST AP PORTABLE  (CPT=71045)  Result Date: 7/7/2025  CONCLUSION: Preliminary reading provided by radiologist from [facility]. Repositioning endotracheal tube, tip about 3 cm above the jessica. Nasogastric tube in the stomach. Low lung volumes. Left lower lobe atelectasis. No pneumothorax. No large effusion, but there could be a small left effusion. Cardiac and mediastinal contour unchanged. No sign of pneumothorax. Patient somewhat rotated to the left. Electronically Verified and Signed by Attending Radiologist: Declan Morin MD 7/7/2025 7:48 AM Workstation: [id number]    XR CHEST AP PORTABLE  (CPT=71045)  Result Date: 7/7/2025  CONCLUSION: The NG tube is within the right lower lobe. Removal is recommended Findings discussed with Dr. Bustillo at midnight on 7/7/2025 Electronically Verified and Signed by Attending  Radiologist: LeRoy Stromberg MD 7/7/2025 12:02 AM Workstation: [id number]    CT ABDOMEN+PELVIS(CPT=74176)  Result Date: 7/6/2025  CONCLUSION: There is swirling of the central mesenteric root concerning for a midgut volvulus. The stomach is distended with fluid and debris. There is swirling of central loops of bowel around the central mesenteric root. There is severe mesenteric edema throughout  the abdomen and pelvis. There is a mild to moderate amount ascites throughout the abdomen/pelvis. Clinical correlation with serum lactate and physical exam is recommended to exclude bowel ischemia. Surgical consultation is recommended. Extensive postoperative changes at the anterior abdominal wall. There is a ventral hernia at the superior abdominal wall containing a portion of the transverse colon. Findings discussed with Dr. Bustillo at 11:10 p.m. on 7/6/2025. 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  Acute respiratory failure, intubated 7/7 for procedure  COPD - no exacerbation, follows with [facility]  HFpEF  Acute on chronic renal failure  leukocytosis  Hx ventral hernia s/p repair (failed) and previous hx of diverticulitis s/p bowel resection  GERD  Hx of VTE - off of treatment    ASSESSMENT/PLAN  Neuro  Avoid unnecessary meds and sedatives    CV  Monitor hemodynamics, maintain goal MAP >65  Hold lasix today, monitor fluid balance    Resp  Monitor respiratory status closely, maintain sats >89%  Continue on scheduled nebs  Give racemic epi this am given upper airway wheeze  Check CXR  Continue inhalers (on trelegy at home)    GI  NPO for now, NGT per surgery  Swallow eval when okay for diet    Renal  Hold lasix today, monitor renal function, urine output, lytes and fluid balance    Hem  Monitor for s/s bleeding    ID  Continue zosyn for now  Await culture results    Endo  Monitor  glucoses, ISS    ICU checklist  Feeding: NPO, should have speech/swallow eval when appropriate  Analgesia/ Sedation: PRN  Thromboembolism PPX: SCDs,HSQ  Stress Ulcer PPX: PPI  Glucose control: monitor glucoses/ ISS  Bowel Regimen: PRN  Lines/drains/tubes: n/a  Deescalation of antibiotics: n/a  Therapies:PT/OT/ST  Code status: full  Dispo: ICU     Zelalem Warren MD         [1]    piperacillin-tazobactam  3.375 g Intravenous Q12H    ipratropium-albuterol  3 mL Nebulization QID    fluticasone-salmeterol  1 puff Inhalation BID    umeclidinium bromide  1 puff Inhalation Daily    furosemide  40 mg Intravenous Daily    tamsulosin  0.4 mg Oral Nightly    levothyroxine  75 mcg Oral Daily @ 0700    pantoprazole  40 mg Oral QAM AC    Or    pantoprazole  40 mg Intravenous QAM AC    sodium chloride  3 mL Nebulization TID   [2]   acetaminophen **OR** acetaminophen **OR** acetaminophen    polyethylene glycol (PEG 3350)    bisacodyl    hydrALAzine    HYDROcodone-acetaminophen    HYDROmorphone **OR** HYDROmorphone **OR** HYDROmorphone

## 2025-07-08 NOTE — CONSULTS
HISTORY OF PRESENT ILLNESS       Syed Cooney is a 89 year old male with a history of COPD, pulmonary embolism, CKD, CHF, hypertension, hyperlipidemia, Crohn's disease, ventral incisional hernia,  hypothyroidism, and chronic anemia, who presented to the ER 7/6 with shortness of breath, abdominal pain, and low blood pressure. 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 He was extubated yesterday. He has had some wheezing after extubation which improved with racemic epi. [facility] is being consulted to assist in his care, the patient normally follows with Dr. Henson for his COPD.    PAST MEDICAL HISTORY     Past Medical History:    Anemia    Back problem    BPH (benign prostatic hyperplasia)    Cataract    CKD (chronic kidney disease)    Congestive heart disease (HCC)    COPD (chronic obstructive pulmonary disease) (HCC)    Crohn disease (HCC)    Diverticulosis    Hearing impairment    HTN (hypertension)    Hyperlipidemia    Hypothyroidism    Mitral regurgitation    Osteoarthritis    Osteoporosis    Pulmonary embolism (HCC)       PAST SURGICAL HISTORY    Past Surgical History[1]    HOME MEDICATIONS      Prior to Admission Medications   Prescriptions Last Dose Informant Patient Reported? Taking?   Cholecalciferol (VITAMIN D) 50 MCG (2000 UT) Oral Cap   No Yes   Sig: Take 1 capsule (2,000 Units total) by mouth daily.   Omeprazole 40 MG Oral Capsule Delayed Release   No Yes   Sig: Take 1 capsule (40 mg total) by mouth daily.   TAB-A-DELONTE Oral Tab   No Yes   Sig: TAKE ONE (1) TABLET BY MOUTH DAILY   acidophilus-pectin Oral Cap   No Yes   Sig: Take 1 capsule by mouth daily.   Patient taking differently: Take 1 capsule by mouth 2 (two) times daily.   albuterol 108 (90 Base) MCG/ACT Inhalation Aero Soln   No Yes   Sig: Inhale 2 puffs into the lungs every 6 (six) hours as needed for  Wheezing.   carvedilol 3.125 MG Oral Tab   No Yes   Sig: Take 2 tablets (6.25 mg total) by mouth 2 (two) times daily with meals.   cyanocobalamin 1000 MCG/ML Injection Solution   No Yes   Sig: Inject 1 mL (1,000 mcg total) into the muscle every 30 (thirty) days.   diphenhydrAMINE-APAP, sleep, (TYLENOL PM EXTRA STRENGTH)  MG Oral Tab   Yes Yes   Sig: Take 1 tablet by mouth at bedtime.   ferrous sulfate 325 (65 FE) MG Oral Tab EC   No Yes   Sig: Take 1 tablet (325 mg total) by mouth 2 (two) times daily with meals.   fluticasone-umeclidin-vilant (TRELEGY ELLIPTA) 200-62.5-25 MCG/ACT Inhalation Aerosol Powder, Breath Activated   Yes Yes   Sig: Inhale 1 puff into the lungs daily.   furosemide 40 MG Oral Tab   Yes Yes   Sig: Take 1 tablet (40 mg total) by mouth.   ipratropium-albuterol 0.5-2.5 (3) MG/3ML Inhalation Solution   No Yes   Sig: Take 3 mL by nebulization every 4 (four) hours as needed (shortness of breath, wheezing).   levothyroxine 75 MCG Oral Tab   No Yes   Sig: Take 1 tablet (75 mcg total) by mouth before breakfast.   Patient taking differently: Take 50 mcg by mouth before breakfast.   losartan 25 MG Oral Tab   No Yes   Sig: Take 1 tablet (25 mg total) by mouth daily.   mesalamine 1.2 g Oral Tab EC   No Yes   Sig: Take 2 tablets (2.4 g total) by mouth daily.   tamsulosin 0.4 MG Oral Cap   No Yes   Sig: Take 1 capsule (0.4 mg total) by mouth nightly.      Facility-Administered Medications: None       CURRENT MEDICATIONS       insulin aspart  1-5 Units Subcutaneous q6h    heparin  5,000 Units Subcutaneous Q8H SCAR    piperacillin-tazobactam  3.375 g Intravenous Q12H    ipratropium-albuterol  3 mL Nebulization QID    fluticasone-salmeterol  1 puff Inhalation BID    umeclidinium bromide  1 puff Inhalation Daily    tamsulosin  0.4 mg Oral Nightly    levothyroxine  75 mcg Oral Daily @ 0700    pantoprazole  40 mg Oral QAM AC    Or    pantoprazole  40 mg Intravenous QAM AC    sodium chloride  3 mL Nebulization  TID       PRN meds:  PRN Medications[2]    Infusions:  Medication Infusions[3]      ALLERGIES      Allergies[4]    SOCIAL HISTORY        Social History     Socioeconomic History    Marital status:      Spouse name: Not on file    Number of children: Not on file    Years of education: Not on file    Highest education level: Not on file   Occupational History    Not on file   Tobacco Use    Smoking status: Former     Current packs/day: 0.00     Average packs/day: 2.0 packs/day for 35.0 years (70.0 ttl pk-yrs)     Types: Cigarettes     Quit date: 1987     Years since quittin.5    Smokeless tobacco: Former    Tobacco comments:     30 years   Vaping Use    Vaping status: Never Used   Substance and Sexual Activity    Alcohol use: Not Currently    Drug use: No    Sexual activity: Not on file   Other Topics Concern    Not on file   Social History Narrative    Not on file     Social Drivers of Health     Food Insecurity: No Food Insecurity (2025)    NCSS - Food Insecurity     Worried About Running Out of Food in the Last Year: No     Ran Out of Food in the Last Year: No   Transportation Needs: No Transportation Needs (2025)    NCSS - Transportation     Lack of Transportation: No   Stress: Not on file   Housing Stability: Not At Risk (2025)    NCSS - Housing/Utilities     Has Housing: Yes     Worried About Losing Housing: No     Unable to Get Utilities: No       FAMILY HISTORY      Family History[5]    REVIEW OF SYSTEMS      10 pt ROS negative except what is mentioned in HPI    OBJECTIVE      Vitals:    25 1000 25 1100 25 1200 25 1219   BP: 106/61 101/51 116/59    BP Location:       Pulse: (!) 128 102 98    Resp: 18 20 18    Temp:   97.8 °F (36.6 °C)    TempSrc:   Temporal    SpO2: 94% 100% 100% 100%   Weight:          O2: 2L    Gen - Alert, oriented x 3, in no apparent distress  HEENT - head normocephalic, atraumatic. Eyes-no scleral icterus or injection              -  Mouth - moist mucus membranes, no oral lesions  Neck - supple, no palpable lymphadenopathy.   Lungs - + wheezing heard throughout lung fields  CV - regular rate & rhythm. Normal S1, S2. No murmurs, rubs, or gallops appreciated.  Abdomen - soft, nontender to palpation. Abdominal binder is in place  Extremities - No cyanosis, clubbing, edema appreciated.      Labs:    Recent Labs   Lab 07/06/25 1937 07/07/25 0318 07/08/25  0413   WBC 12.2* 11.8* 11.6*   HGB 12.3* 9.5* 9.6*   .0 188.0 202.0     Recent Labs   Lab 07/06/25 1937 07/07/25 0318 07/08/25  0745   * 137 138   K 5.2* 3.8 4.3  4.3    104 106   CO2 24.0 23.0 28.0   BUN 27* 31* 32*   CREATSERUM 2.13* 1.89* 2.16*   * 192* 101*   ANIONGAP 10 10 4   ALB 4.0 2.8* 2.9*   CA 9.4 8.1* 8.4*   MG  --  1.6 2.3   PHOS  --  3.1 4.2   ALKPHO 131* 94 77   AST 27 17 23   ALT 14 9* <7*   BILT 0.4 0.4 0.5   TP 6.8 4.9* 5.0*     Recent Labs   Lab 07/07/25  1130 07/08/25  0954 07/08/25  1151   PGLU 167* 130* 170*     Recent Labs   Lab 07/07/25  0256 07/07/25  0949   ABGPHT 7.42 7.41   DDVNHZ8I 33* 31*   RCCWL2X 167* 136*   ABGHCO3 23.0 21.6   ABGBE -2.5* -4.3*     Recent Labs   Lab 07/07/25  0056   IPH 7.36   IPCO2 36.6   IPO2 222   ITCO2 22   IHCO3 20.5   ISO2 100   IOANA -5.0     Imaging reviewed.        ASSESSMENT AND PLAN     Syed Cooney is a 89 year old male with a history of COPD, pulmonary embolism, CKD, CHF, hypertension, Crohn's disease, hyperlipidemia, BPH, chronic anemia, and chronic ventral incisional hernia, who was admitted 7/6 with a midgut volvulus requiring surgical repair    Acute hypoxemic respiratory failure  - intubated for exlap, extubated 7/7. Patient has had some postop wheezing which could be from upper airway edema vs COPD  -wean o2 as able  -encourage IS use  -duonebs  -add nebulized budesonide, as below    Volvulus - in a patient with a hx of diverticular disease and ventral incisional hernia, now s/p ex lap with  detorsion 7/7  -per surgery  -currently on empiric Zosyn    COPD - + wheezing on exam  -add nebulized budesonide; hope to avoid systemic steroids unless necessary  -continue advair and incruse while in hospital , resume Trelegy after discharge   -philip   -outpatient follow up with Dr. Natividad Fernandes  -subcutaneous heparin    Dispo  -full code  -ICU until cleared for floor transfer by intensivist    Discussed plan with patient and family . All questions answered.        Herrera Albarran M.D.  Pulmonary/Critical Care         [1]   Past Surgical History:  Procedure Laterality Date    Angiogram      2009    Cataract      Colonoscopy  06/30/2014    Procedure: COLONOSCOPY;  Surgeon: Jaz Carrasquillo DO;  Location:  ENDOSCOPY    Egd N/A 01/04/2017    Procedure: ESOPHAGOGASTRODUODENOSCOPY (EGD);  Surgeon: Gustabo Johnson MD;  Location:  MAIN OR    Egd N/A 01/18/2017    Procedure: ESOPHAGOGASTRODUODENOSCOPY (EGD);  Surgeon: Jaz Carrasquillo DO;  Location:  ENDOSCOPY    Hernia surgery      Knee replacement surgery      Other surgical history      6 yeara ago colonscopy with polpys    Sigmoidoscopy,diagnostic      Total knee replacement     [2]   EPINEPHrine-racemic    glucose **OR** glucose **OR** glucose-vitamin C **OR** dextrose **OR** glucose **OR** glucose **OR** glucose-vitamin C    acetaminophen **OR** acetaminophen **OR** acetaminophen    polyethylene glycol (PEG 3350)    bisacodyl    hydrALAzine    HYDROcodone-acetaminophen    HYDROmorphone **OR** HYDROmorphone **OR** HYDROmorphone  [3] [4] No Known Allergies  [5]   Family History  Problem Relation Age of Onset    Cancer Brother

## 2025-07-08 NOTE — PLAN OF CARE
Neuro stable. Auditory expiratory wheezing, given racemic epi x2. Pulm consult - pulmicort nebs added. Significantly improved by evening. Patient states he is much more comfortable. NG to LIS. No output even after irrigation. Surgery aware. No flatus, hypoactive bowel sounds. Low UOP - IVF started. Midline abd incision c/d/i. Aquacel removed by surgery. Son and daughter at bedside throughout day.  Ambulated in servin x3, up to chair x5 hours.

## 2025-07-08 NOTE — CM/SW NOTE
07/08/25 1400   CM/SW Referral Data   Referral Source Physician   Reason for Referral Discharge planning   Informant EMR;Son   Medical Hx   Does patient have an established PCP? Yes  (Dr. Gan, Jeffrey Rios MD)   Significant Past Medical/Mental Health Hx COPD, CKD stage III, moderate mitral and aortic regurgitation, HTN, HLD, Hypothyroidism, GERD, BPH, prior VTE previously on DOAC, history of diverticulitis, ventral incisional hernia s/p failed repair x 2   Patient Info   Advanced directives? Yes  (Son Nehemias Cooney is HCPOA)   Patient's Current Mental Status at Time of Assessment Alert   Patient's Home Environment [facility]   Number of Levels in Home 2   Patient lives with Alone   Patient Status Prior to Admission   Independent with ADLs and Mobility No   Pt. requires assistance with Driving;Dressing;Meals;Bathing   Services in place prior to admission care home Home Care;DME/Supplies at home   Type of DME/Supplies Straight Cane;Wheeled Walker   care home Home Care Provider Private Duty  ([facility])   care home Care hours per day varied   care home Care days per week varied   Discharge Needs   Anticipated D/C needs To be determined     Pt evaluated by PT and OT with anticipated therapy need for HHC services at [place]. Referrals for HHC sent via [facility].    CM met w/ pt/family at bedside to discuss dc planning. Pt's family at bedside requests I call pt's son and CHRISSY Garcia. List of available HHC agencies with contact information for CM left at pt's bedside. Pt's son called to complete assessment and provide update.    Pt's son confirms pt lives alone in his [facility] with support of family and part-time caregiver services from [facility]. Pt has been navigating his home reasonably well using his cane and wheeled walker with assistance. Pt's son drives pt for any needed appointments. Son is open to HHC services but feels pt may need SNF at [place]. Son requests referrals for potential [facility] be started with Arlene included. Referrals  sent via [employer]. PASRR completed and attached to referral.    Son voices concerns about pt leaving the ICU too soon, as he feels his \"his respiratory status is still too unstable for him to sit properly.\" Son requests care team be made aware of  his concerns for transfer out of ICU \"too soon\", reporting a history of needing to return to the ICU within a short period of transfer to a medical unit. Pt's RN notified of son's concerns.     Son reports he will be at bedside later today and will review [facility] list. Pt's son encouraged to call w/ any questions or concerns.     CM/SW will remain available for DC planning and/or support.     SELAM MichaelN, RN    [id number]

## 2025-07-08 NOTE — SLP NOTE
ADULT SWALLOWING EVALUATION    ASSESSMENT    ASSESSMENT/OVERALL IMPRESSION:  Patient is a 89 y/o male admitted with midgut volvulus s/p ex-lap with repair. PMHx significant for COPD, CKD, HTN, HLD, and GERD. SLP order received to evaluate oropharyngeal swallow d/t concern for aspiration. RN cleared patient for evaluation prior to PO trials. Patient known to this service for prior dysphagia assessment completed 2/3/25 which recommended a regular diet and thin liquids. Today, patient received alert in bed with son present at bedside. Patient noted to have upper airway congestion but saturating well on 2L via NC. Patient denied history of dysphagia symptoms and reported consuming a regular diet and thin liquids at baseline.    Patient presented with a largely intact oropharyngeal swallow. Bolus acceptance was adequate without evidence of anterior bolus loss. Mastication and AP bolus transit were thorough and efficient without evidence of oral residue. Pharyngeal swallow initiation appeared timely and hyolaryngeal excursion was adequate per palpation.  No overt s/s of aspiration observed, and upper airway congestion remained stable throughout. Vocal quality and oxygenation also remained at baseline throughout.    Recommend patient initiate a regular diet and thin liquids when cleared to do so by general surgery. SLP will continue to follow to monitor diet tolerance and adjust as appropriate. Education provided re: results and recommendations.         RECOMMENDATIONS   Diet Recommendations - Solids: Regular (when cleared by GS)  Diet Recommendations - Liquids: Thin Liquids (when celared by GS)                           Aspiration Precautions: Upright position, Small bites, Small sips  Medication Administration Recommendations: One pill at a time  Treatment Plan/Recommendations: Aspiration precautions    HISTORY   MEDICAL HISTORY  Reason for Referral: R/O aspiration    Problem List  Principal Problem:    Midgut volvulus  (HCC)  Active Problems:    Hyperkalemia    Acute kidney injury (HCC)    Acute respiratory failure with hypoxia (HCC)      Past Medical History  Past Medical History[1]       Diet Prior to Admission: Regular, Thin liquids  Precautions: Aspiration    Patient/Family Goals: none stated    SWALLOWING HISTORY  Current Diet Consistency: NPO  Dysphagia History: as above  Imaging Results:   CXR 7/8/25  CONCLUSION:     Interval extubation. Small lung volumes. Decreasing atelectasis at the left base.       Electronically Verified and Signed by Attending Radiologist: Clinton Mcfadden MD 7/8/2025 9:19 AM   Workstation: [id number]     SUBJECTIVE       OBJECTIVE   ORAL MOTOR EXAMINATION  Dentition: Upper dentures, Lower dentures  Symmetry: Within Functional Limits  Strength: Within Functional Limits     Range of Motion: Within Functional Limits       Voice Quality: Weak  Respiratory Status: Nasal cannula  Consistencies Trialed: Thin liquids, Puree, Soft solid, Hard solid  Method of Presentation: Staff/Clinician assistance  Patient Positioned: Upright, Midline    Oral Phase of Swallow: Within Functional Limits                      Pharyngeal Phase of Swallow: Within Functional Limits           (Please note: Silent aspiration cannot be evaluated clinically. Videofluoroscopic Swallow Study is required to rule-out silent aspiration.)    Esophageal Phase of Swallow: No complaints consistent with possible esophageal involvement  Comments: d/w RN              GOALS  Goal #1 The patient will tolerate regular consistency and thin liquids without overt signs or symptoms of aspiration with 90 % accuracy over 1-2 session(s).  In Progress   Goal #2 The patient/family/caregiver will demonstrate understanding and implementation of aspiration precautions and swallow strategies independently over 1-2 session(s).    In Progress   Goal #3     Goal #4     Goal #5     Goal #6     Goal #7     Goal #8       FOLLOW UP  Treatment Plan/Recommendations:  Aspiration precautions  Duration: 1 week  Follow Up Needed (Documentation Required): Yes  SLP Follow-up Date: 07/10/25    Thank you for your referral.   If you have any questions, please contact SALOMON Bull       [1]   Past Medical History:   Abdominal distention    Acute encephalopathy    Anemia    Arthritis    Back problem    BPH (benign prostatic hyperplasia)    Cataract    Chronic lung disease    Congestive heart disease (HCC)    COPD (chronic obstructive pulmonary disease) (HCC)    Crohn disease (HCC)    Crohn's disease of large intestine without complication (HCC)    Diarrhea, unspecified    Disorder of thyroid    Diverticula of small intestine    Diverticulosis of large intestine    Frequent use of laxatives    Hearing impairment    High blood pressure    High cholesterol    History of blood transfusion    Hyperthyroidism    Incontinence    Leg swelling    Mitral valve disorder    leaky mitral valve    Muscle weakness    Osteoarthrosis, unspecified whether generalized or localized, unspecified site    cervical and lumbar spine    Osteoporosis    Other and unspecified hyperlipidemia    Pulmonary embolism (HCC)    Pulmonary emphysema (HCC)    Renal insufficiency    Shortness of breath    Thyroid disease    Unspecified essential hypertension    Wheezing

## 2025-07-09 NOTE — PROGRESS NOTES
[facility]  Progress Note    [facility] Patient Status:  Inpatient    1934 MRN [id number]   Location [facility] 4SW-A Attending Lizzy Nelson, DO   Hosp Day # 2 PCP Jeffrey Gan MD     Subjective:  The patient was seen and examined at bedside. He reports abdominal pain that is about the same as yesterday. He does report nausea, but no vomiting. He denies passing flatus or having a bowel movement.     No NG output overnight even with flushing NG tube. CXR yesterday without NG tube tip visualized, but it is reaching beyond the mid stomach.    Objective/Physical Exam:  /66   Pulse 96   Temp 100.1 °F (37.8 °C) (Temporal)   Resp 25   Wt 125 lb 14.1 oz (57.1 kg)   SpO2 94%   BMI 24.58 kg/m²     Intake/Output Summary (Last 24 hours) at 2025 1036  Last data filed at 2025 0525  Gross per 24 hour   Intake 837.3 ml   Output 200 ml   Net 637.3 ml         General: Alert, oriented x3. No acute distress.  HEENT: Normocephalic, atraumatic. No scleral icterus. NG tube in place  Pulmonary: No respiratory distress, effort normal.   Abdomen: Non-distended, without tympany to percussion. Soft, incisional site tenderness to palpation. Voluntary guarding. Lower abdominal skin abrasion.   Incision: midline incision is clean, dry, intact without surrounding erythema or cellulitis. Staples in place  Extremities: No lower extremity edema. No clubbing or cyanosis.   Skin: Warm, dry. No jaundice.       Labs:  Lab Results   Component Value Date    WBC 9.0 2025    HGB 9.5 2025    HCT 28.3 2025    .0 2025      Lab Results   Component Value Date    INR 2.86 (H) 2023    INR 1.13 (H) 2019    INR 1.10 2019     Lab Results   Component Value Date     2025    K 3.8 2025     2025    CO2 27.0 2025    BUN 38 2025    CREATSERUM 2.47 2025     2025    CA 8.2 2025    ALKPHO 69  07/09/2025    ALT <7 07/09/2025    AST 21 07/09/2025    BILT 0.6 07/09/2025    ALB 3.0 07/09/2025    TP 5.2 07/09/2025          Assessment:  Problem List[1]    POD 2 exploratory laparotomy, detorsion of midgut volvulus, partial mesh explant    Plan:  Given the patient's nausea, continue NG to LIS until return of bowel function  Continue NPO  Once NG tube is removed, SLP has recommended a regular diet and thin liquids  Local wound care to skin abrasion  Appreciate critical care team and pulmonology recommendations  Antiemetics and multimodal pain control with IV tylenol and dilaudid PRN  Encourage ambulation and up to chair-PT/OT on consult  Encourage incentive spirometer  DVT prophylaxis with heparin  GI prophylaxis with protonix      My total face time with this patient was 25 minutes. Greater than half of the visit was spent in counseling the patient on the above listed diagnoses and treatment options.     Robyn Dumont PA-C  7/9/2025  10:36 AM         [1]   Patient Active Problem List  Diagnosis    Abdominal pain    Back pain with radiation    Pneumonia due to infectious organism    Lumbar spinal stenosis    Lumbosacral radiculopathy    Nontraumatic subdural hygroma    Acute encephalopathy    Hyponatremia    Hyperglycemia    Gastrointestinal hemorrhage    Gastrointestinal hemorrhage, unspecified gastrointestinal hemorrhage type    Dyspnea    Upper GI bleeding    Asthma-COPD overlap syndrome (HCC)    Essential hypertension, benign    Diarrhea, unspecified type    Hypernatremia    Crohn's disease of large intestine without complication (HCC)    Stage 3 chronic kidney disease (HCC)    PE (pulmonary thromboembolism) (HCC)    Acute bronchospasm    Influenza    Influenza A    COPD exacerbation (HCC)    Acute respiratory failure, unspecified whether with hypoxia or hypercapnia (HCC)    Moderate mitral regurgitation    Hypokalemia    Hyperkalemia    Acute kidney injury (HCC)    Midgut volvulus (HCC)    Acute  respiratory failure with hypoxia (HCC)

## 2025-07-09 NOTE — PROGRESS NOTES
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 Ambulated today.     Scheduled Medications:  Scheduled Medications[1]    Infusing Medications:  Medication Infusions[2]    PRN Medications:  PRN Medications[3]    OBJECTIVE:  /66   Pulse 96   Temp 100.1 °F (37.8 °C) (Temporal)   Resp 25   Wt 125 lb 14.1 oz (57.1 kg)   SpO2 94%   BMI 24.58 kg/m²    Temp (24hrs), Av.7 °F (37.6 °C), Min:99.4 °F (37.4 °C), Max:100.1 °F (37.8 °C)         Wt Readings from Last 3 Encounters:   25 125 lb 14.1 oz (57.1 kg)   25 125 lb (56.7 kg)   25 124 lb (56.2 kg)       Intake/Output Summary (Last 24 hours) at 2025 1448  Last data filed at 2025 0525  Gross per 24 hour   Intake 807.3 ml   Output 100 ml   Net 707.3 ml     O2: RA  General: NAD.   Neuro: Alert, no focal deficits.    HEENT: PERRL  Neck : No LAD  CV: RRR, nl S1, S2, no S4, S3 or murmur.   Lungs: Clear bilaterally, mild neck rhonchi  Abd: Nontender, non distended.   Ext: No edema.   Skin: No rashes.     Recent Labs   Lab 258 25  0413 25  0421   WBC 11.8* 11.6* 9.0   HGB 9.5* 9.6* 9.5*   HCT 27.3* 27.7* 28.3*   .0 202.0 186.0     Recent Labs   Lab 258 25  0745 25  0421   * 101* 134*   BUN 31* 32* 38*   CREATSERUM 1.89* 2.16* 2.47*   CA 8.1* 8.4* 8.2*    138 142   K 3.8 4.3  4.3 3.8    106 106   CO2 23.0 28.0 27.0   AST 17 23 21   ALT 9* <7* <7*   ALB 2.8* 2.9* 3.0*     No results for input(s): \"INR\", \"PTT\" in the last 168 hours.  Recent Labs   Lab 25  0949   ABGPHT 7.41   DSOQTQ0C 31*   MHPYA3M 136*   ABGHCO3 21.6   SITE Arterial Line   DEV    THGB 10.0*        COVID-19 Lab Results    COVID-19  Lab Results   Component Value Date    COVID19 Not Detected 01/27/2025    COVID19 Not Detected 09/10/2024    COVID19 Not Detected 09/06/2023       Pro-Calcitonin  No results for input(s): \"PCT\" in the last 168 hours.    Cardiac  No results for input(s): \"TROP\", \"PBNP\" in the last 168 hours.    Creatinine Kinase  No results for input(s): \"CK\" in the last 168 hours.    Inflammatory Markers  No results for input(s): \"CRP\", \"REMY\", \"LDH\", \"DDIMER\" in the last 168 hours.    Imaging:   CXR 7/8:    Chest images personally reviewed.   Assessment/Plan   Acute hypoxemic respiratory failure  - intubated for exlap, extubated 7/7. Patient has had some postop wheezing which could be from upper airway edema vs COPD  -wean o2 as able  -encourage IS use  -philip  -nebulized budesonide, as below   Volvulus - in a patient with a hx of diverticular disease and ventral incisional hernia, now s/p ex lap with detorsion 7/7  -per surgery  -currently on empiric Zosyn   COPD - + wheezing on exam  -Continue nebulized budesonide; hope to avoid systemic steroids unless necessary  -continue advair and incruse while in hospital , resume Trelegy after discharge   -philip   -outpatient follow up with Dr. Henson   Proph  -subcutaneous heparin   Dispo  -full code  -Ok for floor from pulm standpoint.   D/w'd pt and family, questions answered.     My best regards,         Lul Diaz MD  [facility] Pulmonary, Critical Care and Sleep Medicine               [1]    insulin aspart  1-5 Units Subcutaneous q6h    heparin  5,000 Units Subcutaneous Q8H SCAR    budesonide  0.5 mg Nebulization BID    piperacillin-tazobactam  3.375 g Intravenous Q12H    ipratropium-albuterol  3 mL Nebulization QID    fluticasone-salmeterol  1 puff Inhalation BID    umeclidinium bromide  1 puff Inhalation Daily    tamsulosin  0.4 mg Oral Nightly    levothyroxine  75 mcg Oral Daily @ 0700    pantoprazole  40 mg Oral QAM AC    Or     pantoprazole  40 mg Intravenous QAM AC   [2]    dextrose 5%-sodium chloride 0.9% 83 mL/hr at 07/09/25 1353   [3]   EPINEPHrine-racemic    glucose **OR** glucose **OR** glucose-vitamin C **OR** dextrose **OR** glucose **OR** glucose **OR** glucose-vitamin C    acetaminophen **OR** acetaminophen **OR** acetaminophen    polyethylene glycol (PEG 3350)    bisacodyl    hydrALAzine    HYDROcodone-acetaminophen    HYDROmorphone **OR** HYDROmorphone **OR** HYDROmorphone

## 2025-07-09 NOTE — PROGRESS NOTES
[facility]   part of [facility]     Hospitalist Progress Note     Travistiffanie Cooney Patient Status:  Inpatient    1934 MRN [id number]   Location [facility] 4SW-A Attending Lizzy Nelson DO    Hosp Day # 2 PCP Jeffrey Gan MD     Chief Complaint: Abdominal pain and distention    Subjective:     Patient up in chair dozing  .1   Daughter bedside hoping PT comes     Objective:    Review of Systems:   A comprehensive review of systems was completed; pertinent positive and negatives stated in subjective.    Vital signs:  Temp:  [99.4 °F (37.4 °C)-100.1 °F (37.8 °C)] 100.1 °F (37.8 °C)  Pulse:  [] 96  Resp:  [19-30] 25  BP: (107-157)/(45-81) 128/66  SpO2:  [93 %-100 %] 94 %    Physical Exam:    General: No acute distress, awake and alert, NG tube in place minimal drainage  Respiratory: Unlabored diminished, no rhonchi 750 I-S  Cardiovascular: S1, S2, regular rate and rhythm  Abdomen: Soft, + post op tenderness, non-distended, absent  bowel sounds, binder in place, incision dry and intact  Extremities: LLE chronically more swollen than RLE    Diagnostic Data:    Labs:  Recent Labs   Lab 25  0413 25  0421   WBC 12.2* 11.8* 11.6* 9.0   HGB 12.3* 9.5* 9.6* 9.5*   MCV 91.1 90.1 89.9 91.3   .0 188.0 202.0 186.0     Recent Labs   Lab 25  0745 25  0421   * 101* 134*   BUN 31* 32* 38*   CREATSERUM 1.89* 2.16* 2.47*   CA 8.1* 8.4* 8.2*   ALB 2.8* 2.9* 3.0*    138 142   K 3.8 4.3  4.3 3.8    106 106   CO2 23.0 28.0 27.0   ALKPHO 94 77 69   AST 17 23 21   ALT 9* <7* <7*   BILT 0.4 0.5 0.6   TP 4.9* 5.0* 5.2*     Estimated Glomerular Filtration Rate: 24 mL/min/1.73m2 (A) (result from lab).    No results for input(s): \"TROP\", \"TROPHS\", \"CK\" in the last 168 hours.    No results for input(s): \"PTP\", \"INR\" in the last 168 hours.     Microbiology  No results found for this visit on  07/06/25.    Imaging: Reviewed in Epic.    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     #Normocytic anemia   #Component of post-op blood loss, expected  -Trend CBC stable     #COPD   -Resume home inhalers after extubation  -Nebs  Critical care pulmonology following wheezing today    #Chronic HFpEF  #Moderate mitral and aortic regurgitation   -Hold lasix  Continue monitoring fluid status getting IV fluids for worsening renal status n.p.o. status changed to D5.45    #Hypertension  -Resume coreg once BP tolerates  -Hold losartan given ANTHONY    #Crohn's disease  -Hold mesalamine    #GERD  -Currently on IV PPI    #BPH  -Flomax     #Hypothyroidism  -Levothyroxine     #HLD  #Prior VTE previously on DOAC   #Ventral incisional hernia with failed repair x 2 in past   #Hx of diverticulitis    POC   Cr 2.47 WBC lower getting IV fluids n.p.o. NG not draining anything  Hgb stable   .1   Cont pulm toilet  IVF adj D5.45    Continue to try to increase activity close monitoring  Avoid narcotics if possible with pain control.  Discussed with his daughter at bedside and RN patient  Krysta Dickey NP     Supplementary Documentation:     Quality:  DVT Mechanical Prophylaxis:   SCDs,    DVT Pharmacologic Prophylaxis   Medication    heparin (Porcine) 5000 UNIT/ML injection 5,000 Units     Code Status: Full Code  Thomas: No urinary catheter in place  YAS: TBD    Discharge is  dependent on: Clinical state, consultant recs  At this point Mr. Cooney is expected to be discharge to: TBD pending PT/OT eval    The 21st Century Cures Act makes medical notes like these available to patients in the interest of transparency. Please be advised this is a medical document. Medical documents are intended to carry relevant information, facts as evident, and the clinical opinion of the practitioner. The medical note is intended as peer to peer communication and may appear blunt or direct. It is written in medical language and may contain abbreviations or verbiage that are unfamiliar.               [1]    insulin aspart  1-5 Units Subcutaneous q6h    heparin  5,000 Units Subcutaneous Q8H SCAR    budesonide  0.5 mg Nebulization BID    piperacillin-tazobactam  3.375 g Intravenous Q12H    ipratropium-albuterol  3 mL Nebulization QID    fluticasone-salmeterol  1 puff Inhalation BID    umeclidinium bromide  1 puff Inhalation Daily    tamsulosin  0.4 mg Oral Nightly    levothyroxine  75 mcg Oral Daily @ 0700    pantoprazole  40 mg Oral QAM AC    Or    pantoprazole  40 mg Intravenous QAM AC

## 2025-07-09 NOTE — PROGRESS NOTES
[place] [facility] Progress Note     SUBJECTIVE/Interval history:  No acute events overnight, he feels better this morning, denies dyspnea or cough. RN notes upper airway congestion has been ongoing. Nebs have helped. Still notes abdominal pain, stable.  No fevers. Remains on RA    Medications  Reviewed personally  Scheduled Medications[1]  PRN Medications[2]    OBJECTIVE:  Vitals:    07/09/25 0400 07/09/25 0500 07/09/25 0600 07/09/25 0700   BP: 110/55 127/56 119/51 127/58   BP Location: Left arm      Pulse: 97 96 95 95   Resp: 19 20 19 19   Temp: 99.6 °F (37.6 °C)      TempSrc: Temporal      SpO2: 96% 96% 96% 96%   Weight:           Vital signs in last 24 hours:  Blood pressure 127/58, pulse 95, temperature 99.6 °F (37.6 °C), temperature source Temporal, resp. rate 19, weight 125 lb 14.1 oz (57.1 kg), SpO2 96%.     Intake/Output:  I/O last 3 completed shifts:  In: 1027.3 [P.O.:160; I.V.:507.3; NG/GT:60; IV PIGGYBACK:300]  Out: 535 [Urine:535]       Physical Exam:  General: Appears awake, alert, comfortable. No distress  Neurologic:No focal deficits noted.  Alert.  Oriented.  Lungs:rare rhonchi today. No wheeze. No distress or tachypnea  Heart:RRR no m  Abdomen: soft, nondistended, mild pain in mid epigastric area. No rigidity  Extremities:No overt deformities, edema    Lab Data Review:   Recent Labs   Lab 07/07/25 0318 07/08/25  0745 07/09/25  0421   * 101* 134*   BUN 31* 32* 38*   CREATSERUM 1.89* 2.16* 2.47*   CA 8.1* 8.4* 8.2*    138 142   K 3.8 4.3  4.3 3.8    106 106   CO2 23.0 28.0 27.0     Recent Labs   Lab 07/07/25 0318 07/08/25  0413 07/09/25  0421   RBC 3.03* 3.08* 3.10*   HGB 9.5* 9.6* 9.5*   HCT 27.3* 27.7* 28.3*   MCV 90.1 89.9 91.3   MCH 31.4 31.2 30.6   MCHC 34.8 34.7 33.6   RDW 13.2 13.4 13.5   NEPRELIM 10.65* 9.40* 7.17   WBC 11.8* 11.6* 9.0   .0 202.0 186.0     No results for input(s): \"BNP\" in the last 168 hours.  No results for  input(s): \"TROP\", \"CK\" in the last 168 hours.  No results for input(s): \"PT\", \"INR\", \"PTT\" in the last 168 hours.  Recent Labs   Lab 07/07/25  0949   ABGPHT 7.41   TKLFHC7I 31*   XCFYY1D 136*   ABGHCO3 21.6   ABGBE -4.3*   TEMP 98.6   MARVA Not Applicable   SITE Arterial Line   DEV    THGB 10.0*       Other Labs:  Interval Culture Data:   No results found for this visit on 07/06/25.  No results for input(s): \"COLORUR\", \"CLARITY\", \"SPECGRAVITY\", \"GLUUR\", \"BILUR\", \"KETUR\", \"BLOODURINE\", \"PHURINE\", \"PROUR\", \"UROBILINOGEN\", \"NITRITE\", \"LEUUR\", \"WBCUR\", \"RBCUR\", \"BACUR\", \"EPIUR\" in the last 168 hours.    Interval Radiology:   Reviewed personally  XR CHEST AP PORTABLE  (CPT=71045)  Result Date: 7/8/2025  CONCLUSION: Interval extubation. Small lung volumes. Decreasing atelectasis at the left base. Electronically Verified and Signed by Attending Radiologist: Clinton Mcfadden MD 7/8/2025 9:19 AM Workstation: [id number]    XR CHEST AP PORTABLE  (CPT=71045)  Result Date: 7/7/2025  CONCLUSION: Endotracheal tube and enteric tube in place. Mild left basilar atelectasis. Electronically Verified and Signed by Attending Radiologist: Noe Alfred MD 7/7/2025 8:17 AM Workstation: [email or web address]    XR CHEST AP PORTABLE  (CPT=71045)  Result Date: 7/7/2025  CONCLUSION: Preliminary reading provided by radiologist from [facility]. Repositioning endotracheal tube, tip about 3 cm above the jessica. Nasogastric tube in the stomach. Low lung volumes. Left lower lobe atelectasis. No pneumothorax. No large effusion, but there could be a small left effusion. Cardiac and mediastinal contour unchanged. No sign of pneumothorax. Patient somewhat rotated to the left. Electronically Verified and Signed by Attending Radiologist: Declan Morin MD 7/7/2025 7:48 AM Workstation: [id number]    XR CHEST AP PORTABLE  (CPT=71045)  Result Date: 7/7/2025  CONCLUSION: The NG tube is within the right lower lobe. Removal is recommended Findings discussed with   Keny at midnight on 7/7/2025 Electronically Verified and Signed by Attending Radiologist: LeRoy Stromberg MD 7/7/2025 12:02 AM Workstation: [id number]    CT ABDOMEN+PELVIS(CPT=74176)  Result Date: 7/6/2025  CONCLUSION: There is swirling of the central mesenteric root concerning for a midgut volvulus. The stomach is distended with fluid and debris. There is swirling of central loops of bowel around the central mesenteric root. There is severe mesenteric edema throughout  the abdomen and pelvis. There is a mild to moderate amount ascites throughout the abdomen/pelvis. Clinical correlation with serum lactate and physical exam is recommended to exclude bowel ischemia. Surgical consultation is recommended. Extensive postoperative changes at the anterior abdominal wall. There is a ventral hernia at the superior abdominal wall containing a portion of the transverse colon. Findings discussed with Dr. Bustillo at 11:10 p.m. on 7/6/2025. 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  Acute respiratory failure, intubated 7/7 for procedure  COPD - no exacerbation, follows with [facility]  HFpEF  Acute on chronic renal failure; likely prerenal  leukocytosis  Hx ventral hernia s/p repair (failed) and previous hx of diverticulitis s/p bowel resection  GERD  Hx of VTE - off of treatment    ASSESSMENT/PLAN  Neuro  Avoid unnecessary meds and sedatives    CV  Monitor hemodynamics, maintain goal MAP >65  Hold on further lasix, continue with gentle IVF, monitor fluid balance    Resp  Monitor respiratory status closely, maintain sats >89%  Continue on scheduled nebs  Continue inhalers (on trelegy at home)  [facility] following    GI  NPO for now, NGT per surgery    Renal  Hold on further lasix, continue with gentle IVF, monitor renal function, urine output, lytes and fluid balance    Hem  Monitor for  s/s bleeding    ID  Continue zosyn for now, would complete 5 day course unless different regimen per other services    Endo  Monitor glucoses, ISS    ICU checklist  Feeding: NPO, diet when okay with surgery  Analgesia/ Sedation: PRN  Thromboembolism PPX: SCDs,HSQ  Stress Ulcer PPX: PPI  Glucose control: monitor glucoses/ ISS  Bowel Regimen: PRN  Lines/drains/tubes: n/a  Deescalation of antibiotics: n/a  Therapies:PT/OT/ST  Code status: full  Dispo: ICU, tx to floor     Zelalem Warren MD           [1]    potassium chloride  20 mEq Intravenous Once    insulin aspart  1-5 Units Subcutaneous q6h    heparin  5,000 Units Subcutaneous Q8H SCAR    budesonide  0.5 mg Nebulization BID    piperacillin-tazobactam  3.375 g Intravenous Q12H    ipratropium-albuterol  3 mL Nebulization QID    fluticasone-salmeterol  1 puff Inhalation BID    umeclidinium bromide  1 puff Inhalation Daily    tamsulosin  0.4 mg Oral Nightly    levothyroxine  75 mcg Oral Daily @ 0700    pantoprazole  40 mg Oral QAM AC    Or    pantoprazole  40 mg Intravenous QAM AC   [2]   EPINEPHrine-racemic    glucose **OR** glucose **OR** glucose-vitamin C **OR** dextrose **OR** glucose **OR** glucose **OR** glucose-vitamin C    acetaminophen **OR** acetaminophen **OR** acetaminophen    polyethylene glycol (PEG 3350)    bisacodyl    hydrALAzine    HYDROcodone-acetaminophen    HYDROmorphone **OR** HYDROmorphone **OR** HYDROmorphone

## 2025-07-09 NOTE — PLAN OF CARE
Pt A & O on room air. Congestion- from upper airway. Orders to transfer to med/surg floor. Up in chair for hours at a time. Walked in servin twice this shift. No complaints. Dr Stark adjusted NGT, still no output. Family at bedside updated on POC.

## 2025-07-09 NOTE — PROGRESS NOTES
[facility]   part of [facility]     Hospitalist Progress Note     Syed Cooney Patient Status:  Inpatient    1934 MRN [id number]   Location [facility] 4SW-A Attending Xu Stark MD   Hosp Day # 2 PCP Jeffrey Gan MD     Chief Complaint: Abdominal pain and distention    Subjective:     Resting comfortably in bed, son at bedside. Reports no output from NGT since placement. He has not passed flatus or had BM yet. He denies any abdominal pain, n/v. He ambulated in halls yesteraday    Objective:    Review of Systems:   A comprehensive review of systems was completed; pertinent positive and negatives stated in subjective.    Vital signs:  Temp:  [97.8 °F (36.6 °C)-99.9 °F (37.7 °C)] 99.6 °F (37.6 °C)  Pulse:  [] 95  Resp:  [18-30] 19  BP: (101-165)/(45-81) 127/58  SpO2:  [91 %-100 %] 96 %    Physical Exam:    General: No acute distress, awake and alert, NG tube in place  Respiratory: Upper airway wheezing, no rhonchi  Cardiovascular: S1, S2, regular rate and rhythm  Abdomen: Soft, + post op tenderness, non-distended, positive bowel sounds, binder in place  Extremities: LLE chronically more swollen than RLE    Diagnostic Data:    Labs:  Recent Labs   Lab 25  0413 25  0421   WBC 12.2* 11.8* 11.6* 9.0   HGB 12.3* 9.5* 9.6* 9.5*   MCV 91.1 90.1 89.9 91.3   .0 188.0 202.0 186.0     Recent Labs   Lab 25  0745 25  0421   * 101* 134*   BUN 31* 32* 38*   CREATSERUM 1.89* 2.16* 2.47*   CA 8.1* 8.4* 8.2*   ALB 2.8* 2.9* 3.0*    138 142   K 3.8 4.3  4.3 3.8    106 106   CO2 23.0 28.0 27.0   ALKPHO 94 77 69   AST 17 23 21   ALT 9* <7* <7*   BILT 0.4 0.5 0.6   TP 4.9* 5.0* 5.2*     Estimated Glomerular Filtration Rate: 24 mL/min/1.73m2 (A) (result from lab).    No results for input(s): \"TROP\", \"TROPHS\", \"CK\" in the last 168 hours.    No results for input(s): \"PTP\", \"INR\" in the last 168  hours.     Microbiology  No results found for this visit on 07/06/25.    Imaging: Reviewed in Epic.    Medications: Scheduled Medications[1]    Assessment & Plan:      #Midgut volvulus  -s/p emergent ex-lap and successful detorsion 7/7  -NPO  -NG tube in place  -Empiric zosyn  -Pain control, PRN anti-emetics   -Supportive care  -Surgery following      #Post operative mechanical ventilation  -Extubated 7/8  -Encourage IS for atelectasis  -Upper airway wheezing today, given racemic epi  -Duonebs    #ANTHONY on CKD 3  -Monitor, hold diuretics today     #Normocytic anemia   #Component of post-op blood loss, expected  -Trend CBC      #COPD   -PTA inhalers  -Nebs    #Chronic HFpEF  #Moderate mitral and aortic regurgitation   -Hold lasix    #Hypertension  -Resume coreg once BP tolerates  -Hold losartan given ANTHONY    #Crohn's disease  -Hold mesalamine    #GERD  -Currently on IV PPI    #BPH  -Flomax     #Hypothyroidism  -Levothyroxine     #HLD  #Prior VTE previously on DOAC   #Ventral incisional hernia with failed repair x 2 in past   #Hx of diverticulitis      Lizzy Nelson,       Supplementary Documentation:     Quality:  DVT Mechanical Prophylaxis:   SCDs,    DVT Pharmacologic Prophylaxis   Medication    heparin (Porcine) 5000 UNIT/ML injection 5,000 Units     Code Status: Full Code  Thomas: No urinary catheter in place  YAS: TBD    Discharge is dependent on: Clinical state, consultant recs  At this point Mr. Cooney is expected to be discharge to: TBD pending PT/OT eval    The 21st Century Cures Act makes medical notes like these available to patients in the interest of transparency. Please be advised this is a medical document. Medical documents are intended to carry relevant information, facts as evident, and the clinical opinion of the practitioner. The medical note is intended as peer to peer communication and may appear blunt or direct. It is written in medical language and may contain abbreviations or verbiage  that are unfamiliar.               [1]    potassium chloride  20 mEq Intravenous Once    insulin aspart  1-5 Units Subcutaneous q6h    heparin  5,000 Units Subcutaneous Q8H SCAR    budesonide  0.5 mg Nebulization BID    piperacillin-tazobactam  3.375 g Intravenous Q12H    ipratropium-albuterol  3 mL Nebulization QID    fluticasone-salmeterol  1 puff Inhalation BID    umeclidinium bromide  1 puff Inhalation Daily    tamsulosin  0.4 mg Oral Nightly    levothyroxine  75 mcg Oral Daily @ 0700    pantoprazole  40 mg Oral QAM AC    Or    pantoprazole  40 mg Intravenous QAM AC

## 2025-07-09 NOTE — PHYSICAL THERAPY NOTE
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 Pt diagnosed with Midgut volvulus and is s/p emergent ex lap and successful detorsion 7/7/25. Pt was intubated for procedure and extubated 7/7/25.     HOME SITUATION  Type of Home: [facility]  Home Layout: Multi-level           Lives With: Son, Caregiver part-time    Drives: No   Patient Regularly Uses: Cane, Rolling walker       Prior Level of [place]: Pt reports he is typically mod ind with ADLs, ambulates mod ind with RW, and stair climbs mod ind. Pt lives with his son who assists as needed, has a CG part time when the son is not home, and goes to adult day care some days.   PHYSICAL THERAPY ASSESSMENT   Patient demonstrates good  progress this session, goals  remain in progress.      Patient is requiring minimal assist as a result of the following impairments: decreased functional strength, decreased endurance/aerobic capacity, pain, impaired   balance, decreased muscular endurance, and medical status.     Patient continues to function below baseline with bed mobility, transfers, gait, and stair negotiation.  Next session anticipate patient to progress bed mobility, transfers, and gait.  Physical Therapy will continue to follow patient for duration of hospitalization.    Patient continues to benefit from continued skilled PT services: at discharge to promote prior level of function.  Anticipate patient will return home with home health PT.    PLAN DURING HOSPITALIZATION  Nursing Mobility Recommendation : 1 Assist  PT Device Recommendation:  Rolling walker  PT Treatment Plan: Bed mobility, Endurance, Energy conservation, Patient education, Family education, Gait training, Strengthening, Stair training, Transfer training, Balance training  Frequency (Obs): 3-5x/week     CURRENT GOALS     Goal #1 Patient is able to demonstrate supine - sit EOB @ level: supervision      Goal #2 Patient is able to demonstrate transfers Sit to/from Stand at assistance level: supervision      Goal #3 Patient is able to ambulate 150 feet with assist device: walker - rolling at assistance level: supervision      Goal #4 Patient is able to stair climb 4 stairs with supervision   Goal #5     Goal #6     Goal Comments: Goals established on 7/7/2025 7/9/2025 all goals ongoing    SUBJECTIVE  Pt pleasant and cooperative    OBJECTIVE  Precautions: Bed/chair alarm, NG suction, Abdominal protective strategies    WEIGHT BEARING RESTRICTION     PAIN ASSESSMENT   Rating: Unable to rate  Location: abdomen  Management Techniques: Activity promotion, Repositioning, Relaxation    BALANCE                                                                                                                       Static Sitting: Fair +  Dynamic Sitting: Fair +           Static Standing: Poor +  Dynamic Standing: Poor +    ACTIVITY TOLERANCE   Denies dizziness, vitals monitored and stable                      O2 WALK       AM-PAC '6-Clicks' INPATIENT SHORT FORM - BASIC MOBILITY  How much difficulty does the patient currently have...  Patient Difficulty: Turning over in bed (including adjusting bedclothes, sheets and blankets)?: A Little   Patient Difficulty: Sitting down on and standing up from a chair with arms (e.g., wheelchair, bedside commode, etc.): A Little   Patient Difficulty: Moving from lying on back to sitting on the side of the bed?: A Little   How much help from another person does the patient currently need...   Help from Another: Moving to and from a bed to a chair (including a  wheelchair)?: A Little   Help from Another: Need to walk in hospital room?: A Little   Help from Another: Climbing 3-5 steps with a railing?: A Lot     AM-PAC Score:  Raw Score: 17   Approx Degree of Impairment: 50.57%   Standardized Score (AM-PAC Scale): 42.13   CMS Modifier (G-Code): CK    FUNCTIONAL ABILITY STATUS  Gait Assessment   Functional Mobility/Gait Assessment  Gait Assistance: Minimum assistance  Distance (ft): 65  Assistive Device: Rolling walker  Pattern: Shuffle (flexed posture)    Skilled Therapy Provided: Per RN okay to work with pt. Pt received in chair and was agreeable to PT session.     Bed Mobility:  Rolling: NT   Supine<>Sit: NT   Sit<>Supine: min A to BLE     Transfer Mobility:  Sit<>Stand: mod A for the first stand and min A for the second stand   Stand<>Sit: min A    Gait: pt ambulated with RW and min A     Therapist's Comments: pt educated on role of therapy, goals for session, safety, fall prevention, and activity recommendations.       Patient End of Session: In bed, Needs met, Call light within reach, RN aware of session/findings, All patient questions and concerns addressed, Hospital anti-slip socks, Alarm set, Family present, SCDs in place    PT Session Time: 15 minutes    Therapeutic Activity: 15 minutes

## 2025-07-09 NOTE — PLAN OF CARE
Alert and oriented, pupils 2B, congenial.  Room air with expiratory wheezes mostly from tracheal area, pulls 800 on IS and I witnessed him using flutter valve appropriately multiple times (encouraged to use each 10x hourly while awake - in bed to bedside table so he can reach).  No murmur noted, occasional PAC's, 2+ pulses except for left foot via doppler with 1+ edema..  Dentures were in but are out for the night.  Active bowel sounds, home binder in place, ABD to site to catch any drainage - some small spots of serosanguinous drainage deena to lower portion of incision with nothing new since 0000.  He denies nausea, again note no drainage from NG 64 cm to right nare, flushed with air and do not get return.  Voided x 1 thus far.  SCD's remain intact. Glasses in room, hearing aides have been charged and are available for him when he wants them.  After hypertonic saline neb had worsening upper airway expiratory wheeze with dyspnea - placed on O2 briefly for work of breathing and called RT who gave an extra neb with good response.  Son Jose was notified and spoke with patient over the phone (pt had been very anxious - emotional support rendered).  Expect transfer orders today.  See assessments and flowsheets for further data.

## 2025-07-10 ENCOUNTER — TELEPHONE (OUTPATIENT)
Dept: FAMILY MEDICINE CLINIC | Facility: CLINIC | Age: OVER 89
End: 2025-07-10

## 2025-07-10 NOTE — CM/SW NOTE
Daughter at bedside. Provided SONIYA list but did discuss that is more appropriate to return home. Dtr verbalized understanding but stating her alan may have gone to [facility] to tour. Inform her that they are accepting at this time but if pt continues to improve may decline closer to discharge. Informed dtr that this writer saw pt ambulating with staff earlier in the day, pt did several laps within the unit. Dtr stating she would leave SONIYA list for brother to review.      Need to follow up on discharge plan,  vs SONIYA.     Alexandra Ward, FARHANA RN, CM  [phone]

## 2025-07-10 NOTE — PROGRESS NOTES
[place] [facility] Progress Note     SUBJECTIVE/Interval history:  No acute events overnight, he denies concerns today. Denies cough, dyspnea, chest pain. Thinks abdominal pain is 'okay'. No fevers. Remains on RA    Medications  Reviewed personally  Scheduled Medications[1]  PRN Medications[2]    OBJECTIVE:  Vitals:    07/09/25 2020 07/10/25 0000 07/10/25 0400 07/10/25 0600   BP: 143/83 138/54 149/48    BP Location:  Right arm Right arm    Pulse: 89 93 79    Resp: 20 21 17    Temp:  98.6 °F (37 °C) 98.1 °F (36.7 °C)    TempSrc:  Temporal Temporal    SpO2: 98% 96% 97%    Weight:    127 lb 13.9 oz (58 kg)       Vital signs in last 24 hours:  Blood pressure 149/48, pulse 79, temperature 98.1 °F (36.7 °C), temperature source Temporal, resp. rate 17, weight 127 lb 13.9 oz (58 kg), SpO2 97%.     Intake/Output:  I/O last 3 completed shifts:  In: 3274 [P.O.:100; I.V.:2494; NG/GT:30; IV PIGGYBACK:650]  Out: 450 [Urine:450]       Physical Exam:  General: Appears awake, alert, comfortable. No distress  Neurologic:No focal deficits noted.  Alert.  Oriented.  Lungs:CTAB today. No wheeze. No rhonchi. No distress or tachypnea  Heart:RRR no m  Abdomen: soft, nondistended, mild pain in mid epigastric area. No rigidity  Extremities:No overt deformities, edema    Lab Data Review:   Recent Labs   Lab 07/08/25  0745 07/09/25  0421 07/10/25  0351   * 134* 157*   BUN 32* 38* 32*   CREATSERUM 2.16* 2.47* 2.01*   CA 8.4* 8.2* 7.6*    142 147*   K 4.3  4.3 3.8 3.4*  3.4*    106 114*   CO2 28.0 27.0 28.0     Recent Labs   Lab 07/08/25  0413 07/09/25  0421 07/10/25  0350   RBC 3.08* 3.10* 2.71*   HGB 9.6* 9.5* 8.4*   HCT 27.7* 28.3* 25.1*   MCV 89.9 91.3 92.6   MCH 31.2 30.6 31.0   MCHC 34.7 33.6 33.5   RDW 13.4 13.5 13.5   NEPRELIM 9.40* 7.17 5.18   WBC 11.6* 9.0 6.7   .0 186.0 177.0     No results for input(s): \"BNP\" in the last 168 hours.  No results for input(s): \"TROP\", \"CK\"  in the last 168 hours.  No results for input(s): \"PT\", \"INR\", \"PTT\" in the last 168 hours.  Recent Labs   Lab 07/07/25  0949   ABGPHT 7.41   CQUJBE7E 31*   TUWOA3U 136*   ABGHCO3 21.6   ABGBE -4.3*   TEMP 98.6   MARVA Not Applicable   SITE Arterial Line   DEV    THGB 10.0*       Other Labs:  Interval Culture Data:   No results found for this visit on 07/06/25.  No results for input(s): \"COLORUR\", \"CLARITY\", \"SPECGRAVITY\", \"GLUUR\", \"BILUR\", \"KETUR\", \"BLOODURINE\", \"PHURINE\", \"PROUR\", \"UROBILINOGEN\", \"NITRITE\", \"LEUUR\", \"WBCUR\", \"RBCUR\", \"BACUR\", \"EPIUR\" in the last 168 hours.    Interval Radiology:   Reviewed personally  XR CHEST AP PORTABLE  (CPT=71045)  Result Date: 7/8/2025  CONCLUSION: Interval extubation. Small lung volumes. Decreasing atelectasis at the left base. Electronically Verified and Signed by Attending Radiologist: Clinton Mcfadden MD 7/8/2025 9:19 AM Workstation: [id number]    XR CHEST AP PORTABLE  (CPT=71045)  Result Date: 7/7/2025  CONCLUSION: Endotracheal tube and enteric tube in place. Mild left basilar atelectasis. Electronically Verified and Signed by Attending Radiologist: Noe Alfred MD 7/7/2025 8:17 AM Workstation: [id number]    XR CHEST AP PORTABLE  (CPT=71045)  Result Date: 7/7/2025  CONCLUSION: Preliminary reading provided by radiologist from [facility]. Repositioning endotracheal tube, tip about 3 cm above the jessica. Nasogastric tube in the stomach. Low lung volumes. Left lower lobe atelectasis. No pneumothorax. No large effusion, but there could be a small left effusion. Cardiac and mediastinal contour unchanged. No sign of pneumothorax. Patient somewhat rotated to the left. Electronically Verified and Signed by Attending Radiologist: Declan Morin MD 7/7/2025 7:48 AM Workstation: [id number]    XR CHEST AP PORTABLE  (CPT=71045)  Result Date: 7/7/2025  CONCLUSION: The NG tube is within the right lower lobe. Removal is recommended Findings discussed with Dr. Bustillo at midnight on  7/7/2025 Electronically Verified and Signed by Attending Radiologist: LeRoy Stromberg MD 7/7/2025 12:02 AM Workstation: [id number]    CT ABDOMEN+PELVIS(CPT=74176)  Result Date: 7/6/2025  CONCLUSION: There is swirling of the central mesenteric root concerning for a midgut volvulus. The stomach is distended with fluid and debris. There is swirling of central loops of bowel around the central mesenteric root. There is severe mesenteric edema throughout  the abdomen and pelvis. There is a mild to moderate amount ascites throughout the abdomen/pelvis. Clinical correlation with serum lactate and physical exam is recommended to exclude bowel ischemia. Surgical consultation is recommended. Extensive postoperative changes at the anterior abdominal wall. There is a ventral hernia at the superior abdominal wall containing a portion of the transverse colon. Findings discussed with Dr. Bustillo at 11:10 p.m. on 7/6/2025. 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  Acute respiratory failure, intubated 7/7 for procedure, extubated 7/7  Upper airway wheeze/ poor secretion control/clearance  COPD -  follows with [facility]  HFpEF  Acute on chronic renal failure; likely prerenal  leukocytosis  Hx ventral hernia s/p repair (failed) and previous hx of diverticulitis s/p bowel resection  GERD  Hx of VTE - off of treatment    ASSESSMENT/PLAN  Neuro  Avoid unnecessary meds and sedatives    CV  Monitor hemodynamics, maintain goal MAP >65  Continue to hold on diuresis for now; continue gentle IVF, monitor fluid balance    Resp  Monitor respiratory status closely, maintain sats >89%  Continue on scheduled nebs  Continue inhalers (on trelegy at home)  [facility] following    GI  NPO for now, NGT per surgery. Will need some form of nutrition soon whether diet vs TPN -defer to surgery     Renal  Hold on  further lasix, continue with gentle IVF, changed to LR, monitor renal function, urine output, lytes and fluid balance    Hem  Monitor for s/s bleeding    ID  Continue zosyn for now, would complete 5 day course unless different regimen per other services    Endo  Monitor glucoses, ISS    ICU checklist  Feeding: NPO, diet vs TPN per surgery  Analgesia/ Sedation: PRN  Thromboembolism PPX: SCDs,HSQ  Stress Ulcer PPX: PPI  Glucose control: monitor glucoses/ ISS  Bowel Regimen: PRN  Lines/drains/tubes: n/a  Deescalation of antibiotics: n/a  Therapies:PT/OT/ST  Code status: full  Dispo: ICU, tx to floor     Zelalem Warren MD             [1]    potassium chloride  40 mEq Intravenous Once    insulin aspart  1-5 Units Subcutaneous q6h    heparin  5,000 Units Subcutaneous Q8H SCAR    budesonide  0.5 mg Nebulization BID    piperacillin-tazobactam  3.375 g Intravenous Q12H    ipratropium-albuterol  3 mL Nebulization QID    fluticasone-salmeterol  1 puff Inhalation BID    umeclidinium bromide  1 puff Inhalation Daily    tamsulosin  0.4 mg Oral Nightly    levothyroxine  75 mcg Oral Daily @ 0700    pantoprazole  40 mg Oral QAM AC    Or    pantoprazole  40 mg Intravenous QAM AC   [2]   EPINEPHrine-racemic    glucose **OR** glucose **OR** glucose-vitamin C **OR** dextrose **OR** glucose **OR** glucose **OR** glucose-vitamin C    acetaminophen **OR** acetaminophen **OR** acetaminophen    polyethylene glycol (PEG 3350)    bisacodyl    hydrALAzine    HYDROcodone-acetaminophen    HYDROmorphone **OR** HYDROmorphone **OR** HYDROmorphone

## 2025-07-10 NOTE — PLAN OF CARE
Pt assessment unchanged (see flowsheets)     NG removed by surgery team. CLD tomorrow.    X2 walksaround unit. Pt tols well.    Called report to [facility]. Waiting on transport

## 2025-07-10 NOTE — PROGRESS NOTES
S: He is feeling better. He still has some intermittent wheezing    Meds:   insulin aspart  1-5 Units Subcutaneous q6h    heparin  5,000 Units Subcutaneous Q8H SCAR    budesonide  0.5 mg Nebulization BID    piperacillin-tazobactam  3.375 g Intravenous Q12H    ipratropium-albuterol  3 mL Nebulization QID    fluticasone-salmeterol  1 puff Inhalation BID    umeclidinium bromide  1 puff Inhalation Daily    tamsulosin  0.4 mg Oral Nightly    levothyroxine  75 mcg Oral Daily @ 0700    pantoprazole  40 mg Oral QAM AC    Or    pantoprazole  40 mg Intravenous QAM AC       Prn Meds:  PRN Medications[1]    Infusions:   electrolyte-A 83 mL/hr (07/10/25 1115)       OBJECTIVE:  Vitals:    07/10/25 0000 07/10/25 0400 07/10/25 0600 07/10/25 0800   BP: 138/54 149/48  (!) 161/58   Pulse: 93 79  82   Resp: 21 17  24   Temp: 98.6 °F (37 °C) 98.1 °F (36.7 °C)  97.8 °F (36.6 °C)   TempSrc: Temporal Temporal  Temporal   SpO2: 96% 97%  100%   Weight:   127 lb 13.9 oz (58 kg)      O2: room air    Gen - Alert, oriented x 3, in no apparent distress  Lungs - + wheezes   CV - regular rate & rhythm. Normal S1, S2. No murmurs   Extremities - No cyanosis, clubbing, edema appreciated.        Labs:  Recent Labs   Lab 07/08/25  0413 07/09/25  0421 07/10/25  0350   WBC 11.6* 9.0 6.7   HGB 9.6* 9.5* 8.4*   .0 186.0 177.0     Recent Labs   Lab 07/08/25  0745 07/09/25  0421 07/10/25  0351    142 147*   K 4.3  4.3 3.8 3.4*  3.4*    106 114*   CO2 28.0 27.0 28.0   BUN 32* 38* 32*   CREATSERUM 2.16* 2.47* 2.01*   * 134* 157*   ANIONGAP 4 9 5   ALB 2.9* 3.0* 2.8*   CA 8.4* 8.2* 7.6*   MG 2.3 2.4 2.3   PHOS 4.2 4.0 2.7   TP 5.0* 5.2* 4.8*     Recent Labs   Lab 07/08/25  0745 07/09/25  0421 07/10/25  0351   ALKPHO 77 69 58   AST 23 21 17   ALT <7* <7* <7*   BILT 0.5 0.6 0.4       Recent Labs   Lab 07/07/25  0256 07/07/25  0949   ABGPHT 7.42 7.41   [id number] 33* 31*   NXRTN8U 167* 136*   ABGHCO3 23.0 21.6   ABGBE -2.5*  -4.3*   LACTIBG  --  2.0     Recent Labs   Lab 07/07/25  0056   IPH 7.36   IPCO2 36.6   IPO2 222   ITCO2 22   IHCO3 20.5   ISO2 100   IOANA -5.0           ASSESSMENT AND PLAN     Maria Tleigh annholly Cooney is a 89 year old male with a history of COPD, pulmonary embolism, CKD, CHF, hypertension, Crohn's disease, hyperlipidemia, BPH, chronic anemia, and chronic ventral incisional hernia, who was admitted 7/6 with a midgut volvulus requiring surgical repair   Acute hypoxemic respiratory failure  - intubated for exlap, extubated 7/7. Patient has had some postop wheezing which could be from upper airway edema vs COPD  -wean o2 as able  -encourage IS use  -duonebs qid  -nebulized budesonide bid   Volvulus - in a patient with a hx of diverticular disease and ventral incisional hernia, now s/p ex lap with detorsion 7/7  -per surgery  -currently on empiric Zosyn   COPD - + wheezing on exam  -Continue nebulized budesonide; hope to avoid systemic steroids unless necessary  -continue advair and incruse while in hospital , resume Trelegy after discharge   -duonebs qid  -outpatient follow up with Dr. Henson   Proph  -subcutaneous heparin   Dispo  -full code  -Ok for floor from pulm standpoint.     We will continue to follow with you         Herrera Albarran M.D.  Pulmonary/Critical Care         [1]   EPINEPHrine-racemic    glucose **OR** glucose **OR** glucose-vitamin C **OR** dextrose **OR** glucose **OR** glucose **OR** glucose-vitamin C    acetaminophen **OR** acetaminophen **OR** acetaminophen    polyethylene glycol (PEG 3350)    bisacodyl    hydrALAzine    HYDROcodone-acetaminophen    HYDROmorphone **OR** HYDROmorphone **OR** HYDROmorphone

## 2025-07-10 NOTE — PROGRESS NOTES
[facility]  Progress Note    [facility] Dharmesh Eros Patient Status:  Inpatient    1934 MRN [id number]   Location [facility] 4SW-A Attending Lizzy Nelson, DO   Hosp Day # 3 PCP Jeffrey Gan MD     Subjective:  He is seen and examined with family at bedside  who aids in translation. He denies abdominal pain. He denies nausea or vomiting. He reports passing flatus this AM. He denies a bowel movement.  Minimal output from his NG tube.    WBC 9.0 --> 6.7. Hgb 9.5 --> 8.4.   Objective/Physical Exam:  BP (!) 161/58 (BP Location: Right arm)   Pulse 82   Temp 97.8 °F (36.6 °C) (Temporal)   Resp 24   Wt 127 lb 13.9 oz (58 kg)   SpO2 100%   BMI 24.97 kg/m²     Intake/Output Summary (Last 24 hours) at 7/10/2025 1322  Last data filed at 7/10/2025 0911  Gross per 24 hour   Intake 2577 ml   Output 475 ml   Net 2102 ml         General: Awake, Alert,   Cooperative.  No apparent distress.  HEENT: Normocephalic, atraumatic. NG tube in place.   Pulm: no respiratory distress, no increased work of breathing  Abdomen:  Soft, non-distended,appropriate incisional tenderness, with no rebound or guarding.  No peritoneal signs.  Incision:  Clean, dry, intact without erythema.  Staples in place.     Labs:  Lab Results   Component Value Date    WBC 6.7 07/10/2025    HGB 8.4 07/10/2025    HCT 25.1 07/10/2025    .0 07/10/2025      Lab Results   Component Value Date    INR 2.86 (H) 2023    INR 1.13 (H) 2019    INR 1.10 2019     Lab Results   Component Value Date     07/10/2025    K 3.4 07/10/2025    K 3.4 07/10/2025     07/10/2025    CO2 28.0 07/10/2025    BUN 32 07/10/2025    CREATSERUM 2.01 07/10/2025     07/10/2025    CA 7.6 07/10/2025    ALKPHO 58 07/10/2025    ALT <7 07/10/2025    AST 17 07/10/2025    BILT 0.4 07/10/2025    ALB 2.8 07/10/2025    TP 4.8 07/10/2025            Assessment:  Problem List[1]      POD 3 exploratory laparotomy, detorsion of midgut  volvulus, partial mesh explant     Plan:  Remove NG tube today  NPO today, CLD tomorrow.   Continue critical care management as per ICU recommendations  Antiemetics and analgesics as needed  Encourage ambulation and up to chair  Medical management as per hospialist.   DVT prophylaxis with heparin  GI prophylaxis with protonix       Patient discussed with Dr. Stark.   Selena Chen PA-C  7/10/2025  1:22 PM         [1]   Patient Active Problem List  Diagnosis    Abdominal pain    Back pain with radiation    Pneumonia due to infectious organism    Lumbar spinal stenosis    Lumbosacral radiculopathy    Nontraumatic subdural hygroma    Acute encephalopathy    Hyponatremia    Hyperglycemia    Gastrointestinal hemorrhage    Gastrointestinal hemorrhage, unspecified gastrointestinal hemorrhage type    Dyspnea    Upper GI bleeding    Asthma-COPD overlap syndrome (HCC)    Essential hypertension, benign    Diarrhea, unspecified type    Hypernatremia    Crohn's disease of large intestine without complication (HCC)    Stage 3 chronic kidney disease (HCC)    PE (pulmonary thromboembolism) (HCC)    Acute bronchospasm    Influenza    Influenza A    COPD exacerbation (HCC)    Acute respiratory failure, unspecified whether with hypoxia or hypercapnia (HCC)    Moderate mitral regurgitation    Hypokalemia    Hyperkalemia    Acute kidney injury (HCC)    Midgut volvulus (HCC)    Acute respiratory failure with hypoxia (HCC)

## 2025-07-10 NOTE — PROGRESS NOTES
[facility]   part of [facility]     Hospitalist Progress Note     Syed Cooney Patient Status:  Inpatient    1934 MRN [id number]   Location [facility] 4SW-A Attending Lizzy Nelson DO    Hosp Day # 3 PCP Jeffrey Gan MD     Chief Complaint: Abdominal pain and distention    Subjective:     Patient   + flatus this am   only c/o pain w/ coughing  Walked in halls today w/ minimal assistance  Son at bedside      Objective:    Review of Systems:   A comprehensive review of systems was completed; pertinent positive and negatives stated in subjective.    Vital signs:  Temp:  [97.8 °F (36.6 °C)-99.3 °F (37.4 °C)] 97.8 °F (36.6 °C)  Pulse:  [79-99] 82  Resp:  [17-28] 24  BP: (130-161)/(48-83) 161/58  SpO2:  [96 %-100 %] 100 %    Physical Exam:    General: No acute distress, awake and alert, NG tube in place minimal drainage  Respiratory: Unlabored diminished, no rhonchi 1000 I-S  Cardiovascular: S1, S2, regular rate and rhythm  Abdomen: Soft, + post op tenderness, non-distended,  scant   bowel sounds, binder in place, incision dry and intact  Extremities: LLE chronically more swollen than RLE    Diagnostic Data:    Labs:  Recent Labs   Lab 258 25  0413 25  0421 07/10/25  0350   WBC 12.2* 11.8* 11.6* 9.0 6.7   HGB 12.3* 9.5* 9.6* 9.5* 8.4*   MCV 91.1 90.1 89.9 91.3 92.6   .0 188.0 202.0 186.0 177.0     Recent Labs   Lab 25  0745 25  0421 07/10/25  0351   * 134* 157*   BUN 32* 38* 32*   CREATSERUM 2.16* 2.47* 2.01*   CA 8.4* 8.2* 7.6*   ALB 2.9* 3.0* 2.8*    142 147*   K 4.3  4.3 3.8 3.4*  3.4*    106 114*   CO2 28.0 27.0 28.0   ALKPHO 77 69 58   AST 23 21 17   ALT <7* <7* <7*   BILT 0.5 0.6 0.4   TP 5.0* 5.2* 4.8*     Estimated Glomerular Filtration Rate: 31 mL/min/1.73m2 (A) (result from lab).    No results for input(s): \"TROP\", \"TROPHS\", \"CK\" in the last 168 hours.    No results for input(s): \"PTP\", \"INR\" in  the last 168 hours.     Microbiology  No results found for this visit on 07/06/25.    Imaging: Reviewed in Epic.    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     #Normocytic anemia   #Component of post-op blood loss, expected  -Trend  hgb sl lower      #COPD   -Resume home inhalers after extubation  -Nebs  Critical care pulmonology following     #Chronic HFpEF  #Moderate mitral and aortic regurgitation   -Hold lasix  - cr sl lower  Na better  IVF per cc team     #Hypertension  -Resume coreg  once can have po   -Hold losartan given ANTHONY  Bp sl elevated     #Crohn's disease  -Hold mesalamine    #GERD  -Currently on IV PPI    #BPH  -Flomax     #Hypothyroidism  -Levothyroxine     #HLD  #Prior VTE previously on DOAC   #Ventral incisional hernia with failed repair x 2 in past   #Hx of diverticulitis    POC   Na 147 lower   IVF per  critical care team    Replace K   Cr lower 2.01   Hgb 8.4 sl lower   No drainage from NG >24 hrs   Walking in halls   Looks better today   Await GS recs re NG and starting nutrition   Son updated     Krysta Dickey NP     Addendum:    Patient seen and examined independently.  Agree with above except as otherwise noted.      Gen: NAD  CVS: s1s2  Resp: CTA  Abd: soft    Assessment and Plan:    #Midgut volvulus  -s/p emergent ex-lap and successful detorsion 7/7  -NPO  -NG tube in place  -Empiric zosyn  -Pain control, PRN anti-emetics   -Supportive care  -Surgery following     #Hypernatremia  -gentle IVF  -holding diuretics  -follow  BMP    #Hypokalemia  -replace  -follow BMP     #Post operative mechanical ventilation  -Extubated 7/8  -Encourage IS for atelectasis  -Upper airway wheezing today, given racemic epi  -nebs    #ANTHONY on CKD 3  -Monitor, hold diuretics today     #Normocytic anemia   #Component of post-op blood loss, expected  -Trend CBC      #COPD   -PTA inhalers  -Nebs    #Chronic HFpEF  #Moderate mitral and aortic regurgitation   -Hold lasix    #Hypertension  -Resume coreg once BP tolerates  -Hold losartan given ANTHONY    #Crohn's disease  -Hold mesalamine    #GERD  -Currently on IV PPI    #BPH  -Flomax     #Hypothyroidism  -Levothyroxine     #HLD  #Prior VTE previously on DOAC   #Ventral incisional hernia with failed repair x 2 in past   #Hx of diverticulitis      Lizzy Nelson,       Supplementary Documentation:     Quality:  DVT Mechanical Prophylaxis:   SCDs,    DVT Pharmacologic Prophylaxis   Medication    heparin (Porcine) 5000 UNIT/ML injection 5,000 Units     Code Status: Full Code  Thomas: No urinary catheter in place  YAS: TBD    Discharge is dependent on: Clinical state, consultant recs  At this point Mr. Cooney is expected to be discharge to: TBD pending PT/OT eval    The 21st Century Cures Act makes medical notes like these available to patients in the interest of transparency. Please be advised this is a medical document. Medical documents are intended to carry relevant information, facts as evident, and the clinical opinion of the practitioner. The medical note is intended as peer to peer communication and may appear blunt or direct. It is written in medical language and may contain abbreviations or verbiage that are unfamiliar.                 [1]    insulin aspart  1-5 Units Subcutaneous q6h    heparin  5,000 Units Subcutaneous Q8H SCAR    budesonide  0.5 mg Nebulization BID    piperacillin-tazobactam  3.375 g Intravenous Q12H    ipratropium-albuterol  3 mL Nebulization QID    fluticasone-salmeterol  1 puff  Inhalation BID    umeclidinium bromide  1 puff Inhalation Daily    tamsulosin  0.4 mg Oral Nightly    levothyroxine  75 mcg Oral Daily @ 0700    pantoprazole  40 mg Oral QAM AC    Or    pantoprazole  40 mg Intravenous QAM AC

## 2025-07-10 NOTE — PLAN OF CARE
NGT pulled by surgeon at bedside. RN updated by MD. Per verbal order by surgeon NPO tonight and clear liquid diet start tomorrow

## 2025-07-10 NOTE — PLAN OF CARE
Received pt at change of shift sitting up in chair, son at bedside.  Assisted back to bed with use of walker.  Pt doing most on his own.  Oriented x4.  Denies pain except when moving.  Has audible upper airway expiratory wheezing.  On room air.  Getting nebs per RT.  NGT to LIWS with minimal bile drainage.  Incision with staples intact.  Abdominal binder on. Incontinent.

## 2025-07-11 NOTE — PROGRESS NOTES
S: He was moved out of the ICU yesterday. No significant overnight events.    Meds:   insulin aspart  1-5 Units Subcutaneous q6h    heparin  5,000 Units Subcutaneous Q8H SCAR    budesonide  0.5 mg Nebulization BID    piperacillin-tazobactam  3.375 g Intravenous Q12H    ipratropium-albuterol  3 mL Nebulization QID    fluticasone-salmeterol  1 puff Inhalation BID    umeclidinium bromide  1 puff Inhalation Daily    tamsulosin  0.4 mg Oral Nightly    levothyroxine  75 mcg Oral Daily @ 0700    pantoprazole  40 mg Oral QAM AC    Or    pantoprazole  40 mg Intravenous QAM AC       Prn Meds:  PRN Medications[1]    Infusions:   electrolyte-A 83 mL/hr (07/10/25 1115)       OBJECTIVE:  Vitals:    07/10/25 1934 07/10/25 2330 07/11/25 0328 07/11/25 0820   BP:  152/73 156/67 (!) 164/74   Pulse: 99 97 91 95   Resp: 20 18 20 16   Temp:  97.4 °F (36.3 °C) 97.5 °F (36.4 °C) 98.4 °F (36.9 °C)   TempSrc:  Oral Oral Oral   SpO2: 99% 99% 99% 99%   Weight:         O2: room air    Gen - Alert, oriented x 3, in no apparent distress  Lungs - + wheezes , although could be radiating from the upper airway  CV - regular rate & rhythm. Normal S1, S2. No murmurs   Extremities - No cyanosis, clubbing, edema appreciated.        Labs:  Recent Labs   Lab 07/09/25  0421 07/10/25  0350 07/11/25  0518   WBC 9.0 6.7 6.3   HGB 9.5* 8.4* 8.7*   .0 177.0 186.0     Recent Labs   Lab 07/09/25  0421 07/10/25  0351 07/11/25  0518    147* 149*   K 3.8 3.4*  3.4* 3.8  3.8    114* 116*   CO2 27.0 28.0 28.0   BUN 38* 32* 21   CREATSERUM 2.47* 2.01* 1.52*   * 157* 81   ANIONGAP 9 5 5   ALB 3.0* 2.8* 2.9*   CA 8.2* 7.6* 7.7*   MG 2.4 2.3 2.4   PHOS 4.0 2.7 2.1*   TP 5.2* 4.8* 5.0*     Recent Labs   Lab 07/09/25  0421 07/10/25  0351 07/11/25  0518   ALKPHO 69 58 57   AST 21 17 19   ALT <7* <7* <7*   BILT 0.6 0.4 0.5       Recent Labs   Lab 07/07/25  0256 07/07/25  0949   ABGPHT 7.42 7.41   [id number] 33* 31*   PZXNL6U 167* 136*    ABGHCO3 23.0 21.6   ABGBE -2.5* -4.3*   LACTIBG  --  2.0     Recent Labs   Lab 07/07/25  0056   IPH 7.36   IPCO2 36.6   IPO2 222   ITCO2 22   IHCO3 20.5   ISO2 100   IOANA -5.0           ASSESSMENT AND PLAN     [facility] Dharmesh Cooney is a 89 year old male with a history of COPD, pulmonary embolism, CKD, CHF, hypertension, Crohn's disease, hyperlipidemia, BPH, chronic anemia, and chronic ventral incisional hernia, who was admitted 7/6 with a midgut volvulus requiring surgical repair   Acute hypoxemic respiratory failure  - intubated for exlap, extubated 7/7. Patient has had some postop wheezing which could be from upper airway edema vs COPD  -supplemental oxygen prn - now on room air  -encourage IS use  -duonebs qid  -continue nebulized budesonide bid   Volvulus - in a patient with a hx of diverticular disease and ventral incisional hernia, now s/p ex lap with detorsion 7/7  -per surgery  -currently on empiric Zosyn   COPD - + wheezing on exam  -Continue nebulized budesonide; hope to avoid systemic steroids unless necessary  -continue advair and incruse while in hospital , resume Trelegy after discharge   -harishonebs qid  -outpatient follow up with Dr. Henson   Proph  -subcutaneous heparin   Dispo  -full code  -inpatient     We will continue to follow with you         Herrera Albarran M.D.  Pulmonary/Critical Care           [1]   EPINEPHrine-racemic    glucose **OR** glucose **OR** glucose-vitamin C **OR** dextrose **OR** glucose **OR** glucose **OR** glucose-vitamin C    acetaminophen **OR** acetaminophen **OR** acetaminophen    polyethylene glycol (PEG 3350)    bisacodyl    hydrALAzine    HYDROcodone-acetaminophen    HYDROmorphone **OR** HYDROmorphone **OR** HYDROmorphone

## 2025-07-11 NOTE — DIETARY NOTE
[facility]   part of [facility]   CLINICAL NUTRITION    [facility]     Admitting diagnosis:  Midgut volvulus (HCC) [Q43.3]    Ht:  5'  Wt: 58 kg (127 lb 13.9 oz).   Body mass index is 24.97 kg/m².  IBW: 45.5kg    Wt Readings from Last 6 Encounters:   07/10/25 58 kg (127 lb 13.9 oz)   05/05/25 56.7 kg (125 lb)   01/27/25 56.2 kg (124 lb)   01/15/25 55.8 kg (123 lb)   12/08/24 56.2 kg (124 lb)   09/10/24 56.7 kg (125 lb)       Labs/Meds reviewed    Diet:       Procedures    Clear liquid diet Is Patient on Accuchecks? Yes       Pt has been NPO/CL x 5 days. POD 3 exploratory laparotomy, detorsion of midgut volvulus, partial mesh explant. Diet to advance as medically feasible. NG out. If diet is unable to advance in next 2-3 days, recommend nutrition support if aggressive care is pursued. RD available to make recommendations. Will continue to monitor and follow pt nutritional status.     PMHx COPD, CKD stage III, moderate mitral and aortic regurgitation, HTN, HLD, Hypothyroidism, GERD, BPH,  diverticulitis, ventral incisional hernia     Patient is at low nutrition risk at this time.    Please consult if patient status changes or nutrition issues arise.    Karin Mcgrath RD, LDN  Clinical Nutrition  [phone]

## 2025-07-11 NOTE — DISCHARGE INSTRUCTIONS
Exploratory Laparotomy   WHAT TO EXPECT   You may feel pain at the incision site. This is due to the stitches or staples that were placed during surgery  You may feel mild nausea and vomiting for the first 24 hours, this should resolve quickly.  You may feel a sore throat. This is due to the breathing tube used during surgery.  You may have constipation, especially if you take the prescribed narcotic pain medications. If you have not had a bowel movement for 48 hours after surgery, take Miralax 17g (one cap full) every 12 hours until you have a bowel movement. If another 24 hours goes by without a bowel movement, then take a dose of magnesium citrate or milk of magnesia.    MEDICATIONS  Take 2 Extra Strength Tylenol (1000mg every) every 8 hours for pain. For the first 3 days it is best to take Tylenol every 8 hours even if you do not feel much pain. Be sure not to exceed 4000 mg of Tylenol within 24 hours.  You can also take NSAID (Advil or Aleve): Advil (ibuprofen) 800mg every 8 hours or Aleve (naproxen) 500mg every 12 hours for pain. For the first 3 days it is best to take the NSAID around the clock even if you do not feel much pain. Be sure not to exceed 3200 mg of Ibuprofen or 1000 mg of naproxen within 24 hours. Do not take NSAIDs if you have a contraindication to taking them (peptic ulcer disease, Crohn’s disease, ulcerative colitis, kidney failure, bleeding disorders or if you are on current anticoagulation medications).   For pain that is not controlled by alternating the above, take one Oxycodone pill (5mg) every six hours as needed for pain. If you do not feel that narcotics are necessary, you shouldn’t take them. If the pain is severe, you can take two pills (10mg) every six hours.  Please ask your surgeon before resuming blood thinners such as Aspirin, Plavix, Coumadin, Warfarin, Eliquis, or Xarelto.   All other home medications may be resumed as scheduled.    DIET  Start with a bland diet and slowly  advance to regular food as your appetite improves. There are no specific food restrictions.  It is common to have a decreased appetite following surgery. Eat small frequent meals to combat this. You can supplement your diet with nutritional shakes such as boost or ensure.  Drink plenty of water or a sports drink to stay hydrated.  Do not drink alcohol (beer, wine, liquor) or use tobacco products.      WOUND CARE  Your incisions are closed with staples. These will be removed in our office 14-21 days following your operation.   If you have any dressings over your incisions (band-aids, 4x4 gauze), these should be removed 2 days following your surgery.  No additional dressings or bandages are needed to cover the incision sites. If you have small amounts of drainage from any of your incision sites, you may place a dry dressing over the wound to protect your clothing   You may shower 24 hours after surgery. Soap and water can get on the incisions, but do not scrub the wounds. No hair dye or chemicals of any kind should get on the incisions.  Do not apply any topical ointments such as Neosporin or Hydrogen Peroxide over the incision sites.  Do not swim or submerge the incisions under water (pool, bath, or hot tub) for at least 2 weeks following surgery.   A drain may be left in place following surgery. Drain care instructions will be reviewed prior to discharge. You should measure daily output from your drain in a log. Drain output should decrease over time. Drains are typically left in place for 5-7 days days following surgery. The drain may have been removed in the hospital prior to discharge. If you discharge with the drain in place, this will be removed at your post-operative visit. If you notice a change in your drain color, increased drain output, or your drain is leaking, please contact our office immediately.    ACTIVITY  Every day you should be up out of bed, showered, and dressed. Do not stay in bed all day.  You  should be up and walking frequently. Walking helps prevent blood clots in the legs and pneumonia. You may walk in the house, outside, or in stores to stay more active.   You can go up and down stairs.   You will discharge home with an abdominal binder. You may wear the binder as tight or loose as you would like. You can also wear the binder as needed, but it is beneficial to wear while walking/ moving around to provide support.   Do not lift, push, pull a force greater than 5-10 pounds or perform strenuous activity that requires straining the core muscles until your post-operative visit. At that time, we will review the remaining activity restrictions. You will have a lifting restriction in place for 6-8 weeks following surgery.  You may ride in a car but should not drive the car for at least one week. Do not drive within 8 hours of taking narcotic medication (Oxycodone). You must be able to react quickly to avoid a traffic accident without pain before you can drive.    RETURN TO WORK/SCHOOL  The average time off work/ school is 1-2 weeks. If you require a note for work/school, you can call our office and we will provide one for you. One can also be written for you at your post-operative visit  If FMLA or short-term disability paperwork needs to be completed, please contact our forms department at [phone] for further information. The forms department is open Monday- Friday 8 AM-4 PM.    APPOINTMENT  Please call our office as soon as possible at [phone] to make an appointment 10-14 days after surgery.  For questions or concerns please call our office between 8:30 a.m. and 5 p.m.Monday through Friday. The number above directs you to the answering service after hours to reach the on-call physician.  Please call our office immediately for fever greater than 100.5 F, excess bleeding, inability to urinate, severe abdominal pain, severe diarrhea, uncontrollable vomiting.  For life threatening emergencies such  as severe chest pain, difficulty breathing, or loss of consciousness, call 911.

## 2025-07-11 NOTE — PLAN OF CARE
A&Ox4. VSS RA/ Encouraged IS Denies chest pain, shortness of breath GI: Abdomen soft, nondistended.  Abdominal binder from pt's home in place. : Voids. Pain controlled with PRN pain medications. Up with standby assist. Incisions: CDI Diet:NPO IVF per order. All appropriate safety measures in place.

## 2025-07-11 NOTE — PLAN OF CARE
A&Ox4. VSS. RA. . IS. Denies chest pain and SOB.   Telemetry: NSR.   GI: Abdomen soft, nondistended. Passing gas.   Denies nausea.   : Voids. Urinal at bedside   Pain controlled with PRN pain medications.   Up with standby assist/walker   Drains: None  Incisions: ML abdominal staples. CDI abd binder in place   Diet: clear liquid diet   IVF running per order. All appropriate safety measures in place. All questions and concerns addressed.

## 2025-07-11 NOTE — SLP NOTE
SPEECH DAILY NOTE - INPATIENT    ASSESSMENT & PLAN   ASSESSMENT  Pt seen for dysphagia tx to assess tolerance with recommended diet, ensure proper utilization of aspiration precautions and provide pt/family education. Daughter present. Patient diet has been advanced per surgery/MD.  Patient with meal tray present consisted of mac n cheese and mashed potatoes.  Patient tolerated PO trials with no overt clinical s/s aspiration observed or suspected.  Patient denied pain or globus sensation.  Patient with fair appetite.  Daughter endorsed no concern with patient swallow function at this time. All education completed with patient/daughter. D/W RN.       Diet Recommendations - Solids: Regular - as per MD discretion  Diet Recommendations - Liquids: Thin Liquids       Aspiration Precautions: Upright position, Small bites, Small sips  Medication Administration Recommendations: One pill at a time    Patient Experiencing Pain: No        Treatment Plan  Treatment Plan/Recommendations: No further inpatient SLP service warranted    Interdisciplinary Communication: Discussed with RN            GOALS  Goal #1 The patient will tolerate regular consistency and thin liquids without overt signs or symptoms of aspiration with 90 % accuracy over 1-2 session(s). Met   Goal #2 The patient/family/caregiver will demonstrate understanding and implementation of aspiration precautions and swallow strategies independently over 1-2 session(s).    Met       FOLLOW UP  Follow Up Needed (Documentation Required): No    Session: 1 of 1    If you have any questions, please contact SALOMON Mclaughlin MS CCC-SLP/L  Speech-Language Pathologist  Spect[employer]-Link [phone]

## 2025-07-11 NOTE — CM/SW NOTE
CM followed up with son Re: [facility] vs HH choice.     Son began conversation by expressing concern regarding infection/cleanliness in the hospital and his hope is that the patient will go to [facility] to continue nursing care and therapy. CM explained difference in care ratio and setting between ICU, medical floor, and then rehab setting and concern for infection in the rehab setting. Son expressed concern is pt lives at home alone and has 14 steps to get upstairs. Patient is currently ambulating in the halls well but has not completed stairs, as this writer was told. Message sent to OT logged into pt's chart for the day sharing conversation and asking for goal to be stairs.     Son stated he will follow up with CM later today.   Did express that [facility] is choice for [facility] but did not answer and spoke around choice for HH.     Addendum 1603: Followed up with pt's son regarding progress today and how stairs with PT went and any other concerns regarding home vs [facility]. Son is undecided at this point and would like to see how his dad progresses over next 24-48 hours. Family in contact with [facility] and choice is [facility] for [facility].  If going home, HHC undecided. Son is making decisions and family is translating for patient.     SW/CM to remain available for support and/or discharge planning.    Zarina Matos, BSN RN, CMSRN  RN Case Manager

## 2025-07-11 NOTE — PROGRESS NOTES
[facility]  Progress Note    [facility] [place] Patient Status:  Inpatient    1934 MRN [id number]   Location [facility] 3NW-A Attending Lizzy Nelson, DO   Hosp Day # 4 PCP Jeffrey Gan MD     Subjective:  The patient was seen and examined at bedside. Family is at bedside aiding in translation. The patient reports passing small amounts of flatus. He also reports having a small bowel movement early this morning. He is tolerating clear liquids.     Objective/Physical Exam:  BP (!) 164/74 (BP Location: Left arm)   Pulse 95   Temp 98.4 °F (36.9 °C) (Oral)   Resp 16   Wt 127 lb 13.9 oz (58 kg)   SpO2 99%   BMI 24.97 kg/m²     Intake/Output Summary (Last 24 hours) at 2025 1050  Last data filed at 2025 1000  Gross per 24 hour   Intake 1101.63 ml   Output 800 ml   Net 301.63 ml         General: Alert, oriented x3. No acute distress.  HEENT: Normocephalic, atraumatic. No scleral icterus.  Pulmonary: No respiratory distress, effort normal.   Abdomen: mildly-distended, without tympany to percussion. Soft, appropriate incisional site tenderness to palpation. No rebound or guarding. No peritoneal signs.   Incision: midline incision is clean, dry, intact without surrounding erythema or cellulitis. Staples in place  Extremities: No lower extremity edema. No clubbing or cyanosis.   Skin: Warm, dry. No jaundice.       Labs:  Lab Results   Component Value Date    WBC 6.3 2025    HGB 8.7 2025    HCT 25.9 2025    .0 2025      Lab Results   Component Value Date    INR 2.86 (H) 2023    INR 1.13 (H) 2019    INR 1.10 2019     Lab Results   Component Value Date     2025    K 3.8 2025    K 3.8 2025     2025    CO2 28.0 2025    BUN 21 2025    CREATSERUM 1.52 2025    GLU 81 2025    CA 7.7 2025    ALKPHO 57 2025    ALT <7 2025    AST 19 2025    BILT 0.5  07/11/2025    ALB 2.9 07/11/2025    TP 5.0 07/11/2025          Assessment:  Problem List[1]    POD 4 exploratory laparotomy, detorsion of midgut volvulus, partial mesh explant  Acute hypoxemic respiratory failure  COPD     Plan:  Continue clear liquid diet. Advance diet as tolerated. Advised patient to go slow   Antiemetics and analgesics as needed  Encourage ambulation and up to chair-PT on consult   Encourage incentive spirometer  Medical management per  hospitalist  DVT prophylaxis with heparin  GI prophylaxis with protonix       The patient was discussed with Dr. Stark , and he is in agreement with the assessment and plan. My total face time with this patient was 25 minutes. Greater than half of the visit was spent in counseling the patient on the above listed diagnoses and treatment options.     Robyn Dumont PA-C  7/11/2025  10:50 AM         [1]   Patient Active Problem List  Diagnosis    Abdominal pain    Back pain with radiation    Pneumonia due to infectious organism    Lumbar spinal stenosis    Lumbosacral radiculopathy    Nontraumatic subdural hygroma    Acute encephalopathy    Hyponatremia    Hyperglycemia    Gastrointestinal hemorrhage    Gastrointestinal hemorrhage, unspecified gastrointestinal hemorrhage type    Dyspnea    Upper GI bleeding    Asthma-COPD overlap syndrome (HCC)    Essential hypertension, benign    Diarrhea, unspecified type    Hypernatremia    Crohn's disease of large intestine without complication (HCC)    Stage 3 chronic kidney disease (HCC)    PE (pulmonary thromboembolism) (HCC)    Acute bronchospasm    Influenza    Influenza A    COPD exacerbation (HCC)    Acute respiratory failure, unspecified whether with hypoxia or hypercapnia (HCC)    Moderate mitral regurgitation    Hypokalemia    Hyperkalemia    Acute kidney injury (HCC)    Midgut volvulus (HCC)    Acute respiratory failure with hypoxia (HCC)

## 2025-07-11 NOTE — OCCUPATIONAL THERAPY NOTE
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 Pt has assist with ADL and mobility as needed. Pt has a part time CG daily- hours vary based on when family is there. Pt ambulatory with RW. Pt attends [employer] Classes a few days per week.     ASSESSMENT   Patient demonstrates good  progress this session, goals remain in progress.    Patient continues to function below baseline with ADLs and functional transfers.   Contributing factors to remaining limitations include decreased functional strength, decreased functional reach, decreased endurance, and decreased muscular endurance.  Next session anticipate patient to progress toileting, lower body dressing, bed mobility, transfers, static standing balance, dynamic standing balance, functional standing tolerance, and energy conservation strategies.  Occupational Therapy will continue to follow patient for duration of hospitalization.    Patient continues to benefit from continued skilled OT services: at discharge to promote prior level of function and safety with additional support and return home with home health OT.     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 Co-Morbidities : CHF, COPD, PE, emphysema, OA, HTN, crohn's disease, TKA    WEIGHT BEARING RESTRICTION       Recommendations for nursing staff:   Transfers: 1 person  Toileting location:  toilet    TREATMENT SESSION:  Patient Start of Session: seated in chair    FUNCTIONAL TRANSFER ASSESSMENT  Sit to Stand: Chair  Edge of Bed: Not Tested  Chair: Contact Guard Assist    BED MOBILITY  Rolling: Not Tested  Supine to Sit : Not tested  Sit to Supine (OT): Not Tested    BALANCE ASSESSMENT  Static Sitting: Supervision  Static Standing: Stand-by Assist    FUNCTIONAL ADL ASSESSMENT  UB Dressing Seated: Minimal Assist    ACTIVITY TOLERANCE: WFL                         O2 SATURATIONS       EDUCATION PROVIDED  Patient Education : Role of Occupational Therapy; Plan of Care; Discharge Recommendations; Functional Transfer Techniques; Fall Prevention; Posture/Positioning; Energy Conservation; Proper Body Mechanics  Patient's Response to Education: Verbalized Understanding; Requires Further Education    Equipment used: RW  Demonstrates functional use, Would benefit from additional trial     Therapist comments: Pt received seated in chair, pleasant and cooperative for OT session. Son requesting for pt to have a clean gown on. Pt requires min A for gown management. Pt stands to RW at [facility] and transfers to w/c at [facility]/SBA. Pt is taken to stairs. See PT note for stair training. Pt demos standing balance at [facility]/SBA in preparation for standing based ADLs/functional transfers. Pt completes stairs at [facility] and transfers back to w/c > bedside chair at [facility] with RW.    Patient End of Session: Up in chair, Needs met, Call light within reach, RN aware of session/findings, All patient questions and concerns addressed, Hospital anti-slip socks, Family present    SUBJECTIVE  Pt pleasant and cooperative.    PAIN ASSESSMENT  Ratin  Location: denies  Management Techniques: Nurse notified, Body mechanics, Activity promotion, Repositioning     OBJECTIVE  Precautions: Bed/chair alarm, NG suction, Abdominal protective strategies    AM-PAC ‘6-Clicks’ Inpatient Daily Activity Short Form  -   Putting on and taking off regular lower body  clothing?: A Lot  -   Bathing (including washing, rinsing, drying)?: A Lot  -   Toileting, which includes using toilet, bedpan or urinal? : A Lot  -   Putting on and taking off regular upper body clothing?: A Little  -   Taking care of personal grooming such as brushing teeth?: A Little  -   Eating meals?: A Little    AM-PAC Score:  Score: 15  Approx Degree of Impairment: 56.46%  Standardized Score (AM-PAC Scale): 34.69    PLAN  OT Device Recommendations: TBD  OT Treatment Plan: Balance activities, Energy conservation/work simplification techniques, ADL training, Functional transfer training, UE strengthening/ROM, Endurance training, Patient/Family education, Patient/Family training, Cognitive reorientation, Equipment eval/education, Compensatory technique education  Rehab Potential : Good  Frequency: 3-5x/week    OT Goals:     All goals ongoing 07/11    ADL Goals  Patient will perform toileting with supervision and AE PRN  Patient will perform LB dressing with supervision and AE PRN     Functional Transfer Goals  Patient will perform bed mobility supine to sit with supervision  Patient will perform bed mobility sit to supine with supervision  Patient will perform toilet transfer with supervision    OT Session Time: 25 minutes  Self-Care Home Management: 25 minutes

## 2025-07-11 NOTE — PROGRESS NOTES
[facility]   part of [facility]     Hospitalist Progress Note     Travistiffanie Cooney Patient Status:  Inpatient    1934 MRN [id number]   Location [facility] 4SW-A Attending Lizzy Nelson DO    Hosp Day # 4 PCP Jeffrey Gan MD     Chief Complaint: Abdominal pain and distention    Subjective:     Patient    passibng flatus has walked x2 in halls  Breathing a little  hard when walking, some pain in abd  Tolerating FLD   Da at bedside      Objective:    Review of Systems:   A comprehensive review of systems was completed; pertinent positive and negatives stated in subjective.    Vital signs:  Temp:  [97.4 °F (36.3 °C)-98.4 °F (36.9 °C)] 97.4 °F (36.3 °C)  Pulse:  [91-99] 94  Resp:  [16-22] 18  BP: (152-164)/(59-85) 155/59  SpO2:  [99 %-100 %] 100 %    Physical Exam:    General: No acute distress, awake and alert,   Respiratory: Unlabored diminished, no rhonchi RA sats 100 %   Cardiovascular: S1, S2, regular rate and rhythm  NSR   Abdomen: Soft, + post op tenderness, non-distended,  hypo    bowel sounds, binder in place, incision dry and intact  + flatus , small NM earlier   Extremities: no edema     Diagnostic Data:    Labs:  Recent Labs   Lab 258 25  0413 25  0421 07/10/25  0350 25  0518   WBC 11.8* 11.6* 9.0 6.7 6.3   HGB 9.5* 9.6* 9.5* 8.4* 8.7*   MCV 90.1 89.9 91.3 92.6 93.2   .0 202.0 186.0 177.0 186.0     Recent Labs   Lab 25  0421 07/10/25  0351 25  0518   * 157* 81   BUN 38* 32* 21   CREATSERUM 2.47* 2.01* 1.52*   CA 8.2* 7.6* 7.7*   ALB 3.0* 2.8* 2.9*    147* 149*   K 3.8 3.4*  3.4* 3.8  3.8    114* 116*   CO2 27.0 28.0 28.0   ALKPHO 69 58 57   AST 21 17 19   ALT <7* <7* <7*   BILT 0.6 0.4 0.5   TP 5.2* 4.8* 5.0*     Estimated Glomerular Filtration Rate: 43 mL/min/1.73m2 (A) (result from lab).    No results for input(s): \"TROP\", \"TROPHS\", \"CK\" in the last 168 hours.    No results for input(s): \"PTP\", \"INR\" in  the last 168 hours.     Microbiology  No results found for this visit on 07/06/25.    Imaging: Reviewed in Epic.    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    Complete zosyn after 1 more dose   Replace k/phos   Na 149   Cr lower 1.52   Hgb better 8.7   Sw following for dc plans   SONIYA/ vs home w/ family   Pt rec home w/ pt therapy  - normally lives alone   CXR/ EKG  reviewed stable   Cont IS   Walking    Krysta Dickey NP     Addendum:     Patient seen and examined independently.  Agree with above except as otherwise noted.       Gen: NAD  CVS: s1s2  Resp: CTA  Abd: soft     Assessment and Plan:     #Midgut volvulus  -s/p emergent ex-lap and successful detorsion 7/7  -NGT tube removed  -Empiric zosyn  -Pain control, PRN anti-emetics   -Supportive care  -FLD, ADAT  -Surgery following      #ANTHONY on CKD 3  -improving  -holding PTA lasix  -follow BMP     #Hypernatremia  -gentle IVF  -holding  diuretics  -follow BMP     #Hypokalemia  -replace  -follow BMP    #Hypertension  -Resume PTA carvedilol, losartan     #Post operative mechanical ventilation  -Extubated 7/8  -Encourage IS for atelectasis  -nebs     #Normocytic anemia   #Component of post-op blood loss, expected  -iron studies ordered   -Trend CBC      #COPD   -PTA inhalers  -Nebs     #Chronic HFpEF  #Moderate mitral and aortic regurgitation   -Hold lasix     #Crohn's disease  -resume mesalamine    #GERD  -Currently on IV PPI     #BPH  -Flomax      #Hypothyroidism  -Levothyroxine      #HLD  #Prior VTE previously on DOAC   #Ventral incisional hernia with failed repair x 2 in past   #Hx of diverticulitis        Lizzy Nelson,       Supplementary Documentation:     Quality:  DVT Mechanical Prophylaxis:   SCDs,    DVT Pharmacologic Prophylaxis   Medication    heparin (Porcine) 5000 UNIT/ML injection 5,000 Units     Code Status: Full Code  Thomas: No urinary catheter in place  YAS: TBD    Discharge is dependent on: Clinical state, consultant recs  At this point Mr. Cooney is expected to be discharge to: TBD pending PT/OT eval    The 21st Century Cures Act makes medical notes like these available to patients in the interest of transparency. Please be advised this is a medical document. Medical documents are intended to carry relevant information, facts as evident, and the clinical opinion of the practitioner. The medical note is intended as peer to peer communication and may appear blunt or direct. It is written in medical language and may contain abbreviations or verbiage that are unfamiliar.                   [1]    potassium phosphate dibasic 15 mmol in sodium chloride 0.9% 250 mL IVPB  15 mmol Intravenous Once    insulin aspart  1-5 Units Subcutaneous q6h    heparin  5,000 Units Subcutaneous Q8H SCAR    budesonide  0.5 mg Nebulization BID    piperacillin-tazobactam  3.375 g Intravenous Q12H    ipratropium-albuterol  3 mL Nebulization QID     fluticasone-salmeterol  1 puff Inhalation BID    umeclidinium bromide  1 puff Inhalation Daily    tamsulosin  0.4 mg Oral Nightly    levothyroxine  75 mcg Oral Daily @ 0700    pantoprazole  40 mg Oral QAM AC    Or    pantoprazole  40 mg Intravenous QAM AC

## 2025-07-11 NOTE — PHYSICAL THERAPY NOTE
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 Pt diagnosed with Midgut volvulus and is s/p emergent ex lap and successful detorsion 7/7/25. Pt was intubated for procedure and extubated 7/7/25.     HOME SITUATION  Type of Home: [facility]  Home Layout: Multi-level         Lives With: Son, Caregiver part-time    Drives: No   Patient Regularly Uses: Cane, Rolling walker       Prior Level of [place]: Pt reports he is typically mod ind with ADLs, ambulates mod ind with RW, and stair climbs mod ind. Pt lives with his son who assists as needed, has a CG part time when the son is not home, and goes to adult day care some days.   PHYSICAL THERAPY ASSESSMENT   Patient demonstrates good  progress this session, goals  remain in progress.      Patient is requiring contact guard assist as a result of the following impairments: decreased functional strength, decreased endurance/aerobic capacity, pain, impaired   balance, decreased muscular endurance, and medical status.     Patient continues to function below baseline with bed mobility, transfers, gait, and stair negotiation.  Next session anticipate patient to progress bed mobility, transfers, and gait.  Physical Therapy will continue to follow patient for duration of hospitalization.    Patient continues to benefit from continued skilled PT services: at discharge to promote prior level of function.  Anticipate patient will return home with home health PT.    PLAN DURING HOSPITALIZATION  Nursing Mobility Recommendation : 1 Assist  PT Device  Recommendation: Rolling walker  PT Treatment Plan: Bed mobility, Endurance, Energy conservation, Patient education, Family education, Gait training, Strengthening, Stair training, Transfer training, Balance training  Frequency (Obs): 3-5x/week     CURRENT GOALS     Goal #1 Patient is able to demonstrate supine - sit EOB @ level: supervision      Goal #2 Patient is able to demonstrate transfers Sit to/from Stand at assistance level: supervision      Goal #3 Patient is able to ambulate 150 feet with assist device: walker - rolling at assistance level: supervision      Goal #4 Patient is able to stair climb 4 stairs with supervision   Goal #5     Goal #6     Goal Comments: Goals established on 7/7/2025 7/11/2025 all goals ongoing    SUBJECTIVE  \"Ok lets try\"    OBJECTIVE  Precautions: Bed/chair alarm, NG suction, Abdominal protective strategies    WEIGHT BEARING RESTRICTION     PAIN ASSESSMENT   Rating: Unable to rate  Location: abdomen  Management Techniques: Activity promotion, Repositioning, Relaxation    BALANCE                                                                                                                       Static Sitting: Fair +  Dynamic Sitting: Fair +           Static Standing: Poor +  Dynamic Standing: Poor +    ACTIVITY TOLERANCE   Denies dizziness, vitals monitored and stable                      O2 WALK       AM-PAC '6-Clicks' INPATIENT SHORT FORM - BASIC MOBILITY  How much difficulty does the patient currently have...  Patient Difficulty: Turning over in bed (including adjusting bedclothes, sheets and blankets)?: A Little   Patient Difficulty: Sitting down on and standing up from a chair with arms (e.g., wheelchair, bedside commode, etc.): A Little   Patient Difficulty: Moving from lying on back to sitting on the side of the bed?: A Little   How much help from another person does the patient currently need...   Help from Another: Moving to and from a bed to a chair (including a  wheelchair)?: A Little   Help from Another: Need to walk in hospital room?: A Little   Help from Another: Climbing 3-5 steps with a railing?: A Lot     AM-PAC Score:  Raw Score: 17   Approx Degree of Impairment: 50.57%   Standardized Score (AM-PAC Scale): 42.13   CMS Modifier (G-Code): CK    FUNCTIONAL ABILITY STATUS  Gait Assessment   Functional Mobility/Gait Assessment  Gait Assistance: Contact guard assist  Distance (ft): 20 x 2  Assistive Device: Rolling walker  Pattern: Shuffle (flexed posture)  Stairs: Stairs (son witnessed stairs - described home setup)  How Many Stairs: 10  Device: 2 Rails  Assist: Contact guard assist  Pattern: Ascend and Descend  Ascend and Descend : Step to    Skilled Therapy Provided: Per RN okay to work with pt. Pt received in chair and was agreeable to PT session.     Bed Mobility:  Rolling: NT   Supine<>Sit: NT   Sit<>Supine: NT    Transfer Mobility:  Sit<>Stand: CGA  Stand<>Sit: CGA    Gait: CGA with RW - improved [place]aine, and completed stairs per son's request.       Patient End of Session: Up in chair, Needs met, RN aware of session/findings, Call light within reach, All patient questions and concerns addressed, Hospital anti-slip socks    PT Session Time: 23 minutes  Gait Training: 15min  Therapeutic Activity: 8 minutes

## 2025-07-12 NOTE — PROGRESS NOTES
S: He is feeling ok. No new complaints. He's sitting up eating.    Meds:   carvedilol  6.25 mg Oral BID with meals    ferrous sulfate  325 mg Oral BID with meals    losartan  25 mg Oral Daily    mesalamine DR  800 mg Oral TID    insulin aspart  1-5 Units Subcutaneous q6h    heparin  5,000 Units Subcutaneous Q8H SCAR    budesonide  0.5 mg Nebulization BID    ipratropium-albuterol  3 mL Nebulization QID    fluticasone-salmeterol  1 puff Inhalation BID    umeclidinium bromide  1 puff Inhalation Daily    tamsulosin  0.4 mg Oral Nightly    levothyroxine  75 mcg Oral Daily @ 0700    pantoprazole  40 mg Oral QAM AC       Prn Meds:  PRN Medications[1]    Infusions:        OBJECTIVE:  Vitals:    07/12/25 0030 07/12/25 0459 07/12/25 0740 07/12/25 0926   BP: (!) 161/69 140/79 159/63    Pulse: 83 71 75 93   Resp: 19 20 18    Temp: 97.9 °F (36.6 °C) 98.1 °F (36.7 °C) 97.8 °F (36.6 °C)    TempSrc: Oral Oral Oral    SpO2: 98% 99% 99% 97%   Weight:         O2: room air    Gen - Alert, oriented x 3, in no apparent distress  Lungs - + occasional wheezes  CV - regular rate & rhythm. Normal S1, S2. No murmurs   Extremities - No cyanosis, clubbing, edema appreciated.        Labs:  Recent Labs   Lab 07/10/25  0350 07/11/25  0518 07/12/25  0433   WBC 6.7 6.3 5.6   HGB 8.4* 8.7* 8.2*   .0 186.0 179.0     Recent Labs   Lab 07/09/25  0421 07/10/25  0351 07/11/25  0518 07/12/25  0433    147* 149* 148*   K 3.8 3.4*  3.4* 3.8  3.8 3.8  3.8    114* 116* 116*   CO2 27.0 28.0 28.0 25.0   BUN 38* 32* 21 19   CREATSERUM 2.47* 2.01* 1.52* 1.49*   * 157* 81 119*   ANIONGAP 9 5 5 7   ALB 3.0* 2.8* 2.9*  --    CA 8.2* 7.6* 7.7* 7.3*   MG 2.4 2.3 2.4 2.2   PHOS 4.0 2.7 2.1* 2.2*   TP 5.2* 4.8* 5.0*  --      Recent Labs   Lab 07/09/25  0421 07/10/25  0351 07/11/25  0518   ALKPHO 69 58 57   AST 21 17 19   ALT <7* <7* <7*   BILT 0.6 0.4 0.5       Recent Labs   Lab 07/07/25  0256 07/07/25  0949   ABGPHT 7.42 7.41    HKXMDN6M 33* 31*   CHSDT8B 167* 136*   ABGHCO3 23.0 21.6   ABGBE -2.5* -4.3*   LACTIBG  --  2.0     Recent Labs   Lab 07/07/25  0056   IPH 7.36   IPCO2 36.6   IPO2 222   ITCO2 22   IHCO3 20.5   ISO2 100   IOANA -5.0           ASSESSMENT AND PLAN     [facility] Dharmesh Cooney is a 89 year old male with a history of COPD, pulmonary embolism, CKD, CHF, hypertension, Crohn's disease, hyperlipidemia, hypothyroidism, BPH, chronic anemia, and chronic ventral incisional hernia, who was admitted 7/6 with a volvulus requiring surgical repair   Acute hypoxemic respiratory failure  - intubated for exlap, extubated 7/7. Patient has had some postop wheezing which could be from upper airway edema vs COPD  -supplemental oxygen prn - now on room air  -encourage IS use  -philip qid  -continue nebulized budesonide bid   Volvulus - in a patient with a hx of diverticular disease and ventral incisional hernia, now s/p ex lap with detorsion 7/7  -per surgery  -completed a course of Zosyn   COPD - intermittent wheezing on exam  -Continue nebulized budesonide; hope to avoid systemic steroids unless necessary  -continue advair and incruse while in hospital; resume Trelegy after discharge   -philip   -outpatient follow up with Dr. Natividad Fernandes  -subcutaneous heparin   Dispo  -full code  -inpatient ; discharge planning per hospitalist and surgery    We will continue to follow with you       Herrera Albarran M.D.  Pulmonary/Critical Care             [1]   albuterol    EPINEPHrine-racemic    glucose **OR** glucose **OR** glucose-vitamin C **OR** dextrose **OR** glucose **OR** glucose **OR** glucose-vitamin C    acetaminophen **OR** acetaminophen **OR** [DISCONTINUED] acetaminophen    polyethylene glycol (PEG 3350)    bisacodyl    hydrALAzine    HYDROcodone-acetaminophen

## 2025-07-12 NOTE — PROGRESS NOTES
[facility]  Progress Note    [facility] [place] Patient Status:  Inpatient    1934 MRN [id number]   Location [facility] 3NW-A Attending Lizzy Nelson, DO   Hosp Day # 5 PCP Jeffrey Gan MD     Subjective:  The patient is resting in bed.  He reports feeling well today.  He denies abdominal pain, nausea, or vomiting.  He reports passing flatus and having a bowel movement.    Objective/Physical Exam:  General: Alert, orientated x3.  Cooperative.  No apparent distress.  Vital Signs:  Blood pressure 159/63, pulse 93, temperature 97.8 °F (36.6 °C), temperature source Oral, resp. rate 18, weight 127 lb 13.9 oz (58 kg), SpO2 97%.  HEENT: Normocephalic, atraumatic. No scleral icterus.  Abdomen:  Soft, non-distended, appropriately tender, with no rebound or guarding.  No peritoneal signs.  Incision: Midline dry, clean, and intact with staples in place. No surrounding erythema or drainage. No signs of infection.    Labs:  CBC:    Lab Results   Component Value Date    WBC 5.6 2025    WBC 6.3 2025    WBC 6.7 07/10/2025     Lab Results   Component Value Date    HGB 8.2 (L) 2025    HGB 8.7 (L) 2025    HGB 8.4 (L) 07/10/2025      Lab Results   Component Value Date    .0 2025    .0 2025    .0 07/10/2025     Lab Results   Component Value Date    WBC 5.6 2025    HGB 8.2 2025    HCT 23.8 2025    .0 2025    CREATSERUM 1.49 2025    BUN 19 2025     2025    K 3.8 2025    K 3.8 2025     2025    CO2 25.0 2025     2025    CA 7.3 2025    MG 2.2 2025    PHOS 2.2 2025       Assessment/Plan:  Problem List[1]    POD 5 exploratory laparotomy, detorsion of midgut volvulus, partial mesh explantation  Acute hypoxemic respiratory failure  COPD    Continue soft diet.  Multimodal pain control with Tylenol and Norco. Antiemetics as  needed.  Encourage incentive spirometer.  Encourage ambulation and up to chair.  DVT prophylaxis with heparin.  GI prophylaxis with Protonix.  Hopeful discharge home within the next 24-48 hours.      BRIGIDA Morrison  7/12/2025  11:14 AM     This note was initiated by Leida Bustillos PA-C.  The PA saw the patient in conjunction with me. The PA performed a history, exam, and developed the assessment and plan. I agree with the mentioned assessment and plan and have provided further documentation of my own, if necessary.    Marcial Apodaca MD  [facility] General Surgery           [1]   Patient Active Problem List  Diagnosis    Abdominal pain    Back pain with radiation    Pneumonia due to infectious organism    Lumbar spinal stenosis    Lumbosacral radiculopathy    Nontraumatic subdural hygroma    Acute encephalopathy    Hyponatremia    Hyperglycemia    Gastrointestinal hemorrhage    Gastrointestinal hemorrhage, unspecified gastrointestinal hemorrhage type    Dyspnea    Upper GI bleeding    Asthma-COPD overlap syndrome (HCC)    Essential hypertension, benign    Diarrhea, unspecified type    Hypernatremia    Crohn's disease of large intestine without complication (HCC)    Stage 3 chronic kidney disease (HCC)    PE (pulmonary thromboembolism) (HCC)    Acute bronchospasm    Influenza    Influenza A    COPD exacerbation (HCC)    Acute respiratory failure, unspecified whether with hypoxia or hypercapnia (HCC)    Moderate mitral regurgitation    Hypokalemia    Hyperkalemia    Acute kidney injury (HCC)    Midgut volvulus (HCC)    Acute respiratory failure with hypoxia (HCC)

## 2025-07-12 NOTE — PLAN OF CARE
Problem: Patient/Family Goals  Goal: Patient/Family Long Term Goal  Description: Patient's Long Term Goal: return home    Interventions:  - employ coping mechanisms to maintain calm for weaning trials  - report pain to staff  - participate in PT/OT as able    - See additional Care Plan goals for specific interventions  Outcome: Progressing     Problem: PAIN - ADULT  Goal: Verbalizes/displays adequate comfort level or patient's stated pain goal  Description: INTERVENTIONS:  - Encourage pt to monitor pain and request assistance  - Assess pain using appropriate pain scale  - Administer analgesics based on type and severity of pain and evaluate response  - Implement non-pharmacological measures as appropriate and evaluate response  - Consider cultural and social influences on pain and pain management  - Manage/alleviate anxiety  - Utilize distraction and/or relaxation techniques  - Monitor for opioid side effects  - Notify MD/LIP if interventions unsuccessful or patient reports new pain  - Anticipate increased pain with activity and pre-medicate as appropriate  Outcome: Progressing     Problem: SAFETY ADULT - FALL  Goal: Free from fall injury  Description: INTERVENTIONS:  - Assess pt frequently for physical needs  - Identify cognitive and physical deficits and behaviors that affect risk of falls.  - [place] fall precautions as indicated by assessment.  - Educate pt/family on patient safety including physical limitations  - Instruct pt to call for assistance with activity based on assessment  - Modify environment to reduce risk of injury  - Provide assistive devices as appropriate  - Consider OT/PT consult to assist with strengthening/mobility  - Encourage toileting schedule  Outcome: Progressing     Problem: RESPIRATORY - ADULT  Goal: Achieves optimal ventilation and oxygenation  Description: INTERVENTIONS:  - Assess for changes in respiratory status  - Assess for changes in mentation and behavior  - Position to  facilitate oxygenation and minimize respiratory effort  - Oxygen supplementation based on oxygen saturation or ABGs  - Provide Smoking Cessation handout, if applicable  - Encourage broncho-pulmonary hygiene including cough, deep breathe, Incentive Spirometry  - Assess the need for suctioning and perform as needed  - Assess and instruct to report SOB or any respiratory difficulty  - Respiratory Therapy support as indicated  - Manage/alleviate anxiety  - Monitor for signs/symptoms of CO2 retention  Outcome: Progressing     Problem: GASTROINTESTINAL - ADULT  Goal: Minimal or absence of nausea and vomiting  Description: INTERVENTIONS:  - Maintain adequate hydration with IV or PO as ordered and tolerated  - Nasogastric tube to low intermittent suction as ordered  - Evaluate effectiveness of ordered antiemetic medications  - Provide nonpharmacologic comfort measures as appropriate  - Advance diet as tolerated, if ordered  - Obtain nutritional consult as needed  - Evaluate fluid balance  Outcome: Progressing  Goal: Maintains or returns to baseline bowel function  Description: INTERVENTIONS:  - Assess bowel function  - Maintain adequate hydration with IV or PO as ordered and tolerated  - Evaluate effectiveness of GI medications  - Encourage mobilization and activity  - Obtain nutritional consult as needed  - Establish a toileting routine/schedule  - Consider collaborating with pharmacy to review patient's medication profile  Outcome: Progressing     Problem: METABOLIC/FLUID AND ELECTROLYTES - ADULT  Goal: Glucose maintained within prescribed range  Description: INTERVENTIONS:  - Monitor Blood Glucose as ordered  - Assess for signs and symptoms of hyperglycemia and hypoglycemia  - Administer ordered medications to maintain glucose within target range  - Assess barriers to adequate nutritional intake and initiate nutrition consult as needed  - Instruct patient on self management of diabetes  Outcome: Progressing  Goal:  Electrolytes maintained within normal limits  Description: INTERVENTIONS:  - Monitor labs and rhythm and assess patient for signs and symptoms of electrolyte imbalances  - Administer electrolyte replacement as ordered  - Monitor response to electrolyte replacements, including rhythm and repeat lab results as appropriate  - Fluid restriction as ordered  - Instruct patient on fluid and nutrition restrictions as appropriate  Outcome: Progressing  Goal: Hemodynamic stability and optimal renal function maintained  Description: INTERVENTIONS:  - Monitor labs and assess for signs and symptoms of volume excess or deficit  - Monitor intake, output and patient weight  - Monitor urine specific gravity, serum osmolarity and serum sodium as indicated or ordered  - Monitor response to interventions for patient's volume status, including labs, urine output, blood pressure (other measures as available)  - Encourage oral intake as appropriate  - Instruct patient on fluid and nutrition restrictions as appropriate  Outcome: Progressing     Problem: SKIN/TISSUE INTEGRITY - ADULT  Goal: Skin integrity remains intact  Description: INTERVENTIONS  - Assess and document risk factors for pressure ulcer development  - Assess and document skin integrity  - Monitor for areas of redness and/or skin breakdown  - Initiate interventions, skin care algorithm/standards of care as needed  Outcome: Progressing  Goal: Incision(s), wounds(s) or drain site(s) healing without S/S of infection  Description: INTERVENTIONS:  - Assess and document risk factors for pressure ulcer development  - Assess and document skin integrity  - Assess and document dressing/incision, wound bed, drain sites and surrounding tissue  - Implement wound care per orders  - Initiate isolation precautions as appropriate  - Initiate Pressure Ulcer prevention bundle as indicated  Outcome: Progressing     Problem: MUSCULOSKELETAL - ADULT  Goal: Return mobility to safest level of  function  Description: INTERVENTIONS:  - Assess patient stability and activity tolerance for standing, transferring and ambulating w/ or w/o assistive devices  - Assist with transfers and ambulation using safe patient handling equipment as needed  - Ensure adequate protection for wounds/incisions during mobilization  - Obtain PT/OT consults as needed  - Advance activity as appropriate  - Communicate ordered activity level and limitations with patient/family  Outcome: Progressing     Problem: Impaired Functional Mobility  Goal: Achieve highest/safest level of mobility/gait  Description: Interventions:  - Assess patient's functional ability and stability  - Promote increasing activity/tolerance for mobility and gait  - Educate and engage patient/family in tolerated activity level and precautions    Outcome: Progressing     Problem: Impaired Activities of Daily Living  Goal: Achieve highest/safest level of independence in self care  Description: Interventions:  - Assess ability and encourage patient to participate in ADLs to maximize function  - Promote sitting position while performing ADLs such as feeding, grooming, and bathing  - Educate and encourage patient/family in tolerated functional activity level and precautions during self-care    Outcome: Progressing     Problem: Impaired Swallowing  Goal: Minimize aspiration risk  Description: Interventions:  - Patient should be alert and upright for all feedings (90 degrees preferred)  - Offer food and liquids at a slow rate  - No straws  - Encourage small bites of food and small sips of liquid  - Offer pills one at a time, crush or deliver with applesauce as needed  - Discontinue feeding and notify MD (or speech pathologist) if coughing or persistent throat clearing or wet/gurgly vocal quality is noted  Outcome: Progressing  The patient is alert and oriented x 4, and his vital signs are stable. He ate his entire breakfast this morning with no complaint of abdominal pain  or nausea after eating. He has sat up in the chair and ambulated in the hallways with his son today.

## 2025-07-12 NOTE — PROGRESS NOTES
Pt is alert and oriented, vss. Sats 99% on RA.  Pt reports mild abdominal pain, declines pain medication.  Midline incision with staples, abdominal binder in place.   Tolerating a soft diet, passing gas, active bowel sounds.   Up sba/walker, voids adequately. IV Zosyn as per Mar. Poc discussed, call light in reach.

## 2025-07-12 NOTE — PROGRESS NOTES
[facility]   part of [facility]     Hospitalist Progress Note     Deangeloholly Cooney Patient Status:  Inpatient    1934 MRN [id number]   Location [facility] 4SW-A Attending Lizzy Nelson DO    Hosp Day # 5 PCP Jeffrey Gan MD     Chief Complaint: Abdominal pain and distention    Subjective:     Awake/alert, no complaint, ambulated several times yesterday. Discussed sodium with pt and son and encouraged fluid intake increase today    Objective:    Review of Systems:   A comprehensive review of systems was completed; pertinent positive and negatives stated in subjective.    Vital signs:  Temp:  [97.4 °F (36.3 °C)-98.4 °F (36.9 °C)] 98.1 °F (36.7 °C)  Pulse:  [] 71  Resp:  [15-20] 20  BP: (133-164)/(59-85) 140/79  SpO2:  [98 %-100 %] 99 %    Physical Exam:    General: No acute distress, awake and alert,   Respiratory: Unlabored diminished, no rhonchi RA sats 100 %   Cardiovascular: S1, S2, regular rate and rhythm  NSR   Abdomen: Soft, + post op tenderness, non-distended,  hypo    bowel sounds, binder in place, incision dry and intact  + flatus , small NM earlier   Extremities: no edema     Diagnostic Data:    Labs:  Recent Labs   Lab 25  0413 25  0421 07/10/25  0350 2518 25  0433   WBC 11.6* 9.0 6.7 6.3 5.6   HGB 9.6* 9.5* 8.4* 8.7* 8.2*   MCV 89.9 91.3 92.6 93.2 90.8   .0 186.0 177.0 186.0 179.0     Recent Labs   Lab 25  0421 07/10/25  0351 25  0518 25  0433   * 157* 81 119*   BUN 38* 32* 21 19   CREATSERUM 2.47* 2.01* 1.52* 1.49*   CA 8.2* 7.6* 7.7* 7.3*   ALB 3.0* 2.8* 2.9*  --     147* 149* 148*   K 3.8 3.4*  3.4* 3.8  3.8 3.8  3.8    114* 116* 116*   CO2 27.0 28.0 28.0 25.0   ALKPHO 69 58 57  --    AST 21 17 19  --    ALT <7* <7* <7*  --    BILT 0.6 0.4 0.5  --    TP 5.2* 4.8* 5.0*  --      Estimated Glomerular Filtration Rate: 44 mL/min/1.73m2 (A) (result from lab).    No results for input(s):  \"TROP\", \"TROPHS\", \"CK\" in the last 168 hours.    No results for input(s): \"PTP\", \"INR\" in the last 168 hours.     Microbiology  No results found for this visit on 07/06/25.    Imaging: Reviewed in Epic.    Medications: Scheduled Medications[1]    Assessment & Plan:      #Midgut volvulus  -s/p emergent ex-lap and successful detorsion 7/7  -NGT tube removed  -Empiric zosyn  -Pain control, PRN anti-emetics   -Supportive care  -tolerating soft diet   -Surgery following      #ANTHONY on CKD 3  -improving  -holding PTA lasix  -follow BMP     #Hypernatremia  -encourage increased PO fluid intake  -holding diuretics  -follow BMP     #Hypokalemia  -replace  -follow BMP    #Hypertension  -PTA carvedilol, losartan     #Post operative mechanical ventilation  -Extubated 7/8  -Encourage IS for atelectasis  -nebs     #Normocytic anemia   #Component of post-op blood loss, expected  -iron studies reviewed   -Trend CBC      #COPD   -PTA inhalers  -Nebs     #Chronic HFpEF  #Moderate mitral and aortic regurgitation   -Hold lasix     #Crohn's disease  -mesalamine    #GERD  -Currently on IV PPI     #BPH  -Flomax      #Hypothyroidism  -Levothyroxine      #HLD  #Prior VTE previously on DOAC   #Ventral 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 Cooney is expected to be discharge to: TBD pending PT/OT eval    The 21st Century Cures Act makes medical notes like these available to patients in the interest of transparency. Please be advised this is a medical document. Medical documents are intended to carry relevant information, facts as evident, and the  clinical opinion of the practitioner. The medical note is intended as peer to peer communication and may appear blunt or direct. It is written in medical language and may contain abbreviations or verbiage that are unfamiliar.                     [1]    carvedilol  6.25 mg Oral BID with meals    ferrous sulfate  325 mg Oral BID with meals    losartan  25 mg Oral Daily    mesalamine DR  800 mg Oral TID    insulin aspart  1-5 Units Subcutaneous q6h    heparin  5,000 Units Subcutaneous Q8H SCAR    budesonide  0.5 mg Nebulization BID    ipratropium-albuterol  3 mL Nebulization QID    fluticasone-salmeterol  1 puff Inhalation BID    umeclidinium bromide  1 puff Inhalation Daily    tamsulosin  0.4 mg Oral Nightly    levothyroxine  75 mcg Oral Daily @ 0700    pantoprazole  40 mg Oral QAM AC

## 2025-07-13 NOTE — PROGRESS NOTES
S: He had some dyspnea and wheezing this am. He improved somewhat after neb treatments but continues to \"wheeze\".     Meds:   potassium phosphate dibasic 15 mmol in sodium chloride 0.9% 250 mL IVPB  15 mmol Intravenous Once    insulin aspart  1-5 Units Subcutaneous TID CC and HS    carvedilol  6.25 mg Oral BID with meals    ferrous sulfate  325 mg Oral BID with meals    losartan  25 mg Oral Daily    mesalamine DR  800 mg Oral TID    heparin  5,000 Units Subcutaneous Q8H SCAR    budesonide  0.5 mg Nebulization BID    ipratropium-albuterol  3 mL Nebulization QID    fluticasone-salmeterol  1 puff Inhalation BID    umeclidinium bromide  1 puff Inhalation Daily    tamsulosin  0.4 mg Oral Nightly    levothyroxine  75 mcg Oral Daily @ 0700    pantoprazole  40 mg Oral QAM AC       Prn Meds:  PRN Medications[1]    Infusions:        OBJECTIVE:  Vitals:    07/13/25 0710 07/13/25 0728 07/13/25 0912 07/13/25 0923   BP: (!) 172/63  137/63 137/64   Pulse: 73  88 85   Resp: 14  22    Temp: 98.5 °F (36.9 °C)      TempSrc: Oral      SpO2: 100% 99% 100% 100%   Weight:         O2: 3L    Gen - Alert, oriented x 3, in no apparent distress  Lungs - + wheezing sounds but seems to be referred from upper airway.  CV - regular rate & rhythm. Normal S1, S2. No murmurs   Extremities - No cyanosis, clubbing, edema appreciated.        Labs:  Recent Labs   Lab 07/11/25  0518 07/12/25  0433 07/13/25  0551   WBC 6.3 5.6 6.4   HGB 8.7* 8.2* 9.1*   .0 179.0 196.0     Recent Labs   Lab 07/09/25  0421 07/10/25  0351 07/11/25  0518 07/12/25  0433 07/13/25  0551    147* 149* 148*  --    K 3.8 3.4*  3.4* 3.8  3.8 3.8  3.8 3.8    114* 116* 116*  --    CO2 27.0 28.0 28.0 25.0  --    BUN 38* 32* 21 19  --    CREATSERUM 2.47* 2.01* 1.52* 1.49*  --    * 157* 81 119*  --    ANIONGAP 9 5 5 7  --    ALB 3.0* 2.8* 2.9*  --   --    CA 8.2* 7.6* 7.7* 7.3*  --    MG 2.4 2.3 2.4 2.2 2.0   PHOS 4.0 2.7 2.1* 2.2* 2.3*  2.4   TP  5.2* 4.8* 5.0*  --   --      Recent Labs   Lab 07/09/25  0421 07/10/25  0351 07/11/25  0518   ALKPHO 69 58 57   AST 21 17 19   ALT <7* <7* <7*   BILT 0.6 0.4 0.5       Recent Labs   Lab 07/07/25  0256 07/07/25  0949   ABGPHT 7.42 7.41   WERSLI9Y 33* 31*   NKYIK4C 167* 136*   ABGHCO3 23.0 21.6   ABGBE -2.5* -4.3*   LACTIBG  --  2.0     Recent Labs   Lab 07/07/25  0056   IPH 7.36   IPCO2 36.6   IPO2 222   ITCO2 22   IHCO3 20.5   ISO2 100   IOANA -5.0           ASSESSMENT AND PLAN     [facility] Dharmesh Cooney is a 89 year old male with a history of COPD, pulmonary embolism, CKD, CHF, hypertension, Crohn's disease, hyperlipidemia, hypothyroidism, BPH, chronic anemia, and chronic ventral incisional hernia, who was admitted 7/6 with a volvulus requiring surgical repair   Acute hypoxemic respiratory failure  - intubated for exlap, extubated 7/7. Patient has had some postop wheezing which could be from upper airway edema vs COPD. Vocal cord dysfunction is also possible.  -wean O2 as able  -start IV solumedrol given persistent upper airway wheezing  -encourage IS use  -philip qid  Volvulus - in a patient with a hx of diverticular disease and ventral incisional hernia, now s/p ex lap with detorsion 7/7  -per surgery  -completed a course of Zosyn   COPD - intermittent wheezing on exam, seems to be upper airway  -start IV steroids as above  -continue advair and incruse while in hospital; resume Trelegy after discharge   -philip   -outpatient follow up with Dr. Natividad Fernandes  -subcutaneous heparin   Dispo  -full code  -inpatient     Discussed w/ patient's son. Discussed w/ hospitalist.    We will continue to follow with you       Herrera Albarran M.D.  Pulmonary/Critical Care               [1]   ipratropium-albuterol    acetaminophen    albuterol    EPINEPHrine-racemic    glucose **OR** glucose **OR** glucose-vitamin C **OR** dextrose **OR** glucose **OR** glucose **OR** glucose-vitamin C    polyethylene glycol (PEG 3350)     bisacodyl    hydrALAzine    HYDROcodone-acetaminophen

## 2025-07-13 NOTE — PROGRESS NOTES
[facility]   part of [facility]     Hospitalist Progress Note     Travistiffanie Cooney Patient Status:  Inpatient    1934 MRN [id number]   Location [facility] 4SW-A Attending Lizzy Nelson DO    Hosp Day # 6 PCP Jeffrey Gan MD     Chief Complaint: Abdominal pain and distention    Subjective:     Several episodes of upper airway wheezing this morning and feeling like he couldn't breathe. O2 sats remained WNL on RA. Son at bedside. Updated on plan of care and answered all questions    Objective:    Review of Systems:   A comprehensive review of systems was completed; pertinent positive and negatives stated in subjective.    Vital signs:  Temp:  [97.1 °F (36.2 °C)-98.5 °F (36.9 °C)] 98.5 °F (36.9 °C)  Pulse:  [73-93] 73  Resp:  [14-26] 14  BP: (158-172)/(63-70) 172/63  SpO2:  [96 %-100 %] 99 %    Physical Exam:    General: No acute distress, awake and alert,   Respiratory: Unlabored diminished, no rhonchi RA sats 100 %   Cardiovascular: S1, S2, regular rate and rhythm  NSR   Abdomen: Soft, + post op tenderness, non-distended,  hypo    bowel sounds, binder in place, incision dry and intact  + flatus , small NM earlier   Extremities: no edema     Diagnostic Data:    Labs:  Recent Labs   Lab 07/09/25  0421 07/10/25  0350 07/11/25  0518 07/12/25  0433 07/13/25  0551   WBC 9.0 6.7 6.3 5.6 6.4   HGB 9.5* 8.4* 8.7* 8.2* 9.1*   MCV 91.3 92.6 93.2 90.8 90.8   .0 177.0 186.0 179.0 196.0     Recent Labs   Lab 07/09/25  0421 07/10/25  0351 07/11/25  0518 07/12/25  0433 25  0551   * 157* 81 119*  --    BUN 38* 32* 21 19  --    CREATSERUM 2.47* 2.01* 1.52* 1.49*  --    CA 8.2* 7.6* 7.7* 7.3*  --    ALB 3.0* 2.8* 2.9*  --   --     147* 149* 148*  --    K 3.8 3.4*  3.4* 3.8  3.8 3.8  3.8 3.8    114* 116* 116*  --    CO2 27.0 28.0 28.0 25.0  --    ALKPHO 69 58 57  --   --    AST 21 17 19  --   --    ALT <7* <7* <7*  --   --    BILT 0.6 0.4 0.5  --   --    TP 5.2* 4.8*  5.0*  --   --      Estimated Glomerular Filtration Rate: 44 mL/min/1.73m2 (A) (result from lab).    No results for input(s): \"TROP\", \"TROPHS\", \"CK\" in the last 168 hours.    No results for input(s): \"PTP\", \"INR\" in the last 168 hours.     Microbiology  No results found for this visit on 07/06/25.    Imaging: Reviewed in Epic.    Medications: Scheduled Medications[1]    Assessment & Plan:      #Midgut volvulus  -s/p emergent ex-lap and successful detorsion 7/7  -NGT tube removed  -Empiric zosyn  -Pain control, PRN anti-emetics   -Supportive care  -tolerating soft diet   -Surgery following     #Upper airway wheezing; possible vocal cord dysfunction, inflammation from intubation/ET tube  -CXR without acute process  -avoid supplemental O2 if no hypoxia  -trial IV steroids  -nebs  -supportive care  -IS     #ANTHONY on CKD 3  -improving  -holding PTA lasix  -follow BMP     #Hypernatremia  -encourage increased PO fluid intake  -holding diuretics  -follow BMP     #Hypokalemia  -replace  -follow BMP    #Hypertension  -PTA carvedilol, losartan     #Post operative mechanical ventilation  -Extubated 7/8  -Encourage IS for atelectasis  -nebs     #Normocytic anemia   #Component of post-op blood loss, expected  -iron studies reviewed   -Trend CBC      #COPD   -PTA inhalers  -Nebs     #Chronic HFpEF  #Moderate mitral and aortic regurgitation   -Hold lasix     #Crohn's disease  -mesalamine    #GERD  -Currently on IV PPI     #BPH  -Flomax      #Hypothyroidism  -Levothyroxine      #HLD  #Prior VTE previously on DOAC   #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 Cooney is expected to be discharge to: TBD pending PT/OT margarita    The 21st Century Cures Act makes medical notes like these available to patients in the interest of transparency. Please be advised this is a medical document. Medical documents are intended to carry relevant information, facts as evident, and the clinical opinion of the practitioner. The medical note is intended as peer to peer communication and may appear blunt or direct. It is written in medical language and may contain abbreviations or verbiage that are unfamiliar.                       [1]    potassium phosphate dibasic 15 mmol in sodium chloride 0.9% 250 mL IVPB  15 mmol Intravenous Once    insulin aspart  1-5 Units Subcutaneous TID CC and HS    carvedilol  6.25 mg Oral BID with meals    ferrous sulfate  325 mg Oral BID with meals    losartan  25 mg Oral Daily    mesalamine DR  800 mg Oral TID    heparin  5,000 Units Subcutaneous Q8H SCAR    budesonide  0.5 mg Nebulization BID    ipratropium-albuterol  3 mL Nebulization QID    fluticasone-salmeterol  1 puff Inhalation BID    umeclidinium bromide  1 puff Inhalation Daily    tamsulosin  0.4 mg Oral Nightly    levothyroxine  75 mcg Oral Daily @ 0700    pantoprazole  40 mg Oral QAM AC

## 2025-07-13 NOTE — PROGRESS NOTES
[facility]  Progress Note    Syed Cooney Patient Status:  Inpatient    1934 MRN [id number]   Location [facility] 3NW-A Attending Lizzy Nelson, DO   Hosp Day # 6 PCP Jeffrey Gan MD     Subjective:  Patient resting in chair with family present. Family reports that patient has continued to have oxygen requirements and cough. Patient with history of COPD.     In terms of his surgical site, patient states abdomen is feeling well. He is tolerating a soft diet, no nausea or vomiting. He continues to pass flatus and report bowel movements. He denies fever or chills.    Patient currently on 3 L O2 via nasal cannula at bedside, SpO2 100%.    Objective/Physical Exam:  General: Alert, orientated x3.  Cooperative.  No apparent distress.  Vital Signs:  Blood pressure 137/64, pulse 85, temperature 98.5 °F (36.9 °C), temperature source Oral, resp. rate 22, weight 127 lb 13.9 oz (58 kg), SpO2 100%.  Wt Readings from Last 3 Encounters:   07/10/25 127 lb 13.9 oz (58 kg)   25 125 lb (56.7 kg)   25 124 lb (56.2 kg)     Lungs: No respiratory distress.  Cardiac: Regular rate and rhythm.   Abdomen:  Soft, non distended, appropriately tender, with no rebound or guarding.  No peritoneal signs.   Extremities:  No lower extremity edema noted.    Incision: midline incision with staples, clean, dry, intact, no erythema.      Intake/Output:    Intake/Output Summary (Last 24 hours) at 2025 0945  Last data filed at 2025 0923  Gross per 24 hour   Intake 480 ml   Output 300 ml   Net 180 ml     I/O last 3 completed shifts:  In: 3303 [P.O.:1380; I.V.:1923]  Out: 200 [Urine:200]  I/O this shift:  In: 0   Out: 100 [Urine:100]    Medications: Scheduled Medications[1]    Labs:  Lab Results   Component Value Date    WBC 6.4 2025    HGB 9.1 2025    HCT 26.5 2025    .0 2025     Lab Results   Component Value Date    K 3.8 2025     Lab Results   Component  Value Date    INR 2.86 (H) 09/06/2023    INR 1.13 (H) 08/31/2019    INR 1.10 07/16/2019         Assessment  Problem List[2]    POD 6 exploratory laparotomy, detorsion of midgut volvulus, partial mesh explantation  Acute hypoxemic respiratory failure  COPD    Plan:  Continue soft diet  Chest x-ray demonstrating no focal consolidation, no pleural effusion, no pneumothorax.  Pulmonology following, appreciate recommendations  Encourage incentive spirometry  Multimodal pain control with Tyelnol and Norco. Antiemetics as needed  Ambulate and up to chair  DVT prophylaxis with heparin  GI prophylaxis with protonix  Discharge pending improvement in oxygenation status and clearance from pulmonology.  Patient is stable from general surgery standpoint for discharge home once cleared by pulmonology and primary teams.  The patient will follow-up with [facility] in 2 weeks.    Quality:  DVT Mechanical Prophylaxis:   SCDs,    DVT Pharmacologic Prophylaxis   Medication    heparin (Porcine) 5000 UNIT/ML injection 5,000 Units                Code Status: Full Code  Thomas: No urinary catheter in place  Thomas Duration (in days):   Central line:    YAS: 7/13/2025        Kerry Zhou PA-C  7/13/2025  9:45 AM      The patient was seen and examined with Dr. Apodaca.             [1]    potassium phosphate dibasic 15 mmol in sodium chloride 0.9% 250 mL IVPB  15 mmol Intravenous Once    insulin aspart  1-5 Units Subcutaneous TID CC and HS    carvedilol  6.25 mg Oral BID with meals    ferrous sulfate  325 mg Oral BID with meals    losartan  25 mg Oral Daily    mesalamine DR  800 mg Oral TID    heparin  5,000 Units Subcutaneous Q8H SCAR    budesonide  0.5 mg Nebulization BID    ipratropium-albuterol  3 mL Nebulization QID    fluticasone-salmeterol  1 puff Inhalation BID    umeclidinium bromide  1 puff Inhalation Daily    tamsulosin  0.4 mg Oral Nightly    levothyroxine  75 mcg Oral Daily @ 0700    pantoprazole  40 mg Oral QAM AC    [2]   Patient Active Problem List  Diagnosis    Abdominal pain    Back pain with radiation    Pneumonia due to infectious organism    Lumbar spinal stenosis    Lumbosacral radiculopathy    Nontraumatic subdural hygroma    Acute encephalopathy    Hyponatremia    Hyperglycemia    Gastrointestinal hemorrhage    Gastrointestinal hemorrhage, unspecified gastrointestinal hemorrhage type    Dyspnea    Upper GI bleeding    Asthma-COPD overlap syndrome (HCC)    Essential hypertension, benign    Diarrhea, unspecified type    Hypernatremia    Crohn's disease of large intestine without complication (HCC)    Stage 3 chronic kidney disease (HCC)    PE (pulmonary thromboembolism) (HCC)    Acute bronchospasm    Influenza    Influenza A    COPD exacerbation (HCC)    Acute respiratory failure, unspecified whether with hypoxia or hypercapnia (HCC)    Moderate mitral regurgitation    Hypokalemia    Hyperkalemia    Acute kidney injury (HCC)    Midgut volvulus (HCC)    Acute respiratory failure with hypoxia (HCC)

## 2025-07-13 NOTE — PROGRESS NOTES
Pt is alert and oriented, vss. Sats 99% on RA.  Pt denies pain, denies nausea. Tolerating a soft diet, passing gas, active bowel sounds.   Midline incision with staples, CHG cleansed, abdominal binder in place.   Up sba/walker, voids adequately. IV Zosyn as per Mar.   Pt'son Jose who is POA voiced concern about not wanted his dad to discharge home and that the patient would need more help thus he want him discharge to [facility] for Rehab. Writing RN informed the pt and the son about possibility for SW consult for discharge planning and consult placed per protocol. Poc discussed, call light in reach.    0300:Pt c/o SOB, RT called to bedside, prn Duoneb offered. Pt c/o of feeling even worse. Jayesh 99% room air, vss. Noted expiratory wheezing. Prn Albuterol offered shortly after. Pt reported feeling a little better and able to sleep.    0630:Pt still having more noticeable wheezing. Sats still in high 90's, NSR on Tele, Hospitalist paged regarding above. CXR ordered.

## 2025-07-13 NOTE — PHYSICAL THERAPY NOTE
Duplicate PT orders received and cleared. Pt already on PT caseload and PT will continue to follow.

## 2025-07-13 NOTE — PROGRESS NOTES
Patient is saline locked, on room air, shortness of breath and wheezing mildly improved since starting IV Solu-medrol this morning, [facility], on telemetry running NSR, voiding into urinal, denies pain, ambulates with standby assist and a walker, incision is C/D/I, Hydralazine given for elevated blood pressure, tolerating a low fiber diet. Possible discharge tomorrow to [facility] pending Pulmonology clearance. Patient and son updated on plan of care.

## 2025-07-14 NOTE — PROGRESS NOTES
Pulmonary Progress Note        NAME: Syed Cooney - ROOM: 321/[facility]-A - MRN: [id number] - Age: 89 year old - : 1934        Last 24hrs: No events overnight, conts to have exp wheezing    OBJECTIVE:  Vitals:    25 0000 25 0430 25 0755 25 1231   BP: (!) 158/95  (!) 161/82 142/76   BP Location: Right arm  Right arm Right arm   Pulse: 93 85 90 82   Resp: 18  20 20   Temp: 98.2 °F (36.8 °C)  97.6 °F (36.4 °C) 97.8 °F (36.6 °C)   TempSrc: Oral  Oral Oral   SpO2: 99% 99% 100% 100%   Weight:           Oxygen Therapy  SpO2: 100 %  O2 Device: None (Room air)  ETCO2 (mmHg): 21 mmHg  FiO2 (%): 40 %  O2 Flow Rate (L/min): 3 L/min  Pulse Oximetry Type: Continuous  Oximetry Probe Site Changed: No  Pulse Ox Probe Location: Left hand                  Intake/Output Summary (Last 24 hours) at 2025 1254  Last data filed at 2025 0905  Gross per 24 hour   Intake 577 ml   Output 450 ml   Net 127 ml       Scheduled Medication:Scheduled Medications[1]  Continuous Infusing Medication:Medication Infusions[2]    Lungs: wheezes posterior - bilateral  Heart: S1, S2 normal, no murmur, click, rub or gallop, regular rate and rhythm  Abdomen: soft, non-tender; bowel sounds normal; no masses,  no organomegaly  Extremities: extremities normal, atraumatic, no cyanosis or edema    Labs reviewed as noted below        ASSESSMENT/PLAN:    Acute hypoxemic respiratory failure  - intubated for exlap, extubated . Patient has had some postop wheezing which could be from upper airway edema vs COPD. Vocal cord dysfunction is also possible.  -wean O2 as able  Volvulus - in a patient with a hx of diverticular disease and ventral incisional hernia, now s/p ex lap with detorsion   -per surgery  -completed a course of Zosyn   COPD - intermittent wheezing on exam, seems to be upper airway, possibly from persistent coughing that pt had earlier this admission  -cont IV steroids  -continue advair and incruse while in  hospital; resume Trelegy after discharge   -philip   -outpatient follow up with Dr. Natividad Fernandes  -subcutaneous heparin   Dispo  -full code  -inpatient   -discussed w/ Daughter at bedside      Niall GreenBowmanstownstan  [facility]  Pulmonary and Critical Care             [1]    insulin aspart  1-5 Units Subcutaneous TID CC and HS    methylPREDNISolone  80 mg Intravenous Q8H    carvedilol  6.25 mg Oral BID with meals    ferrous sulfate  325 mg Oral BID with meals    losartan  25 mg Oral Daily    mesalamine DR  800 mg Oral TID    heparin  5,000 Units Subcutaneous Q8H SCAR    ipratropium-albuterol  3 mL Nebulization QID    fluticasone-salmeterol  1 puff Inhalation BID    umeclidinium bromide  1 puff Inhalation Daily    tamsulosin  0.4 mg Oral Nightly    levothyroxine  75 mcg Oral Daily @ 0700    pantoprazole  40 mg Oral QAM AC   [2]

## 2025-07-14 NOTE — PROGRESS NOTES
Patient is saline locked, on room air, continues to have some shortness of breath with exertion and expiratory wheezing, IV Solu-medrol scheduled, on telemetry running NSR/ST, incision is C/D/I, ambulates with standby assist and a walker, tolerating a low fiber diet, [place]. Plan to discharge to [facility] once cleared by Hospitalist and Pulmonology. Patient and family updated on plan of care.

## 2025-07-14 NOTE — PROGRESS NOTES
[facility]  Progress Note    [facility] Patient Status:  In[facility]    1934 MRN [id number]   Location [facility] 3NW-A Attending Lizzy Nelson, DO   Hosp Day # 7 PCP Jeffrey Gan MD     Subjective:  Patient seen resting in chair in her bed with family present and translating for the entirety of the encounter.  Patient continues to experience shortness of breath and wheezing.  He otherwise denies abdominal pain.  He is tolerating soft diet without nausea and vomiting.  He has passed flatus and had a bowel movement yesterday per family.    Objective/Physical Exam:  General: Alert, orientated x3.  Cooperative.  No apparent distress.  Vital Signs:  Blood pressure (!) 161/82, pulse 90, temperature 97.6 °F (36.4 °C), temperature source Oral, resp. rate 10, weight 127 lb 13.9 oz (58 kg), SpO2 100%.  Wt Readings from Last 3 Encounters:   07/10/25 127 lb 13.9 oz (58 kg)   25 125 lb (56.7 kg)   25 124 lb (56.2 kg)     Lungs: SpO2 99% on room air. Audible wheezing on exam.  Cardiac: Regular rate and rhythm.   Abdomen:  Soft, non distended, mild LUQ tenderness, with no rebound or guarding.  No peritoneal signs.   Extremities:  No lower extremity edema noted.    Incision: Clean, dry, and intact with staples present.  No sign of infection or cellulitis.    Intake/Output:    Intake/Output Summary (Last 24 hours) at 2025 1134  Last data filed at 2025 0905  Gross per 24 hour   Intake 577 ml   Output 450 ml   Net 127 ml     I/O last 3 completed shifts:  In: 937 [P.O.:600; I.V.:337]  Out: 700 [Urine:700]  I/O this shift:  In: 240 [P.O.:240]  Out: 50 [Urine:50]    Medications: Scheduled Medications[1]    Labs:     Lab Results   Component Value Date     2025    K 4.3 2025     2025    CO2 24.0 2025    BUN 18 2025    CREATSERUM 1.43 2025     2025    CA 7.8 2025     Lab Results   Component Value Date    INR  2.86 (H) 09/06/2023    INR 1.13 (H) 08/31/2019    INR 1.10 07/16/2019         Assessment  Problem List[2]    POD 7 exploratory laparotomy, detorsion of midgut volvulus, partial mesh explantation  Acute hypoxemic respiratory failure  COPD    Plan:  Continue soft diet as tolerated.  Continue bowel regimen as needed.  Respiratory per pulmonary recommendations.  Multimodal pain control with Tylenol, and as needed Norco.  Ambulate and up to chair  DVT prophylaxis with heparin.  GI prophylaxis with Protonix.  Medical management per primary.  Patient stable for discharge from surgical standpoint once respiratory status improves and he is cleared by pulmonology and hospitalist.  Follow-up [facility] in 2 weeks.    Quality:  DVT Mechanical Prophylaxis:   SCDs,    DVT Pharmacologic Prophylaxis   Medication    heparin (Porcine) 5000 UNIT/ML injection 5,000 Units                Code Status: Full Code  Thomas: No urinary catheter in place  Thomas Duration (in days):   Central line:    YAS:         BRIGIDA Yoon  7/14/2025  11:34 AM                   [1]    insulin aspart  1-5 Units Subcutaneous TID CC and HS    methylPREDNISolone  80 mg Intravenous Q8H    carvedilol  6.25 mg Oral BID with meals    ferrous sulfate  325 mg Oral BID with meals    losartan  25 mg Oral Daily    mesalamine DR  800 mg Oral TID    heparin  5,000 Units Subcutaneous Q8H SCAR    ipratropium-albuterol  3 mL Nebulization QID    fluticasone-salmeterol  1 puff Inhalation BID    umeclidinium bromide  1 puff Inhalation Daily    tamsulosin  0.4 mg Oral Nightly    levothyroxine  75 mcg Oral Daily @ 0700    pantoprazole  40 mg Oral QAM AC   [2]   Patient Active Problem List  Diagnosis    Abdominal pain    Back pain with radiation    Pneumonia due to infectious organism    Lumbar spinal stenosis    Lumbosacral radiculopathy    Nontraumatic subdural hygroma    Acute encephalopathy    Hyponatremia    Hyperglycemia    Gastrointestinal hemorrhage     Gastrointestinal hemorrhage, unspecified gastrointestinal hemorrhage type    Dyspnea    Upper GI bleeding    Asthma-COPD overlap syndrome (HCC)    Essential hypertension, benign    Diarrhea, unspecified type    Hypernatremia    Crohn's disease of large intestine without complication (HCC)    Stage 3 chronic kidney disease (HCC)    PE (pulmonary thromboembolism) (HCC)    Acute bronchospasm    Influenza    Influenza A    COPD exacerbation (HCC)    Acute respiratory failure, unspecified whether with hypoxia or hypercapnia (HCC)    Moderate mitral regurgitation    Hypokalemia    Hyperkalemia    Acute kidney injury (HCC)    Midgut volvulus (HCC)    Acute respiratory failure with hypoxia (HCC)

## 2025-07-14 NOTE — PLAN OF CARE
A&Ox4. VSS on RA. Pain managed with PRN medications. ABD binder on and intact. SOB noted with activity. Pt reports improvement of SOB since yesterday. Call light in reach. Pt updated on POC. Continue IV Steroids

## 2025-07-14 NOTE — PROGRESS NOTES
[facility]   part of [facility]     Hospitalist Progress Note     Syed Dharmesh Cooney Patient Status:  Inpatient    1934 MRN [id number]   Location [facility] 4SW-A Attending Lizzy Nelson DO    Hosp Day # 7 PCP Jeffrey Gan MD     Chief Complaint: Abdominal pain and distention    Subjective:     Overall feels SOB has improved, however continues to feel wheezing/SOB after using nebulizers and after eating    Objective:    Review of Systems:   A comprehensive review of systems was completed; pertinent positive and negatives stated in subjective.    Vital signs:  Temp:  [97.4 °F (36.3 °C)-98.2 °F (36.8 °C)] 97.6 °F (36.4 °C)  Pulse:  [71-93] 90  Resp:  [10-25] 10  BP: (137-169)/(58-95) 161/82  SpO2:  [98 %-100 %] 100 %    Physical Exam:    General: No acute distress, awake and alert,   Respiratory: Unlabored diminished, no rhonchi RA sats 100 %   Cardiovascular: S1, S2, regular rate and rhythm  NSR   Abdomen: Soft, + post op tenderness, non-distended,  hypo    bowel sounds, binder in place, incision dry and intact  + flatus , small NM earlier   Extremities: no edema     Diagnostic Data:    Labs:  Recent Labs   Lab 07/09/25  0421 07/10/25  0350 07/11/25  0518 07/12/25  0433 25  0551   WBC 9.0 6.7 6.3 5.6 6.4   HGB 9.5* 8.4* 8.7* 8.2* 9.1*   MCV 91.3 92.6 93.2 90.8 90.8   .0 177.0 186.0 179.0 196.0     Recent Labs   Lab 07/09/25  0421 07/10/25  [id number] 25  0433 25  0551 25  0524   * 157* 81 119* 124*  --    BUN 38* 32* 21 19 16  --    CREATSERUM 2.47* 2.01* 1.52* 1.49* 1.27  --    CA 8.2* 7.6* 7.7* 7.3* 7.6*  --    ALB 3.0* 2.8* 2.9*  --   --   --     147* 149* 148* 147*  --    K 3.8 3.4*  3.4* 3.8  3.8 3.8  3.8 3.8  3.8 4.3    114* 116* 116* 114*  --    CO2 27.0 28.0 28.0 25.0 23.0  --    ALKPHO 69 58 57  --   --   --    AST 21 17 19  --   --   --    ALT <7* <7* <7*  --   --   --    BILT 0.6 0.4 0.5  --   --   --    TP  5.2* 4.8* 5.0*  --   --   --      Estimated Glomerular Filtration Rate: 53 mL/min/1.73m2 (A) (result from lab).    No results for input(s): \"TROP\", \"TROPHS\", \"CK\" in the last 168 hours.    No results for input(s): \"PTP\", \"INR\" in the last 168 hours.     Microbiology  No results found for this visit on 07/06/25.    Imaging: Reviewed in Epic.    Medications: Scheduled Medications[1]    Assessment & Plan:      #Midgut volvulus  -s/p emergent ex-lap and successful detorsion 7/7  -completed course of empiric zosyn  -Pain control, PRN anti-emetics   -Supportive care  -tolerating soft diet   -Surgery following     #Upper airway wheezing; possible vocal cord dysfunction, inflammation from intubation/ET tube  -CXR without acute process  -avoid supplemental O2 if no hypoxia  -trial IV steroids  -nebs  -supportive care  -IS     #ANTHONY on CKD 3  -improving  -holding PTA lasix  -follow BMP     #Hypernatremia  -improving  -encourage increased PO fluid intake  -holding diuretics  -follow BMP     #Hypokalemia  -replace  -follow BMP    #Hypertension  -PTA carvedilol, losartan     #Post operative mechanical ventilation  -Extubated 7/8  -Encourage IS for atelectasis  -nebs     #Normocytic anemia   #Component of post-op blood loss, expected  -iron studies reviewed   -Trend CBC      #COPD   -PTA inhalers  -Nebs     #Chronic HFpEF  #Moderate mitral and aortic regurgitation   -Hold lasix     #Crohn's disease  -mesalamine    #GERD  -Currently on IV PPI     #BPH  -Flomax      #Hypothyroidism  -Levothyroxine      #HLD  #Prior VTE previously on DOAC   #Ventral incisional hernia with failed repair x 2 in past   #Hx of diverticulitis        Lizzy Nelson,       Supplementary Documentation:     Quality:  DVT Mechanical Prophylaxis:   SCDs,    DVT Pharmacologic Prophylaxis   Medication    heparin (Porcine) 5000 UNIT/ML injection 5,000 Units     Code Status: Full Code  Thomas: No urinary catheter in place  YAS: TBD    Discharge is dependent  on: Clinical state, consultant recs  At this point Mr. Cooney is expected to be discharge to: TBD pending PT/OT eval    The 21st Century Cures Act makes medical notes like these available to patients in the interest of transparency. Please be advised this is a medical document. Medical documents are intended to carry relevant information, facts as evident, and the clinical opinion of the practitioner. The medical note is intended as peer to peer communication and may appear blunt or direct. It is written in medical language and may contain abbreviations or verbiage that are unfamiliar.                         [1]    insulin aspart  1-5 Units Subcutaneous TID CC and HS    methylPREDNISolone  80 mg Intravenous Q8H    carvedilol  6.25 mg Oral BID with meals    ferrous sulfate  325 mg Oral BID with meals    losartan  25 mg Oral Daily    mesalamine DR  800 mg Oral TID    heparin  5,000 Units Subcutaneous Q8H SCAR    ipratropium-albuterol  3 mL Nebulization QID    fluticasone-salmeterol  1 puff Inhalation BID    umeclidinium bromide  1 puff Inhalation Daily    tamsulosin  0.4 mg Oral Nightly    levothyroxine  75 mcg Oral Daily @ 0700    pantoprazole  40 mg Oral QAM AC

## 2025-07-14 NOTE — DIETARY NOTE
[facility]   part of [facility]   CLINICAL NUTRITION    [facility]     Admitting diagnosis:  Midgut volvulus (HCC) [Q43.3]    Ht:  5'  Wt: 58 kg (127 lb 13.9 oz).   Body mass index is 24.97 kg/m².  IBW: 45.5kg    Wt Readings from Last 6 Encounters:   07/10/25 58 kg (127 lb 13.9 oz)   05/05/25 56.7 kg (125 lb)   01/27/25 56.2 kg (124 lb)   01/15/25 55.8 kg (123 lb)   12/08/24 56.2 kg (124 lb)   09/10/24 56.7 kg (125 lb)     Labs/Meds reviewed    Diet:       Procedures    Low Fiber/Soft diet Low Fiber/Soft; Is Patient on Accuchecks? Yes     7/14- POD 7. Diet advanced 7/11. Tolerating low fiber. +BM 7/12. Continues to have SOB on and off.     Percent Meals Eaten (last 3 days)       Date/Time Percent Meals Eaten (%)    07/11/25 1150 100 %    07/12/25 0940 100 %    07/12/25 1620 100 %    07/12/25 2331 0 %    07/13/25 0425 0 %    07/13/25 0923 100 %    07/13/25 1130 0 %    07/14/25 0905 100 %            7/11-Pt has been NPO/CL x 5 days. POD 3 exploratory laparotomy, detorsion of midgut volvulus, partial mesh explant. Diet to advance as medically feasible. NG out. If diet is unable to advance in next 2-3 days, recommend nutrition support if aggressive care is pursued. RD available to make recommendations. Will continue to monitor and follow pt nutritional status.     PMHx COPD, CKD stage III, moderate mitral and aortic regurgitation, HTN, HLD, Hypothyroidism, GERD, BPH,  diverticulitis, ventral incisional hernia     Patient is at low nutrition risk at this time.    Please consult if patient status changes or nutrition issues arise.    Karin Mcgrath RD, LDN  Clinical Nutrition  [phone]

## 2025-07-15 NOTE — PLAN OF CARE
Pt Aox4. Spo2 >90% on RA, SOB with exertion and at rest at times. Wheezing noted, Scheduled NEBS, also needing PRN nebs. IV steroids. NSR on tele, heparin, EP cardiac. Midline staples, intact w abd binder. Continent x2. Up SB w walker. UE edema/redness, pending dopplers to be completed. QID accucheck. Pt updated on POC. Call light within reach, no further needs at this time.     Placed on 1-2L for comfort, c/o sob. Saturating >95% on RA.     Problem: Patient/Family Goals  Goal: Patient/Family Long Term Goal  Description: Patient's Long Term Goal: return home    Interventions:  - employ coping mechanisms to maintain calm for weaning trials  - report pain to staff  - participate in PT/OT as able    - See additional Care Plan goals for specific interventions  Outcome: Progressing     Problem: PAIN - ADULT  Goal: Verbalizes/displays adequate comfort level or patient's stated pain goal  Description: INTERVENTIONS:  - Encourage pt to monitor pain and request assistance  - Assess pain using appropriate pain scale  - Administer analgesics based on type and severity of pain and evaluate response  - Implement non-pharmacological measures as appropriate and evaluate response  - Consider cultural and social influences on pain and pain management  - Manage/alleviate anxiety  - Utilize distraction and/or relaxation techniques  - Monitor for opioid side effects  - Notify MD/LIP if interventions unsuccessful or patient reports new pain  - Anticipate increased pain with activity and pre-medicate as appropriate  Outcome: Progressing     Problem: SAFETY ADULT - FALL  Goal: Free from fall injury  Description: INTERVENTIONS:  - Assess pt frequently for physical needs  - Identify cognitive and physical deficits and behaviors that affect risk of falls.  - [place] fall precautions as indicated by assessment.  - Educate pt/family on patient safety including physical limitations  - Instruct pt to call for assistance with activity based on  assessment  - Modify environment to reduce risk of injury  - Provide assistive devices as appropriate  - Consider OT/PT consult to assist with strengthening/mobility  - Encourage toileting schedule  Outcome: Progressing     Problem: RESPIRATORY - ADULT  Goal: Achieves optimal ventilation and oxygenation  Description: INTERVENTIONS:  - Assess for changes in respiratory status  - Assess for changes in mentation and behavior  - Position to facilitate oxygenation and minimize respiratory effort  - Oxygen supplementation based on oxygen saturation or ABGs  - Provide Smoking Cessation handout, if applicable  - Encourage broncho-pulmonary hygiene including cough, deep breathe, Incentive Spirometry  - Assess the need for suctioning and perform as needed  - Assess and instruct to report SOB or any respiratory difficulty  - Respiratory Therapy support as indicated  - Manage/alleviate anxiety  - Monitor for signs/symptoms of CO2 retention  Outcome: Progressing     Problem: GASTROINTESTINAL - ADULT  Goal: Minimal or absence of nausea and vomiting  Description: INTERVENTIONS:  - Maintain adequate hydration with IV or PO as ordered and tolerated  - Nasogastric tube to low intermittent suction as ordered  - Evaluate effectiveness of ordered antiemetic medications  - Provide nonpharmacologic comfort measures as appropriate  - Advance diet as tolerated, if ordered  - Obtain nutritional consult as needed  - Evaluate fluid balance  Outcome: Progressing  Goal: Maintains or returns to baseline bowel function  Description: INTERVENTIONS:  - Assess bowel function  - Maintain adequate hydration with IV or PO as ordered and tolerated  - Evaluate effectiveness of GI medications  - Encourage mobilization and activity  - Obtain nutritional consult as needed  - Establish a toileting routine/schedule  - Consider collaborating with pharmacy to review patient's medication profile  Outcome: Progressing     Problem: METABOLIC/FLUID AND ELECTROLYTES  - ADULT  Goal: Glucose maintained within prescribed range  Description: INTERVENTIONS:  - Monitor Blood Glucose as ordered  - Assess for signs and symptoms of hyperglycemia and hypoglycemia  - Administer ordered medications to maintain glucose within target range  - Assess barriers to adequate nutritional intake and initiate nutrition consult as needed  - Instruct patient on self management of diabetes  Outcome: Progressing  Goal: Electrolytes maintained within normal limits  Description: INTERVENTIONS:  - Monitor labs and rhythm and assess patient for signs and symptoms of electrolyte imbalances  - Administer electrolyte replacement as ordered  - Monitor response to electrolyte replacements, including rhythm and repeat lab results as appropriate  - Fluid restriction as ordered  - Instruct patient on fluid and nutrition restrictions as appropriate  Outcome: Progressing  Goal: Hemodynamic stability and optimal renal function maintained  Description: INTERVENTIONS:  - Monitor labs and assess for signs and symptoms of volume excess or deficit  - Monitor intake, output and patient weight  - Monitor urine specific gravity, serum osmolarity and serum sodium as indicated or ordered  - Monitor response to interventions for patient's volume status, including labs, urine output, blood pressure (other measures as available)  - Encourage oral intake as appropriate  - Instruct patient on fluid and nutrition restrictions as appropriate  Outcome: Progressing     Problem: SKIN/TISSUE INTEGRITY - ADULT  Goal: Skin integrity remains intact  Description: INTERVENTIONS  - Assess and document risk factors for pressure ulcer development  - Assess and document skin integrity  - Monitor for areas of redness and/or skin breakdown  - Initiate interventions, skin care algorithm/standards of care as needed  Outcome: Progressing  Goal: Incision(s), wounds(s) or drain site(s) healing without S/S of infection  Description: INTERVENTIONS:  -  Assess and document risk factors for pressure ulcer development  - Assess and document skin integrity  - Assess and document dressing/incision, wound bed, drain sites and surrounding tissue  - Implement wound care per orders  - Initiate isolation precautions as appropriate  - Initiate Pressure Ulcer prevention bundle as indicated  Outcome: Progressing     Problem: MUSCULOSKELETAL - ADULT  Goal: Return mobility to safest level of function  Description: INTERVENTIONS:  - Assess patient stability and activity tolerance for standing, transferring and ambulating w/ or w/o assistive devices  - Assist with transfers and ambulation using safe patient handling equipment as needed  - Ensure adequate protection for wounds/incisions during mobilization  - Obtain PT/OT consults as needed  - Advance activity as appropriate  - Communicate ordered activity level and limitations with patient/family  Outcome: Progressing     Problem: Impaired Functional Mobility  Goal: Achieve highest/safest level of mobility/gait  Description: Interventions:  - Assess patient's functional ability and stability  - Promote increasing activity/tolerance for mobility and gait  - Educate and engage patient/family in tolerated activity level and precautions    Outcome: Progressing     Problem: Impaired Activities of Daily Living  Goal: Achieve highest/safest level of independence in self care  Description: Interventions:  - Assess ability and encourage patient to participate in ADLs to maximize function  - Promote sitting position while performing ADLs such as feeding, grooming, and bathing  - Educate and encourage patient/family in tolerated functional activity level and precautions during self-care    Outcome: Progressing     Problem: Impaired Swallowing  Goal: Minimize aspiration risk  Description: Interventions:  - Patient should be alert and upright for all feedings (90 degrees preferred)  - Offer food and liquids at a slow rate  - No straws  -  Encourage small bites of food and small sips of liquid  - Offer pills one at a time, crush or deliver with applesauce as needed  - Discontinue feeding and notify MD (or speech pathologist) if coughing or persistent throat clearing or wet/gurgly vocal quality is noted  Outcome: Progressing

## 2025-07-15 NOTE — CM/SW NOTE
Updates sent to Kelly CLAROS noted plan to IV steroids today and plan to transition to PO tomorrow.     GREGORIO/CM to remain available for support and/or discharge planning.    Zarina Matos, BSN RN, CMSRN  RN Case Manager

## 2025-07-15 NOTE — PROGRESS NOTES
[facility]   part of [facility]     Hospitalist Progress Note     Travistiffanie Cooney Patient Status:  Inpatient    1934 MRN [id number]   Location [facility] 4SW-A Attending Lizzy Nelson DO    Hosp Day # 9 PCP Jeffrey Gan MD     Chief Complaint: Abdominal pain and distention    Subjective:     Patient still with SOB at times. No nausea or vomiting and only mild abdominal discomfort. Didn't have an appetite this morning. No BM in a few days but passing flatus.    Objective:    Review of Systems:   A comprehensive review of systems was completed; pertinent positive and negatives stated in subjective.    Vital signs:  Temp:  [97.2 °F (36.2 °C)-97.7 °F (36.5 °C)] 97.6 °F (36.4 °C)  Pulse:  [75-87] 75  Resp:  [14-24] 22  BP: (108-168)/(67-95) 135/75  SpO2:  [95 %-100 %] 100 %    Physical Exam:    General: No acute distress, awake and alert,   Respiratory: Unlabored, no wheezing, no rhonchi  Cardiovascular: S1, S2, regular rate and rhythm  NSR   Abdomen: Soft, +post op tenderness, non-distended, incision in staples dry and intact  Extremities: No edema     Diagnostic Data:    Labs:  Recent Labs   Lab 07/10/25  [id number] 25  0433 25  0551 25  0828   WBC 6.7 6.3 5.6 6.4 12.1*   HGB 8.4* 8.7* 8.2* 9.1* 9.9*   MCV 92.6 93.2 90.8 90.8 91.7   .0 186.0 179.0 196.0 250.0     Recent Labs   Lab 07/10/25  0351 25  0518 25  0433 25  0551 25  0524 25  0813 25  0526   * 81   < > 124*  --  149* 160*   BUN 32* 21   < > 16  --  18 33*   CREATSERUM 2.01* 1.52*   < > 1.27  --  1.43* 1.75*   CA 7.6* 7.7*   < > 7.6*  --  7.8* 8.2*   ALB 2.8* 2.9*  --   --   --   --   --    * 149*   < > 147*  --  146* 142   K 3.4*  3.4* 3.8  3.8   < > 3.8  3.8 4.3 4.3 3.9   * 116*   < > 114*  --  113* 109   CO2 28.0 28.0   < > 23.0  --  24.0 25.0   ALKPHO 58 57  --   --   --   --   --    AST 17 19  --   --   --   --   --    ALT <7*  <7*  --   --   --   --   --    BILT 0.4 0.5  --   --   --   --   --    TP 4.8* 5.0*  --   --   --   --   --     < > = values in this interval not displayed.     Estimated Glomerular Filtration Rate: 36 mL/min/1.73m2 (A) (result from lab).    No results for input(s): \"TROP\", \"TROPHS\", \"CK\" in the last 168 hours.    No results for input(s): \"PTP\", \"INR\" in the last 168 hours.     Microbiology  No results found for this visit on 07/06/25.    Imaging: Reviewed in Epic.    Medications: Scheduled Medications[1]    Assessment & Plan:      #Midgut volvulus  -S/p emergent ex-lap and successful detorsion 7/7  -Completed course of empiric zosyn  -Pain control, PRN anti-emetics   -Supportive care, bowel regimen  -Tolerating soft diet   -Surgery following     #Upper airway wheezing; possible vocal cord dysfunction due to inflammation from intubation/ET tube  -CXR without acute process  -Avoid supplemental O2 if no hypoxia  -Trial IV steroids > transition to PO course 7/16  -Nebs  -Supportive care  -IS  -Pulm following  -SLP following for VCD exercises  -Son requesting ENT eval     #ANTHONY on CKD 3  -Cr increasing past few days, encourage PO intake, hold IVF for now  -Holding PTA lasix     #Hypernatremia  -Resolved  -Holding diuretics     #Hypertension  -PTA carvedilol, losartan     #Post operative mechanical ventilation  -Extubated 7/8  -Encourage IS for atelectasis  -Nebs     #Normocytic anemia   #Component of post-op blood loss, expected  -Iron studies reviewed   -Hgb stable     #COPD   -PTA inhalers  -Nebs     #Chronic HFpEF  #Moderate mitral and aortic regurgitation   -Hold lasix     #Crohn's disease  -Mesalamine    #GERD  -PPI     #BPH  -Flomax      #Hypothyroidism  -Levothyroxine      #HLD  #Prior VTE previously on DOAC   #Ventral incisional hernia with failed repair x 2 in past   #Hx of diverticulitis    Discussed with patient, son, RN and speech therapist.        Nirmal Gan DO      Supplementary Documentation:      Quality:  DVT Mechanical Prophylaxis:   SCDs,    DVT Pharmacologic Prophylaxis   Medication    heparin (Porcine) 5000 UNIT/ML injection 5,000 Units     Code Status: Full Code  Thomas: No urinary catheter in place  YAS: 1- 2 days    Discharge is dependent on: Clinical state, consultant recs  At this point Mr. Cooney is expected to be discharge to: [facility]    The 21st Century Cures Act makes medical notes like these available to patients in the interest of transparency. Please be advised this is a medical document. Medical documents are intended to carry relevant information, facts as evident, and the clinical opinion of the practitioner. The medical note is intended as peer to peer communication and may appear blunt or direct. It is written in medical language and may contain abbreviations or verbiage that are unfamiliar.               [1]    docusate sodium  100 mg Oral BID    polyethylene glycol (PEG 3350)  17 g Oral Daily    predniSONE  40 mg Oral Daily with breakfast    insulin aspart  1-5 Units Subcutaneous TID CC and HS    carvedilol  6.25 mg Oral BID with meals    ferrous sulfate  325 mg Oral BID with meals    losartan  25 mg Oral Daily    mesalamine DR  800 mg Oral TID    heparin  5,000 Units Subcutaneous Q8H SCAR    ipratropium-albuterol  3 mL Nebulization QID    fluticasone-salmeterol  1 puff Inhalation BID    umeclidinium bromide  1 puff Inhalation Daily    tamsulosin  0.4 mg Oral Nightly    levothyroxine  75 mcg Oral Daily @ 0700    pantoprazole  40 mg Oral QAM AC

## 2025-07-15 NOTE — PROGRESS NOTES
A&Ox4. VSS. 1L O2 NC. .  Telemetry: NSR  GI: Abdomen soft, nondistended. Passing gas.  Denies nausea.  : Voids.  Pain controlled with PRN pain medications  Up with assist with walker.  Incisions:midline incision with staples, abdominal binder in place.  Diet:soft diet  IV saline locked.  All appropriate safety measures in place. All questions and concerns addressed. Will continue to monitor    1520: Patient saturating 100% on RA. Exertional dyspnea noted. Expiratory wheezing. Nonproductive cough after receiving nebulizer treatment.

## 2025-07-15 NOTE — PROGRESS NOTES
Pulmonary Progress Note        NAME: Syed Cooney - ROOM: 321/[facility]-A - MRN: [id number] - Age: 89 year old - : 1934        Last 24hrs: No events overnight, conts to have exp wheezing, per son had two episodes/attacks last night, basically didn't sleep all night    OBJECTIVE:  Vitals:    07/15/25 0256 07/15/25 0505 07/15/25 0749 07/15/25 0915   BP:  149/61 137/63    BP Location:  Right arm Right arm    Pulse: 94 90 81 101   Resp:  24 22    Temp:  97.8 °F (36.6 °C) 97.5 °F (36.4 °C)    TempSrc:  Oral     SpO2: 100% 100% 98% 98%   Weight:           Oxygen Therapy  SpO2: 98 %  O2 Device: Nasal cannula  ETCO2 (mmHg): 21 mmHg  FiO2 (%): 40 %  O2 Flow Rate (L/min): 1 L/min  Pulse Oximetry Type: Continuous  Oximetry Probe Site Changed: No  Pulse Ox Probe Location: Left hand                  Intake/Output Summary (Last 24 hours) at 7/15/2025 0949  Last data filed at 7/15/2025 0915  Gross per 24 hour   Intake 1320 ml   Output 160 ml   Net 1160 ml       Scheduled Medication:Scheduled Medications[1]  Continuous Infusing Medication:Medication Infusions[2]    Lungs: wheezes diffusely  Heart: S1, S2 normal, no murmur, click, rub or gallop, regular rate and rhythm  Abdomen: soft, non-tender; bowel sounds normal; no masses,  no organomegaly  Extremities: extremities normal, atraumatic, no cyanosis or edema    Labs reviewed as noted below        ASSESSMENT/PLAN:    Acute hypoxemic respiratory failure  - intubated for exlap, extubated . Weaned to RA today  Wheezing  -most likely due to VCD, may have some vocal cord swelling related to recent intubation, may also be having a flare of his COPD though this is least likely  -will ask speech therapy to see for VC exercises  Volvulus - in a patient with a hx of diverticular disease and ventral incisional hernia, now s/p ex lap with detorsion   -per surgery  -completed a course of Zosyn   COPD - intermittent wheezing on exam, seems to be upper airway, possibly from  persistent coughing that pt had earlier this admission  -cont IV steroids, taper back to PO tomorrow  -continue advair and incruse while in hospital; resume Trelegy after discharge   -philip   -outpatient follow up with Dr. Natividad Fernandes  -subcutaneous heparin   Dispo  -full code  -inpatient   -discussed w/ Son at bedside      [facility]  Pulmonary and Critical Care               [1]    insulin aspart  1-5 Units Subcutaneous TID CC and HS    methylPREDNISolone  80 mg Intravenous Q8H    carvedilol  6.25 mg Oral BID with meals    ferrous sulfate  325 mg Oral BID with meals    losartan  25 mg Oral Daily    mesalamine DR  800 mg Oral TID    heparin  5,000 Units Subcutaneous Q8H SCAR    ipratropium-albuterol  3 mL Nebulization QID    fluticasone-salmeterol  1 puff Inhalation BID    umeclidinium bromide  1 puff Inhalation Daily    tamsulosin  0.4 mg Oral Nightly    levothyroxine  75 mcg Oral Daily @ 0700    pantoprazole  40 mg Oral QAM AC   [2]

## 2025-07-15 NOTE — PROGRESS NOTES
Pt POD #9 s/p ex-lap, detorsion of midgut volvulus, primary repair of recurrent ventral incisional hernia.    From an abdominal perspective doing well. Da diet, no N/V, has had multiple Bms. Pt with SOB yesterday with continued symptoms this AM. Back on supplemental O2.    Incision C/D/I.     Pt doing exceptionally well from postop abdominal perspective. Continued respiratory care per medicine/pulmonary. Diet as tolerated.    No active surgical issues at present.

## 2025-07-15 NOTE — PROGRESS NOTES
[facility]   part of [facility]     Hospitalist Progress Note     Travistiffanie Cooney Patient Status:  Inpatient    1934 MRN [id number]   Location [facility] 4SW-A Attending Lizzy Nelson DO    Hosp Day # 8 PCP Jeffrey Gan MD     Chief Complaint: Abdominal pain and distention    Subjective:     Continues to have episodes of wheezing and trouble catching his breath. Doesn't feel too much improvement since starting IV steroids    Objective:    Review of Systems:   A comprehensive review of systems was completed; pertinent positive and negatives stated in subjective.    Vital signs:  Temp:  [97.3 °F (36.3 °C)-97.9 °F (36.6 °C)] 97.5 °F (36.4 °C)  Pulse:  [31-98] 81  Resp:  [16-26] 22  BP: (137-176)/(60-99) 137/63  SpO2:  [94 %-100 %] 98 %    Physical Exam:    General: No acute distress, awake and alert,   Respiratory: Unlabored diminished, no rhonchi RA sats 100 %   Cardiovascular: S1, S2, regular rate and rhythm  NSR   Abdomen: Soft, + post op tenderness, non-distended,  hypo    bowel sounds, binder in place, incision dry and intact  + flatus , small NM earlier   Extremities: no edema     Diagnostic Data:    Labs:  Recent Labs   Lab 07/09/25  0421 07/10/25  0350 07/11/25  0518 25  0433 25  0551   WBC 9.0 6.7 6.3 5.6 6.4   HGB 9.5* 8.4* 8.7* 8.2* 9.1*   MCV 91.3 92.6 93.2 90.8 90.8   .0 177.0 186.0 179.0 196.0     Recent Labs   Lab 25  0421 07/10/25  03525  0518 25  0433 25  0551 25  0524 25  0813   * 157* 81 119* 124*  --  149*   BUN 38* 32* 21 19 16  --  18   CREATSERUM 2.47* 2.01* 1.52* 1.49* 1.27  --  1.43*   CA 8.2* 7.6* 7.7* 7.3* 7.6*  --  7.8*   ALB 3.0* 2.8* 2.9*  --   --   --   --     147* 149* 148* 147*  --  146*   K 3.8 3.4*  3.4* 3.8  3.8 3.8  3.8 3.8  3.8 4.3 4.3    114* 116* 116* 114*  --  113*   CO2 27.0 28.0 28.0 25.0 23.0  --  24.0   ALKPHO 69 58 57  --   --   --   --    AST 21 17 19  --    --   --   --    ALT <7* <7* <7*  --   --   --   --    BILT 0.6 0.4 0.5  --   --   --   --    TP 5.2* 4.8* 5.0*  --   --   --   --      Estimated Glomerular Filtration Rate: 46 mL/min/1.73m2 (A) (result from lab).    No results for input(s): \"TROP\", \"TROPHS\", \"CK\" in the last 168 hours.    No results for input(s): \"PTP\", \"INR\" in the last 168 hours.     Microbiology  No results found for this visit on 07/06/25.    Imaging: Reviewed in Epic.    Medications: Scheduled Medications[1]    Assessment & Plan:      #Midgut volvulus  -s/p emergent ex-lap and successful detorsion 7/7  -completed course of empiric zosyn  -Pain control, PRN anti-emetics   -Supportive care  -tolerating soft diet   -Surgery following     #Upper airway wheezing; possible vocal cord dysfunction, inflammation from intubation/ET tube  -CXR without acute process  -avoid supplemental O2 if no hypoxia  -trial IV steroids > transition to PO course  -nebs  -supportive care  -IS     #ANTHONY on CKD 3  -improving  -holding PTA lasix  -follow BMP     #Hypernatremia  -improving  -encourage increased PO fluid intake  -holding diuretics  -follow BMP     #Hypokalemia  -replace  -follow BMP    #Hypertension  -PTA carvedilol, losartan     #Post operative mechanical ventilation  -Extubated 7/8  -Encourage IS for atelectasis  -nebs     #Normocytic anemia   #Component of post-op blood loss, expected  -iron studies reviewed   -Trend CBC      #COPD   -PTA inhalers  -Nebs     #Chronic HFpEF  #Moderate mitral and aortic regurgitation   -Hold lasix     #Crohn's disease  -mesalamine    #GERD  -Currently on IV PPI     #BPH  -Flomax      #Hypothyroidism  -Levothyroxine      #HLD  #Prior VTE previously on DOAC   #Ventral incisional hernia with failed repair x 2 in past   #Hx of diverticulitis        Lizzy Nelson DO      Supplementary Documentation:     Quality:  DVT Mechanical Prophylaxis:   SCDs,    DVT Pharmacologic Prophylaxis   Medication    heparin (Porcine) 5000  UNIT/ML injection 5,000 Units     Code Status: Full Code  Thomas: No urinary catheter in place  YAS: TBD    Discharge is dependent on: Clinical state, consultant recs  At this point Mr. Cooney is expected to be discharge to: TBD pending PT/OT eval    The 21st Century Cures Act makes medical notes like these available to patients in the interest of transparency. Please be advised this is a medical document. Medical documents are intended to carry relevant information, facts as evident, and the clinical opinion of the practitioner. The medical note is intended as peer to peer communication and may appear blunt or direct. It is written in medical language and may contain abbreviations or verbiage that are unfamiliar.                           [1]    insulin aspart  1-5 Units Subcutaneous TID CC and HS    methylPREDNISolone  80 mg Intravenous Q8H    carvedilol  6.25 mg Oral BID with meals    ferrous sulfate  325 mg Oral BID with meals    losartan  25 mg Oral Daily    mesalamine DR  800 mg Oral TID    heparin  5,000 Units Subcutaneous Q8H SCAR    ipratropium-albuterol  3 mL Nebulization QID    fluticasone-salmeterol  1 puff Inhalation BID    umeclidinium bromide  1 puff Inhalation Daily    tamsulosin  0.4 mg Oral Nightly    levothyroxine  75 mcg Oral Daily @ 0700    pantoprazole  40 mg Oral QAM AC

## 2025-07-16 NOTE — CONSULTS
[facility]  Report of Consultation    Syed Cooney Patient Status:  Inpatient    1934 MRN [id number]   Location [facility] 3NW-A Attending Nirmal Gan, DO   Hosp Day # 9 PCP Jeffrey Gan MD     Reason for Consultation:  Breathing problems    History of Present Illness:  Syed Cooney is a  89 year old male is postoperative day #9 from an exploratory laparotomy for midgut volvulus who has recently developed respiratory issues.  Otolaryngology was consulted for upper airway evaluation.    Most of the history is obtained from the medical service and medical record as the patient has significant communication problems due to severe hearing loss and a language barrier.  [place], he has had several days of intermittent wheezing that has failed to improve with inhalers.  No reported issues with significant desats or airway obstruction.  He indicates that he is eating and drinking well but sometimes coughs.      History:  Past Medical History[1]  Past Surgical History[2]  Family History[3]  Set as collapsible by default.[4]    Allergies:  Allergies[5]    Medications:  Current Hospital Medications[6]    Review of Systems:  No fevers, chills, nausea, emesis    Physical Exam:  Vitals:    25 1408   BP: 124/68   Pulse: 71   Resp: 20   Temp: 97.6 °F (36.4 °C)       Gen- AOx3, NAD , voice strained and breathy at times.  Audible expiratory wheeze at times but no inspiratory wheeze and no labored breathing.    Neuro- CN II-XII intact b/l    Head / Face- NCAT, no tenderness with palpation of frontal or maxillary sinuses    Eyes- PERRL, EOMI, no nystagmus    Ears- Pinna normal bilaterally    Nose- external nose without deformity, nasal septum deviated to left, nasal mucosa moist without lesion    Oral Cavity- Lips normal.  Full upper and lower dentures removed for exam.  Mild diffuse mucosal erythema of the buccal surfaces, hard palate and tongue with patches of white debris that  were easily scraped off with a tongue depressor.  No ulcerations or masses.    Oropharynx- Tonsils 1+, soft palate and uvula normal.  Pharyngeal wall appeared dry.    Neck- parotid and submandibular glands normal to palpation, no palp LAD bilateral, thyroid normal       Scope:   See procedure note for findings.      Laboratory Data:  Lab Results   Component Value Date    WBC 12.1 07/16/2025    HGB 9.9 07/16/2025    HCT 28.8 07/16/2025    .0 07/16/2025    CREATSERUM 1.75 07/16/2025    BUN 33 07/16/2025     07/16/2025    K 3.9 07/16/2025     07/16/2025    CO2 25.0 07/16/2025     07/16/2025    CA 8.2 07/16/2025    PGLU 100 07/16/2025       Impression and Plan:    Thrush extending from the oral cavity to the hypopharynx/larynx with acute laryngitis.  Vocal cord motion is normal and there are no masses or lesions noted of the hypopharynx or larynx.  The visualized portion of the subglottis appeared clear.    --recommend a course of nystatin for his thrush  --Use spacers with inhalers and instruct the patient to rinse mouth and throat out after inhaler use    I discussed these findings and my recommendations with the patient, his adult son and his nurse at the bedside.    George Ferrell MD, FACS  Otolaryngology - Head & Neck Surgery  [facility]  [phone]              [1]   Past Medical History:   Anemia    Back problem    BPH (benign prostatic hyperplasia)    Cataract    CKD (chronic kidney disease)    Congestive heart disease (HCC)    COPD (chronic obstructive pulmonary disease) (HCC)    Crohn disease (HCC)    Diverticulosis    Hearing impairment    HTN (hypertension)    Hyperlipidemia    Hypothyroidism    Mitral regurgitation    Osteoarthritis    Osteoporosis    Pulmonary embolism (HCC)   [2]   Past Surgical History:  Procedure Laterality Date    Angiogram      2009    Cataract      Colonoscopy  06/30/2014    Procedure: COLONOSCOPY;  Surgeon: Jaz Carrasquillo DO;  Location:   ENDOSCOPY    Egd N/A 2017    Procedure: ESOPHAGOGASTRODUODENOSCOPY (EGD);  Surgeon: Gustabo Johnson MD;  Location:  MAIN OR    Egd N/A 2017    Procedure: ESOPHAGOGASTRODUODENOSCOPY (EGD);  Surgeon: Jaz Carrasquillo DO;  Location:  ENDOSCOPY    Hernia surgery      Knee replacement surgery      Other surgical history      6 yeara ago colonscopy with polpys    Sigmoidoscopy,diagnostic      Total knee replacement     [3]   Family History  Problem Relation Age of Onset    Cancer Brother    [4]   Social History  Socioeconomic History    Marital status:    Tobacco Use    Smoking status: Former     Current packs/day: 0.00     Average packs/day: 2.0 packs/day for 35.0 years (70.0 ttl pk-yrs)     Types: Cigarettes     Quit date: 1987     Years since quittin.5    Smokeless tobacco: Former    Tobacco comments:     30 years   Vaping Use    Vaping status: Never Used   Substance and Sexual Activity    Alcohol use: Not Currently    Drug use: No   [5] No Known Allergies  [6]   Current Facility-Administered Medications:     docusate sodium (Colace) cap 100 mg, 100 mg, Oral, BID    polyethylene glycol (PEG 3350) (Miralax) 17 g oral packet 17 g, 17 g, Oral, Daily    furosemide (Lasix) 10 mg/mL injection 20 mg, 20 mg, Intravenous, Once    sodium chloride 3 % nebulizer solution 3 mL, 3 mL, Nebulization, QID    predniSONE (Deltasone) tab 40 mg, 40 mg, Oral, Daily with breakfast    ipratropium-albuterol (Duoneb) 0.5-2.5 (3) MG/3ML inhalation solution 3 mL, 3 mL, Nebulization, Q4H PRN    insulin aspart (NovoLOG) 100 Units/mL FlexPen 1-5 Units, 1-5 Units, Subcutaneous, TID CC and HS    acetaminophen (Tylenol) tab 650 mg, 650 mg, Oral, Q6H PRN    albuterol (Ventolin HFA) 108 (90 Base) MCG/ACT inhaler 2 puff, 2 puff, Inhalation, Q6H PRN    carvedilol (Coreg) tab 6.25 mg, 6.25 mg, Oral, BID with meals    ferrous sulfate DR tab 325 mg, 325 mg, Oral, BID with meals    losartan (Cozaar) tab 25 mg, 25 mg,  Oral, Daily    mesalamine DR (Delzicol) cap 800 mg, 800 mg, Oral, TID    EPINEPHrine-racemic (S-2) 2.25 % nebulizer solution 0.5 mL, 0.5 mL, Nebulization, Q2H PRN    heparin (Porcine) 5000 UNIT/ML injection 5,000 Units, 5,000 Units, Subcutaneous, Q8H SCAR    glucose (Dex4) 15 GM/59ML oral liquid 15 g, 15 g, Oral, Q15 Min PRN **OR** glucose (Glutose) 40% oral gel 15 g, 15 g, Oral, Q15 Min PRN **OR** glucose-vitamin C (Dex-4) chewable tab 4 tablet, 4 tablet, Oral, Q15 Min PRN **OR** dextrose 50% injection 50 mL, 50 mL, Intravenous, Q15 Min PRN **OR** glucose (Dex4) 15 GM/59ML oral liquid 30 g, 30 g, Oral, Q15 Min PRN **OR** glucose (Glutose) 40% oral gel 30 g, 30 g, Oral, Q15 Min PRN **OR** glucose-vitamin C (Dex-4) chewable tab 8 tablet, 8 tablet, Oral, Q15 Min PRN    bisacodyl (Dulcolax) 10 MG rectal suppository 10 mg, 10 mg, Rectal, Daily PRN    hydrALAzine (Apresoline) 20 mg/mL injection 10 mg, 10 mg, Intravenous, Q6H PRN    ipratropium-albuterol (Duoneb) 0.5-2.5 (3) MG/3ML inhalation solution 3 mL, 3 mL, Nebulization, QID    HYDROcodone-acetaminophen (Norco) 5-325 MG per tab 1 tablet, 1 tablet, Oral, Q6H PRN    fluticasone-salmeterol (Advair Diskus) 500-50 MCG/ACT inhaler 1 puff, 1 puff, Inhalation, BID    umeclidinium bromide (Incruse Ellipta) 62.5 MCG/ACT inhaler 1 puff, 1 puff, Inhalation, Daily    tamsulosin (Flomax) cap 0.4 mg, 0.4 mg, Oral, Nightly    levothyroxine (Synthroid) tab 75 mcg, 75 mcg, Oral, Daily @ 0700    pantoprazole (Protonix) DR tab 40 mg, 40 mg, Oral, QAM AC **OR** [DISCONTINUED] pantoprazole (Protonix) 40 mg in sodium chloride 0.9% PF 10 mL IV push, 40 mg, Intravenous, QAM AC

## 2025-07-16 NOTE — PLAN OF CARE
A&Ox4. VSS. RA. . Denies chest pain and Has SOB at times. Expiratory wheezing. Check on pt throughout night- no difficulty breathing  Telemetry: NSR.   GI: Abdomen soft, nondistended. Passing gas.   Denies nausea.   : Voids/ bed side urinal  Pain controlled with PRN Tylenol   Up with standby assist/walker  Incisions:  Midline with staple MARSHALL  Abdominal binder in place  Diet: Soft diet  QID accucheck  Saline locked   All appropriate safety measures in place. All questions and concerns addressed.    Bilateral forearms are swollen and red- ultrasound already  ordered

## 2025-07-16 NOTE — PROGRESS NOTES
Pulmonary Progress Note        NAME: Syed Cooney - ROOM: 321/[facility]-A - MRN: [id number] - Age: 89 year old - : 1934        Last 24hrs: Son states his father's breathing is no better    OBJECTIVE:  Vitals:    07/15/25 2031 07/15/25 2345 25 0456 25 0712   BP: 140/71 (!) 168/69 156/76 135/75   BP Location: Right arm Right arm Right arm Right arm   Pulse: 79 76 87 75   Resp:    Temp: 97.2 °F (36.2 °C) 97.7 °F (36.5 °C) 97.7 °F (36.5 °C) 97.6 °F (36.4 °C)   TempSrc: Oral Oral Oral Oral   SpO2: 100% 100% 95% 100%   Weight:           Oxygen Therapy  SpO2: 100 %  O2 Device: None (Room air)  ETCO2 (mmHg): 21 mmHg  FiO2 (%): 40 %  O2 Flow Rate (L/min): 1 L/min  Pulse Oximetry Type: Continuous  Oximetry Probe Site Changed: No  Pulse Ox Probe Location: Left hand                  Intake/Output Summary (Last 24 hours) at 2025 1011  Last data filed at 2025 0712  Gross per 24 hour   Intake 420 ml   Output 200 ml   Net 220 ml       Scheduled Medication:Scheduled Medications[1]  Continuous Infusing Medication:Medication Infusions[2]    Lungs: clear bilaterally  Heart: S1, S2 normal, no murmur, click, rub or gallop, regular rate and rhythm  Abdomen: soft, non-tender; bowel sounds normal; no masses,  no organomegaly  Extremities: extremities normal, atraumatic, no cyanosis or edema    Labs reviewed as noted below        ASSESSMENT/PLAN:    Acute hypoxemic respiratory failure  - intubated for exlap, extubated . Weaned to RA today  Wheezing  -most likely due to VCD, may have some vocal cord swelling related to recent intubation, may also be having a flare of his COPD though this is least likely  -will ask speech therapy to see for VC exercises  -son would like an ENT consult; will defer to hospitalist   Volvulus - in a patient with a hx of diverticular disease and ventral incisional hernia, now s/p ex lap with detorsion   -per surgery  -completed a course of Zosyn   COPD -  intermittent wheezing on exam, seems to be upper airway, possibly from persistent coughing that pt had earlier this admission  -IV to PO steroids today  -continue advair and incruse while in hospital; resume Trelegy after discharge   -philip   -outpatient follow up with Dr. Natividad Fernandes  -subcutaneous heparin   Dispo  -full code  -inpatient   -discussed w/ Son at bedside       [1]    docusate sodium  100 mg Oral BID    polyethylene glycol (PEG 3350)  17 g Oral Daily    predniSONE  40 mg Oral Daily with breakfast    insulin aspart  1-5 Units Subcutaneous TID CC and HS    carvedilol  6.25 mg Oral BID with meals    ferrous sulfate  325 mg Oral BID with meals    losartan  25 mg Oral Daily    mesalamine DR  800 mg Oral TID    heparin  5,000 Units Subcutaneous Q8H SCAR    ipratropium-albuterol  3 mL Nebulization QID    fluticasone-salmeterol  1 puff Inhalation BID    umeclidinium bromide  1 puff Inhalation Daily    tamsulosin  0.4 mg Oral Nightly    levothyroxine  75 mcg Oral Daily @ 0700    pantoprazole  40 mg Oral QAM AC   [2]

## 2025-07-16 NOTE — PLAN OF CARE
Pt slept well through out the night. IN AM c/o of SOB/. Pt sat up in chair for 2 hours, felt a little bit better. He went back to the bed and sat him up higher and stated again breathing is a bit better. Encourage him to use spirometer and pt states that he did use albuterol inhaler in AM

## 2025-07-16 NOTE — PROGRESS NOTES
[facility]  Progress Note    [facility] [place] Patient Status:  Inpatient    1934 MRN [id number]   Location [facility] 3NW-A Attending Nirmal Gan, DO   Hosp Day # 9 PCP Jeffrey Gna MD     Subjective:  He is seen and examined resting in bed. He reports mild abdominal discomfort. He denies nausea or vomiting with eating. He reports passing flatus but denies a bowel movement. Last documented bowel movement 2025. He reports some shortness of breath, but states this is improved compared to yesterday.     Objective/Physical Exam:  /75 (BP Location: Right arm)   Pulse 75   Temp 97.6 °F (36.4 °C) (Oral)   Resp 22   Wt 130 lb 1.6 oz (59 kg)   SpO2 100%   BMI 25.41 kg/m²     Intake/Output Summary (Last 24 hours) at 2025 0816  Last data filed at 2025 0712  Gross per 24 hour   Intake 660 ml   Output 200 ml   Net 460 ml         General: Awake, Alert, Cooperative.  No apparent distress.  HEENT: Normocephalic, atraumatic.   Pulm: no respiratory distress, no increased work of breathing  Abdomen:  Soft, mildly-distended, appropriate incisional tenderness, with no rebound or guarding.  No peritoneal signs.  Incision:  Clean, dry, intact without erythema.  Staples in place.       Labs:      Lab Results   Component Value Date    INR 2.86 (H) 2023    INR 1.13 (H) 2019    INR 1.10 2019     Lab Results   Component Value Date     2025    K 3.9 2025     2025    CO2 25.0 2025    BUN 33 2025    CREATSERUM 1.75 2025     2025    CA 8.2 2025            Assessment:  Problem List[1]    POD 9 exploratory laparotomy, detorsion of midgut volvulus, partial mesh explantation  Acute hypoxemic respiratory failure  COPD    Plan:  Continue soft diet as tolerated; advise to go slow.   Initiate bowel regimen- Colace 100 mg BID and Miralax daily  Encourage ambulation and up to chair  Analgesics and antiemetics as  needed  Medical management as per pulmonology and hospitalist  CBC ordered  DVT prophylaxis with heparin   GI prophylaxis with protonix    The patient was discussed with Dr. Stark who is in agreement with the plan.   Selena Chen PA-C  7/16/2025  8:16 AM       [1]   Patient Active Problem List  Diagnosis    Abdominal pain    Back pain with radiation    Pneumonia due to infectious organism    Lumbar spinal stenosis    Lumbosacral radiculopathy    Nontraumatic subdural hygroma    Acute encephalopathy    Hyponatremia    Hyperglycemia    Gastrointestinal hemorrhage    Gastrointestinal hemorrhage, unspecified gastrointestinal hemorrhage type    Dyspnea    Upper GI bleeding    Asthma-COPD overlap syndrome (HCC)    Essential hypertension, benign    Diarrhea, unspecified type    Hypernatremia    Crohn's disease of large intestine without complication (HCC)    Stage 3 chronic kidney disease (HCC)    PE (pulmonary thromboembolism) (HCC)    Acute bronchospasm    Influenza    Influenza A    COPD exacerbation (HCC)    Acute respiratory failure, unspecified whether with hypoxia or hypercapnia (HCC)    Moderate mitral regurgitation    Hypokalemia    Hyperkalemia    Acute kidney injury (HCC)    Midgut volvulus (HCC)    Acute respiratory failure with hypoxia (HCC)

## 2025-07-16 NOTE — PLAN OF CARE
Saw pt this afternoon-  voice softer  c/o cough, wheezing, can hear in his ears  Daughters at bedside  Arms w/ dependent edema  NT   + LE edema  Decreased BS bilat  Flutter valve makes him cough     Dc US arms  Lasix 20 mg now   CXR portable   + vol status   Have spoken w/ Dr Ferrell office- MD spoke w/ him     Has VC exercises    ? Place for 3% saline inhaler  3% nebs ordered    Daughters updated  Pt walking in servin   + flatus no BM  today   Krysta Dickey, NP

## 2025-07-16 NOTE — PROCEDURES
Flexible Laryngoscopy Procedure Note (44712)    Due to inability for adequate examination of the larynx and need for magnification to perform the examination, endoscopy was performed.  Risks and benefits were discussed with patient/family and they have given verbal consent to proceed.    Procedure: Diagnostic flexible fiberoptic laryngoscopy    Anesthesia: None    Surgeon: George Ferrell MD    Procedure Details:  A flexible fiberoptic rhinolaryngoscope was passed into the right nasal cavity.  It was advanced through the nasopharyx and oropharynx. The hypopharynx, supraglottis and glottis and visualized parts of the subglottis were then examined.     The nasopharynx was free of masses or lesions.    The tongue base was symmetrical.    Pyriform sinuses were clear.    The endolarynx was diffusely erythematous with white patches scattered over the mucosal surfaces.  There were no masses.  The vocal cords appeared erythematous with scattered white patches on their surfaces.  Vocal cord motion appeared normal.  The visualized portion of the subglottis was clear.    The scope was then withdrawn.    Condition: Stable    Complications: Patient tolerated the procedure well with no immediate complications.

## 2025-07-16 NOTE — PHYSICAL THERAPY NOTE
Chart reviewed. PT treatment session attempted this morning. Per patient, he is feeling out of breath and felt he is unable to participate in therapy at this time. Son present in the room and states pt has been experiencing flare ups everyday. RN was notified of this attempt. Offered to follow up in the afternoon. Patient states \" let's see how I feel.\"

## 2025-07-16 NOTE — SLP NOTE
SLP EVALUATION - INPATIENT    Admission Date: 7/6/2025  Evaluation Date: 07/16/25    Reason for Referral:  (VCD treatment)    ASSESSMENT & PLAN   ASSESSMENT & IMPRESSION  Order received as per pulmonary service for VCD; chart reviewed.  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  Patient is s/p exploratory laparotomy (POD#9); intubated for procedure and extubated same day.  Patient developed acute hypoxemic respiratory failure and wheezing though patient weaned to room air. Patient with intermittent SOB with and without activity. Patient tolerated soft/low fiber diet with thin liquids without incident (seen by our service for BSE and dysphagia follow-up sessions).   Medical history includes COPD, CHF, PE, emphysema, OA, Crohn's disease, hearing impairment, TKA.    Patient seen at room-side with soon and daughter present.  Son reported patient with remote history of ENT & OP SLP for VCD; underwent 10 sessions with favorable results.  Son concerned VCD has been exacerbated by current acute medical issues. Noted ENT for possible consult today. Extensive time spent on active listening to son's concerns and request for VCD exercises to complete with patient.  Discussed potential ENT consult and scope to further assess vocal cord anatomy and physiology to confirm VCD as well as obtain previous HEP that was successful in managing VCD symptoms.  This SLP did provide rescue breathing exercises, as well as HEP in the meantime.  SLP completed with patient with daughter assist in interpreting services.  Patient completed 3 x 10 each, with max assist using visual/tactile biofeedback.  Education/counseling/collaboration completed with patient/family on continued practice 3x/day however await ENT consult/recommendations.      Patient Experiencing Pain: No      GOALS  Goal #1 Educate patient on recovery breathing strategies purpose and importance - handoput provided In progress   Goal #2 Patient will complete  breathing techniques with 100% independence within 3-5 sessions   In progress     Prior Living Situation: Home with support  Prior Level of Function: Assistance/Support for ADL's      Imaging Results: CXR 7/16/25:  Negative for active disease    Patient/Family Goals: for patient to see ENT    Interdisciplinary Communication: Discussed with RN  Disussed with other staff    Patient, family and/or caregiver has been informed and has taken part in this evaluation and plan of treatment and have been advised and agree on the findings and goals.      FOLLOW UP  Treatment Plan/Recommendations:  (VCD treatment)  Duration: 1 week  Follow Up Needed (Documentation Required): Yes  SLP Follow-up Date: 07/17/25    Thank you for your referral.  If you have any questions please contact SALOMON Mclaughlin MS CCC-SLP/L  Speech-Language Pathologist  Spect[facility]-Link [phone]

## 2025-07-17 NOTE — PLAN OF CARE
Problem: RESPIRATORY - ADULT  Goal: Achieves optimal ventilation and oxygenation  Description: INTERVENTIONS:  - Assess for changes in respiratory status  - Assess for changes in mentation and behavior  - Position to facilitate oxygenation and minimize respiratory effort  - Oxygen supplementation based on oxygen saturation or ABGs  - Provide Smoking Cessation handout, if applicable  - Encourage broncho-pulmonary hygiene including cough, deep breathe, Incentive Spirometry  - Assess the need for suctioning and perform as needed  - Assess and instruct to report SOB or any respiratory difficulty  - Respiratory Therapy support as indicated  - Manage/alleviate anxiety  - Monitor for signs/symptoms of CO2 retention  Outcome: Progressing     Problem: SAFETY ADULT - FALL  Goal: Free from fall injury  Description: INTERVENTIONS:  - Assess pt frequently for physical needs  - Identify cognitive and physical deficits and behaviors that affect risk of falls.  - [place] fall precautions as indicated by assessment.  - Educate pt/family on patient safety including physical limitations  - Instruct pt to call for assistance with activity based on assessment  - Modify environment to reduce risk of injury  - Provide assistive devices as appropriate  - Consider OT/PT consult to assist with strengthening/mobility  - Encourage toileting schedule  Outcome: Progressing     Problem: SKIN/TISSUE INTEGRITY - ADULT  Goal: Skin integrity remains intact  Description: INTERVENTIONS  - Assess and document risk factors for pressure ulcer development  - Assess and document skin integrity  - Monitor for areas of redness and/or skin breakdown  - Initiate interventions, skin care algorithm/standards of care as needed  Outcome: Progressing     Problem: SKIN/TISSUE INTEGRITY - ADULT  Goal: Incision(s), wounds(s) or drain site(s) healing without S/S of infection  Description: INTERVENTIONS:  - Assess and document risk factors for pressure ulcer  development  - Assess and document skin integrity  - Assess and document dressing/incision, wound bed, drain sites and surrounding tissue  - Implement wound care per orders  - Initiate isolation precautions as appropriate  - Initiate Pressure Ulcer prevention bundle as indicated  Outcome: Progressing     Problem: Impaired Swallowing  Goal: Minimize aspiration risk  Description: Interventions:  - Patient should be alert and upright for all feedings (90 degrees preferred)  - Offer food and liquids at a slow rate  - No straws  - Encourage small bites of food and small sips of liquid  - Offer pills one at a time, crush or deliver with applesauce as needed  - Discontinue feeding and notify MD (or speech pathologist) if coughing or persistent throat clearing or wet/gurgly vocal quality is noted  Outcome: Progressing     Patient alert x4, VSS. Difficulty speaking. Audible wheezing. Neb treatments. High 90%`s on RA. All meds given per MAR. Tolerated well. IS at bedside. Also using flutter valve.  Abd staples open to air. CDI. Bed alarm on. Call light within reach

## 2025-07-17 NOTE — PROGRESS NOTES
Pt is alert and oriented x4. Hard of hearing, bilateral hearing aids. Voice is strained. Pt is saturating well on room air. Dyspnea with exertion. Expiratory wheezing auscultated bilaterally. Pt is using IS and flutter valve. Inhalers administered. Midline incision with staples CDI. Abdominal binder in place. Pt has poor appetite. Denies flatus. Small bowel movement 7/16 after suppository. Fluid intake encouraged. Diminished urine output. IV SL. Pt is ambulatory with walker and assist. Plan of care reviewed with pt and his son at the bedside. All questions addressed.     1115 Pt complaining of abdominal distention, low appetite, and increased abdominal pain. Surgery PA updated, and will return to see the patient.     1130 Orders for CLD and abdominal xray.    1155 Pt complaining of dizziness. Low urine output. Hospitalist paged with update. Orders for IVF received.     1500 Surgery PA updated with x ray results. Continue CLD and encourage ambulation.

## 2025-07-17 NOTE — PROGRESS NOTES
Pulmonary Progress Note        NAME: Syed Cooney - ROOM: 321/[facility]-A - MRN: [id number] - Age: 89 year old - : 1934        Last 24hrs: No events overnight, noting some abd discomfort, hasn't moved his bowels recently, no appetite, feels weak    OBJECTIVE:  Vitals:    25 0300 25 0401 25 0747 25 0810   BP:  137/74 121/56    BP Location:  Right arm Right arm    Pulse: 75 76 75 78   Resp:  22  20   Temp:  97.3 °F (36.3 °C) 97.4 °F (36.3 °C)    TempSrc:  Oral Oral    SpO2: 98% 97% 100% 100%   Weight:           Oxygen Therapy  SpO2: 100 %  O2 Device: None (Room air)  ETCO2 (mmHg): 21 mmHg  FiO2 (%): 40 %  O2 Flow Rate (L/min): 0 L/min  Pulse Oximetry Type: Continuous  Oximetry Probe Site Changed: No  Pulse Ox Probe Location: Left hand                  Intake/Output Summary (Last 24 hours) at 2025 1035  Last data filed at 2025 0747  Gross per 24 hour   Intake 540 ml   Output 200 ml   Net 340 ml       Scheduled Medication:Scheduled Medications[1]  Continuous Infusing Medication:Medication Infusions[2]    Lungs: wheezes bilaterally  Heart: S1, S2 normal, no murmur, click, rub or gallop, regular rate and rhythm  Abdomen: soft, hypoactive BS  Extremities: extremities normal, atraumatic, no cyanosis or edema    Labs reviewed as noted below        ASSESSMENT/PLAN:    Acute hypoxemic respiratory failure  - intubated for exlap, extubated   -resolved, on RA  Wheezing  -most likely due to VCD, may have some vocal cord swelling related to recent intubation, may also be having a flare of his COPD though this is least likely  -speech therapy following  Thrush  -seen on ENT exam  -cont nystatin  Volvulus - in a patient with a hx of diverticular disease and ventral incisional hernia, now s/p ex lap with detorsion   -per surgery  -completed a course of Zosyn   -now w/ abd distension- KUB per PCP  COPD - intermittent wheezing on exam, seems to be upper airway, possibly from  persistent coughing that pt had earlier this admission  -cont PO steroids  -continue advair and incruse while in hospital; resume Trelegy after discharge   -philip   -outpatient follow up with Dr. Natividad Fernandes  -subcutaneous heparin   Dispo  -full code  -inpatient   -discussed w/ Son at bedside      Niall GreenMillvillestan  [facility]  Pulmonary and Critical Care             [1]    potassium chloride  40 mEq Oral Once    docusate sodium  100 mg Oral BID    polyethylene glycol (PEG 3350)  17 g Oral Daily    sodium chloride  3 mL Nebulization QID    nystatin  5 mL Oral QID    predniSONE  40 mg Oral Daily with breakfast    insulin aspart  1-5 Units Subcutaneous TID CC and HS    carvedilol  6.25 mg Oral BID with meals    ferrous sulfate  325 mg Oral BID with meals    losartan  25 mg Oral Daily    mesalamine DR  800 mg Oral TID    heparin  5,000 Units Subcutaneous Q8H SCAR    ipratropium-albuterol  3 mL Nebulization QID    fluticasone-salmeterol  1 puff Inhalation BID    umeclidinium bromide  1 puff Inhalation Daily    tamsulosin  0.4 mg Oral Nightly    levothyroxine  75 mcg Oral Daily @ 0700    pantoprazole  40 mg Oral QAM AC   [2]

## 2025-07-17 NOTE — PROGRESS NOTES
[facility]  Progress Note    Syed Cooney Patient Status:  Inpatient    1934 MRN [id number]   Location [facility] [place] 3NW-A Attending Nirmal Gan, DO   Hosp Day # 10 PCP Jeffrey Gan MD     Subjective:  Patient seen resting in chair in her bed doing breathing treatments.  Patient endorses mild abdominal discomfort.  He denies nausea and vomiting.  Patient unable to clearly state if he was tolerating his diet due to difficulty speaking, though has been tolerating previously.  He endorses passing flatus.  He states he has not had a bowel movement, but to documented bowel movements on .  Per nurse, patient was given suppository with bowel movement soon after.    Objective/Physical Exam:  General: Alert, orientated x3.  Cooperative.  No apparent distress.  Vital Signs:  Blood pressure 121/56, pulse 78, temperature 97.4 °F (36.3 °C), temperature source Oral, resp. rate 20, weight 130 lb 1.6 oz (59 kg), SpO2 100%.  Wt Readings from Last 3 Encounters:   25 130 lb 1.6 oz (59 kg)   25 125 lb (56.7 kg)   25 124 lb (56.2 kg)     Lungs: Audible wheezing on exam.  Cardiac: Regular rate and rhythm.   Abdomen:  Soft, mildly distended, mild LLQ tenderness, with no rebound or guarding.  No peritoneal signs.   Extremities:  No lower extremity edema noted.    Incision: Clean, dry, intact, no erythema    Intake/Output:    Intake/Output Summary (Last 24 hours) at 2025 0850  Last data filed at 2025 0747  Gross per 24 hour   Intake 540 ml   Output 200 ml   Net 340 ml     I/O last 3 completed shifts:  In: 600 [P.O.:600]  Out: 400 [Urine:400]  No intake/output data recorded.    Medications: Scheduled Medications[1]    Labs:  Lab Results   Component Value Date    WBC 11.2 2025    HGB 9.4 2025    HCT 28.0 2025    .0 2025     Lab Results   Component Value Date     2025    K 3.8 2025     2025    CO2 25.0 2025     BUN 42 07/17/2025    CREATSERUM 1.94 07/17/2025     07/17/2025    CA 8.0 07/17/2025     Lab Results   Component Value Date    INR 2.86 (H) 09/06/2023    INR 1.13 (H) 08/31/2019    INR 1.10 07/16/2019         Assessment  Problem List[2]    POD 10 exploratory laparotomy, detorsion of midgut volvulus, partial mesh explantation  Acute hypoxemic respiratory failure  COPD    Plan:  Continue soft diet as tolerated.  Continue bowel regimen -- Colace twice daily and Dulcolax suppository as needed.  Multimodal pain control with Tylenol and as needed Norco.  Ambulate and up to chair as able.  DVT prophylaxis with heparin.  GI prophylaxis with Protonix.  Medical management per hospitalist and pulmonologist.    Quality:  DVT Mechanical Prophylaxis:   SCDs,    DVT Pharmacologic Prophylaxis   Medication    heparin (Porcine) 5000 UNIT/ML injection 5,000 Units                Code Status: Full Code  Thomas: No urinary catheter in place  Thomas Duration (in days):   Central line:    YAS: 7/17/2025        BRIGIDA Yoon  7/17/2025  8:50 AM          The patient was independently examined. I agree with the PA's assessment and plan.     Ileus, vs  ingested air w/ COPD exacerbation.   Trial liquids only till tomorrow.   Will add simethicone.   Restart diet as [place] in AM    More than 50% of clinical time and 100% MDM was performed by me.               [1]    docusate sodium  100 mg Oral BID    polyethylene glycol (PEG 3350)  17 g Oral Daily    sodium chloride  3 mL Nebulization QID    nystatin  5 mL Oral QID    predniSONE  40 mg Oral Daily with breakfast    insulin aspart  1-5 Units Subcutaneous TID CC and HS    carvedilol  6.25 mg Oral BID with meals    ferrous sulfate  325 mg Oral BID with meals    losartan  25 mg Oral Daily    mesalamine DR  800 mg Oral TID    heparin  5,000 Units Subcutaneous Q8H SCAR    ipratropium-albuterol  3 mL Nebulization QID    fluticasone-salmeterol  1 puff Inhalation BID    umeclidinium bromide  1 puff  Inhalation Daily    tamsulosin  0.4 mg Oral Nightly    levothyroxine  75 mcg Oral Daily @ 0700    pantoprazole  40 mg Oral QAM AC   [2]   Patient Active Problem List  Diagnosis    Abdominal pain    Back pain with radiation    Pneumonia due to infectious organism    Lumbar spinal stenosis    Lumbosacral radiculopathy    Nontraumatic subdural hygroma    Acute encephalopathy    Hyponatremia    Hyperglycemia    Gastrointestinal hemorrhage    Gastrointestinal hemorrhage, unspecified gastrointestinal hemorrhage type    Dyspnea    Upper GI bleeding    Asthma-COPD overlap syndrome (HCC)    Essential hypertension, benign    Diarrhea, unspecified type    Hypernatremia    Crohn's disease of large intestine without complication (HCC)    Stage 3 chronic kidney disease (HCC)    PE (pulmonary thromboembolism) (HCC)    Acute bronchospasm    Influenza    Influenza A    COPD exacerbation (HCC)    Acute respiratory failure, unspecified whether with hypoxia or hypercapnia (HCC)    Moderate mitral regurgitation    Hypokalemia    Hyperkalemia    Acute kidney injury (HCC)    Midgut volvulus (HCC)    Acute respiratory failure with hypoxia (HCC)

## 2025-07-17 NOTE — PROGRESS NOTES
[facility]   part of [facility]     Hospitalist Progress Note     Deangeloholly Cooney Patient Status:  Inpatient    1934 MRN [id number]   Location [facility] 4SW-A Attending Lizzy Nelson DO    Hosp Day # 10 PCP Jeffrey Gan MD     Chief Complaint: Abdominal pain and distention    Subjective:     Patient did have a small bowel movement yesterday. Today he is complaining of abdominal distention and so does not want to eat. He is also a little dizzy.     Objective:    Review of Systems:   A comprehensive review of systems was completed; pertinent positive and negatives stated in subjective.    Vital signs:  Temp:  [97.2 °F (36.2 °C)-97.6 °F (36.4 °C)] 97.4 °F (36.3 °C)  Pulse:  [66-78] 67  Resp:  [20-23] 20  BP: ()/(54-85) 98/68  SpO2:  [97 %-100 %] 100 %    Physical Exam:    General: No acute distress, awake and alert,   Respiratory: Unlabored, no wheezing, no rhonchi  Cardiovascular: S1, S2, regular rate and rhythm  NSR   Abdomen: Soft, +post op tenderness, mildly distended, incision in staples dry and intact  Extremities: No edema     Diagnostic Data:    Labs:  Recent Labs   Lab 25  0518 25  0433 25  0551 25  0828 25  0517   WBC 6.3 5.6 6.4 12.1* 11.2*   HGB 8.7* 8.2* 9.1* 9.9* 9.4*   MCV 93.2 90.8 90.8 91.7 92.1   .0 179.0 196.0 250.0 230.0     Recent Labs   Lab 25  0518 25  0433 25  0813 25  0526 25  0517   GLU 81   < > 149* 160* 132*   BUN 21   < > 18 33* 42*   CREATSERUM 1.52*   < > 1.43* 1.75* 1.94*   CA 7.7*   < > 7.8* 8.2* 8.0*   ALB 2.9*  --   --   --   --    *   < > 146* 142 141   K 3.8  3.8   < > 4.3 3.9 3.8   *   < > 113* 109 109   CO2 28.0   < > 24.0 25.0 25.0   ALKPHO 57  --   --   --   --    AST 19  --   --   --   --    ALT <7*  --   --   --   --    BILT 0.5  --   --   --   --    TP 5.0*  --   --   --   --     < > = values in this interval not displayed.     Estimated Glomerular  Filtration Rate: 32 mL/min/1.73m2 (A) (result from lab).    No results for input(s): \"TROP\", \"TROPHS\", \"CK\" in the last 168 hours.    No results for input(s): \"PTP\", \"INR\" in the last 168 hours.     Microbiology  No results found for this visit on 07/06/25.    Imaging: Reviewed in Epic.    Medications: Scheduled Medications[1]    Assessment & Plan:      #Midgut volvulus  -S/p emergent ex-lap and successful detorsion 7/7  -Completed course of empiric zosyn  -Pain control, PRN anti-emetics   -Supportive care, bowel regimen  -More abdominal discomfort today, plan for abdominal x-ray and switch to CLD  -Surgery following     #Upper airway wheezing; possible vocal cord dysfunction due to inflammation from intubation/ET tube  -CXR without acute process  -Avoid supplemental O2 if no hypoxia  -Trial IV steroids > transition to PO course 7/16  -Nebs  -Supportive care  -IS  -Pulm following  -SLP following for VCD exercises  -ENT consulted, s/p flexible laryngoscopy with normal vocal cord motion without masses but did show thrush    #Oropharyngeal thrush  -Nystatin     #ANTHONY on CKD 3  -Resume IVF today  -Holding PTA lasix     #Hypernatremia  -Resolved  -Holding diuretics     #Hypertension  -PTA carvedilol with holding parameters  -Hold losartan     #Post operative mechanical ventilation  -Extubated 7/8  -Encourage IS for atelectasis  -Nebs     #Normocytic anemia   #Component of post-op blood loss, expected  -Iron studies reviewed   -Hgb stable     #COPD   -PTA inhalers  -Nebs     #Chronic HFpEF  #Moderate mitral and aortic regurgitation   -Hold lasix     #Crohn's disease  -Mesalamine    #GERD  -PPI     #BPH  -Flomax      #Hypothyroidism  -Levothyroxine      #HLD  #Prior VTE previously on DOAC   #Ventral incisional hernia with failed repair x 2 in past   #Hx of diverticulitis    Discussed with patient, son, RN.        Nirmal Gan DO      Supplementary Documentation:     Quality:  DVT Mechanical Prophylaxis:   SCDs,    DVT  Pharmacologic Prophylaxis   Medication    heparin (Porcine) 5000 UNIT/ML injection 5,000 Units     Code Status: Full Code  Thomas: No urinary catheter in place  YAS: TBD    Discharge is dependent on: Clinical state, consultant recs  At this point Mr. Cooney is expected to be discharge to: [facility]    The 21st Century Cures Act makes medical notes like these available to patients in the interest of transparency. Please be advised this is a medical document. Medical documents are intended to carry relevant information, facts as evident, and the clinical opinion of the practitioner. The medical note is intended as peer to peer communication and may appear blunt or direct. It is written in medical language and may contain abbreviations or verbiage that are unfamiliar.               [1]    potassium chloride  40 mEq Oral Once    docusate sodium  100 mg Oral BID    polyethylene glycol (PEG 3350)  17 g Oral Daily    sodium chloride  3 mL Nebulization QID    nystatin  5 mL Oral QID    predniSONE  40 mg Oral Daily with breakfast    insulin aspart  1-5 Units Subcutaneous TID CC and HS    carvedilol  6.25 mg Oral BID with meals    ferrous sulfate  325 mg Oral BID with meals    losartan  25 mg Oral Daily    mesalamine DR  800 mg Oral TID    heparin  5,000 Units Subcutaneous Q8H SCAR    ipratropium-albuterol  3 mL Nebulization QID    fluticasone-salmeterol  1 puff Inhalation BID    umeclidinium bromide  1 puff Inhalation Daily    tamsulosin  0.4 mg Oral Nightly    levothyroxine  75 mcg Oral Daily @ 0700    pantoprazole  40 mg Oral QAM AC

## 2025-07-17 NOTE — PHYSICAL THERAPY NOTE
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 Pt diagnosed with Midgut volvulus and is s/p emergent ex lap and successful detorsion 7/7/25. Pt was intubated for procedure and extubated 7/7/25.     HOME SITUATION  Type of Home: [facility]  Home Layout: Multi-level         Lives With: Son, Caregiver part-time    Drives: No   Patient Regularly Uses: Cane, Rolling walker       Prior Level of [place]: Pt reports he is typically mod ind with ADLs, ambulates mod ind with RW, and stair climbs mod ind. Pt lives with his son who assists as needed, has a CG part time when the son is not home, and goes to adult day care some days.   PHYSICAL THERAPY ASSESSMENT   Patient demonstrates good  progress this session, goals  remain in progress.      Patient is requiring contact guard assist as a result of the following impairments: decreased functional strength, decreased endurance/aerobic capacity, pain, impaired   balance, decreased muscular endurance, and medical status.     Patient continues to function below baseline with bed mobility, transfers, gait, and stair negotiation.  Next session anticipate patient to progress bed mobility, transfers, and gait.  Physical Therapy will continue to follow patient for duration of hospitalization.    Patient continues to benefit from continued skilled PT services: at discharge to promote prior level of function.  Anticipate patient will return home with home health PT.    PLAN DURING HOSPITALIZATION  Nursing Mobility Recommendation : 1 Assist  PT Device  Recommendation: Rolling walker  PT Treatment Plan: Bed mobility, Endurance, Energy conservation, Patient education, Family education, Gait training, Strengthening, Stair training, Transfer training, Balance training  Frequency (Obs): 3-5x/week     CURRENT GOALS     Goal #1 Patient is able to demonstrate supine - sit EOB @ level: supervision      Goal #2 Patient is able to demonstrate transfers Sit to/from Stand at assistance level: supervision  met      Goal #3 Patient is able to ambulate 150 feet with assist device: walker - rolling at assistance level: supervision  Met atSBA      Goal #4 Patient is able to stair climb 4 stairs with supervision   Goal #5     Goal #6     Goal Comments: Goals established on 7/7/2025 7/17/2025 all goals ongoing    SUBJECTIVE  \"Ok lets try\"    OBJECTIVE  Precautions: Bed/chair alarm, NG suction, Abdominal protective strategies    WEIGHT BEARING RESTRICTION     PAIN ASSESSMENT   Rating: Unable to rate  Location: abdomen  Management Techniques: Activity promotion, Repositioning, Relaxation    BALANCE                                                                                                                       Static Sitting: Fair +  Dynamic Sitting: Fair +           Static Standing: Poor +  Dynamic Standing: Poor +    ACTIVITY TOLERANCE   Spo2 94% on room air                        O2 WALK       AM-PAC '6-Clicks' INPATIENT SHORT FORM - BASIC MOBILITY  How much difficulty does the patient currently have...  Patient Difficulty: Turning over in bed (including adjusting bedclothes, sheets and blankets)?: None   Patient Difficulty: Sitting down on and standing up from a chair with arms (e.g., wheelchair, bedside commode, etc.): None   Patient Difficulty: Moving from lying on back to sitting on the side of the bed?: A Little   How much help from another person does the patient currently need...   Help from Another: Moving to and from a bed to a chair (including a wheelchair)?: A Little    Help from Another: Need to walk in hospital room?: A Little   Help from Another: Climbing 3-5 steps with a railing?: A Little     AM-PAC Score:  Raw Score: 20   Approx Degree of Impairment: 35.83%   Standardized Score (AM-PAC Scale): 47.67   CMS Modifier (G-Code): CJ    FUNCTIONAL ABILITY STATUS  Gait Assessment   Functional Mobility/Gait Assessment  Gait Assistance: Supervision  Distance (ft): 150  Assistive Device: Rolling walker  Pattern:  (gait paced to reduce sob. two standing rest breaks, forward flexed posture.)  Stairs:  (stair training done previously. data is from previous session)  How Many Stairs: 10  Device: 2 Rails  Assist: Contact guard assist  Pattern: Ascend and Descend  Ascend and Descend : Step to    Skilled Therapy Provided: Per RN okay to work with pt. Pt received in chair and was agreeable to PT session. Daughter concerned that pt may be weak, encouraged pt to ambulate to prevent deconditioning. Patient was given adequate time to prep for gait, proper breathing relaxation and purse lip breaths practiced ahead of time.   BP monitored     Bed Mobility:  Rolling: sup  Supine<>Sit: NT   Sit<>Supine:sup    Transfer Mobility:  Sit<>Stand: SBA  Stand<>Sit: SBA   Gait: CGA initially but progressed to SBA with RW, chair follow.     Patient End of Session: Up in chair, Needs met, RN aware of session/findings, Call light within reach, All patient questions and concerns addressed, Hospital anti-slip socks    PT Session Time: 23 minutes  Gait Training: 15min  Therapeutic Activity: 8 minutes

## 2025-07-17 NOTE — PROGRESS NOTES
Alert and Oriented x 4.  VSS  Afebrile.    O2 Sat WNL on room air.  Note SOB with [place].  Note expiratory wheezing.   Telemetry = NSR  Speech therapy saw patient today.  ENT saw patient and did fiberoptic laryngoscopy at bedside and found patient to have Thrush.  Dr Gan informed and Nystantin ordered and given.  Lasix given per orders.  Denies nausea or emesis.  Tolerating diet well.  Patient up in servin walking and tolerated it fairly well but SOB and note some wheezing.  Albuterol given with good relief. Patient using IS and Flutter valve.    Voiding in urinal in small amounts.    Up in chair most of day and tolerated it well.  Family at bedside.  All questions and concerns addressed.

## 2025-07-18 PROBLEM — I46.9 CARDIAC ARREST (HCC): Status: ACTIVE | Noted: 2025-01-01

## 2025-07-18 NOTE — PROGRESS NOTES
[facility]   part of [facility]     Hospitalist Progress Note     Syed Cooney Patient Status:  Inpatient    1934 MRN [id number]   Location [facility] 4SW-A Attending Nirmal Miller DO    Hosp Day # 11 PCP Jeffrey Gan MD     Chief Complaint: Abdominal pain and distention    Subjective:     Patient   sedated vented  Family at bedside  awaiting arrival of grand son then will stop pressors and extubate   S/p PEA arrest/ ROSC achieved - tx to ICU on vent suspected aspiration  comfort measures now     Objective:    Review of Systems:   Unable to do     Vital signs:  Temp:  [93 °F (33.9 °C)-97.2 °F (36.2 °C)] 93.4 °F (34.1 °C)  Pulse:  [] 101  Resp:  [15-30] 28  BP: ()/(34-78) 111/54  SpO2:  [72 %-100 %] 100 %  AO: ()/(45-65) 110/54  FiO2 (%):  [50 %-80 %] 50 %    Physical Exam:    General: No acute distress, sedated vented    Respiratory: +ETT Fio2 50%   Cardiovascular: S1, S2, regular rate and rhythm  ST   Abdomen: not assessed  Extremities:not assessed  Thomas Triple lumen     Diagnostic Data:    Labs:  Recent Labs   Lab 25  0433 25  0551 25  0828 25  0517 25  0319 25  0320   WBC 5.6 6.4 12.1* 11.2* 13.1*  --    HGB 8.2* 9.1* 9.9* 9.4* 8.8*  --    MCV 90.8 90.8 91.7 92.1 92.1  --    .0 196.0 250.0 230.0 250.0  --    INR  --   --   --   --   --  1.29*     Recent Labs   Lab 25  0526 25  0517 25  0319   * 132* 243*  243*   BUN 33* 42* 49*  49*   CREATSERUM 1.75* 1.94* 2.06*  2.06*   CA 8.2* 8.0* 7.5*  7.5*   ALB  --   --  2.8*    141 137  137   K 3.9 3.8 4.6  4.6  4.6    109 106  106   CO2 25.0 25.0 24.0  24.0   ALKPHO  --   --  59   AST  --   --  52*   ALT  --   --  38   BILT  --   --  0.4   TP  --   --  4.7*     Estimated Glomerular Filtration Rate: 30 mL/min/1.73m2 (A) (result from lab).    No results for input(s): \"TROP\", \"TROPHS\", \"CK\" in the last 168 hours.    Recent  Labs   Lab 07/18/25  0320   PTP 16.2*   INR 1.29*        Microbiology  No results found for this visit on 07/06/25.    Imaging: Reviewed in Epic.    Medications: Scheduled Medications[1]    Assessment & Plan:    # s/p PEA arrest   bradycardia/PEA/hypoxia -ACLS/ ROSC- intubation, tx to ICU, now comfort measures.   -IV steroids   Plan to stop pressors/ extubate  once silvino son arrives  - mess sent to Dr Stark   -levo/ vaso gtts propofol gtt   Comfort order set  Dr Stark aware     Suspected aspiration   #Midgut volvulus  -S/p emergent ex-lap and successful detorsion 7/7  -Pain control,    #Upper airway wheezing; possible vocal cord dysfunction due to inflammation from intubation/ET tube  -ENT consulted, s/p flexible laryngoscopy with normal vocal cord motion without masses but did show thrush  Reintubation 7/18     #Oropharyngeal thrush  #ANTHONY on CKD 3  #Hypernatremia  -Resolved  #Hypertension  #Post operative mechanical ventilation  -Extubated 7/8, reintubation 7/18   #Normocytic anemia   #Component of post-op blood loss, expected  #COPD   #Chronic HFpEF  #Moderate mitral and aortic regurgitation   #Crohn's disease  #GERD  #BPH  #Hypothyroidism  #HLD  #Prior VTE previously on DOAC   #Ventral incisional hernia with failed repair x 2 in past   #Hx of diverticulitis    Discussed with  RN., ccare team , family    emotional support to family  - questions answered.   Dr Stark notified       Krysta Dickey NP       Supplementary Documentation:     Quality:  DVT Mechanical Prophylaxis:   SCDs,    DVT Pharmacologic Prophylaxis   Medication    heparin (Porcine) 5000 UNIT/ML injection 5,000 Units     Code Status: DNAR/Comfort Care  Thomas: Thomas catheter in place  YAS: TBD    Discharge is dependent on: Clinical state, consultant recs  At this point Mr. Cooney is expected to be discharge to: [facility]    The 21st Century Cures Act makes medical notes like these available to patients in the interest of transparency. Please be advised this  is a medical document. Medical documents are intended to carry relevant information, facts as evident, and the clinical opinion of the practitioner. The medical note is intended as peer to peer communication and may appear blunt or direct. It is written in medical language and may contain abbreviations or verbiage that are unfamiliar.                 [1]    chlorhexidine gluconate  15 mL Mouth/Throat BID@0800,2000    mineral oil-white petrolatum  1 Application Both Eyes Nightly    methylPREDNISolone  40 mg Intravenous Q8H    piperacillin-tazobactam  3.375 g Intravenous Q12H    morphINE  2 mg Intravenous Once    LORazepam  1 mg Intravenous Once    nystatin  5 mL Oral QID    insulin aspart  1-5 Units Subcutaneous TID CC and HS    [Held by provider] carvedilol  6.25 mg Oral BID with meals    [Held by provider] ferrous sulfate  325 mg Oral BID with meals    [Held by provider] losartan  25 mg Oral Daily    [Held by provider] mesalamine DR  800 mg Oral TID    heparin  5,000 Units Subcutaneous Q8H SCAR    [Held by provider] tamsulosin  0.4 mg Oral Nightly    levothyroxine  75 mcg Oral Daily @ 0700    pantoprazole  40 mg Oral QAM AC

## 2025-07-18 NOTE — SPIRITUAL CARE NOTE
Spiritual Care Visit Note    Patient Name: Syed Cooney Date of Spiritual Care Visit: 25   : 1934 Primary Dx: Midgut volvulus (HCC)       Referred By: Referral From:     Spiritual Care Taxonomy:    Intended Effects: Journeying with someone in the grief process    Methods: Encourage self reflection, Encourage sharing of feelings, Offer emotional support, Offer spiritual/Sikh support    Interventions: Facilitate closure, Provide grief resources, Alpha, Explain  role    Visit Type/Summary:     - Spiritual Care: Consulted with RN prior to visit. Family at bedside names/describes family arriving to say good-bye as pt. Transitions to comfort care later this morning.  offered Church prayers of transition and eternal life and words of comfort for family, some of who were tearful and all very respectful. Family expressed appreciation for  visit.  remains available as needed for follow up.    Spiritual Care support can be requested via an Epic consult. For urgent/immediate needs, please contact the On Call  at: Edward: ext [phone]

## 2025-07-18 NOTE — CODE DOCUMENTATION
[place] HOSPITALIST  CODE NOTE     Syed Cooney Patient Status:  Inpatient    1934 MRN [id number]   Location [facility] 4SW-A Attending Nirmal Gan,    Hosp Day # 11 PCP Jeffrey Gan MD     Reason for Code Alert (narrative): AMS, hypoxia     Rhythm: bradycardia -> PEA arrest    CPR initiated: yes    Medications given: yes, epi x3, bicarb x1     ROSC achieved: yes     Physical Exam:    /56 (BP Location: Right arm)   Pulse 75   Temp 96.5 °F (35.8 °C) (Temporal)   Resp 20   Wt 130 lb 1.6 oz (59 kg)   SpO2 97%   BMI 25.41 kg/m²   General: unresponsive   Respiratory: coarse BS BL  Cardiovascular: tachy post ROSC  Abdomen: distended   Neurologic: deferred   Extremities: edema BL    Diagnostic Data:      Labs: reviewed    Imaging: reviewed     ASSESSMENT / PLAN:     Patient noted to be more confused and required up to 5L NC, when suctioned by RT, there was concern for gastric content.    # acute hypoxic respiratory failure  - intubated, vent management per pulm/ICU  - CXR  - ABG    # PEA arrest due to hypoxia   - sp ROSC  - ICU     # aspiration event   # ileus   # COPD  # chronic HFpEF  # Crohn's  # GERD    Son has been updated over the phone and he is on his way to the hospital. FULL code confirmed.     Upon my evaluation, this patient had a high probability of imminent or life-threatening deterioration due to hypotension, hypoxemia, shock, and cardiac arrest, which required my direct attention, intervention, and personal management.    I have personally provided 45 minutes of critical care time, exclusive of time spent on separately billable procedures.  Time includes review of all pertinent laboratory/radiology results, discussion with consultants, and monitoring for potential decompensation.  Performed interventions included oxygen, epinephrine, sodium bicarbonate, and intubation.      Indu Dobbs MD  2025

## 2025-07-18 NOTE — RESPIRATORY THERAPY NOTE
Pt admitted to ICU from floors. Intubated with 7.5 @ 23 with current vent setting 15 375 50% +5 PIP 47-51 PLAT 28-31 Auto peep 10-11 ABg done. Sptm collected and sent to lab. Duo nebs Q4

## 2025-07-18 NOTE — DISCHARGE SUMMARY
[place] HOSPITALIST  DISCHARGE SUMMARY     Syed Cooney Patient Status:  Inpatient    1934 MRN [id number]   Location [facility] 4SW-A Attending Nirmal Gan, DO   Hosp Day # 11 PCP Jeffrey Gan MD     Date of Admission: 2025  Date of Discharge: 2025  Discharge Disposition:  25 at 11:53 AM    Discharge Diagnosis:   #PEA cardiac arrest likely due to aspiration  #Aspiration PNA  #Shock due to above  #Post operative mechanical ventilation  #Midgut volvulus  #Ileus   #Upper airway wheezing; possible vocal cord dysfunction due to inflammation from intubation/ET tube  #Oropharyngeal thrush  #ANTHONY on CKD 3  #Hypernatremia  #Hypertension  #Normocytic anemia   #Component of post-op blood loss, expected  #COPD   #Chronic HFpEF  #Moderate mitral and aortic regurgitation   #Crohn's disease  #GERD  #BPH  #Hypothyroidism  #HLD  #Prior VTE previously on DOAC   #Ventral incisional hernia with failed repair x 2 in past   #Hx of diverticulitis    History of Present Illness: Syed Cooney is a 89 year old male with history of  COPD, CKD stage III, moderate mitral and aortic regurgitation, HTN, HLD, Hypothyroidism, GERD, BPH, prior VTE previously on DOAC, history of diverticulitis, ventral incisional hernia s/p failed repair x 2 presented with sudden onset of abdominal pain, distention, loss of appetite and inability to tolerate oral intake. Family brought him to the ED. ED work up revealed midgut volvulus with severe mesenteric edema throughout the abdomen and pelvis. He underwent emergent ex lap and successful detorsion of the volvulus and admitted to ICU intubated.    Brief Synopsis: Patient presented with abdominal pain and was found to have midgut volvulus.  He underwent emergent exploratory laparotomy and successful detorsion on .  He completed a course of Zosyn postoperatively.  He was kept intubated overnight and extubated the following day.  He had persistent  upper airway wheezing due to possible vocal cord dysfunction.  Pulmonology consulted.  Chest x-ray was without acute process.  He was started on steroids and bronchodilators.  ENT consulted he underwent flexible laryngoscopy which showed normal vocal cord motion but did show thrush.  He was started on nystatin.  Course complicated by ileus and his diet went back from soft to clear liquids.  Last night he had an aspiration event leading to bradycardia and PEA arrest.  He ACLS started and he had ROSC after 17 minutes.  He was intubated and started on pressors in the ICU.  Family eventually decided on comfort care and he was palliatively extubated and passed away on July 18 at 11:53 AM.    Procedures during hospitalization:   Exploratory laparotomy and detorsion of midgut volvulus  Flexible laryngoscopy    Incidental or significant findings and recommendations (brief descriptions):  Pleases see brief synopsis above    Lab/Test results pending at Discharge:   None    Consultants:  Critical care  Pulmonology  General Surgery  ENT      Nirmal Gan DO    Time spent:  15 minutes

## 2025-07-18 NOTE — SLP NOTE
Recent events noted and patient now intubated to vent. Family to pursue comfort measures.  SLP will sign off at this time.   Romy Saucedo MS CCC-SLP/L  Speech-Language Pathologist  [facility]Link [phone]

## 2025-07-18 NOTE — PROCEDURES
Arterial Line  Performed by: LIAN Willson     General Information and Staff     Procedure Start: 0340  Patient Location:  ICU  Indication: continuous blood pressure monitoring and blood sampling needed    Site Identification: real time ultrasound guided, sterile technique used     Procedure Details     Catheter Size:  20 G  Catheter Length:  1 and 3/4 inchCatheter Type:  Arrow  Seldinger Technique?: Yes    Laterality:  right   Site: radial     Site Prep: chlorhexidine  Line Secured:  Tape and Tegaderm     Assessment: Chuck's test negative, Good flash, guidewire and catheter advanced without difficulty, pulsatile blood flow noted.    Events: patient tolerated procedure well with no complications

## 2025-07-18 NOTE — PROCEDURES
[facility] Patient Status:  Inpatient    1934 MRN [id number]   Location [facility] 4SW-A Attending Nirmal Gan,    Hosp Day # 11 PCP Jeffrey Gan MD       CVC Procedure Note    Procedure        :  triple lumen catheter with ultrasound guidance  Indication          : shock  Consent           : emergent  Timeout           : performed at bedside  (s)     :  LIAN Willson  Complications : none  EBL                 : < 5mls  Meds:                : 3mL 1% lidocaine      Consent unable to be obtained due to emergent nature of procedure.    Timeout performed at bedside. Patient prepped and draped in sterile fashion, and pre-medicated as above. The right internal jugular vein was visualized by ultrasound, and distinguished from the adjacent artery as being easily compressible. The right internal jugular vein was seen to be entered by the 18g introducer needle under direct ultrasound guidance with one skin break(s). Venous location was verified by return of dark, non-pulsatile blood return. A 7fr triple lumen catheter was advanced by modified seldinger technique. All ports flush and draw easily.    No obvious complications noted.  Chest x-ray is pending.    LIAN Willson

## 2025-07-18 NOTE — CODE DOCUMENTATION
.Code Blue    *See Code Blue Documentation Record*    Reason the code blue was called: Initially an RRT was called d/t an increase in patient's work of breathing, increasing O2 needs and fluctuations in heart rate (bradying down)  Assessment of patient leading up to code: Patient had called the PCT and requested to get up to the recliner d/t SOB. Respiratory therapist was called by this RN d/t throaty lung sounds worsening from earlier assessment. RT came and she administered a neb treatment and then used the Yankauer to suction the patient's mouth. She notified this writer and show this writer the contents of what was suctioned from the patient. The secretions were thin and brown and appeared to be gastric secretions vs respiratory secretions. Dr. Dobbs was paged and this RN requested that she evaluate the patient in person. She was in the process of caring for another patient and said she would come to the bedside as soon as possible and ordered for the patient to be NPO and ordered a stat CXR. Patient started to became less responsive, heart rate bradying down, noted accessory muscle use, RRT was called at 0152 and patient was transferred to the bed by 3 Rns. After transfer to the bed, patient's skin color became ashen, breathing became agonal and pulse was weak and thready, code blue was called at 0155.  Interventions/Testing: Patient required CPR, intubation and transfer to ICU for intubation.  Patient Outcome/Disposition: Alive, intubated and on a ventilator.  Family Notified: yes  Name of family notified: Jose Eros  This writer accompanied the patient for transfer to room 459. Report was given to PORTILLO Ramires at the bedside.

## 2025-07-18 NOTE — PLAN OF CARE
Patient received around 0700. Comfort orders already in place, waiting for family to arrive. Intubated/sedated. Once family arrived, patient given comfort meds, morphine gtt started and pt extubated. Jehovah's witness blessing completed per family. Supportive gtt's shut off. TOD 1153, called with rober ryan RN.

## 2025-07-18 NOTE — PROGRESS NOTES
CRITICAL CARE UPDATE NOTE    Patient admitted overnight in the setting cardiac arrest, acute respiratory failure, and shock. After multiple discussions with the patients family overnight, decision was made to proceed with comfort care. At the time of my evaluation, comfort care is ongoing. Condolences given.     #goals of care    DOS: 7/18/25

## 2025-07-18 NOTE — SPIRITUAL CARE NOTE
Spiritual Care Visit Note    Patient Name: Syed Cooney Date of Spiritual Care Visit: 25   : 1934 Primary Dx: Midgut volvulus (HCC)       Referred By: Referral From: Other (Comment)    Spiritual Care Taxonomy:    Intended Effects: Convey a calming presence    Methods: Collaborate with care team member, Offer support    Interventions: Acknowledge current situation, Active listening, Ask guided questions, Silent prayer    Visit Type/Summary:     - Spiritual Care:  responded to code blue in 321. No family at the bedside. NP told  that son who is AZALEA is in route.  maintained a presence during the code and transfer to ICU bed 459.  offered silent prayers for patient and staff.   remains available as needed for follow up.    Spiritual Care support can be requested via an Epic consult. For urgent/immediate needs, please contact the On Call  at: Edward: ext [phone]    Min. Ofe Souza Mdiv.

## 2025-07-18 NOTE — CONSULTS
[facility]  Report of Consultation    Syed Cooney Patient Status:  Inpatient    1934 MRN [id number]   Location [facility] 4SW-A Attending Nirmal Gan, DO   Hosp Day # 11 PCP Jeffrey Gan MD     Reason for Consultation:  Cardiac arrest    History of Present Illness:  Syed Cooney is a a(n) 89 year old male with PMH HFpEF, COPD, HTN, Crohns, previous multiple abdominal surgeries, diverticulosis who was hospitalized earlier this month after presenting with volvulus. He developed abdominal pain, distention and hypotension, underwent volvulus detorsion on  and was monitored postop in ICU. He was kept intubated postoperatively and extubated later same day on  but had upper airway wheeze/stridor post extubation managed on nebs with eventual improvement.  He had prolonged ileus managed with NGT placement but this was able to be removed last week and tolerate a diet. He was also managed for COPD flare (possible VCD) on nebs and recently started steroids for ongoing dyspnea. Yesterday he was noted to have more abdominal distention and pain, abdominal xray with air filled bowel loops/ileus.   This morning he was noted confused, hypoxic, orally suctioned by RT with concern for gastric contents. Rapid response then code blue was called for bradycardia--> PEA arrest. Underwent ACLS, ~17mins with epi x3, bicarb x1 with ROSC. He was intubated during code and IO placed to left shoulder due to lack of IV access. Post arrest, he was arousable and responsive.     History:  Past Medical History[1]  Past Surgical History[2]  Family History[3]   reports that he quit smoking about 38 years ago. His smoking use included cigarettes. He has a 70 pack-year smoking history. He has quit using smokeless tobacco. He reports that he does not currently use alcohol. He reports that he does not use drugs.    Allergies:  Allergies[4]    Medications:  reviewed  Scheduled  Medications[5]  PRN Medications[6]    Review of Systems:   WANDER  Vital signs in last 24 hours:  Patient Vitals for the past 24 hrs:   BP Temp Temp src Pulse Resp SpO2   07/18/25 0245 (!) 55/36 -- -- 75 15 95 %   07/18/25 0240 (!) 56/34 -- -- 95 15 96 %   07/18/25 0230 (!) 85/43 -- -- 100 20 100 %   07/18/25 0143 110/56 96.5 °F (35.8 °C) Temporal 75 20 97 %   07/18/25 0115 -- -- -- 73 18 98 %   07/18/25 0055 132/78 -- -- 71 26 97 %   07/17/25 2240 -- -- -- 67 -- 98 %   07/17/25 2031 130/51 -- -- 61 23 98 %   07/17/25 1700 138/53 97 °F (36.1 °C) Axillary 64 22 99 %   07/17/25 1307 -- 97.2 °F (36.2 °C) Oral -- -- --   07/17/25 1155 -- -- -- 67 20 100 %   07/17/25 1143 98/68 -- -- 66 -- 100 %   07/17/25 0810 -- -- -- 78 20 100 %   07/17/25 0747 121/56 97.4 °F (36.3 °C) Oral 75 -- 100 %   07/17/25 0401 137/74 97.3 °F (36.3 °C) Oral 76 22 97 %   07/17/25 0300 -- -- -- 75 -- 98 %       Intake/Output:    Intake/Output Summary (Last 24 hours) at 7/18/2025 0246  Last data filed at 7/18/2025 0055  Gross per 24 hour   Intake 325 ml   Output 200 ml   Net 125 ml          PHYSICAL EXAM  GEN: Appears intubated, awake on vent. Moving purposefully  PSYCH:WANDER  NEURO: moving all extremities, pupils equal. No focal deficit appreciated  HEENT: Atraumatic, normocephalic, EOMI, no icterus/hemorrhage, no conjunctival injection/discharge, nares normal  MOUTH: ETT in place  NECK: Trachea midline, symmetric, no visible masses or scars, no crepitus, normal flexion/extension  CHEST: Normal chest excursion, no visible deformity or scars, no tenderness to palpation  PULMONARY:wheeze bilaterally. Rare rhonchi. No distress on vent  COR: regular rate and rhythm, no murmur heard  GI: moderate distention with no rigidity. Does react to palpation/ appears tender. staples in place  LYMPHATIC: No palpable or visible lymphadenopathy in neck, axillae, groin  EXT: No overt deformities, +edema    Lab Data Review:  Recent Labs   Lab 07/14/25  0813  07/16/25  0526 07/17/25  0517   * 160* 132*   BUN 18 33* 42*   CREATSERUM 1.43* 1.75* 1.94*   CA 7.8* 8.2* 8.0*   * 142 141   K 4.3 3.9 3.8   * 109 109   CO2 24.0 25.0 25.0     Recent Labs   Lab 07/13/25  0551 07/16/25  0828 07/17/25  0517   RBC 2.92* 3.14* 3.04*   HGB 9.1* 9.9* 9.4*   HCT 26.5* 28.8* 28.0*   MCV 90.8 91.7 92.1   MCH 31.2 31.5 30.9   MCHC 34.3 34.4 33.6   RDW 13.9 14.3 14.1   NEPRELIM 4.34 10.83* 9.94*   WBC 6.4 12.1* 11.2*   .0 250.0 230.0     No results for input(s): \"BNP\" in the last 168 hours.  No results for input(s): \"TROP\", \"CK\" in the last 168 hours.  No results for input(s): \"PT\", \"INR\", \"PTT\" in the last 168 hours.    Other Labs:     ABG:  No results for input(s): \"ABGPHT\", \"ZWOLSP6W\", \"WXJYN0N\", \"ABGHCO3\", \"ABGBE\", \"TEMP\", \"MARVA\", \"SITE\", \"DEV\", \"THGB\" in the last 168 hours.    Invalid input(s): \"HSI50ICJ\", \"CHOB\"    Cultures:   No results found for this visit on 07/06/25.  No results for input(s): \"COLORUR\", \"CLARITY\", \"SPECGRAVITY\", \"GLUUR\", \"BILUR\", \"KETUR\", \"BLOODURINE\", \"PHURINE\", \"PROUR\", \"UROBILINOGEN\", \"NITRITE\", \"LEUUR\", \"WBCUR\", \"RBCUR\", \"BACUR\", \"EPIUR\" in the last 168 hours.    Radiology:   Reviewed personally  XR ABDOMEN (1 VIEW) (CPT=74018)  Result Date: 7/17/2025  CONCLUSION: See above Electronically Verified and Signed by Attending Radiologist: Chelo Gan MD 7/17/2025 2:20 PM Workstation: [id number]    XR CHEST AP PORTABLE  (CPT=71045)  Result Date: 7/16/2025  CONCLUSION: No evidence of active cardiopulmonary disease Electronically Verified and Signed by Attending Radiologist: Noe Alfred MD 7/16/2025 3:41 PM Workstation: [id number]    Problems  Cardiac arrest, bradycardia/PEA due to hypoxia, suspected aspiration  Shock related to above  Acute hypoxic respiratory failure, intubated 7/18  Aspiration pneumonia/ pneumonia  Volvulus s/p ex lap with detorsion 7/7  Previous upper airway wheeze, possible VCD  COPD exacerbation -  follows with [facility]  [facility]  HFpEF  Acute on chronic renal failure  leukocytosis  Hx ventral hernia s/p repair (failed) and previous hx of diverticulitis s/p bowel resection  GERD  Hx of VTE - off of treatment     ASSESSMENT/PLAN  Neuro  Monitor neuro status closely  Wean sedation for goal RASS 0 to -2     CV  Monitor hemodynamics, titrate vasopressors for goal MAP >65  Obtain CXR and plan for POCUS   Abx as below     Resp  Continue mechanical ventilation, low tidal volume strategy, extend I:E ratio given ongoing COPD exacerbation/ obstruction  Increase scheduled nebulized bronchodilators  Start empiric abx for aspiration pneumonia; send cx  Wean fio2 for sats >89% and/or PaO2 >55  [facility] following     GI  Place OGT - keep NPO  Surgery following   Obtain CT chest/abdomen/pelvis once stable     Renal  monitor renal function, urine output, lytes and fluid balance     Hem  Monitor for s/s bleeding     ID  Start empiric abx for aspiration  Send cultures including endotracheal aspirate     Endo  Monitor glucoses, ISS     ICU checklist  Feeding: NPO  Analgesia/ Sedation: wean sedation for goal RASS 0 to -2  Thromboembolism PPX: SCDs, HSQ  Stress Ulcer PPX: PPI  Glucose control: monitor glucoses/ ISS  Bowel Regimen: PRN  Lines/drains/tubes: plan to place CVC and a line  Deescalation of antibiotics: n/a  Therapies:PT/OT/ST when appropriate  Code status: full  Dispo: ICU,     Zelalem Warren MD  Upon my evaluation, this patient had a high probability of imminent or life-threatening deterioration due to critical findings of laboratory tests, hypotension, shock, circulatory failure, and respiratory failure, which required my direct attention, intervention, and personal management.    I have personally provider 50 minutes of critical care time exclusive of time spent on separately billable procedures.  Time includes review of all pertinent laboratory/radiology results, discussion with consultants, and monitoring for potential  decompensation.  Performed interventions included fluids, pressor drugs, oxygen, vasoactive medications, and managing mechanical ventilation.      Reviewed with patient's son and daughter in law in waiting room. They were updated on events and patient's current condition. They expressed they are concerned about the patient's ongoing critical illness and likely lack of independence should he survive.  They are going to review with other family members but expressed they will likely move forward with comfort care this morning which is reasonable    Zelalem Warren MD             [1]   Past Medical History:   Anemia    Back problem    BPH (benign prostatic hyperplasia)    Cardiac arrest (HCC)    Cataract    CKD (chronic kidney disease)    Congestive heart disease (HCC)    COPD (chronic obstructive pulmonary disease) (HCC)    Crohn disease (HCC)    Diverticulosis    Hearing impairment    HTN (hypertension)    Hyperlipidemia    Hypothyroidism    Mitral regurgitation    Osteoarthritis    Osteoporosis    Pulmonary embolism (HCC)   [2]   Past Surgical History:  Procedure Laterality Date    Angiogram      2009    Cataract      Colonoscopy  06/30/2014    Procedure: COLONOSCOPY;  Surgeon: Jaz Carrasquillo DO;  Location:  ENDOSCOPY    Egd N/A 01/04/2017    Procedure: ESOPHAGOGASTRODUODENOSCOPY (EGD);  Surgeon: Gustabo Johnson MD;  Location:  MAIN OR    Egd N/A 01/18/2017    Procedure: ESOPHAGOGASTRODUODENOSCOPY (EGD);  Surgeon: Jaz Carrasquillo DO;  Location:  ENDOSCOPY    Hernia surgery      Knee replacement surgery      Other surgical history      6 yeara ago colonscopy with polpys    Sigmoidoscopy,diagnostic      Total knee replacement     [3]   Family History  Problem Relation Age of Onset    Cancer Brother    [4] No Known Allergies  [5]    propofol        fentaNYL        norepinephrine        senna  10 mL Per NG Tube BID    docusate  100 mg Per NG Tube BID    chlorhexidine gluconate  15 mL Mouth/Throat  BID@0800,2000    mineral oil-white petrolatum  1 Application Both Eyes Nightly    ipratropium-albuterol  3 mL Nebulization 6 times per day    methylPREDNISolone  40 mg Intravenous Q8H    piperacillin-tazobactam  3.375 g Intravenous Q12H    guaiFENesin ER  600 mg Oral BID    simethicone  160 mg Oral TID    docusate sodium  100 mg Oral BID    polyethylene glycol (PEG 3350)  17 g Oral Daily    sodium chloride  3 mL Nebulization QID    nystatin  5 mL Oral QID    insulin aspart  1-5 Units Subcutaneous TID CC and HS    carvedilol  6.25 mg Oral BID with meals    ferrous sulfate  325 mg Oral BID with meals    [Held by provider] losartan  25 mg Oral Daily    mesalamine DR  800 mg Oral TID    heparin  5,000 Units Subcutaneous Q8H SCAR    fluticasone-salmeterol  1 puff Inhalation BID    umeclidinium bromide  1 puff Inhalation Daily    tamsulosin  0.4 mg Oral Nightly    levothyroxine  75 mcg Oral Daily @ 0700    pantoprazole  40 mg Oral QAM AC   [6]   propofol    fentaNYL    norepinephrine    acetaminophen **OR** acetaminophen **OR** acetaminophen    fentaNYL    fentaNYL (Sublimaze) **AND** fentanyl    fentaNYL (Sublimaze)    polyethylene glycol (PEG 3350)    bisacodyl    ipratropium-albuterol    acetaminophen    albuterol    EPINEPHrine-racemic    glucose **OR** glucose **OR** glucose-vitamin C **OR** dextrose **OR** glucose **OR** glucose **OR** glucose-vitamin C    bisacodyl    hydrALAzine    HYDROcodone-acetaminophen

## 2025-07-18 NOTE — SPIRITUAL CARE NOTE
Spiritual Care Visit Note    Patient Name: Syed Cooney Date of Spiritual Care Visit: 25   : 1934 Primary Dx: Midgut volvulus (HCC)       Referred By: Referral From: Other (Comment)    Spiritual Care Taxonomy:    Intended Effects: Build relationship of care and support    Methods: Offer support    Interventions: Acknowledge current situation, Active listening, Ask guided questions, Silent prayer    Visit Type/Summary:     - Spiritual Care:  checked in with ICU nurse after patient was transferred. No family at the bedside.  advised to contact if further spiritual care support is needed.   remains available as needed for follow up.    Spiritual Care support can be requested via an Epic consult. For urgent/immediate needs, please contact the On Call  at: Edward: ext [phone]    Min.Ofe Souza Mdiv.

## 2025-07-18 NOTE — PROGRESS NOTES
Received patient from the floor after code blue.   Sedated. See flowsheet for further assessment.  Vented. Propofol infusing for sedation.   A fib. ART line. Levophed and vasopressin started.   NPO. OG to LIS.   Thomas inserted.   Bedrest.      Family/POA decided to go comfort (just waiting for a family member to arrive). MD notified, family/POA agreed to changing to DNR status until family arrives.   [facility] ([facility]) notified -- case # [id number]. Only call w/ TOD, not a donor candidate.

## 2025-07-18 NOTE — PROGRESS NOTES
[facility]   part of [facility]     Hospitalist Progress Note     Syed Cooney Patient Status:  Inpatient    1934 MRN [id number]   Location [facility] 4SW-A Attending Lizzy Nelson DO    Hosp Day # 11 PCP Jeffrey Gan MD     Chief Complaint: Abdominal pain and distention    Subjective:     Overnight events reviewed. Patient is intubated and sedated. He is on pressors. Family members at bedside.     Objective:    Review of Systems:   A comprehensive review of systems was unable to be completed due to patient clinical status.     Vital signs:  Temp:  [93 °F (33.9 °C)-97.2 °F (36.2 °C)] 93.6 °F (34.2 °C)  Pulse:  [] 101  Resp:  [15-30] 30  BP: ()/(34-78) 111/54  SpO2:  [72 %-100 %] 100 %  AO: ()/(45-65) 92/46  FiO2 (%):  [50 %-80 %] 50 %    Physical Exam:    General: No acute distress, intubated and sedated  Respiratory: No wheezing, ET tube in place  Cardiovascular: S1, S2, regular rate and rhythm  NSR   Abdomen: Soft, mildly distended, incision in staples dry and intact  Extremities: No edema     Diagnostic Data:    Labs:  Recent Labs   Lab 25  0433 25  0551 25  0828 25  0517 25  0319 25  0320   WBC 5.6 6.4 12.1* 11.2* 13.1*  --    HGB 8.2* 9.1* 9.9* 9.4* 8.8*  --    MCV 90.8 90.8 91.7 92.1 92.1  --    .0 196.0 250.0 230.0 250.0  --    INR  --   --   --   --   --  1.29*     Recent Labs   Lab 25  0526 25  0517 25  0319   * 132* 243*  243*   BUN 33* 42* 49*  49*   CREATSERUM 1.75* 1.94* 2.06*  2.06*   CA 8.2* 8.0* 7.5*  7.5*   ALB  --   --  2.8*    141 137  137   K 3.9 3.8 4.6  4.6  4.6    109 106  106   CO2 25.0 25.0 24.0  24.0   ALKPHO  --   --  59   AST  --   --  52*   ALT  --   --  38   BILT  --   --  0.4   TP  --   --  4.7*     Estimated Glomerular Filtration Rate: 30 mL/min/1.73m2 (A) (result from lab).    No results for input(s): \"TROP\", \"TROPHS\", \"CK\" in the last 168  hours.    Recent Labs   Lab 07/18/25  0320   PTP 16.2*   INR 1.29*      Microbiology  No results found for this visit on 07/06/25.    Imaging: Reviewed in Epic.    Medications: Scheduled Medications[1]    Assessment & Plan:      #PEA cardiac arrest likely due to   -S/p ACLS with ROSC after 17 minutes    #Aspiration PNA  #Shock due to above  -Pressors  -Family to make him comfort care    #Post operative mechanical ventilation  -Extubated 7/8 when intubated for surgery  -Now intubated due to above on 7/18  -Plan to palliatively extubate later today    #Midgut volvulus  #Ileus   -S/p emergent ex-lap and successful detorsion 7/7  -Completed course of empiric zosyn  -NPO  -Surgery following     #Upper airway wheezing; possible vocal cord dysfunction due to inflammation from intubation/ET tube  -CXR without acute process  -S/p trial steroids  -Pulm following  -ENT consulted, s/p flexible laryngoscopy with normal vocal cord motion without masses but did show thrush  -Now comfort care    #Oropharyngeal thrush  -Nystatin     #ANTHONY on CKD 3  -Monitor     #Hypernatremia  -Resolved     #Hypertension  -Hold meds     #Normocytic anemia   #Component of post-op blood loss, expected  #COPD   #Chronic HFpEF  #Moderate mitral and aortic regurgitation   #Crohn's disease  #GERD  #BPH  #Hypothyroidism  #HLD  #Prior VTE previously on DOAC   #Ventral incisional hernia with failed repair x 2 in past   #Hx of diverticulitis    Discussed with family. Plan for palliative extubation, they have elected for comfort care which is reasonable.         Nirmal Gan DO      Supplementary Documentation:     Quality:  DVT Mechanical Prophylaxis:        DVT Pharmacologic Prophylaxis   Medication   None     Code Status: DNAR/Comfort Care  Thomas: Thomas catheter in place  YAS: TBD    Discharge is dependent on: Clinical state, consultant recs  At this point Mr. Cooney is expected to be discharge to: [facility]    The 21st Century Cures Act makes medical notes like  these available to patients in the interest of transparency. Please be advised this is a medical document. Medical documents are intended to carry relevant information, facts as evident, and the clinical opinion of the practitioner. The medical note is intended as peer to peer communication and may appear blunt or direct. It is written in medical language and may contain abbreviations or verbiage that are unfamiliar.             [1]    chlorhexidine gluconate  15 mL Mouth/Throat BID@0800,2000    mineral oil-white petrolatum  1 Application Both Eyes Nightly    morphINE  2 mg Intravenous Once    LORazepam  1 mg Intravenous Once    nystatin  5 mL Oral QID    [Held by provider] losartan  25 mg Oral Daily    [Held by provider] mesalamine DR  800 mg Oral TID    pantoprazole  40 mg Oral QAM AC

## 2025-07-18 NOTE — ANESTHESIA PROCEDURE NOTES
Airway  Date/Time: 7/18/2025 2:10 AM  Reason: emergent    Airway not difficult    General Information and Staff   Patient location during procedure: floor  Anesthesiologist: Dennis Main MD  Performed: anesthesiologist   Performed by: Dennis Main MD  Authorized by: Dennis Main MD        Consent for Airway (if performed for an anesthetic, see related documentation for consents)   Consent: No emergent situation. Verbal consent not obtained. Written consent not obtained  Risks and benefits: risks, benefits and alternatives were not discussed    Indications and Patient Condition  Indications for airway management: anesthesia and cardio/pulmonary arrest  Sedation level: no sedation      Preoxygenated: yesPatient position: sniffing    Mask difficulty assessment: 0 - not attempted    Final Airway Details    Final airway type: endotracheal airway    Successful airway: ETT  Cuffed: yes   Successful intubation technique: Video laryngoscopy  Facilitating devices/methods: intubating stylet  Endotracheal tube insertion site: oral  Blade: GlideScope  Blade size: #3  ETT size (mm): 7.5    Cormack-Lehane Classification: grade I - full view of glottis  Placement verified by: capnometry   Cuff volume (mL): 10  Measured from: lips  ETT to lips (cm): 23  Number of attempts at approach: 1    Additional Comments  Called emergently to Code Blue for patient in cardiopulmonary arrest due to presumed aspiration.  ACLS being performed by house staff and bag mask ventilation being performed by respiratory therapy.  No sedation given as patient unresponsive.  Glidescope # 3 used, vocal cords visualized and copious liquid sectretions noted to be present at glottic opening.  Atraumatic oral intubation ETT 7.5, +CO2 color change detection noted, +BBS.  Taped and secured by respiratory therapist.

## 2025-07-18 NOTE — PROGRESS NOTES
Pulmonary Progress Note        NAME: [facility]ed Cooney [facility] - MRN: [id number] - Age: 89 year old - : 1934        Last 24hrs: Overnight events reviewed, intubated w/ central and arterial lines placed, sedated this AM    OBJECTIVE:  Vitals:    25 0600 25 0630 25 0700 25 0800   BP:       BP Location:       Pulse: 104 97 88 101   Resp: (!) 30 (!) 30 (!) 30 (!) 28   Temp: (!) 93.9 °F (34.4 °C) (!) 93.7 °F (34.3 °C) (!) 93.6 °F (34.2 °C) (!) 93.4 °F (34.1 °C)   TempSrc:       SpO2: 100% 100% 100% 100%   Weight:       Height:           Oxygen Therapy  SpO2: 100 %  O2 Device: Ventilator  ETCO2 (mmHg): 32 mmHg  FiO2 (%): 50 %  O2 Flow Rate (L/min): 5 L/min  Pulse Oximetry Type: Continuous  Oximetry Probe Site Changed: Yes  Pulse Ox Probe Location: Ear lobe                  Intake/Output Summary (Last 24 hours) at 2025 0902  Last data filed at 2025 0535  Gross per 24 hour   Intake 1025 ml   Output 150 ml   Net 875 ml       Scheduled Medication:Scheduled Medications[1]  Continuous Infusing Medication:Medication Infusions[2]    Lungs: clear to auscultation bilaterally  Heart: S1, S2 normal, no murmur, click, rub or gallop, regular rate and rhythm  Abdomen: soft, hypoactive BS  Extremities: extremities normal, atraumatic, no cyanosis or edema    Labs reviewed as noted below        ASSESSMENT/PLAN:    Acute hypoxemic respiratory failure  - intubated for exlap, extubated , reintubated  after probable aspiration  -on vent, iTime adjusted as peak pressures were in the 50s placing pt at risk for pneumothorax, after adjustment peak pressures were in the mid 30s  -family to pursue comfort measures later today  Wheezing  -most likely due to VCD, may have some vocal cord swelling related to recent intubation, may also be having a flare of his COPD though this is least likely  Thrush  -seen on ENT exam  -cont nystatin  Volvulus - in a patient with a hx of diverticular  disease and ventral incisional hernia, now s/p ex lap with detorsion 7/7  -per surgery  -completed a course of Zosyn   -now w/ abd distension- KUB per PCP  COPD - intermittent wheezing on exam, seems to be upper airway, possibly from persistent coughing that pt had earlier this admission  - IV steroids  Proph  -subcutaneous heparin   Dispo  -DNAR, awaiting family members at bedside  -discussed w/ ICU and IM    Cctime 35 minutes      [facility]  Pulmonary and Critical Care               [1]    chlorhexidine gluconate  15 mL Mouth/Throat BID@0800,2000    mineral oil-white petrolatum  1 Application Both Eyes Nightly    methylPREDNISolone  40 mg Intravenous Q8H    piperacillin-tazobactam  3.375 g Intravenous Q12H    morphINE  2 mg Intravenous Once    LORazepam  1 mg Intravenous Once    nystatin  5 mL Oral QID    insulin aspart  1-5 Units Subcutaneous TID CC and HS    [Held by provider] carvedilol  6.25 mg Oral BID with meals    [Held by provider] ferrous sulfate  325 mg Oral BID with meals    [Held by provider] losartan  25 mg Oral Daily    [Held by provider] mesalamine DR  800 mg Oral TID    heparin  5,000 Units Subcutaneous Q8H SCAR    [Held by provider] tamsulosin  0.4 mg Oral Nightly    levothyroxine  75 mcg Oral Daily @ 0700    pantoprazole  40 mg Oral QAM AC   [2]    propofol 20 mcg/kg/min (07/18/25 0334)    vasopressin (Vasostrict) 20 Units in sodium chloride 0.9% 100 mL infusion for septic shock 0.03 Units/min (07/18/25 0332)    norepinephrine 16 mcg/min (07/18/25 0648)    morphINE in sodium chloride 0.9%      LORazepam (Ativan) 20 mg in sodium chloride 0.9% 100 mL infusion      sodium chloride 75 mL/hr at 07/17/25 1221

## 2025-07-28 NOTE — PROGRESS NOTES
Provider Clarification     Additional information on the patient's diagnosis of hypotension    Septic shock    This note is part of the patient's medical record.

## (undated) DEVICE — GLOVE SUR 7.5 SENSICARE PI PIP CRM PWD F

## (undated) DEVICE — ENDOSCOPY PACK - LOWER: Brand: MEDLINE INDUSTRIES, INC.

## (undated) DEVICE — MEDI-VAC NON-CONDUCTIVE SUCTION TUBING: Brand: CARDINAL HEALTH

## (undated) DEVICE — SPECIMEN TRAP LUKI

## (undated) DEVICE — Device

## (undated) DEVICE — DUAL LUMEN STOMACH TUBE,ANTI-REFLUX VALVE: Brand: SALEM SUMP

## (undated) DEVICE — 3M™ RED DOT™ MONITORING ELECTRODE WITH FOAM TAPE AND STICKY GEL, 50/BAG, 20/CASE, 72/PLT 2570: Brand: RED DOT™

## (undated) DEVICE — DRAPE FLD WRM W44XL66IN C6L FOR INTRATEMP SYS

## (undated) DEVICE — Device: Brand: DEFENDO AIR/WATER/SUCTION AND BIOPSY VALVE

## (undated) DEVICE — SUT PDS II 1 27IN CT-1 ABSRB VLT L36MM 1/2

## (undated) DEVICE — "MH-438 A/W VLVE F/140 EVIS-140": Brand: AIR/WATER VALVE

## (undated) DEVICE — FILTERLINE NASAL ADULT O2/CO2

## (undated) DEVICE — 1200CC GUARDIAN II: Brand: GUARDIAN

## (undated) DEVICE — SOLUTION IRRIG 1000ML 0.9% NACL USP BTL

## (undated) DEVICE — APPLICATOR PREP 26ML CHG 2% ISO ALC 70%

## (undated) DEVICE — MEDI-VAC SUCTION HANDLE REGULAR CAPACITY: Brand: CARDINAL HEALTH

## (undated) DEVICE — PACK CLOSING BUNDLE

## (undated) DEVICE — GLOVE SUR 7.5 SENSICARE PI PIP GRN PWD F

## (undated) DEVICE — COVER LT HNDL RIG FOR SUR CAM DISP

## (undated) DEVICE — SNARE CAPTI HEX STIFF SMALL

## (undated) DEVICE — FORCEP BIOPSY RJ4 LG CAP W/ND

## (undated) DEVICE — FIAPC® PROBE W/ FILTER 2200 C OD 2.3MM/6.9FR; L 2.2M/7.2FT: Brand: ERBE

## (undated) DEVICE — COVER MAYO STD W24XL53IN 3 LAYR SMS RECOIL

## (undated) DEVICE — SUT ETHLN 2-0 18IN FS NABSRB BLK 26MM 3/8 CIR

## (undated) DEVICE — SLEEVE COMPR MD KNEE LEN SGL USE KENDALL SCD

## (undated) DEVICE — PACK PBDS LAPAROTOMY GENERAL

## (undated) NOTE — LETTER
[facility] [address], [address]    Consent for Operation    Date: June 14th, 2018    Time: 1600    1. I authorize the performance upon Syed Deluca Ends the following operation:    Procedure(s):   [address] wi revealed by the pictures or by descriptive texts accompanying them. If the procedure has been videotaped, the surgeon will obtain the original videotape. The hospital will not be responsible for storage or maintenance of this tape.     6. For the purpose of THAT MY DOCTOR PROVIDED ME WITH THE ABOVE EXPLANATIONS, THAT ALL BLANKS OR STATEMENTS REQUIRING INSERTION OR COMPLETION WERE FILLED IN.     Signature of Patient:   ___________________________    When the patient is a minor or mentally incompetent to give co · All of the medicines I take (including prescriptions, herbal supplements, and pills I can buy without a prescription (including street drugs/illegal medications).  Failure to inform my anesthesiologist about these medicines may increase my risk of anesthe _____________________________________________________________________________  Anesthesiologist Signature     Date   Time  I have discussed the procedure and information above with the patient (or patient’s representative) and answered their questions.  The

## (undated) NOTE — IP AVS SNAPSHOT
[facility] [place] [address] [place], [address] ~ [phone]                Discharge Summary   5/11/2017    Pili Pineda           Admission Information        Provider Department    5/11/2017 Agapito Baer MD  2ne-A CHANGE how you take these medications        Instructions Authorizing Provider    Morning Afternoon Evening As Needed    carvedilol 6.25 MG Tabs   Last time this was given:  12.5 mg on 5/16/2017  6:30 AM   Commonly known as:  CORE Commonly known as:  NEXIUM        Take 40 mg by mouth every morning before breakfast.                            ferrous sulfate 325 (65 FE) MG [facility]   Last time this was given:  325 mg on 5/16/2017 10:05 AM        Take 325 mg by mouth 2 (two) times daily w Where to Get Your Medications      These medications were sent to [facility], [address] [phone], [phone] [address] [place] [address] [place]  [address]     Phone:  [phone]    - albuterol sulfate (2.5 MG/3ML) 0.083% 179.0     (05/12/17)  6.6 (05/12/17)  3.69 (L) (05/12/17)  10.8 (L) (05/12/17)  31.8 (L) (05/12/17)  86.2    (05/12/17)  162.0       Recent Hematology Lab Results (cont.)  (Last 3 results in the past 90 days)    Neutrophil % Lymphocyte % Monocyte % Eosinop harming yourself, contact [facility] at [phone]. - If you don’t have insurance, [facility] has partnered with Patient [address] to help you get signed up for insurance coverage.   [facility] [place] your medications while at home. Ant-Infective Medications     clarithromycin 500 MG Oral Tab         Use: Treat infections or suspected infection   Most common side effects:  Allergic reactions, rash, nausea, diarrhea   What to report to your health Umeclidinium Bromide 62.5 MCG/INH Inhalation Aerosol Powder, Breath Activated    Albuterol Sulfate HFA (PROAIR HFA) 108 (90 BASE) MCG/ACT Inhalation Aero Soln    Budesonide-Formoterol Fumarate (SYMBICORT) 160-4.5 MCG/ACT Inhalation Aerosol       Use:  Morris Cholecalciferol (VITAMIN D) 1000 UNITS Oral Tab    tamsulosin HCl (FLOMAX) 0.4 MG Oral Cap

## (undated) NOTE — LETTER
01/15/25      To whom it may concern,     I am writing to provide documentation supporting the medical necessity for in-home aide services for my patient, Syed Cooney, who is under my care in my capacity as a family medicine physician. This letter serves to outline the patient's current medical condition and the rationale for the requested services.    Syed Cooney has been diagnosed with Chronic Obstructive Pulmonary Disease, Age-related physical debility, Bilateral lower extremity weakness. These conditions include but are not limited to symptoms of shortness of breath (with rest and exertion), difficulty with mobility, poor endurance, impaired imbalance, muscle weakness etc. Due to these health challenges, Syed Cooney requires assistance with activities of daily living (ADLs).    The need for an aide is crucial for the following reasons:    1. Safety Concerns: Syed Cooney is at an increased risk of falls and injuries due to impaired balance, muscle weakness. An aide will help mitigate these risks by providing necessary support and supervision.    2. Improved Quality of Life: With the assistance of an aide, Syed Cooney  can maintain a level of independence while ensuring their safety and well-being, thereby enhancing their quality of life.    3. Health Management: The aide will assist in managing Syed Petersens complex medical regimen, ensuring adherence to prescribed treatments and monitoring for any adverse effects or complications.    4. Prevention of Hospitalization: Continuous in-home support can prevent complications that might otherwise lead to hospitalization, thereby reducing overall healthcare costs.    Given these considerations, I strongly recommend approval for additional aide services to adequately meet Syed Petersens needs. The presence of a skilled aide is essential in providing the necessary care and support  that cannot be met by family members alone.    Please feel free to contact my office at [phone] for any additional information or clarification needed to facilitate this request. Thank you for your attention to this matter and for your continued support in providing comprehensive care for Syed Cooney.    Sincerely,    Jeffrey Gan MD  Family Medicine  [address] [place] [address], [address]

## (undated) NOTE — IP AVS SNAPSHOT
Patient Demographics     Address Phone    [address] [facility]  [address] 60 530 49 87 [facility])  [phone] (Mobile) *Preferred*      Emergency Contact(s)     Name Relation Home Work Rodney Ghosh [phone]    Jameel Cheung ? Avoid smoking. Known to cause breathing problems and can decrease the rate of healing. Incision care/Dressing changes  ? Wash hands before and after dressing changes.     FOR DRY GAUZE DRESSINGS:  ? Change dressing daily using dry sterile gauze and pap ? Change position at least every 45 minutes while awake to avoid stiffness or increased discomfort.  o For hip replacements – use abductor pillow or bed pillow as directed.  o For knee replacements– may elevate your leg by placing a pillow under entire leg ? Pain, excessive tenderness, redness, or swelling in leg or calf (other than incision site). Signs of Possible Dislocation (for hip replacement surgeries)  ? Increased severe leg or groin pain. ? Turning in or out of surgical leg that is new.   ? Incre Place 10 mg rectally daily. Buffered Aspirin 325 MG Tabs   Commonly known as:  BUFFERIN   Next dose due:  1/9/17 at 9 pm        Take 325 mg by mouth 2 (two) times daily.                                carvedilol 6.25 MG Tabs   La Take 100 mg by mouth daily.                             nystatin [id number] UNIT/ML Susp   Last time this was given:  500,000 Units on 1/9/2017  3:36 PM   Commonly known as:  MYCOSTATIN   Next dose due:  1/9/17 at 9 pm   Notes to Patient:  Take for 7 more days Take 1 tablet by mouth 2 (two) times daily. SYMBICORT 160-4.5 MCG/ACT Aero   Generic drug:  Budesonide-Formoterol Fumarate   Next dose due:  1/9/17 at 9 pm        Inhale 2 puffs into the lungs 2 (two) times daily. Given      [id number] Levothyroxine Sodium (SYNTHROID) tab 50 mcg 01/09/17 0455 Given      [id number] Losartan Potassium (COZAAR) tab 100 mg 01/09/17 0842 Given      [id number] Pantoprazole Sodium (PROTONIX) EC tab 40 mg (Or Linked Group #1) 01/09/17 0456 Giv Order ID Medication Name Action Time Action Reason Comments    [id number] Enoxaparin Sodium (LOVENOX) 40 MG/0.4ML injection 40 mg 01/09/17 1537 Given                    Recent Vital Signs       Most Recent Value    Vitals 145/62 mmHg Filed at 01/09/2017 15 Total Calciums are not corrected for effects of low albumin. If needed, use the following correction formula. Corrected Calcium Formula:      ((4.0 - Albumin) x 0.8 + Calcium    Note: Calculation is only valid when Albumin is less than 4.0g/dL.      So Aerobic Bacterial Culture Once [[id number]] Collected:  01/08/17 1824    Order Status:  Completed Lab Status:  Preliminary result Updated:  01/09/17 1417    Specimen Information:  Other from Knee, right      Aerobic Culture Result No Growth 1 Day      Aero today from rehab.  He is 80, has COPD, osteoarthritis, recent knee repair on R side.  He was in rehab, doing well until two days ago, per son, started complaining of abdominal discomfort, decreased appetite.  He then had episode of vomiting this AM, and pe bisacodyl 10 MG Rectal Suppos Place 10 mg rectally daily. Disp:  Rfl:  1/4/2017 at 0900   carvedilol 6.25 MG Oral Tab Take 6.25 mg by mouth 2 (two) times daily with meals.  Disp:  Rfl:  1/4/2017 at 0900   cephALEXin 250 MG Oral Cap Take 250 mg by mouth 4 (f lansoprazole 30 MG Oral Capsule Delayed Release Take 30 mg by mouth every morning before breakfast. Disp:  Rfl:  1/4/2017 at 0900   cyanocobalamin 1000 MCG/ML Injection Solution Inject 1,000 mcg into the muscle every 30 (thirty) days.    Disp:  Rfl:  Past W Heart:    Regular rate and rhythm, S1 and S2 normal, JULISA at the LSB, negative for rub or gallop   Abdomen:     Soft, distended, tender to palpation, hyperactive bowel sounds,  no masses, no organomegaly   Rectal:    Normal tone, no masses or tenderness; Plan  Gi consult noted d/w family    Sindi Brothers MD  1/5/2017  8:04 AM     Electronically signed by Raúl Myles MD on 1/5/2017  8:08 AM            D/C Summary     No notes of this type exist for this encounter.          Physical The Comment 6 yeara ago colonscopy with polpys    COLONOSCOPY  6/30/2014    Comment Procedure: COLONOSCOPY;  Surgeon: Uche Thompson DO;  Location: [facility] ENDOSCOPY    KNEE REPLACEMENT SURGERY      EGD N/A 1/4/2017    Comment Procedure: [id number] Stoop/Curb Assistance: Not tested (offered stairs - time constraints)  Comment : above scores based on dept protocol    Skilled Therapy Provided:     Pt up to [facility] chair upon PT arrival - Pt eager to ambulate in hallway.   Pt performed seated ankle pumps - in Goal #3   Patient is able to ambulate 100 feet with assist device: walker - rolling at assistance level: supervision         Goal #4        Goal #5        Goal #6        Goal Comments: Goals established on 1/5/2017 - all goals ongoing as of 1/9/2017 Comment Procedure: COLONOSCOPY;  Surgeon: Kassandra Gerard DO;  Location: [facility] ENDOSCOPY    KNEE REPLACEMENT SURGERY      EGD N/A 1/4/2017    Comment Procedure: ESOPHAGOGASTRODUODENOSCOPY (EGD);   Surgeon: Sabine Fuentes MD;  Location: [facility] MAIN OR remove blankets to prepare for supine to sit with min assist. Pt needed cues to scoot forward and for safety. Pt sat on edge of bed, moderate wheezing, Respiratory therapist provided breathing treatment while pt worked on exercises.    During sit to stand, supervision         Goal #2   Patient is able to demonstrate transfers EOB to/from [facility] at assistance level: supervision         Goal #3   Patient is able to ambulate 100 feet with assist device: walker - rolling at assistance level: supervision [address] Hyperglycemia    Leukocytosis    Acute renal failure (ARF) (HCC)    Acute kidney injury (HCC)    Azotemia    Metabolic acidosis    Metabolic alkalosis    Respiratory alkalosis    Gastrointestinal hemorrhage    Anemia, unspecified type    Dyspnea, unspeci -   Toileting, which includes using toilet, bedpan or urinal? : A Little  -   Putting on and taking off regular upper body clothing?: None  -   Taking care of personal grooming such as brushing teeth?: None  -   Eating meals?: None    AM-PAC Score:  Score: strengthening/ROM; Endurance training;Cognitive reorientation;Patient/Family education;Patient/Family training;Equipment eval/education; Fine motor coordination activities; Compensatory technique education      ADL Goals  Patient will perform lower body dress Laryngeal Penetration: During the swallow (with straw only)  Tracheal Aspiration:  (none)  Cough/Throat Clear Response: No                        HARD SOLID  Triggered at: Valleculae  Delay (seconds):  (2.5)  Laryngeal Penetration:  (none)  Tracheal Aspira Pager [phone]         Navjot Powers               SLP Note - SLP Notes      SLP Note by Navjot Powers at 1/9/2017  9:30 AM  Version 1 of 1    Author:  Navjot Powers Service:  (none) Author Type:  Speech and Language Pathologist    Filed:  1/9/2017 10

## (undated) NOTE — LETTER
[facility]  [address]ley [address] [facility], [address] [place] [address]    Consent for Operation    Date: __________________    Time: _______________    1.  I authorize the performance upon Pratapsinh D Denver Mellow the following operation:    Procedure(s):  ESOPHAGOGASTROD procedure has been videotaped, the surgeon will obtain the original videotape. The hospital will not be responsible for storage or maintenance of this tape.     6. For the purpose of advancing medical education, I consent to the admittance of observers to t STATEMENTS REQUIRING INSERTION OR COMPLETION WERE FILLED IN.     Signature of Patient:   ___________________________    When the patient is a minor or mentally incompetent to give consent:  Signature of person authorized to consent for patient: ____________ supplements, and pills I can buy without a prescription (including street drugs/illegal medications). Failure to inform my anesthesiologist about these medicines may increase my risk of anesthetic complications.   · If I am allergic to anything or have had Anesthesiologist Signature     Date   Time  I have discussed the procedure and information above with the patient (or patient’s representative) and answered their questions. The patient or their representative has agreed to have anesthesia services.     ___

## (undated) NOTE — LETTER
[address]  [address]  Authorization for Surgical Operation and Procedure    Date:___________                                                                                                         Time:__________  1.  I hereby authorizeSurgeon(s):  Xu Stark MD, my physician and his/her assistants (if applicable), which may include medical students, residents, and/or fellows, to perform the following surgical operation/ procedure and administer such anesthesia as may be determined necessary by my physician:  Operation/Procedure name (s) Procedure(s):  EXPLORATORY LAPAROTOMY POSSIBLE BOWEL RESECTION on [facility]   2.   I recognize that during the surgical operation/procedure, unforeseen conditions may necessitate additional or different procedures than those listed above.  I, therefore, further authorize and request that the above-named surgeon, assistants, or designees perform such procedures as are, in their judgment, necessary and desirable.    3.   My surgeon/physician has discussed prior to my surgery the potential benefits, risks and side effects of this procedure; the likelihood of achieving goals; and potential problems that might occur during recuperation.  They also discussed reasonable alternatives to the procedure, including risks, benefits, and side effects related to the alternatives and risks related to not receiving this procedure.  I have had all my questions answered and I acknowledge that no guarantee has been made as to the result that may be obtained.    4.   Should the need arise during my operation/procedure, which includes change of level of care prior to discharge, I also consent to the administration of blood and/or blood products.  Further, I understand that despite careful testing and screening of blood or blood products by collecting agencies, I may still be subject to ill effects as a result of receiving a blood transfusion  and/or blood products.  The following are some, but not all, of the potential risks that can occur: fever and allergic reactions, hemolytic reactions, transmission of diseases such as Hepatitis, AIDS and Cytomegalovirus (CMV) and fluid overload.  In the event that I wish to have an autologous transfusion of my own blood, or a directed donor transfusion, I will discuss this with my physician.  Check only if Refusing Blood or Blood Products  I understand refusal of blood or blood products as deemed necessary by my physician may have serious consequences to my condition to include possible death. I hereby assume responsibility for my refusal and release the hospital, its personnel, and my physicians from any responsibility for the consequences of my refusal.         o  Refuse    5.   I authorize the use of any specimen, organs, tissues, body parts or foreign objects that may be removed from my body during the operation/procedure for diagnosis, research or teaching purposes and their subsequent disposal by hospital authorities.  I also authorize the release of specimen test results and/or written reports to my treating physician on the hospital medical staff or other referring or consulting physicians involved in my care, at the discretion of the Pathologist or my treating physician.    6.   I consent to the photographing or videotaping of the operations or procedures to be performed, including appropriate portions of my body for medical, scientific, or educational purposes, provided my identity is not revealed by the pictures or by descriptive texts accompanying them.  If the procedure has been photographed/videotaped, the surgeon will obtain the original picture, image, videotape or CD.  The hospital will not be responsible for storage, release or maintenance of the picture, image, tape or CD.    7.   I consent to the presence of a  or observers in the operating room as deemed necessary by my  physician or their designees.    8.   I recognize that in the event my procedure results in extended X-Ray/fluoroscopy time, I may develop a skin reaction.    9. If I have a Do Not Attempt Resuscitation (DNAR) order in place, that status will be suspended while in the operating room, procedural suite, and during the recovery period unless otherwise explicitly stated by me (or a person authorized to consent on my behalf). The surgeon or my attending physician will determine when the applicable recovery period ends for purposes of reinstating the DNAR order.  10. Patients having a sterilization procedure: I understand that if the procedure is successful the results will be permanent and it will therefore be impossible for me to inseminate, conceive, or bear children.  I also understand that the procedure is intended to result in sterility, although the result has not been guaranteed.   11. I acknowledge that my physician has explained sedation/analgesia administration to me including the risk and benefits I consent to the administration of sedation/analgesia as may be necessary or desirable in the judgment of my physician.    I CERTIFY THAT I HAVE READ AND FULLY UNDERSTAND THE ABOVE CONSENT TO OPERATION and/or OTHER PROCEDURE.     _________________________________________  __________________________________  Signature of Patient     Signature of Responsible Person         ___________________________________         Printed Name of Responsible Person             _________________________________                 Relationship to Patient  _________________________________________  ______________________________  Signature of Witness          Date  Time      Patient Name: Syed Cooney     : 1934                 Printed: 2025     Medical Record #: [id number]                                            Page 2 of 3      [address]  [address]    Consent for  Anesthesia    I, Syed Cooney agree to be cared for by an anesthesiologist, who is specially trained to monitor me and give me medicine to put me to sleep or keep me comfortable during my procedure    I understand that my anesthesiologist is not an employee or agent of [facility] or [employer]. He or she works for [facility] [employer].    As the patient asking for anesthesia services, I agree to:  Allow the anesthesiologist (anesthesia doctor) to give me medicine and do additional procedures as necessary. Some examples are: Starting or using an “IV” to give me medicine, fluids or blood during my procedure, and having a breathing tube placed to help me breathe when I’m asleep (intubation). In the event that my heart stops working properly, I understand that my anesthesiologist will make every effort to sustain my life, unless otherwise directed by [facility] Do Not Resuscitate documents.  Tell my anesthesia doctor before my procedure:  If I am pregnant.  The last time that I ate or drank.  All of the medicines I take (including prescriptions, herbal supplements, and pills I can buy without a prescription (including street drugs/illegal medications). Failure to inform my anesthesiologist about these medicines may increase my risk of anesthetic complications.  If I am allergic to anything or have had a reaction to anesthesia before.  I understand how the anesthesia medicine will help me (benefits).  I understand that with any type of anesthesia medicine there are risks:  The most common risks are: nausea, vomiting, sore throat, muscle soreness, damage to my eyes, mouth, or teeth (from breathing tube placement).  Rare risks include: remembering what happened during my procedure, allergic reactions to medications, injury to my airway, heart, lungs, vision, nerves, or muscles and in extremely rare instances death.  My doctor has explained to me other choices available to me for  my care (alternatives).  Pregnant Patients (“epidural”):  I understand that the risks of having an epidural (medicine given into my back to help control pain during labor), include itching, low blood pressure, difficulty urinating, headache or slowing of the baby’s heart. Very rare risks include infection, bleeding, seizure, irregular heart rhythms and nerve injury.  Regional Anesthesia (“spinal”, “epidural”, & “nerve blocks”):  I understand that rare but potential complications include headache, bleeding, infection, seizure, irregular heart rhythms, and nerve injury.    I can change my mind about having anesthesia services at any time before I get the medicine.    _____________________________________________________________________________  Patient (or Representative) Signature/Relationship to Patient  Date   Time    _____________________________________________________________________________   Name (if used)    Language/Organization   Time    _____________________________________________________________________________  Anesthesiologist Signature     Date   Time  I have discussed the procedure and information above with the patient (or patient’s representative) and answered their questions. The patient or their representative has agreed to have anesthesia services.    _____________________________________________________________________________  Witness        Date   Time  I have verified that the signature is that of the patient or patient’s representative, and that it was signed before the procedure  Patient Name: Syed Cooney     : 1934                 Printed: 2025     Medical Record #: [id number]                     Page 3 of 3

## (undated) NOTE — IP AVS SNAPSHOT
[facility] [place] [address] [facility], [address] ~ [phone]                Discharge Summary   1/18/2017    Nino Mccarty           Admission Information        Provider Department    1/18/2017 Ameena Huerta MD  3sw-A Hailey Hercules taking these medications        Instructions Authorizing Provider    Morning Afternoon Evening As Needed    acetaminophen 325 MG Tabs   Last time this was given:  650 mg on 1/21/2017  4:38 PM   C losartan 100 MG Tabs   Last time this was given:  100 mg on 1/24/2017  9:06 AM   Commonly known as:  COZAAR   Next dose due:  1/25/17 am        Take 100 mg by mouth daily.                             Ondansetron HCl 4 mg tablet   Commonly known as:  Mina Neal Next dose due:  1/25/17 AM        Inhale 18 mcg into the lungs daily. Vitamin D 1000 UNITS Tabs   Last time this was given:  1,000 Units on 1/23/2017  9:08 AM   Next dose due:  1/25/17 AM        Take 2,000 Units by mouth daily. Recent Hematology Lab Results  (Last 3 results in the past 90 days)    WBC RBC Hemoglobin Hematocrit MCV MCH MCHC RDW Platelet MPV    (92/77/48)  3.3 (L) (01/24/17)  2.58 (L) (01/24/17)  7.7 (L) (01/24/17)  23.9 (L) (01/24/17)  92.6 (01/24/17)  29.8 (01/24 AEROBIC BACTERIAL CULTURE Preliminary result    ANAEROBIC CULTURE Preliminary result    BLOOD CULTURE Preliminary result    BLOOD CULTURE Preliminary result      Radiology Exams     None         Additional Information       We are concerned for your overa Medication Side Effects - Medications to be taken at home  As your caregivers, we want you to be aware of the medications you are prescribed to take and their potential SIDE EFFECTS.  Your nurse will review your medications with you before you are discharge dark, loose bowel movements           Blood Producing Medications     cyanocobalamin 1000 MCG/ML Injection Solution    ferrous sulfate 325 (65 FE) MG Oral Tab EC       Use: Increase blood cell counts   Most common side effects: Pain, fever, rash, fatigue, Albuterol Sulfate HFA (PROAIR HFA) 108 (90 BASE) MCG/ACT Inhalation Aero Soln    Budesonide-Formoterol Fumarate (SYMBICORT) 160-4.5 MCG/ACT Inhalation Aerosol       Use:  Treatment of asthma, COPD/emphysema, cough, allergies   Most common side effects: He

## (undated) NOTE — LETTER
[facility]  Ashley Kinney 61 62[address] [facility], [address] [place] [address]    Consent for Operation    Date: __________________    Time: _______________    1.  I authorize the performance upon Pratapsinh D Denver Mellow the following operation:    Procedure(s):  Esophagogastrod procedure has been videotaped, the surgeon will obtain the original videotape. The hospital will not be responsible for storage or maintenance of this tape.     6. For the purpose of advancing medical education, I consent to the admittance of observers to t STATEMENTS REQUIRING INSERTION OR COMPLETION WERE FILLED IN.     Signature of Patient:   ___________________________    When the patient is a minor or mentally incompetent to give consent:  Signature of person authorized to consent for patient: ____________ supplements, and pills I can buy without a prescription (including street drugs/illegal medications). Failure to inform my anesthesiologist about these medicines may increase my risk of anesthetic complications.   · If I am allergic to anything or have had Anesthesiologist Signature     Date   Time  I have discussed the procedure and information above with the patient (or patient’s representative) and answered their questions. The patient or their representative has agreed to have anesthesia services.     ___

## (undated) NOTE — LETTER
[facility]  Ashley Kinney 61 [address] [facility], [address] [place] [address]    Consent for Operation    Date: __________________    Time: _______________    1.  I authorize the performance upon Syed Aviles the following operation:    Procedure(s):  Esophagogastrod procedure has been videotaped, the surgeon will obtain the original videotape. The hospital will not be responsible for storage or maintenance of this tape.     6. For the purpose of advancing medical education, I consent to the admittance of observers to t STATEMENTS REQUIRING INSERTION OR COMPLETION WERE FILLED IN.     Signature of Patient:   ___________________________    When the patient is a minor or mentally incompetent to give consent:  Signature of person authorized to consent for patient: ____________ supplements, and pills I can buy without a prescription (including street drugs/illegal medications). Failure to inform my anesthesiologist about these medicines may increase my risk of anesthetic complications.   · If I am allergic to anything or have had Anesthesiologist Signature     Date   Time  I have discussed the procedure and information above with the patient (or patient’s representative) and answered their questions. The patient or their representative has agreed to have anesthesia services.     ___

## (undated) NOTE — ED AVS SNAPSHOT
Luz Marina Kade   MRN: [id number]    Department:  [facility] Emergency Department   Date of Visit:  6/6/2019           Disclosure     Insurance plans vary and the physician(s) referred by the ER may not be covered by your plan.  Please con tell this physician (or your personal doctor if your instructions are to return to your personal doctor) about any new or lasting problems. The primary care or specialist physician will see patients referred from the [facility] Emergency Department.  Marco iRcks

## (undated) NOTE — IP AVS SNAPSHOT
[address]            (For Outpatient Use Only) Initial Admit Date: 7/16/2019   Inpt/Obs Admit Date: Inpt: 7/19/19 / Obs: 07/16/19   Discharge Date:    Edith Later:  [de-identified]   MRN: [de-identified]   CSN: [id number]   CEID: [id number] Subscriber ID:  Pt Rel to Subscriber:    Hospital Account Financial Class: Medicare Advantage    July 22, 2019

## (undated) NOTE — LETTER
[address]  [address]  Authorization for Surgical Operation and Procedure     Date:__July 16, 2025_________                                                                                                         Time:__1645________  I hereby authorize  Dr Ferrell, my physician and his/her assistants (if applicable), which may include medical students, residents, and/or fellows, to perform the following surgical operation/ procedure and administer such anesthesia as may be determined necessary by my physician:  Operation/Procedure name (s)  Fiberoptic Laryngoscipy  on  Syed Cooney  2.   I recognize that during the surgical operation/procedure, unforeseen conditions may necessitate additional or different procedures than those listed above.  I, therefore, further authorize and request that the above-named surgeon, assistants, or designees perform such procedures as are, in their judgment, necessary and desirable.    3.   My surgeon/physician has discussed prior to my surgery the potential benefits, risks and side effects of this procedure; the likelihood of achieving goals; and potential problems that might occur during recuperation.  They also discussed reasonable alternatives to the procedure, including risks, benefits, and side effects related to the alternatives and risks related to not receiving this procedure.  I have had all my questions answered and I acknowledge that no guarantee has been made as to the result that may be obtained.    4.   Should the need arise during my operation/procedure, which includes change of level of care prior to discharge, I also consent to the administration of blood and/or blood products.  Further, I understand that despite careful testing and screening of blood or blood products by collecting agencies, I may still be subject to ill effects as a result of receiving a blood transfusion and/or blood products.  The following are some,  but not all, of the potential risks that can occur: fever and allergic reactions, hemolytic reactions, transmission of diseases such as Hepatitis, AIDS and Cytomegalovirus (CMV) and fluid overload.  In the event that I wish to have an autologous transfusion of my own blood, or a directed donor transfusion, I will discuss this with my physician.  Check only if Refusing Blood or Blood Products  I understand refusal of blood or blood products as deemed necessary by my physician may have serious consequences to my condition to include possible death. I hereby assume responsibility for my refusal and release the hospital, its personnel, and my physicians from any responsibility for the consequences of my refusal.          o  Refuse      5.   I authorize the use of any specimen, organs, tissues, body parts or foreign objects that may be removed from my body during the operation/procedure for diagnosis, research or teaching purposes and their subsequent disposal by hospital authorities.  I also authorize the release of specimen test results and/or written reports to my treating physician on the hospital medical staff or other referring or consulting physicians involved in my care, at the discretion of the Pathologist or my treating physician.    6.   I consent to the photographing or videotaping of the operations or procedures to be performed, including appropriate portions of my body for medical, scientific, or educational purposes, provided my identity is not revealed by the pictures or by descriptive texts accompanying them.  If the procedure has been photographed/videotaped, the surgeon will obtain the original picture, image, videotape or CD.  The hospital will not be responsible for storage, release or maintenance of the picture, image, tape or CD.    7.   I consent to the presence of a  or observers in the operating room as deemed necessary by my physician or their designees.    8.   I recognize  that in the event my procedure results in extended X-Ray/fluoroscopy time, I may develop a skin reaction.    9. If I have a Do Not Attempt Resuscitation (DNAR) order in place, that status will be suspended while in the operating room, procedural suite, and during the recovery period unless otherwise explicitly stated by me (or a person authorized to consent on my behalf). The surgeon or my attending physician will determine when the applicable recovery period ends for purposes of reinstating the DNAR order.  10. Patients having a sterilization procedure: I understand that if the procedure is successful the results will be permanent and it will therefore be impossible for me to inseminate, conceive, or bear children.  I also understand that the procedure is intended to result in sterility, although the result has not been guaranteed.   11. I acknowledge that my physician has explained sedation/analgesia administration to me including the risk and benefits I consent to the administration of sedation/analgesia as may be necessary or desirable in the judgment of my physician.    I CERTIFY THAT I HAVE READ AND FULLY UNDERSTAND THE ABOVE CONSENT TO OPERATION and/or OTHER PROCEDURE.    _________________________________________  __________________________________  Signature of Patient     Signature of Responsible Person         ___________________________________         Printed Name of Responsible Person           _________________________________                 Relationship to Patient  _________________________________________  ______________________________  Signature of Witness          Date  Time      Patient Name: Syed Cooney     : 1934                 Printed: 2025     Medical Record #: [id number]                     Page 1 of [address] [place] [address] [place]  [address]    Consent for Anesthesia    I, Syed Cooney agree to  be cared for by an anesthesiologist, who is specially trained to monitor me and give me medicine to put me to sleep or keep me comfortable during my procedure    I understand that my anesthesiologist is not an employee or agent of [facility] or [employer]. He or she works for [employer].    As the patient asking for anesthesia services, I agree to:  Allow the anesthesiologist (anesthesia doctor) to give me medicine and do additional procedures as necessary. Some examples are: Starting or using an “IV” to give me medicine, fluids or blood during my procedure, and having a breathing tube placed to help me breathe when I’m asleep (intubation). In the event that my heart stops working properly, I understand that my anesthesiologist will make every effort to sustain my life, unless otherwise directed by [facility] Do Not Resuscitate documents.  Tell my anesthesia doctor before my procedure:  If I am pregnant.  The last time that I ate or drank.  All of the medicines I take (including prescriptions, herbal supplements, and pills I can buy without a prescription (including street drugs/illegal medications). Failure to inform my anesthesiologist about these medicines may increase my risk of anesthetic complications.  If I am allergic to anything or have had a reaction to anesthesia before.  I understand how the anesthesia medicine will help me (benefits).  I understand that with any type of anesthesia medicine there are risks:  The most common risks are: nausea, vomiting, sore throat, muscle soreness, damage to my eyes, mouth, or teeth (from breathing tube placement).  Rare risks include: remembering what happened during my procedure, allergic reactions to medications, injury to my airway, heart, lungs, vision, nerves, or muscles and in extremely rare instances death.  My doctor has explained to me other choices available to me for my care (alternatives).  Pregnant Patients  (“epidural”):  I understand that the risks of having an epidural (medicine given into my back to help control pain during labor), include itching, low blood pressure, difficulty urinating, headache or slowing of the baby’s heart. Very rare risks include infection, bleeding, seizure, irregular heart rhythms and nerve injury.  Regional Anesthesia (“spinal”, “epidural”, & “nerve blocks”):  I understand that rare but potential complications include headache, bleeding, infection, seizure, irregular heart rhythms, and nerve injury.    I can change my mind about having anesthesia services at any time before I get the medicine.    _____________________________________________________________________________  Patient (or Representative) Signature/Relationship to Patient  Date   Time    _____________________________________________________________________________   Name (if used)    Language/Organization   Time    _____________________________________________________________________________  Anesthesiologist Signature     Date   Time  I have discussed the procedure and information above with the patient (or patient’s representative) and answered their questions. The patient or their representative has agreed to have anesthesia services.    _____________________________________________________________________________  Witness        Date   Time  I have verified that the signature is that of the patient or patient’s representative, and that it was signed before the procedure  Patient Name: Syed Cooney     : 1934                 Printed: 2025     Medical Record #: [id number]                     Page 2 of 2

## (undated) NOTE — IP AVS SNAPSHOT
Patient Demographics     Address  [address] [place] [address] Phone  [phone] [facility])  [phone] (Mobile) *Preferred*      Emergency Contact(s)     Name Relation Home Work Rodney Ghosh [phone]  Tee Montgomery Levothyroxine Sodium 50 MCG Tabs  Commonly known as:  SYNTHROID  Next dose due:  7/23/2019 @ 7am      Take 50 mcg by mouth before breakfast.          mesalamine 1.2 g Tbec  Commonly known as:  LIALDA  Next dose due:  7/23/2010 @ 9am      Take 2 tablets (2. [id number] ferrous sulfate EC tab 325 mg 07/22/19 0821 Given      [id number] mesalamine (DELZICOL) delayed release capsule 07/21/19 1713 Given      [id number] mesalamine (DELZICOL) delayed release capsule 07/22/19 0619 Given      [id number] mesalamine [facility] • Diverticulosis of large intestine    • Frequent use of laxatives    • High blood pressure    • High cholesterol    • Leg swelling    • Mitral valve disorder     leaky mitral valve   • Osteoarthrosis, unspecified whether generalized or localized, unspecif carvedilol 6.25 MG Oral Tab Take 6.25 mg by mouth 2 (two) times daily with meals. Disp:  Rfl:  7/15/2019 at Unknown time   Pravastatin Sodium 20 MG Oral Tab Take 20 mg by mouth nightly.  Disp:  Rfl:  7/15/2019 at Unknown time   albuterol sulfate (2.5 MG/3ML Throat:   Lips, mucosa, and tongue normal; teeth and gums normal   Neck:   Supple, symmetrical, trachea midline, no adenopathy;        thyroid:  No enlargement/tenderness/nodules; no carotid    bruit or JVD   Back:     Symmetric, no curvature, ROM normal, ANTHONY (acute kidney injury) ([facility]     Azotemia     Metabolic acidosis     Metabolic alkalosis     Respiratory alkalosis     Gastrointestinal hemorrhage     Gastrointestinal hemorrhage, unspecified gastrointestinal hemorrhage type     Anemia, unspecified t 7.   Normal left ventricular systolic function with diastolic dysfunction and elevated left ventricular end-diastolic pressure. PROGNOSIS:  Fair. RECOMMENDATIONS:    1. Tylenol 500 mg q.6 h. for 2 or 3 days. 2.   Lexiscan stress test.  3. BNP. EKG:  No acute changes. Two 2 sets of troponin and EKG are normal.  One set of EKG and troponin and BNP have been ordered. PLAN:  Because of the chest wall tenderness. I would give him some Tylenol and get a Lexiscan done tomorrow.   If it is negative, Anemia, unspecified type     Dyspnea, unspecified type     Upper GI bleeding     Asthma-COPD overlap syndrome (HCC)     Essential hypertension, benign     Acute exacerbation of chronic obstructive pulmonary disease (COPD) (HCC)     Renal insufficiency Patient did develop orthostasis, which resolved with IVF. Patient had urinary retention, finch was placed.     Consultations: cardiology,urology    Procedures: finch    Complications: None    Discharge Condition: Stable         Discharge Medications:      D 7/21/2019  9:26 AM[PS.1]    Electronically signed by Pranay Unger MD on 7/21/2019  9:31 AM   Attribution Cr    PS. 1 - Pranay Unger MD on 7/21/2019  9:26 AM  PS. 2 - Pranay Unger MD on 7/21/2019  9:27 AM                        Physical Therapy Notes ( • Other and unspecified hyperlipidemia    • Shortness of breath    • Thyroid disease    • Unspecified essential hypertension    • Wheezing[AR.2]        Past Surgical History[AR.1]  Past Surgical History:   Procedure Laterality Date   • ANGIOGRAM      2009 How much help from another person does the patient currently need. .. [AR.1]   -   Moving to and from a bed to a chair (including a wheelchair)?: A Little   -   Need to walk in hospital room?: A Little   -   Climbing 3-5 steps with a railing?: A Little[AR.2] Pt alert, denied dizziness. Pt reported fatigue, although no witnessed knee buckling this session. Pt able to tolerate 500ft at SBA level with use of RW. [AR.1]  Pending next session for stair training.[AR.2]    Pt seen this AM for transfers, gait trainin Goal Comments: Goals established on 7/17/2019. Ongoing as of 7/22/2019[AR.1]      Attribution Cr    AR. 1 Pantera Waters, PTA on 7/22/2019 10:38 AM  AR. 2 - Melody Smalls, PTA on 7/22/2019 11:44 AM  AR. 3 - Melody Smalls, PTA on 7/22/2019 11:48 AM • Unspecified essential hypertension    • Wheezing        Past Surgical History  Past Surgical History:   Procedure Laterality Date   • ANGIOGRAM      2009   • COLONOSCOPY N/A 6/30/2014    Performed by Genaro Machado DO at [facility] ENDOSCOPY   • 2 [facility] -   Climbing 3-5 steps with a railing?: A Little       AM-PAC Score:  Raw Score: 18   Approx Degree of Impairment: 46.58%   Standardized Score (AM-PAC Scale): 43.63   CMS Modifier (G-Code): CK    FUNCTIONAL ABILITY STATUS  Gait Assessment   Gait Assistance son present for stair training, and HHPT to assess home safety. Pt would benefit from increased social support in home environment. Pt seen this AM for transfers, gait training and BLE strengthening.   Pt with improved activity tolerance this session, No notes of this type exist for this encounter. SLP Notes    No notes of this type exist for this encounter.      Immunizations     Name Date      Pneumovax 23 05/05/16

## (undated) NOTE — IP AVS SNAPSHOT
[facility] [place] [address] [place], [address] ~ [phone]                Discharge Summary   1/4/2017    Ruddy Rodriguez           Admission Information        Provider Department    1/4/2017 Aracelis Avilez MD  3sw-A Commonly known as:  BUFFERIN   Next dose due:  1/9/17 at 9 pm        Take 325 mg by mouth 2 (two) times daily.                                carvedilol 6.25 MG Tabs   Last time this was given:  6.25 mg on 1/9/2017  8:42 AM   Commonly known as:  COREG   Nex Last time this was given:  500,000 Units on 1/9/2017  3:36 PM   Commonly known as:  MYCOSTATIN   Next dose due:  1/9/17 at 9 pm   Notes to Patient:  Take for 7 more days        Take 5 mL (500,000 Units total) by mouth 4 (four) times daily.     Darby Wu Generic drug:  Budesonide-Formoterol Fumarate   Next dose due:  1/9/17 at 9 pm        Inhale 2 puffs into the lungs 2 (two) times daily.                                tamsulosin HCl 0.4 MG Caps   Commonly known as:  FLOMAX   Next dose due:  1/10/17 at 9 am dry if necessary and cover incision with dry sterile gauze and paper tape. For other types of dressings, follow surgeon’s orders. Driving  ? Do not drive until cleared by physician at follow-up office visit. This is usually four to six weeks. ?  Not all ? Review anticoagulant education information sheet provided. Discomfort  ? Surgical discomfort is normal for one to two months. ? Take pain medication as prescribed with food, allowing 30-45 minutes to take effect.   ? Do not drink alcohol while on pain ? Continue using incentive spirometry    Post op Office visits  ? Schedule  10 days to 3 weeks after surgery. ? Additional visits may need to be scheduled. Your physician will discuss this at first post-op office visit.   ? Schedule outpatient physical the Specialties:  PULMONARY DISEASES, Intensivist    Contact information:    [address] [facility]  [phone]        Immunization History as of 1/9/2017  Never Reviewed    Pneumovax 23 (Lot Mgr) 5/5/2016      Recent Hematology Lab Resu Pending Labs     Order Current Status    AEROBIC BACTERIAL CULTURE Preliminary result    ANAEROBIC CULTURE Preliminary result      Radiology Exams     None      Patient Belongings       Most Recent Value    All belongings returned to patient at discharge P medical emergencies, dial 911.             _____________________________________________________________________________    Medication Side Effects - Medications to be taken at home  As your caregivers, we want you to be aware of the medications you are pr Most common side effects: Pain, fever, rash, fatigue, joint pain, blood clots, high blood pressure   What to report to your healthcare team: Pain, swelling, rash, fever           Narcotic Medications     Acetaminophen-Codeine #3 300-30 MG Oral Tab       Us increased heart rate, increased blood pressure, allergic reaction, yeast infection of the mouth/tongue   What to report to your healthcare provider: Head or mouth pain, coating on mouth/tongue, shortness of breath, sensation of heart racing, rash, or itchi

## (undated) NOTE — IP AVS SNAPSHOT
Patient Demographics     Address Phone    [address] [facility]  [place] 55 [phone] 25 [facility])  [phone] (Mobile) *Preferred*      Emergency Contact(s)     Name Relation Home Work Rodney Ghosh [phone]    Jameel Cheung cyanocobalamin 1000 MCG/ML Soln   Commonly known as:  VITAMIN B12        Inject 1,000 mcg into the muscle every 30 (thirty) days.                          ferrous sulfate 325 (65 FE) MG [facility]   Last time this was given:  325 mg on 1/24/2017  9:06 AM   Next Commonly known as:  MIRALAX   Next dose due:  1/24/17 PM        Take 17 g by mouth nightly.                             Pravastatin Sodium 40 MG Tabs   Last time this was given:  40 mg on 1/23/2017  9:30 PM   Commonly known as:  PRAVACHOL   Next dose due: Vitamin D 1000 UNITS Tabs   Last time this was given:  1,000 Units on 1/23/2017  9:08 AM   Next dose due:  1/25/17 AM        Take 2,000 Units by mouth daily.                               ASK your doctor about these medications [id number] Pravastatin Sodium (PRAVACHOL) tab 40 mg 01/23/17 2130 Given      [id number] Umeclidinium Bromide (INCRUSE ELLIPTA) 62.5 MCG/INH inhaler 1 puff 01/24/17 1000 Given      [id number] carvedilol (COREG) tab 6.25 mg 01/24/17 0906 Given      [id number] c Ordering provider: Nik Arriola MD  01/24/17 2653 Resulting lab: [facility]    Specimen Information    Type Source Collected On   Blood  01/24/17 6250          Components       Value Reference Range Flag Lab   Slide Review Slide reviewed,normal RB Aerobic Bacterial Culture Once [[id number]] Collected:  01/22/17 1225    Order Status:  Completed Lab Status:  Preliminary result Updated:  01/24/17 1239    Specimen Information:  Other from Knee, right      Aerobic Culture Result Light Mixed gram positive Order Status:  Completed Lab Status:  Final result Updated:  01/23/17 0700    Specimen Information:  Blood from Blood,peripheral      Blood Culture Result No Growth 5 Days     Blood Culture FREQ X 2 [[id number]] Collected:  01/18/17 9911    Order Status: Order Status:  Completed Lab Status:  Final result Updated:  01/20/17 7652    Specimen Information:  Stool from Stool      E Coli Shigatoxin Negative for Shigatoxins     MRSA Culture Only Once [[id number]] Collected:  01/18/17 0700    Order Status:  Comple Past Surgical History    HERNIA SURGERY      ANGIOGRAM      Comment 2009    OTHER SURGICAL HISTORY      Comment 6 yeara ago colonscopy with polpys    COLONOSCOPY  6/30/2014    Comment Procedure: COLONOSCOPY;  Surgeon: Janet Mckeon DO;  Location: [address] [place] [address] Aerosol Inhale 2 puffs into the lungs 2 (two) times daily. Disp:  Rfl:  1/17/2017 at Unknown time   Acetaminophen-Codeine #3 300-30 MG Oral Tab Take 1 tablet by mouth every 4 (four) hours as needed for Pain.  Disp: 60 tablet Rfl: 0    predniSONE 10 MG Oral cyanocobalamin 1000 MCG/ML Injection Solution Inject 1,000 mcg into the muscle every 30 (thirty) days. Disp:  Rfl:  taking   Polyethylene Glycol 3350 Oral Powd Pack Take 17 g by mouth nightly.    Disp:  Rfl:  taking   Levothyroxine Sodium 50 MCG Oral Tab Lymph nodes:   Cervical, supraclavicular, and axillary nodes normal   Neurologic:   CNII-XII intact.  Normal strength, sensation and reflexes       throughout         Laboratory Data:     Lab Results  Component Value Date   WBC 5.6 01/18/2017   HGB 8.0 01/1 :  Dee Rodriguez MD (Physician)       Consult Orders:    1. IP Consult to Physical Medicine Rehab Once [[id number]] ordered by Bonnie Vernon MD at 01/20/17 1046                . [address] [facility] Patient Status:  Inpatient   D Current Functional Status: sba/cga transfers and gait      Past Medical History:  @Medical hx@  Past Medical History   Diagnosis Date   • Other and unspecified hyperlipidemia    • Unspecified essential hypertension    • Chronic lung disease    • Diverticul [COMPLETED] dextrose 50 % injection      dextrose 5 %-0.45 % NaCl infusion  Intravenous Continuous   influenza vaccine (PF)(FLUZONE) high dose for 65 yrs & older nj 0.5ml 0.5 mL Intramuscular Prior to discharge   [COMPLETED] potassium chloride 40 mEq in so [DISCONTINUED] cephALEXin (KEFLEX) cap 500 mg 500 mg Oral QID   carvedilol (COREG) tab 6.25 mg 6.25 mg Oral BID with meals   Fluticasone Furoate-Vilanterol (BREO ELLIPTA) 200-25 MCG/INH inhaler 1 puff 1 puff Inhalation QAM   bisacodyl (DULCOLAX) rectal sup GLU 78 01/22/2017   CA 7.3 01/22/2017   ALB 1.7 01/22/2017   ALKPHO 72 01/22/2017   BILT 0.6 01/22/2017   TP 4.2 01/22/2017   AST 16 01/22/2017   ALT 14 01/22/2017       Review of Systems:  ROS as listed in HPI   Other 10 point review of systems within nor heel to shin testing is normal bilaterally. Psychiatric: Awake, alert and oriented x 3. Able to register and recall 3/3                                       items. Normal recent and remote memory. PHYSICAL THERAPY KNEE TREATMENT NOTE - INPATIENT     Room Number: 354/354-A     Session: 4  Number of Visits to Meet Established Goals: 5    Presenting Problem: UGI bleed, recent R TKR    Problem List  Principal Problem:    Upper GI bleeding  Active Probl BALANCE  Static Sitting: Good  Dynamic Sitting: Good  Static Standing: Fair -  Dynamic Standing: Poor +    ACTIVITY TOLERANCE  Room air    AM-PAC '6-Clicks' INPATIENT SHORT FORM - BASIC MOBILITY  How much difficulty does the patient currently have. ..  - aware of session/findings; All patient questions and concerns addressed    ASSESSMENT   Pt continues to progress well with mobility - Pt able to tolerate increased ambulation distance with minimal pain complaints.   Pt reluctant to attempt stairs this sessio Author:  Capo Young PTA Service:  (none) Author Type:  Physical Therapy Assistant    Filed:  1/23/2017 10:17 AM Note Time:  1/23/2017 10:08 AM Status:  Signed    :  Capo Young PTA (Physical Therapy Assistant)            PHYSICAL THERAP OBJECTIVE  Precautions: None    WEIGHT BEARING STATUS  Weight Bearing Restriction: None                PAIN ASSESSMENT   Rating: 3  Location: R knee  Management Techniques:  Activity promotion;Relaxation;Repositioning    BALANCE  Static Sitting: Good  Dynam Heel slides    Laq    SLR    Sitting Knee Flexion    Standing heel/toe raises 20 reps   Standing knee flexion 20 reps   Extension stretch  1x       Knee ROM   R Knee Flexion (degrees): 92     R Knee Extension (degrees): 5 (with LAQ)       Patient End of Se :  Hema Wilson PT (Physical Therapist)            PHYSICAL THERAPY KNEE TREATMENT NOTE - INPATIENT     Room Number: 354/354-A     Session: 2   Number of Visits to Meet Established Goals: 5    Presenting Problem: UGI bleed, recent R TKR    P PAIN ASSESSMENT   Ratin (at rest)  Location: R knee  Management Techniques:  Activity promotion;Relaxation;Repositioning    BALANCE  Static Sitting: Good  Dynamic Sitting: Fair -  Static Standing: Fair -  Dynamic Standing: Poor +    ACTIVITY TOLERANCE R Knee Flexion (degrees): 92     R Knee Extension (degrees): 5 (with LAQ)       Patient End of Session: Up in chair;Needs met;Call light within reach;RN aware of session/findings; All patient questions and concerns addressed;SCDs in place; Family present Filed:  1/24/2017 12:02 PM Note Time:  1/24/2017 11:50 AM Status:  Signed    :  Nicole Schrader OT (Occupational Therapist)           OCCUPATIONAL THERAPY TREATMENT NOTE - INPATIENT     Room Number: 354/354-A  Session: 3/5  Number of Visits to Radha Slade DO Jaz;  Location: [facility] ENDOSCOPY       SUBJECTIVE  \"I'm a little weak today\" [per son at bedside, patient fasting today]    Patient self-stated goal is to \"get better and stronger\".     OBJECTIVE  Precautions: None    WEIGHT BEARING RESTRICTION  Jose G Hoyt via RW and SBA > sitting in chair via SBA > simulated LB dressing via SBA with sufficient reach to B feet via forward lean without AE. Patient also reinforced on safety, fall prevention, OT role, care plan with good verbal understanding.  Offered patient op Patient will transfer to tub: with supervision --> goal discontinued 1/24, patient declined         Occupational Therapy Note by Carolyn Henderson OT at 1/23/2017  8:15 AM  Version 1 of 1    Author:  Carolyn Henderson OT Service:  (none) Author Type: Location: [address] [place] [address] ENDOSCOPY    KNEE REPLACEMENT SURGERY      EGD N/A 1/4/2017    Comment Procedure: ESOPHAGOGASTRODUODENOSCOPY (EGD);   Surgeon: Julianne Avila MD;  Location: [address] [place] [address] OR    EGD N/A 1/18/2017    Comment Procedure: ESOPHAGOGASTRODUODENOSCOPY ( was agreeable to utilizing grab bars > toileting via min assist, including pericare in sitting via SBA and clothing management via min assist for balance in standing > functional mobility to bedside chair via RW and CGA > sitting in chair via CGA and safet Occupational Therapy Note by Edy Aguilera OT at 1/22/2017 11:09 AM  Version 1 of 1    Author:  Edy Aguilera OT Service:  (none) Author Type:  Occupational Therapist    Filed:  1/22/2017 11:25 AM Note Time:  1/22/2017 11:09 AM Status:  Signed    :   Wa Comment Procedure: ESOPHAGOGASTRODUODENOSCOPY (EGD); Surgeon: Mejia Shah MD;  Location: [address] [place] [address] OR    EGD N/A 1/18/2017    Comment Procedure: ESOPHAGOGASTRODUODENOSCOPY (EGD);   Surgeon: Rakan Moulton DO;  Location: [address] [place] [address] ENDOSCOPY       SUBJECT R TKR. In this OT treatment session,  patient presents with the following impairments: decreased endurance, balance, safety awareness. Pt was eager for independence and needs reinforcement for using RW safely.   These deficits manifest functionally while p

## (undated) NOTE — LETTER
Date: 2018  Patient Name:  Olu Steinberg             Address: [address] [place], [place] [address]  [address] [place], [place] [id number]    : 1934        Dear Elisa Freeman,      I hope this letter finds you well.      As expected, the biopsies from your area of previously